# Patient Record
Sex: MALE | Race: WHITE | Employment: UNEMPLOYED | URBAN - NONMETROPOLITAN AREA
[De-identification: names, ages, dates, MRNs, and addresses within clinical notes are randomized per-mention and may not be internally consistent; named-entity substitution may affect disease eponyms.]

---

## 2020-10-16 ENCOUNTER — OFFICE VISIT (OUTPATIENT)
Dept: INTERNAL MEDICINE | Age: 50
End: 2020-10-16
Payer: MEDICAID

## 2020-10-16 VITALS
DIASTOLIC BLOOD PRESSURE: 82 MMHG | SYSTOLIC BLOOD PRESSURE: 122 MMHG | HEART RATE: 81 BPM | HEIGHT: 67 IN | WEIGHT: 193.2 LBS | OXYGEN SATURATION: 98 % | BODY MASS INDEX: 30.32 KG/M2

## 2020-10-16 PROBLEM — E89.0 H/O TOTAL THYROIDECTOMY: Status: ACTIVE | Noted: 2020-10-16

## 2020-10-16 PROBLEM — E34.9 HYPOTESTOSTERONISM: Status: ACTIVE | Noted: 2020-10-16

## 2020-10-16 PROBLEM — Z90.89 H/O TOTAL THYROIDECTOMY: Status: ACTIVE | Noted: 2020-10-16

## 2020-10-16 PROBLEM — I34.1 MITRAL VALVE PROLAPSE: Status: ACTIVE | Noted: 2020-10-16

## 2020-10-16 PROBLEM — M54.40 BACK PAIN OF LUMBAR REGION WITH SCIATICA: Status: ACTIVE | Noted: 2020-10-16

## 2020-10-16 PROBLEM — F41.9 ANXIETY: Status: ACTIVE | Noted: 2020-10-16

## 2020-10-16 PROBLEM — E66.09 OBESITY DUE TO EXCESS CALORIES: Status: ACTIVE | Noted: 2020-10-16

## 2020-10-16 PROBLEM — K21.9 GASTROESOPHAGEAL REFLUX DISEASE WITHOUT ESOPHAGITIS: Status: ACTIVE | Noted: 2020-10-16

## 2020-10-16 PROBLEM — Z98.890 H/O TOTAL THYROIDECTOMY: Status: ACTIVE | Noted: 2020-10-16

## 2020-10-16 PROBLEM — G43.001 MIGRAINE WITHOUT AURA WITH STATUS MIGRAINOSUS: Status: ACTIVE | Noted: 2020-10-16

## 2020-10-16 PROBLEM — E78.1 HYPERTRIGLYCERIDEMIA: Status: ACTIVE | Noted: 2020-10-16

## 2020-10-16 PROCEDURE — 90686 IIV4 VACC NO PRSV 0.5 ML IM: CPT | Performed by: INTERNAL MEDICINE

## 2020-10-16 PROCEDURE — G8431 POS CLIN DEPRES SCRN F/U DOC: HCPCS | Performed by: INTERNAL MEDICINE

## 2020-10-16 PROCEDURE — 4004F PT TOBACCO SCREEN RCVD TLK: CPT | Performed by: INTERNAL MEDICINE

## 2020-10-16 PROCEDURE — 90471 IMMUNIZATION ADMIN: CPT | Performed by: INTERNAL MEDICINE

## 2020-10-16 PROCEDURE — 96160 PT-FOCUSED HLTH RISK ASSMT: CPT | Performed by: INTERNAL MEDICINE

## 2020-10-16 PROCEDURE — G8417 CALC BMI ABV UP PARAM F/U: HCPCS | Performed by: INTERNAL MEDICINE

## 2020-10-16 PROCEDURE — 99203 OFFICE O/P NEW LOW 30 MIN: CPT | Performed by: INTERNAL MEDICINE

## 2020-10-16 PROCEDURE — G8484 FLU IMMUNIZE NO ADMIN: HCPCS | Performed by: INTERNAL MEDICINE

## 2020-10-16 PROCEDURE — G8427 DOCREV CUR MEDS BY ELIG CLIN: HCPCS | Performed by: INTERNAL MEDICINE

## 2020-10-16 RX ORDER — OMEPRAZOLE 20 MG/1
20 CAPSULE, DELAYED RELEASE ORAL 2 TIMES DAILY
COMMUNITY
End: 2020-10-23

## 2020-10-16 RX ORDER — GEMFIBROZIL 600 MG/1
600 TABLET, FILM COATED ORAL DAILY
COMMUNITY
End: 2021-01-18 | Stop reason: ALTCHOICE

## 2020-10-16 RX ORDER — PAROXETINE HYDROCHLORIDE 40 MG/1
40 TABLET, FILM COATED ORAL EVERY MORNING
COMMUNITY
End: 2021-01-18 | Stop reason: SDUPTHER

## 2020-10-16 RX ORDER — METHOCARBAMOL 500 MG/1
500 TABLET, FILM COATED ORAL 3 TIMES DAILY PRN
COMMUNITY
End: 2021-01-18 | Stop reason: ALTCHOICE

## 2020-10-16 RX ORDER — TESTOSTERONE 12.5 MG/1.25G
5 GEL TOPICAL DAILY
COMMUNITY
End: 2020-11-30 | Stop reason: SDUPTHER

## 2020-10-16 RX ORDER — PROPRANOLOL HYDROCHLORIDE 20 MG/1
20 TABLET ORAL NIGHTLY
COMMUNITY
End: 2020-10-23

## 2020-10-16 RX ORDER — ACETAMINOPHEN, ASPIRIN AND CAFFEINE 250; 250; 65 MG/1; MG/1; MG/1
1 TABLET, FILM COATED ORAL EVERY 6 HOURS PRN
COMMUNITY
End: 2021-01-18 | Stop reason: ALTCHOICE

## 2020-10-16 RX ORDER — SILDENAFIL CITRATE 20 MG/1
20 TABLET ORAL PRN
COMMUNITY
End: 2021-05-18 | Stop reason: SDUPTHER

## 2020-10-16 RX ORDER — LEVOTHYROXINE AND LIOTHYRONINE 76; 18 UG/1; UG/1
120 TABLET ORAL DAILY
COMMUNITY
End: 2021-05-10 | Stop reason: SDUPTHER

## 2020-10-16 SDOH — HEALTH STABILITY: MENTAL HEALTH: HOW MANY STANDARD DRINKS CONTAINING ALCOHOL DO YOU HAVE ON A TYPICAL DAY?: 1 OR 2

## 2020-10-16 SDOH — HEALTH STABILITY: MENTAL HEALTH: HOW OFTEN DO YOU HAVE A DRINK CONTAINING ALCOHOL?: MONTHLY OR LESS

## 2020-10-16 ASSESSMENT — ENCOUNTER SYMPTOMS
TROUBLE SWALLOWING: 0
COLOR CHANGE: 0
ABDOMINAL PAIN: 0
WHEEZING: 0
SORE THROAT: 0
DIARRHEA: 0
CONSTIPATION: 0
SHORTNESS OF BREATH: 0
STRIDOR: 0
BLOOD IN STOOL: 0
BACK PAIN: 1
COUGH: 0

## 2020-10-16 ASSESSMENT — PATIENT HEALTH QUESTIONNAIRE - PHQ9
6. FEELING BAD ABOUT YOURSELF - OR THAT YOU ARE A FAILURE OR HAVE LET YOURSELF OR YOUR FAMILY DOWN: 1
7. TROUBLE CONCENTRATING ON THINGS, SUCH AS READING THE NEWSPAPER OR WATCHING TELEVISION: 0
5. POOR APPETITE OR OVEREATING: 1
1. LITTLE INTEREST OR PLEASURE IN DOING THINGS: 3
4. FEELING TIRED OR HAVING LITTLE ENERGY: 3
2. FEELING DOWN, DEPRESSED OR HOPELESS: 1
10. IF YOU CHECKED OFF ANY PROBLEMS, HOW DIFFICULT HAVE THESE PROBLEMS MADE IT FOR YOU TO DO YOUR WORK, TAKE CARE OF THINGS AT HOME, OR GET ALONG WITH OTHER PEOPLE: 1
8. MOVING OR SPEAKING SO SLOWLY THAT OTHER PEOPLE COULD HAVE NOTICED. OR THE OPPOSITE, BEING SO FIGETY OR RESTLESS THAT YOU HAVE BEEN MOVING AROUND A LOT MORE THAN USUAL: 1
SUM OF ALL RESPONSES TO PHQ QUESTIONS 1-9: 14
9. THOUGHTS THAT YOU WOULD BE BETTER OFF DEAD, OR OF HURTING YOURSELF: 1
SUM OF ALL RESPONSES TO PHQ QUESTIONS 1-9: 14
SUM OF ALL RESPONSES TO PHQ9 QUESTIONS 1 & 2: 4
SUM OF ALL RESPONSES TO PHQ QUESTIONS 1-9: 13
3. TROUBLE FALLING OR STAYING ASLEEP: 3

## 2020-10-16 NOTE — PROGRESS NOTES
[facility] INTERNAL MEDICINE  [address] [facility], [place] [address]  Phone: [phone]  Fax: [phone]    Visit Date:  10/16/2020    SUBJECTIVE:     Augie Metcalf is a 52 y.o. 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 and he thinks that is where he developed his back injury he has chronic low back pain occasionally bad and he takes Robaxin as needed for pain otherwise he is not on any pain management occasionally the pain radiates to the leg  Patient complains of a migraine headache every day takes Excedrin Migraine with poor control and is requesting to see neurology  History of colonic polyp and is lost to follow-up  He says he has mitral valve prolapse as a child  Asthma as a child no history of intubations has been smoking a pack a day since age 17 and he is try Chantix which made him extremely nauseous  Occasionally he has GERD and he takes omeprazole  He has chronic anxiety which is well managed with paroxetine  He was on Inderal nightly for his mitral valve prolapse he does not take that regularly    Past Medical History:   Diagnosis Date    Anxiety     Herniated lumbar intervertebral disc     Kidney cysts     Liver cyst     Low testosterone     Migraine     OCD (obsessive compulsive disorder)        Past Surgical History:   Procedure Laterality Date    APPENDECTOMY      HERNIA REPAIR      THYROIDECTOMY         Social History     Socioeconomic History    Marital status:      Spouse name: Not on file    Number of children: Not on file    Years of education: Not on file  Highest education level: Not on file   Occupational History    Not on file   Social Needs    Financial resource strain: Not on file    Food insecurity     Worry: Not on file     Inability: Not on file    Transportation needs     Medical: Not on file     Non-medical: Not on file   Tobacco Use    Smoking status: Current Every Day Smoker     Packs/day: 2.00     Years: 34.00     Pack years: 68.00     Types: Cigarettes     Start date: 14    Smokeless tobacco: Never Used   Substance and Sexual Activity    Alcohol use: Yes     Frequency: Monthly or less     Drinks per session: 1 or 2    Drug use: Yes     Types: Marijuana    Sexual activity: Not on file   Lifestyle    Physical activity     Days per week: Not on file     Minutes per session: Not on file    Stress: Not on file   Relationships    Social connections     Talks on phone: Not on file     Gets together: Not on file     Attends Faith service: Not on file     Active member of club or organization: Not on file     Attends meetings of clubs or organizations: Not on file     Relationship status: Not on file    Intimate partner violence     Fear of current or ex partner: Not on file     Emotionally abused: Not on file     Physically abused: Not on file     Forced sexual activity: Not on file   Other Topics Concern    Not on file   Social History Narrative    Not on file       History reviewed. No pertinent family history. No Known Allergies    Current Outpatient Medications   Medication Sig Dispense Refill    testosterone (ANDROGEL) 25 MG/2.5GM (1%) GEL 1 % gel Apply 5 g topically daily.  Apply 4 clicks  (1ml) daily      gemfibrozil (LOPID) 600 MG tablet Take 600 mg by mouth daily      aspirin-acetaminophen-caffeine (EXCEDRIN MIGRAINE) 250-250-65 MG per tablet Take 1 tablet by mouth every 6 hours as needed for Headaches      omeprazole (PRILOSEC) 20 MG delayed release capsule Take 20 mg by mouth 2 times daily      PARoxetine (PAXIL) 40 MG tablet Take 40 mg by mouth every morning      methocarbamol (ROBAXIN) 500 MG tablet Take 500 mg by mouth 3 times daily as needed      thyroid (ARMOUR THYROID) 120 MG tablet Take 120 mg by mouth daily      sildenafil (REVATIO) 20 MG tablet Take 20 mg by mouth as needed      propranolol (INDERAL) 20 MG tablet Take 20 mg by mouth nightly       No current facility-administered medications for this visit. Patient Active Problem List   Diagnosis    Anxiety    Hypotestosteronism    Hypertriglyceridemia    H/O total thyroidectomy    Gastroesophageal reflux disease without esophagitis    Obesity due to excess calories    Migraine without aura with status migrainosus    Mitral valve prolapse    Back pain of lumbar region with sciatica             Review of Systems:   Review of Systems   Constitutional: Negative for activity change, appetite change, chills, diaphoresis, fatigue and fever. HENT: Negative for congestion, hearing loss, postnasal drip, sore throat, tinnitus and trouble swallowing. Eyes: Negative for visual disturbance. Respiratory: Negative for cough, shortness of breath, wheezing and stridor. Cardiovascular: Negative for chest pain, palpitations and leg swelling. Gastrointestinal: Negative for abdominal pain, blood in stool, constipation and diarrhea. GERD   Endocrine: Negative for cold intolerance. Genitourinary: Negative for frequency. Erectile dysfunction   Musculoskeletal: Positive for back pain. Negative for arthralgias and joint swelling. Skin: Negative for color change and wound. Allergic/Immunologic: Negative for environmental allergies. Neurological: Positive for numbness. Negative for dizziness, light-headedness and headaches. Hematological: Negative for adenopathy. Does not bruise/bleed easily. Psychiatric/Behavioral: Negative for decreased concentration, dysphoric mood and sleep disturbance. The patient is nervous/anxious.          OBJECTIVE: @  BP Readings from Last 3 Encounters:   10/16/20 122/82        @No results found for: LABA1C  No results found for: GLUF, LABMICR, LDLCALC, CREATININE     Physical Exam:    Physical Exam  Vitals signs and nursing note reviewed. Constitutional:       General: He is not in acute distress. Appearance: He is well-developed. HENT:      Head: Normocephalic and atraumatic. Right Ear: External ear normal.      Left Ear: External ear normal.      Nose: Nose normal.      Mouth/Throat:      Mouth: Mucous membranes are moist.   Eyes:      General: No scleral icterus. Conjunctiva/sclera: Conjunctivae normal.      Pupils: Pupils are equal, round, and reactive to light. Neck:      Musculoskeletal: Normal range of motion and neck supple. Thyroid: No thyromegaly. Vascular: No carotid bruit. Cardiovascular:      Rate and Rhythm: Normal rate and regular rhythm. Pulses: Normal pulses. Heart sounds: Normal heart sounds, S1 normal and S2 normal. No murmur. Pulmonary:      Effort: Pulmonary effort is normal.      Breath sounds: Normal breath sounds and air entry. No wheezing or rales. Abdominal:      General: Bowel sounds are normal. There is no distension. Palpations: Abdomen is soft. There is no mass. Tenderness: There is no abdominal tenderness. There is no rebound. Musculoskeletal: Normal range of motion. General: No tenderness. Right lower leg: No edema. Left lower leg: No edema. Lymphadenopathy:      Cervical: No cervical adenopathy. Skin:     General: Skin is warm and dry. Findings: No rash. Neurological:      General: No focal deficit present. Mental Status: He is alert and oriented to person, place, and time. Cranial Nerves: Cranial nerves are intact. No cranial nerve deficit. Sensory: Sensation is intact. Motor: Motor function is intact. Coordination: Coordination is intact. Gait: Gait is intact.       Deep Tendon Reflexes: Reflexes normal.   Psychiatric:         Attention and Perception: Attention normal.         Mood and Affect: Mood normal.         Speech: Speech normal.         Behavior: Behavior normal. Behavior is cooperative. Thought Content: Thought content normal.         Judgment: Judgment normal.       Hospital Outpatient Visit on 03/26/2015   Component Date Value Ref Range Status    Amphetamine Screen, Ur 03/26/2015 Negative  Ktkyon=4783 ng/mL Final    Amphetamine test includes Amphetamine and Methamphetamine.  Barbiturates 03/26/2015 Negative  Bcxwyk=788 ng/mL Final    Benzodiazepines 03/26/2015 Negative  Wtlqcj=359 ng/mL Final    Cannabinoids 03/26/2015 Negative  Cutoff=20 ng/mL Final    Cocaine (Metab.) 03/26/2015 Negative  Pqzulz=247 ng/mL Final    Opiate Scrn, Ur 03/26/2015 Negative  Ytvysi=130 ng/mL Final    Opiate test includes Codeine, Morphine, Hydromorphone, Hydrocodone.  Oxycodone, Urine Confirmation 03/26/2015 Negative  Jzdwyj=369 ng/mL Final    Test includes Oxycodone and Oxymorphone    PHENCYCLIDINE, REFLEX 03/26/2015 Negative  Cutoff=25 ng/mL Final    Methadone Screen, Urine 03/26/2015 Negative  Bdxvlr=714 ng/mL Final    Propoxyphene, Urine 03/26/2015 Negative  Neqvyd=938 ng/mL Final    MEPROBAMATE, REFLEX 03/26/2015 Negative  Ipnezn=367 ng/mL Final    Fentanyl, Ur 03/26/2015 Negative  Asadsf=2357 pg/mL Final    Test includes Fentanyl and Norfentanyl    Ethanol U, Deshaun 03/26/2015 Negative  Cutoff=0.020 % Final    Creatinine, Ur 03/26/2015 166.0  20.0 - 300.0 mg/dL Final    pH, Urine 03/26/2015 6.4  4.5 - 8.9 Final    Comment: Performed at:   [facility]  [address] [place] [address]  [id number]  : Rey Lomeli MD, Phone:  [phone]         ASSESSMENT:      Diagnosis Orders   1. Needs flu shot  INFLUENZA, QUADV, ADJUVANTED, 65 YRS =, IM, PF, PREFILL SYR, 0.5ML (FLUAD)   2.  Anxiety anxiety and depression in good control continue Paxil Comprehensive Metabolic Panel   3. Hypotestosteronism continue current testosterone replacement topical CBC Auto Differential   4. Hypertriglyceridemia  Lipid Panel   5. H/O total thyroidectomy  TSH without Reflex   6. Gastroesophageal reflux disease without esophagitis     7. Class 1 obesity due to excess calories without serious comorbidity with body mass index (BMI) of 30.0 to 30.9 in adult  Hemoglobin A1C   8. Screening for HIV without presence of risk factors  HIV Rapid 1&2   9. Hypovitaminosis D  Vitamin D 25 Hydroxy   10. Screening for colon cancer  [facility], [place]   11. Migraine without aura and with status migrainosus, not intractable  [address], [place]   12. Mitral valve prolapse     13. Back pain of lumbar region with sciatica     14. Tobacco abuse  Full PFT Study With Bronchodilator       PLAN:        Medications Ordered:  No orders of the defined types were placed in this encounter.       Other Orders Placed:  Orders Placed This Encounter   Procedures    INFLUENZA, QUADV, ADJUVANTED, 72 YRS =, IM, PF, PREFILL SYR, 0.5ML (FLUAD)    CBC Auto Differential     Standing Status:   Future     Standing Expiration Date:   10/16/2021    Comprehensive Metabolic Panel     Standing Status:   Future     Standing Expiration Date:   10/16/2021    Hemoglobin A1C     Standing Status:   Future     Standing Expiration Date:   10/16/2021    HIV Rapid 1&2     Standing Status:   Future     Standing Expiration Date:   10/16/2021     Order Specific Question:   Specify Date & Time of Incident     Answer:   screening    Lipid Panel     Standing Status:   Future     Standing Expiration Date:   10/16/2021     Order Specific Question:   Is Patient Fasting?/# of Hours     Answer:   yes    TSH without Reflex     Standing Status:   Future     Standing Expiration Date:   10/16/2021    Vitamin D 25 Hydroxy     Standing Status:   Future     Standing Expiration Date:   10/16/2021   [facility], [place]     Referral Priority:   Routine     Referral Type:   Eval and Treat     Referral Reason:   Specialty Services Required     Requested Specialty:   Gastroenterology     Number of Visits Requested:   [address], [place]     Referral Priority:   Routine     Referral Type:   Eval and Treat     Referral Reason:   Specialty Services Required     Requested Specialty:   Neurology     Number of Visits Requested:   1    Full PFT Study With Bronchodilator     Standing Status:   Future     Standing Expiration Date:   10/16/2021       Return in about 3 months (around 1/16/2021).            Electronically signed by Theron Mackenzie MD on 10/16/2020 at 9:58 AM

## 2020-10-16 NOTE — PATIENT INSTRUCTIONS
Patient Education        A Healthy Lifestyle: Care Instructions  Your Care Instructions     A healthy lifestyle can help you feel good, stay at a healthy weight, and have plenty of energy for both work and play. A healthy lifestyle is something you can share with your whole family. A healthy lifestyle also can lower your risk for serious health problems, such as high blood pressure, heart disease, and diabetes. You can follow a few steps listed below to improve your health and the health of your family. Follow-up care is a key part of your treatment and safety. Be sure to make and go to all appointments, and call your doctor if you are having problems. It's also a good idea to know your test results and keep a list of the medicines you take. How can you care for yourself at home? · Do not eat too much sugar, fat, or fast foods. You can still have dessert and treats now and then. The goal is moderation. · Start small to improve your eating habits. Pay attention to portion sizes, drink less juice and soda pop, and eat more fruits and vegetables. ? Eat a healthy amount of food. A 3-ounce serving of meat, for example, is about the size of a deck of cards. Fill the rest of your plate with vegetables and whole grains. ? Limit the amount of soda and sports drinks you have every day. Drink more water when you are thirsty. ? Eat at least 5 servings of fruits and vegetables every day. It may seem like a lot, but it is not hard to reach this goal. A serving or helping is 1 piece of fruit, 1 cup of vegetables, or 2 cups of leafy, raw vegetables. Have an apple or some carrot sticks as an afternoon snack instead of a candy bar. Try to have fruits and/or vegetables at every meal.  · Make exercise part of your daily routine. You may want to start with simple activities, such as walking, bicycling, or slow swimming. Try to be active 30 to 60 minutes every day. You do not need to do all 30 to 60 minutes all at once.  For example, you can exercise 3 times a day for 10 or 20 minutes. Moderate exercise is safe for most people, but it is always a good idea to talk to your doctor before starting an exercise program.  · Keep moving. [place] Ramp the lawn, work in the garden, or [employer]. Take the stairs instead of the elevator at work. · If you smoke, quit. People who smoke have an increased risk for heart attack, stroke, cancer, and other lung illnesses. Quitting is hard, but there are ways to boost your chance of quitting tobacco for good. ? Use nicotine gum, patches, or lozenges. ? Ask your doctor about stop-smoking programs and medicines. ? Keep trying. In addition to reducing your risk of diseases in the future, you will notice some benefits soon after you stop using tobacco. If you have shortness of breath or asthma symptoms, they will likely get better within a few weeks after you quit. · Limit how much alcohol you drink. Moderate amounts of alcohol (up to 2 drinks a day for men, 1 drink a day for women) are okay. But drinking too much can lead to liver problems, high blood pressure, and other health problems. Family health  If you have a family, there are many things you can do together to improve your health. · Eat meals together as a family as often as possible. · Eat healthy foods. This includes fruits, vegetables, lean meats and dairy, and whole grains. · Include your family in your fitness plan. Most people think of activities such as jogging or tennis as the way to fitness, but there are many ways you and your family can be more active. Anything that makes you breathe hard and gets your heart pumping is exercise. Here are some tips:  ? Walk to do errands or to take your child to school or the bus.  ? Go for a family bike ride after dinner instead of watching TV. Where can you learn more? Go to https://chavelinoeb.health-partners. org and sign in to your [employer] account.  Enter P890 in the [facility] box to learn more about \"A Healthy Lifestyle: Care Instructions. \"     If you do not have an account, please click on the \"Sign Up Now\" link. Current as of: January 31, 2020               Content Version: 12.6  © 2006-2020 [employer]. Care instructions adapted under license by [facility] ([facility]). If you have questions about a medical condition or this instruction, always ask your healthcare professional. [address] any warranty or liability for your use of this information. Patient Education        Heart-Healthy Diet: Care Instructions  Your Care Instructions     A heart-healthy diet has lots of vegetables, fruits, nuts, beans, and whole grains, and is low in salt. It limits foods that are high in saturated fat, such as meats, cheeses, and fried foods. It may be hard to change your diet, but even small changes can lower your risk of heart attack and heart disease. Follow-up care is a key part of your treatment and safety. Be sure to make and go to all appointments, and call your doctor if you are having problems. It's also a good idea to know your test results and keep a list of the medicines you take. How can you care for yourself at home? Watch your portions  · Learn what a serving is. A \"serving\" and a \"portion\" are not always the same thing. Make sure that you are not eating larger portions than are recommended. For example, a serving of pasta is ½ cup. A serving size of meat is 2 to 3 ounces. A 3-ounce serving is about the size of a deck of cards. Measure serving sizes until you are good at [place]" them. Keep in mind that restaurants often serve portions that are 2 or 3 times the size of one serving. · To keep your energy level up and keep you from feeling hungry, eat often but in smaller portions. · Eat only the number of calories you need to stay at a healthy weight.  If you need to lose weight, eat fewer calories than your body burns (through exercise and other physical activity). Eat more fruits and vegetables  · Eat a variety of fruit and vegetables every day. Dark green, deep orange, red, or yellow fruits and vegetables are especially good for you. Examples include spinach, carrots, peaches, and berries. · Keep carrots, celery, and other veggies handy for snacks. Buy fruit that is in season and store it where you can see it so that you will be tempted to eat it. · Cook dishes that have a lot of veggies in them, such as stir-fries and soups. Limit saturated and trans fat  · Read food labels, and try to avoid saturated and trans fats. They increase your risk of heart disease. · Use olive or canola oil when you cook. · Bake, broil, grill, or steam foods instead of frying them. · Choose lean meats instead of high-fat meats such as hot dogs and sausages. Cut off all visible fat when you prepare meat. · Eat fish, skinless poultry, and meat alternatives such as soy products instead of high-fat meats. Soy products, such as tofu, may be especially good for your heart. · Choose low-fat or fat-free milk and dairy products. Eat foods high in fiber  · Eat a variety of grain products every day. Include whole-grain foods that have lots of fiber and nutrients. Examples of whole-grain foods include oats, whole wheat bread, and brown rice. · Buy whole-grain breads and cereals, instead of white bread or pastries. Limit salt and sodium  · Limit how much salt and sodium you eat to help lower your blood pressure. · Taste food before you salt it. Add only a little salt when you think you need it. With time, your taste buds will adjust to less salt. · Eat fewer snack items, fast foods, and other high-salt, processed foods. Check food labels for the amount of sodium in packaged foods. · Choose low-sodium versions of canned goods (such as soups, vegetables, and beans). Limit sugar  · Limit drinks and foods with added sugar.  These include candy, desserts, and soda pop.  Limit alcohol  · Limit alcohol to no more than 2 drinks a day for men and 1 drink a day for women. Too much alcohol can cause health problems. When should you call for help? Watch closely for changes in your health, and be sure to contact your doctor if:    · You would like help planning heart-healthy meals. Where can you learn more? Go to https://chpelainaewprerna.healthPivotLink. org and sign in to your [employer] account. Enter V137 in the [employer] box to learn more about \"Heart-Healthy Diet: Care Instructions. \"     If you do not have an account, please click on the \"Sign Up Now\" link. Current as of: August 22, 2019               Content Version: 12.6  © 8068-8648 [employer]. Care instructions adapted under license by [facility] ([facility]). If you have questions about a medical condition or this instruction, always ask your healthcare professional. [address] any warranty or liability for your use of this information.

## 2020-10-16 NOTE — PROGRESS NOTES
After obtaining consent, and per orders of Dr. Aby Spicer, injection of Flu given in Left deltoid by Magan Burr. Patient instructed to remain in clinic for 20 minutes afterwards, and to report any adverse reaction to me immediately.

## 2020-10-19 ENCOUNTER — TELEPHONE (OUTPATIENT)
Dept: NEUROLOGY | Age: 50
End: 2020-10-19

## 2020-10-19 NOTE — TELEPHONE ENCOUNTER
Called spoke with patient wife about appointment , with Catarina Pascal, wife is aware of the appointment, location and phone number.

## 2020-10-20 DIAGNOSIS — E55.9 HYPOVITAMINOSIS D: ICD-10-CM

## 2020-10-20 DIAGNOSIS — Z11.4 SCREENING FOR HIV WITHOUT PRESENCE OF RISK FACTORS: ICD-10-CM

## 2020-10-20 DIAGNOSIS — E66.09 CLASS 1 OBESITY DUE TO EXCESS CALORIES WITHOUT SERIOUS COMORBIDITY WITH BODY MASS INDEX (BMI) OF 30.0 TO 30.9 IN ADULT: ICD-10-CM

## 2020-10-20 DIAGNOSIS — F41.9 ANXIETY: ICD-10-CM

## 2020-10-20 DIAGNOSIS — E34.9 HYPOTESTOSTERONISM: ICD-10-CM

## 2020-10-20 DIAGNOSIS — E78.1 HYPERTRIGLYCERIDEMIA: ICD-10-CM

## 2020-10-20 DIAGNOSIS — E89.0 H/O TOTAL THYROIDECTOMY: ICD-10-CM

## 2020-10-20 LAB
ALBUMIN SERPL-MCNC: 4.3 G/DL (ref 3.5–5.2)
ALP BLD-CCNC: 76 U/L (ref 40–130)
ALT SERPL-CCNC: 19 U/L (ref 5–41)
ANION GAP SERPL CALCULATED.3IONS-SCNC: 13 MMOL/L (ref 7–19)
AST SERPL-CCNC: 15 U/L (ref 5–40)
BASOPHILS ABSOLUTE: 0 K/UL (ref 0–0.2)
BASOPHILS RELATIVE PERCENT: 0.7 % (ref 0–1)
BILIRUB SERPL-MCNC: <0.2 MG/DL (ref 0.2–1.2)
BUN BLDV-MCNC: 15 MG/DL (ref 6–20)
CALCIUM SERPL-MCNC: 9.1 MG/DL (ref 8.6–10)
CHLORIDE BLD-SCNC: 107 MMOL/L (ref 98–111)
CHOLESTEROL, TOTAL: 203 MG/DL (ref 160–199)
CO2: 23 MMOL/L (ref 22–29)
CREAT SERPL-MCNC: 0.9 MG/DL (ref 0.5–1.2)
EOSINOPHILS ABSOLUTE: 0.2 K/UL (ref 0–0.6)
EOSINOPHILS RELATIVE PERCENT: 3.1 % (ref 0–5)
GFR AFRICAN AMERICAN: >59
GFR NON-AFRICAN AMERICAN: >60
GLUCOSE BLD-MCNC: 113 MG/DL (ref 74–109)
HBA1C MFR BLD: 5.5 % (ref 4–6)
HCT VFR BLD CALC: 39.3 % (ref 42–52)
HDLC SERPL-MCNC: 41 MG/DL (ref 55–121)
HEMOGLOBIN: 13.5 G/DL (ref 14–18)
HIV-1 P24 AG: NORMAL
IMMATURE GRANULOCYTES #: 0 K/UL
LDL CHOLESTEROL CALCULATED: 135 MG/DL
LYMPHOCYTES ABSOLUTE: 1.6 K/UL (ref 1.1–4.5)
LYMPHOCYTES RELATIVE PERCENT: 27.3 % (ref 20–40)
MCH RBC QN AUTO: 31 PG (ref 27–31)
MCHC RBC AUTO-ENTMCNC: 34.4 G/DL (ref 33–37)
MCV RBC AUTO: 90.3 FL (ref 80–94)
MONOCYTES ABSOLUTE: 0.4 K/UL (ref 0–0.9)
MONOCYTES RELATIVE PERCENT: 7.6 % (ref 0–10)
NEUTROPHILS ABSOLUTE: 3.5 K/UL (ref 1.5–7.5)
NEUTROPHILS RELATIVE PERCENT: 61.1 % (ref 50–65)
PDW BLD-RTO: 13.7 % (ref 11.5–14.5)
PLATELET # BLD: 297 K/UL (ref 130–400)
PMV BLD AUTO: 9.4 FL (ref 9.4–12.4)
POTASSIUM SERPL-SCNC: 3.9 MMOL/L (ref 3.5–5)
RAPID HIV 1&2: NORMAL
RBC # BLD: 4.35 M/UL (ref 4.7–6.1)
SODIUM BLD-SCNC: 143 MMOL/L (ref 136–145)
TOTAL PROTEIN: 6.8 G/DL (ref 6.6–8.7)
TRIGL SERPL-MCNC: 133 MG/DL (ref 0–149)
TSH SERPL DL<=0.05 MIU/L-ACNC: 0.26 UIU/ML (ref 0.27–4.2)
VITAMIN D 25-HYDROXY: 34 NG/ML
WBC # BLD: 5.8 K/UL (ref 4.8–10.8)

## 2020-10-23 ENCOUNTER — OFFICE VISIT (OUTPATIENT)
Dept: GASTROENTEROLOGY | Age: 50
End: 2020-10-23
Payer: MEDICAID

## 2020-10-23 VITALS
HEIGHT: 67 IN | OXYGEN SATURATION: 98 % | WEIGHT: 195 LBS | SYSTOLIC BLOOD PRESSURE: 120 MMHG | BODY MASS INDEX: 30.61 KG/M2 | HEART RATE: 82 BPM | DIASTOLIC BLOOD PRESSURE: 80 MMHG

## 2020-10-23 PROCEDURE — 99204 OFFICE O/P NEW MOD 45 MIN: CPT | Performed by: NURSE PRACTITIONER

## 2020-10-23 PROCEDURE — 4004F PT TOBACCO SCREEN RCVD TLK: CPT | Performed by: NURSE PRACTITIONER

## 2020-10-23 PROCEDURE — G8427 DOCREV CUR MEDS BY ELIG CLIN: HCPCS | Performed by: NURSE PRACTITIONER

## 2020-10-23 PROCEDURE — G8484 FLU IMMUNIZE NO ADMIN: HCPCS | Performed by: NURSE PRACTITIONER

## 2020-10-23 PROCEDURE — G8417 CALC BMI ABV UP PARAM F/U: HCPCS | Performed by: NURSE PRACTITIONER

## 2020-10-23 ASSESSMENT — ENCOUNTER SYMPTOMS
ABDOMINAL DISTENTION: 0
VOMITING: 0
NAUSEA: 0
TROUBLE SWALLOWING: 0
DIARRHEA: 1
RECTAL PAIN: 0
SHORTNESS OF BREATH: 0
ABDOMINAL PAIN: 1
CONSTIPATION: 1
CHOKING: 0
ANAL BLEEDING: 1
BLOOD IN STOOL: 0
COUGH: 0

## 2020-10-23 NOTE — PATIENT INSTRUCTIONS
restrictions to diet and bowel prep instructions including laxatives. Please read these instructions one week prior to your scheduled procedure to ensure that you are prepared. If you have any questions regarding these instructions please call our office Mon through Fri from 8:00 am to 4:00 pm.     Follow prep instructions provided for bowel prep. Take all of the bowel prep as directed. If you are having problems with nausea, stop your prep for 30-45 min to allow the nausea to subside before resuming your prep. It is important to drink plenty of fluids throughout the day before taking your laxatives. This will help to protect your kidneys, prevent dehydration and maximize the effect of the bowel prep. Your diet before a colonoscopy bowel preparation is very important to ensure a successful colon exam. It is recommended to consider certain changes to your diet three to four days prior to the procedure. Remember that your bowels need to be completely empty for the exam.    What foods are good to eat? Cut down on heavy solid foods three to four days before the procedure and start introducing lighter meals to your diet. The following food suggestions are a good part of your diet before a colonoscopy bowel preparation.  Light meat that is easily digestible such as chicken (without the skin)    Potatoes without skin    Cheese    Eggs    A light meal of steamed white fish    Light clear soups    Foods and drinks to avoid  Avoid foods that contain too much fiber. Stay clear of dark colored beverages. They can stick to the walls of the digestive tract and make it difficult to differentiate from blood.  Some of these foods are:   Red meat, rice, nuts and vegetables    Milk, other milk based fluids and cream    Most fruit and puddings    Whole grain pasta    Cereals, bran and seeds    Colored beverages, especially those that are red or purple in color    Red colored Jell-O     On the day before the colonoscopy, continue to drink plenty of clear fluids. It is important   to keep yourself hydrated before the exam.     Please follow all instructions as provided for cleansing the bowel. Failure to have an adequately prepped colon may cause you to have incomplete exam with further testing required. [email or web address]    Increasing Your Fiber Intake   What is fiber? Fiber is the portion of plant foods that cannot be digested. There are two kinds of fiber, both of which are keys to a healthy diet and a healthy digestive system:   - Soluble fiber aids in bulking and moving food through the gut. It forms a gel when mixed with liquid. - Insoluble fiber does not mix with liquids and passes through the GI tract mostly intact. It is sometimes called \"roughage. \"     Why do I need to eat it? Fiber has many important roles:   - Helps maintain regular bowel movements. More fiber can improve both diarrhea and constipation.   - Reduces the risk of developing hemorrhoids. - Lowers LDL or \"bad\" cholesterol levels, which lowers risk of heart disease.   - Regulates blood sugar levels in people with diabetes. - Provides a feeling of fullness and may help with weight loss. How much do I need? The Academy of Nutrition and Dietetics recommends:   - For women, 25 grams per day under age 48 and 21 grams per day over age 48. - For men, 38 grams per day under age 48 and 30 grams per day over age 48. What foods are the best sources? Plant foods contain fiber, but some more than others. Best choices are:   - Whole grains and high fiber cereals. - Dried beans and legumes. - Fruits and vegetables, especially raw. What about fiber supplements? If you need to add more fiber than you can get in your diet, consider:   - Type of Fiber: The major brands of fiber supplements (Metamucil®, Konsyl®, Citrucel®, Benefiber®, Fibercon®) all use soluble fiber and work in the same way. - Flavorings and Mixing: Many of the powdered brands have added flavoring and are mixed with just water. Other varieties are \"clear\" and can be added to numerous beverages and food items. Some brands also offer a \"wafer\" form. It's up to you! Tip: Increasing the fiber in your diet gradually may help minimize bloating and discomfort. Be sure to drink plenty of fluids as you increase your fiber intake.         Fruits  Serving size  Total fiber (grams)    Pear  1 medium  5.1    Figs, dried  2 medium  3.7    Blueberries  1 cup  3.5    Apple, with skin  1 medium  4.4    Strawberries  1 cup  3.3    Peaches, dried  3 halves  3.2    Orange  1 medium  3.1    Apricots, dried  10 halves  2.6    Raisins  1.5-ounce box  1.6    Grains, cereal & pasta  Serving size  Total fiber (grams)    Spaghetti, whole-wheat  1 cup  6.3    Bran flakes  3/4 cup  5.1    Oatmeal  1 cup  4.0    Bread, rye  1 slice  1.9    Bread, whole-wheat  1 slice  1.9    Bread, mixed-grain  1 slice  1.7    Bread, cracked-wheat  1 slice  1.4

## 2020-10-23 NOTE — PROGRESS NOTES
Subjective:     Patient ID: Nico Ruelas is a 52 y.o. male  PCP: Mckenna Carroll MD  Referring Provider: Christelle Romero MD    HPI  Patient presents to the office today with the following complaints: New Patient and Colonoscopy      Pt here for colonoscopy. Today, he reports bowels alternate between constipation and diarrhea. He reports BM 2-3 times a day, since 2013. He does c/o tarry stools at times, and bright red blood in toilet water at times. This is associated with abdominal cramping at times. Last Colonoscopy 2017 - polyps per patient  Denies any family history of colon cancer or colon polyps    This was my first time assessing Mr. Jose Koehler    Assessment:     1. Rectal bleeding    2. Change in bowel habits    3. Abdominal cramping            Plan:   - High Fiber Diet  - Schedule colonoscopy  Instruct on bowel prep. Nothing to eat or drink after midnight the day of the exam.  Unable to drive for 24 hours after the procedure. No aspirin or nonsteroidal anti-inflammatories for 5 days before procedure. I have discussed the benefits, alternatives, and risks (including bleeding, perforation and death)  for pursuing Endoscopy (EGD/Colonscopy/EUS/ERCP) with the patient and they are willing to continue. We also discussed the need for anesthesia, IV access, proper dietary changes, medication changes if necessary, and need for bowel prep (if ordered) prior to their Endoscopic procedure. They are aware they must have someone accompany them to their scheduled procedure to drive them home - they agree to the above and are willing to continue. Orders  No orders of the defined types were placed in this encounter. Medications  No orders of the defined types were placed in this encounter.         Patient History:     Past Medical History:   Diagnosis Date    Anxiety     Herniated lumbar intervertebral disc     Kidney cysts     Liver cyst     Low testosterone     Migraine     OCD (obsessive compulsive disorder)        Past Surgical History:   Procedure Laterality Date    APPENDECTOMY      COLONOSCOPY  approx 2017    Dr Joselyn Madrid @ [facility] h[facility] \" polyps\" per pt recall    HERNIA REPAIR      THYROIDECTOMY         Family History   Problem Relation Age of Onset    Colon Cancer Neg Hx     Colon Polyps Neg Hx        Social History     Socioeconomic History    Marital status:      Spouse name: None    Number of children: None    Years of education: None    Highest education level: None   Occupational History    None   Social Needs    Financial resource strain: None    Food insecurity     Worry: None     Inability: None    Transportation needs     Medical: None     Non-medical: None   Tobacco Use    Smoking status: Current Every Day Smoker     Packs/day: 2.00     Years: 34.00     Pack years: 68.00     Types: Cigarettes     Start date: 1986    Smokeless tobacco: Never Used   Substance and Sexual Activity    Alcohol use: Yes     Frequency: Monthly or less     Drinks per session: 1 or 2     Comment: daily    Drug use: Yes     Types: Marijuana    Sexual activity: None   Lifestyle    Physical activity     Days per week: None     Minutes per session: None    Stress: None   Relationships    Social connections     Talks on phone: None     Gets together: None     Attends Church service: None     Active member of club or organization: None     Attends meetings of clubs or organizations: None     Relationship status: None    Intimate partner violence     Fear of current or ex partner: None     Emotionally abused: None     Physically abused: None     Forced sexual activity: None   Other Topics Concern    None   Social History Narrative    None       Current Outpatient Medications   Medication Sig Dispense Refill    testosterone (ANDROGEL) 25 MG/2.5GM (1%) GEL 1 % gel Apply 5 g topically daily.  Apply 4 clicks  (1ml) daily      gemfibrozil (LOPID) 600 MG tablet Take 600 mg by mouth daily      aspirin-acetaminophen-caffeine (EXCEDRIN MIGRAINE) 250-250-65 MG per tablet Take 1 tablet by mouth every 6 hours as needed for Headaches      PARoxetine (PAXIL) 40 MG tablet Take 40 mg by mouth every morning      methocarbamol (ROBAXIN) 500 MG tablet Take 500 mg by mouth 3 times daily as needed      thyroid (ARMOUR THYROID) 120 MG tablet Take 120 mg by mouth daily      sildenafil (REVATIO) 20 MG tablet Take 20 mg by mouth as needed       No current facility-administered medications for this visit. No Known Allergies    Review of Systems   Constitutional: Negative for activity change, appetite change, fatigue, fever and unexpected weight change. HENT: Negative for trouble swallowing. Respiratory: Negative for cough, choking and shortness of breath. Cardiovascular: Negative for chest pain. Gastrointestinal: Positive for abdominal pain (cramping), anal bleeding, constipation and diarrhea. Negative for abdominal distention, blood in stool, nausea, rectal pain and vomiting. Musculoskeletal:        Chronic pain     Allergic/Immunologic: Negative for food allergies. All other systems reviewed and are negative. Objective:     /80   Pulse 82   Ht 5' 7\" (1.702 m)   Wt 195 lb (88.5 kg)   SpO2 98%   BMI 30.54 kg/m²     Physical Exam  Vitals signs reviewed. Constitutional:       General: He is not in acute distress. Appearance: He is well-developed. HENT:      Head: Normocephalic and atraumatic. Right Ear: External ear normal.      Left Ear: External ear normal.      Nose: Nose normal.      Comments: Mask on     Mouth/Throat:      Comments: Mask on  Eyes:      Conjunctiva/sclera: Conjunctivae normal.      Pupils: Pupils are equal, round, and reactive to light. Neck:      Musculoskeletal: Normal range of motion and neck supple. Cardiovascular:      Rate and Rhythm: Normal rate and regular rhythm. Heart sounds: Normal heart sounds. No murmur.

## 2020-11-02 ENCOUNTER — OFFICE VISIT (OUTPATIENT)
Age: 50
End: 2020-11-02

## 2020-11-02 VITALS — HEART RATE: 86 BPM | OXYGEN SATURATION: 96 % | TEMPERATURE: 98.4 F

## 2020-11-02 PROCEDURE — 99999 PR OFFICE/OUTPT VISIT,PROCEDURE ONLY: CPT | Performed by: NURSE PRACTITIONER

## 2020-11-04 LAB — SARS-COV-2, NAA: NOT DETECTED

## 2020-11-05 ENCOUNTER — OFFICE VISIT (OUTPATIENT)
Dept: NEUROLOGY | Age: 50
End: 2020-11-05
Payer: MEDICAID

## 2020-11-05 ENCOUNTER — TELEPHONE (OUTPATIENT)
Dept: NEUROLOGY | Age: 50
End: 2020-11-05

## 2020-11-05 VITALS
SYSTOLIC BLOOD PRESSURE: 138 MMHG | RESPIRATION RATE: 16 BRPM | WEIGHT: 194 LBS | HEIGHT: 67 IN | DIASTOLIC BLOOD PRESSURE: 89 MMHG | HEART RATE: 78 BPM | BODY MASS INDEX: 30.45 KG/M2

## 2020-11-05 PROBLEM — G47.33 OBSTRUCTIVE SLEEP APNEA: Status: ACTIVE | Noted: 2020-11-05

## 2020-11-05 PROBLEM — G44.40 ANALGESIC REBOUND HEADACHE: Status: ACTIVE | Noted: 2020-11-05

## 2020-11-05 PROBLEM — R06.81 WITNESSED APNEIC SPELLS: Status: ACTIVE | Noted: 2020-11-05

## 2020-11-05 PROBLEM — M54.2 NECK PAIN: Status: ACTIVE | Noted: 2020-11-05

## 2020-11-05 PROBLEM — R40.0 SOMNOLENCE, DAYTIME: Status: ACTIVE | Noted: 2020-11-05

## 2020-11-05 PROBLEM — G43.011 INTRACTABLE MIGRAINE WITHOUT AURA AND WITH STATUS MIGRAINOSUS: Status: ACTIVE | Noted: 2020-11-05

## 2020-11-05 PROBLEM — T39.95XA ANALGESIC REBOUND HEADACHE: Status: ACTIVE | Noted: 2020-11-05

## 2020-11-05 PROBLEM — R06.83 SNORING: Status: ACTIVE | Noted: 2020-11-05

## 2020-11-05 PROCEDURE — G8484 FLU IMMUNIZE NO ADMIN: HCPCS | Performed by: PHYSICIAN ASSISTANT

## 2020-11-05 PROCEDURE — 4004F PT TOBACCO SCREEN RCVD TLK: CPT | Performed by: PHYSICIAN ASSISTANT

## 2020-11-05 PROCEDURE — G8417 CALC BMI ABV UP PARAM F/U: HCPCS | Performed by: PHYSICIAN ASSISTANT

## 2020-11-05 PROCEDURE — 99214 OFFICE O/P EST MOD 30 MIN: CPT | Performed by: PHYSICIAN ASSISTANT

## 2020-11-05 PROCEDURE — G8427 DOCREV CUR MEDS BY ELIG CLIN: HCPCS | Performed by: PHYSICIAN ASSISTANT

## 2020-11-05 RX ORDER — AMITRIPTYLINE HYDROCHLORIDE 10 MG/1
10 TABLET, FILM COATED ORAL NIGHTLY
Qty: 30 TABLET | Refills: 3 | Status: SHIPPED | OUTPATIENT
Start: 2020-11-05 | End: 2021-01-18 | Stop reason: ALTCHOICE

## 2020-11-05 RX ORDER — METHYLPREDNISOLONE 4 MG/1
TABLET ORAL
Qty: 1 KIT | Refills: 1 | Status: SHIPPED | OUTPATIENT
Start: 2020-11-05 | End: 2020-11-11

## 2020-11-05 RX ORDER — SUMATRIPTAN 100 MG/1
TABLET, FILM COATED ORAL
Qty: 9 TABLET | Refills: 5 | Status: SHIPPED | OUTPATIENT
Start: 2020-11-05 | End: 2021-04-07 | Stop reason: SDUPTHER

## 2020-11-05 NOTE — PATIENT INSTRUCTIONS
Patient education: Migraine headaches in adults       MIGRAINE HEADACHE OVERVIEW -- Headaches can be quite debilitating, although the vast majority are not due to life-threatening disorders. Approximately 90 percent of headaches are caused by one of three syndromes:   ?Migraine headache  ? Tension-type headaches  ? Cluster headaches  This article discusses migraine headaches in adults. MIGRAINE HEADACHE SYMPTOMS -- Between 12 and 16 percent of people in the [place] experience migraine headaches, making it the second most common type of headache. Pain -- The pain of a migraine headache usually begins gradually, intensifies over minutes to one or more hours, and resolves gradually at the end of the attack. The headache is typically dull, deep, and steady when mild to moderate in severity; it becomes throbbing or pulsatile when severe. Migraine headaches are worsened by light, sneezing, straining, constant motion, moving the head rapidly, or physical activity. Many migraine sufferers try to get relief by lying down in a darkened, quiet room. In 60 to 70 percent of people, the pain occurs on only one side of the head. In adults, a migraine headache usually lasts a few hours, although it can last from four to 72 hours. Other symptoms -- Migraine headaches are often accompanied by nausea and vomiting, as well as sensitivity to light and noise. Between 10 and 20 percent of people with migraines also experience nasal stuffiness and runny nose, or teary eyes. The symptoms of a migraine attack may be severe and alarming, but in most cases there are no lasting health effects when the attack ends. Aura -- About 20 percent of people with migraines experience symptoms before the headache; this is called an aura. The aura may include flashing lights or bright spots, zigzag lines, changes in vision, or numbness or tingling in the fingers of one hand, lips, tongue, or lower face.  You may have one or more of these aura symptoms. Auras may also involve other senses and can occasionally cause temporary muscle weakness or changes in speech; these symptoms can be frightening. Aura symptoms typically last five to 20 minutes and rarely last more than 60 minutes. The headache occurs soon after the aura stops. Muscle-related auras may last longer. MIGRAINE HEADACHE TRIGGERS -- Migraines can be triggered by stress, worry, menstrual periods, birth control pills, physical exertion, fatigue, lack of sleep, hunger, head trauma, and certain foods or drinks that contain chemicals such as nitrites, glutamate, aspartate, tyramine. Certain medications and chemicals can also trigger a migraine, including nitroglycerin (used to treat chest pain), estrogens, hydralazine (used to treat high blood pressure), perfumes, smoke, and organic solvents with a strong odor. Headache diary -- People who have frequent or severe headaches may benefit from keeping a headache diary over the course of one month. This can be used to determine what triggers the migraines and what makes them better. MIGRAINE HEADACHE TREATMENT TYPES -- Migraine headache treatment depends upon the frequency, severity, and symptoms of your headache. ? Acute treatment refers to medicines you can take when you have a headache to relieve the pain immediately. ?Preventive treatment refers to medicines you can take on a regular (usually daily) basis to prevent headaches in the future. Acute treatment -- The pain of migraines can be tough to get rid of. Treatment is most likely to work if you take it at the first sign of an attack (eg, at the first sign of aura if one occurs, or when pain begins). In some people, an aura occurs before the migraine (see 'Aura' above). Therefore, an aura can serve as a reliable warning that a migraine headache is on the way, and should be the signal to take migraine medication. (See \"Acute treatment of migraine in adults\". )  Pain relievers -- Mild migraine attacks may respond to pain relievers, some of which are available without a prescription. These drugs include:  ? Aspirin  ? Acetaminophen  ? Nonsteroidal anti-inflammatory drugs (NSAIDs) such as ibuprofen, indomethacin, or naproxen  ? Indomethacin is a prescription medicine that comes in a rectal suppository, which may be useful for people who have nausea during their headaches. Pain relievers are also available in combination with caffeine, which enhances their antimigraine effect. As an example, some pain relievers contain a combination of acetaminophen, aspirin, and caffeine. Pain relievers are often recommended first for mild to moderate migraine attacks. However, they should not be used too often because overuse can lead to medication-overuse headaches or chronic daily headaches. If you respond to a pain reliever, continue taking it with each attack, as long as you do not take it more than once or twice per week. People with gastritis (inflammation of the stomach), ulcers, kidney disease, and bleeding conditions should not take products containing aspirin or NSAIDs. Anti-nausea medications -- If you have nausea and vomiting with a migraine, you can take an anti-nausea medicine by injection or rectal suppository. In some cases, antimigraine drugs can be taken in combination with drugs that alleviate nausea and vomiting, such as metoclopramide or prochlorperazine. Anti-nausea medications given by mouth are usually used in combination with other medications to treat acute migraine. However, anti-nausea medications can be given alone in the hospital by intravenous and intramuscular administration to treat acute migraine headache. Triptans -- If a pain reliever does not control your migraine pain, most healthcare providers will recommend a treatment that is migraine-specific. This includes a class of medications called triptans.  Examples of triptans are sumatriptan, zolmitriptan, naratriptan, rizatriptan, almotriptan, eletriptan, and frovatriptan. Triptans can be used at home or work/school, and are all available in an oral (pill) form. Sumatriptan and zolmitriptan are available as nasal sprays, and sumatriptan is available as an injection. People with familial hemiplegic migraine, basilar migraine, uncontrolled high blood pressure, vascular disease (including ischemic stroke and coronary artery disease), Prinzmetal's angina, pregnancy, and severe kidney or liver disease should not take triptans in most cases. ?Sumatriptan -- Sumatriptan is available in many different formulas, including a tablet, nasal spray, and injection. Over 70 percent of people get pain relief within one hour of injecting sumatriptan; by two hours, 90 percent of people notice improvement. Your healthcare provider can help to decide which formula (pill, nasal spray, or shot) is best for you. Common side effects of injectable sumatriptan include pain at the injection site, dizziness, a feeling of warmth, and tingling in the arms or legs. Most of these reactions occur soon after the injection and resolve within 30 minutes. These drugs are safe for most patients. Sumatriptan nasal spray begins to work faster than the pill form and has fewer side effects than the injection. The most common side effect of the nasal spray is an unpleasant taste. A tablet that contains a combination of sumatriptan-naproxen appears to be more effective than each medication taken alone. It is not known if taking the two medications separately would be as effective as the combination tablet. Ergots -- Ergotamine is an older, migraine-specific drug. It is often combined with caffeine. Ergots are not usually as effective as triptans and are more likely to cause side effects. Ergots are sometimes recommended for people with migraines of a long duration (greater than 48 hours) or that frequently recur.  People with high blood pressure, coronary artery disease, or kidney or liver disease should not use ergotamines. Dihydroergotamine is related to ergotamine, and can be taken by nasal spray for mild or moderate migraine attacks. It can also be given by injection for severe attacks. Other medications -- Other medications for migraine are not as well studied or are less effective. A small percentage of people with migraine headaches do not respond to routine acute treatments and may require additional treatment for pain. Dexamethasone is a glucocorticoid (steroid) medication that can be given by injection, along with another acute migraine treatment, to reduce the risk of a migraine coming back. Dexamethasone injections may be given in an emergency department or clinic. Ulbrelvy-CGRP inhibitor It is the first in a new class of medications known as oral CGRP (calcitonin gene related peptide). It is non-narcotic, oral treatment specifically designed to help relieve migraine pain plus its most bothersome symptoms (light sensitivity, sound sensitivity, and nausea). Nurtec-CGRP inhibitor      Preventive treatment -- Preventive treatment effectively controls migraine headaches in most people, although the benefits of this treatment may not be evident for three to four weeks. In some cases, both acute treatment and preventive treatment are necessary to adequately control migraines. Beta blockers -- Beta blockers were originally developed to treat high blood pressure. In addition, beta blockers reduce the frequency of migraine attacks in 60 to 80 percent of people. Commonly used beta blockers include propranolol, nadolol, atenolol, and metoprolol. Beta blockers may cause depression in some people or impotence in some men. Antidepressant medications -- Tricyclic antidepressants (TCAs) and certain other antidepressant medications are often recommended for migraine prevention. These include amitriptyline, nortriptyline, and doxepin.  Of these, amitriptyline has proven can sometimes be remedied by taking a higher dose of the drug or switching to a similar drug. Herbal therapies -- Herbal therapies have been evaluated for the treatment of migraine headache, including feverfew and butterbur. Of these, feverfew has been the most widely studied. Some studies have found it to be effective for migraine prevention, although most experts agree that the benefits are still unproven. Neither treatment is recommended. (Magnesium and Riboflavin) There is a non-prescription formula called Migrelief that combines, Magnesium, Riboflavin, and Fever Few available. Botulinum toxin A has been shown to be effective in prevention of chronic migraine. Indication  BOTOX® is a prescription medicine that is injected to prevent headaches in adults with chronic migraine who have 15 or more days each month with headache lasting 4 or more hours each day in people 18 years or older. [place] is a new type of preventative treatment that works by blocking Calcitonin Gene-Related Peptide (CGRP) receptors. It is contraindicated in pregnancy and in allergies to latex or rubber. [place] is a self-injection that is taken as 1 dose, once a month. Injections are administered using the Freshdesk autoinjector. Emgality was specifically developed to bind to CGRP, a substance in the brain that may play a key role in migraine. Emgality is a humanized monoclonal antibody that binds to the CGRP ligand and blocks its binding to the receptor. Ajovy is a monoclonal antibody that selectively targets the CGRP ligand. All of the CGRP monoclonal Ab are contraindicated in pregnancy.     Other agents:  Magnesium 400 mg each day  Riboflavin 300 mg each day  Butterbur  CoQ10 100 mg three times per day       Behaviorally, stress management strategies, such as exercise, relaxation techniques, biofeedback mechanisms, and other therapies designed to limit daily discomfort, may reduce the number and severity of migraine attacks. Avoiding medication overuse -- It is essential to use antimigraine medications according to the prescription and clinician's instructions. Overuse of certain medications for migraine, including over-the-counter drugs such as acetaminophen and nonsteroidal antiinflammatory drugs, or prescription drugs such as triptans, can lead to medication-overuse headaches (also called rebound headaches) and to a pattern of daily headaches that require increasing quantities of drugs for relief. A vicious cycle occurs when frequent headaches cause you to take medications, which then cause rebound headaches as the medications wear off, causing you to take more medication, and so on. Speak with a healthcare provider if your migraine treatment does not relieve your headaches or if you are having unpleasant medication side effects. Switching to another drug or switching from acute treatment to preventive treatment may be helpful. MENSTRUAL MIGRAINES -- Migraines occur about three times more commonly in women than in men. Estrogen has a variable effect on the frequency and severity of a woman's migraines; some women who take birth control pills (which contain estrogen) or hormone replacement therapy experience worsening headaches, while others improve. Similarly, some women have more frequent or severe headaches during pregnancy while others have improvement. Menstrual migraines are migraine headaches that occur around the beginning of a woman's menstrual period (usually two days before to three days after the period begins). Women with menstrual migraine may also have migraines at other times during the month. Most often, there is no migraine aura associated with menstrual migraines, even if the woman usually has aura at other times. Menstrual migraines are thought to be triggered by the normal decrease in estrogen levels that occurs before the menstrual period begins.  Menstrual migraines tend to be longer lasting, more severe, and more resistant to treatment than other types of migraine. Treatment -- Initial treatment of acute menstrual migraine is the same as treatment for migraine occurring at any other time. Preventive therapies for menstrual migraine can be either nonspecific (those that do not address the hormonal trigger) or specific (hormone-based treatments). With nonspecific strategies, success requires accurate anticipation of menses for scheduling interventions; therefore, women with irregular cycles are not good candidates for these options. Coexisting problems, such as dysmenorrhea, menorrhagia, endometriosis, as well as contraception needs may influence choice of preventive therapy. A preventive treatment may be useful for women who have menstrual migraines on a predictable schedule. This treatment strategy is called \"mini-prophylaxis\". ?Nonsteroidal antiinflammatory drugs (NSAIDs) such as ibuprofen or naproxen are one option for mini-prophylaxis of menstrual migraine. ?Triptans such as frovatriptan, sumatriptan, or naratriptan are another option. Typically, long-acting triptans are dosed twice daily beginning two days before anticipated menses and continued for five days. Hormonal treatments may be recommended to prevent menstrual migraines. One approach is to use estrogen-progestin contraceptive pills in an extended cycle; another choice is menstrually-targeted supplemental estrogen. These treatments work by preventing a rapid decline in the level of estrogen in the body before the menstrual period, which is believed to trigger the migraine. However, some experts avoid treatment with estrogen-progestin contraceptives for women who have a menstrual migraine with aura. Others consider the use of such treatment only for healthy women younger than age 28 who do not have focal neurologic signs and who do not smoke.   WHERE TO GET MORE INFORMATION -- Your healthcare provider is the best source of information for questions and concerns related to your medical problem    Organizations  National [facility] organization dedicated to service headache sufferers, their families, and the healthcare practitioners who treat them. Promotes research into headache causes and treatments and educates the public. [address] [place], [place] [address]   [email or web address]   [employer] Ricardo Jaime   Tel: [phone] ([phone])   Fax: [phone]   Migraine [address] [place] [address] Drive  Assists migraine sufferers by providing information and support and by raising money to fund innovative research into its causes and better treatments. [address] [facility] Kaci   [email or web address]   [email or web address] org   Tel: [phone]   Fax: [phone] Headache Society Committee for Headache Education (ACHE)  The American Headache Society Committee on Headache Education (ACHE) is a nonprofit patient-health professional partnership dedicated to advancing the treatment and management of patients with headache. [address] [facility]. EDWINA, [facility] [address]   [email or web address]   [employer]. org   Tel: [phone]       Patient education: Sleep apnea in adults       INTRODUCTION -- Normally during sleep, air moves through the throat and in and out of the lungs at a regular rhythm. In a person with sleep apnea, air movement is periodically diminished or stopped. There are two types of sleep apnea: obstructive sleep apnea and central sleep apnea. In obstructive sleep apnea, breathing is abnormal because of narrowing or closure of the throat. In central sleep apnea, breathing is abnormal because of a change in the breathing control and rhythm. Sleep apnea is a serious condition that can affect a person's ability to safely perform normal daily activities and can affect long term health.  Approximately 25 percent of adults are at risk for sleep apnea of some degree. Men are more commonly affected than women. Other risk factors include middle and older age, being overweight or obese, and having a small mouth and throat. This topic review focuses on the most common type of sleep apnea in adults, obstructive sleep apnea (JANET). HOW SLEEP APNEA OCCURS -- The throat is surrounded by muscles that control the airway for speaking, swallowing, and breathing. During sleep, these muscles are less active, and this causes the throat to narrow. In most people, this narrowing does not affect breathing. In others, it can cause snoring, sometimes with reduced or completely blocked airflow. A completely blocked airway without airflow is called an obstructive apnea. Partial obstruction with diminished airflow is called a hypopnea. A person may have apnea and hypopnea during sleep. Insufficient breathing due to apnea or hypopnea causes oxygen levels to fall and carbon dioxide to rise. Because the airway is blocked, breathing faster or harder does not help to improve oxygen levels until the airway is reopened. Typically, the obstruction requires the person to awaken to activate the upper airway muscles. Once the airway is opened, the person then takes several deep breaths to catch up on breathing. As the person awakens, he or she may move briefly, snort or snore, and take a deep breath. Less frequently, a person may awaken completely with a sensation of gasping, smothering, or choking. If the person falls back to sleep quickly, he or she will not remember the event. Many people with sleep apnea are unaware of their abnormal breathing in sleep, and all patients underestimate how often their sleep is interrupted. Awakening from sleep causes sleep to be unrefreshing and causes fatigue and daytime sleepiness. Anatomic causes of obstructive sleep apnea --  Most patients have JANET because of a small upper airway.  As the bones of the face and skull develop, some people develop a small lower face, a small mouth, and a tongue that seems too large for the mouth. These features are genetically determined, which explains why JANET tends to cluster in families. Obesity is another major factor. Tonsil enlargement can be an important cause, especially in children. SLEEP APNEA SYMPTOMS -- The main symptoms of JANET are loud snoring, fatigue, and daytime sleepiness. However, some people have no symptoms. For example, if the person does not have a bed partner, he or she may not be aware of the snoring. Fatigue and sleepiness have many causes and are often attributed to overwork and increasing age. As a result, a person may be slow to recognize that they have a problem. A bed partner or spouse often prompts the patient to seek medical care. Other symptoms may include one or more of the following:  ?Restless sleep  ? Awakening with choking, gasping, or smothering  ? Morning headaches, dry mouth, or sore throat  ? Waking frequently to urinate  ? Awakening unrested, groggy  ? Low energy, difficulty concentrating, memory impairment    Risk factors -- Certain factors increase the risk of sleep apnea. ?Increasing age - JANET occurs at all ages, but it is more common in middle and older age adults. ?Male sex - JANET is two times more common in men, especially in middle age. ?Obesity - The more obese a person is, the more likely he or she is to have JANET. ? Sedation from medication or alcohol - This interferes with the ability to awaken from sleep and can lengthen periods of apnea (no breathing), with potentially dangerous consequences. ? Abnormality of the airway. SLEEP APNEA CONSEQUENCES -- Complications of sleep apnea can include daytime sleepiness and difficulty concentrating. The consequence of this is an increased risk of accidents and errors in daily activities.  Studies have shown that people with severe JANET are more than twice as likely to be involved in a motor vehicle accident as people without these conditions. People with JANET are encouraged to discuss options for driving, working, and performing other high-risk tasks with a healthcare provider. In addition, people with untreated JANET may have an increased risk of cardiovascular problems such as high blood pressure, heart attack, abnormal heart rhythms, or stroke. This risk may be due to changes in the heart rate and blood pressure that occur during sleep. SLEEP APNEA DIAGNOSIS -- The diagnosis of JANET is best made by a knowledgeable sleep medicine specialist who has an understanding of the individual's health issues. The diagnosis is usually based upon the person's medical history, physical examination, and testing, including:  ? A complaint of snoring and ineffective sleep  ? Neck size (greater than 16 inches in men or 14 inches in women) is associated with an increased risk of sleep apnea  ? A small upper airway: difficulty seeing the throat because of a tongue that is large for the mouth  ? High blood pressure, especially if it is resistant to treatment  ? If a bed partner has observed the patient during episodes of stopped breathing (apnea), choking, or gasping during sleep, there is a strong possibility of sleep apnea. Testing is usually performed in a sleep laboratory. A full sleep study is called a polysomnogram. The polysomnogram measures the breathing effort and airflow, blood oxygen level, heart rate and rhythm, duration of the various stages of sleep, body position, and movement of the arms/legs. Home monitoring devices are available that can perform a sleep study. This is a reasonable alternative to conventional testing in a sleep laboratory if the clinician strongly suspects moderate or severe sleep apnea and the patient does not have other illnesses or sleep disorders that may interfere with the results.     SLEEP APNEA TREATMENT -- Sleep apnea is best treated by a knowledgeable sleep medicine specialist. The goal of treatment is to maintain an open airway during sleep. Effective treatment will eliminate the symptoms of sleep disturbance; long-term health consequences are also reduced. Most treatments require nightly use. The challenge for the clinician and the patient is to select an effective therapy that is appropriate for the patient's problem and that is acceptable for long term use. Auto-titrating CPAP delivers an amount of PAP that varies during the night. The variation is dependent on event detection software algorithms, which will increase the pressure gradually in response to flow changes until adequate patency is detected. After a period of sustained upper airway patency, the delivered level of pressure gradually decreases until the algorithm identifies recurrent upper airway obstruction, at which point the delivered pressure again increases. The result is that the delivered pressure varies throughout the night, in an effort to provide the lowest pressure that is necessary to maintain upper airway patency. Continuous positive airway pressure (CPAP) -- The most effective treatment for sleep apnea uses air pressure from a mechanical device to keep the upper airway open during sleep. A CPAP (continuous positive airway pressure)  device uses an air-tight attachment to the nose, typically a mask, connected to a tube and a blower which generates the pressure. Devices that fit comfortably into the nasal opening, rather than over the nose, are also available. CPAP should be used any time the person sleeps (day or night). The CPAP device is usually used for the first time in the sleep lab, where a technician can adjust the pressure and select the best equipment to keep the airway open.  Alternatively, an auto device with a self-adjusting pressure feature, provided with proper education and training, can get treatment started without another sleep test. While the treatment may seem uncomfortable, noisy, or bulky at first, most people accept the treatment after experiencing better sleep. However, difficulty with mask comfort and nasal congestion prevent up to 50 percent of people from using the treatment on a regular basis. Continued follow up with a healthcare provider helps to ensure that the treatment is effective and comfortable. Information from the CPAP machine is often used by physicians, therapists, and insurers to track the success of treatment. CPAP can be delivered with different features to improve comfort and solve problems that may come up during treatment. Changes in treatment may be needed if symptoms do not improve or if the persons condition changes, such as a gain or loss of weight. Adjust sleep position -- Adjusting sleep position (to stay off the back) may help improve sleep quality in people who have JANET when sleeping on the back. However, this is difficult to maintain throughout the night and is rarely an adequate solution. Weight loss -- Weight loss may be helpful for obese or overweight patients. Weight loss may be accomplished with dietary changes, exercise, and/or surgical treatment. However, it can be difficult to maintain weight loss; the five-year success of non-surgical weight loss is only 5 percent, meaning that 95 percent of people regain lost weight. Avoid alcohol and other sedatives -- Alcohol can worsen sleepiness, potentially increasing the risk of accidents or injury. People with JANET are often counseled to drink little to no alcohol, even during the daytime. Similarly, people who take anti-anxiety medications or sedatives to sleep should speak with their healthcare provider about the safety of these medications. People with JANET must notify all healthcare providers, including surgeons, about their condition and the potential risks of being sedated.  People with JANET who are given anesthesia and/or pain medications require special management and close monitoring to reduce the risk of a blocked whom less drastic measures have failed or are inappropriate. Although it is always successful in eliminating obstructive sleep apnea, tracheostomy requires significant lifestyle changes and carries some serious risks (eg, infection, bleeding, blockage). All surgical treatments require discussions about the goals of treatment, the expected outcomes, and potential complications. Hypoglossal nerve stimulator- \"Inspire\" device    PAP treatment failure:  Possible causes of treatment failure include nonadherence or suboptimal adherence, weight gain, an inappropriate level of prescribed positive pressure, or an additional disorder causing sleepiness (eg, narcolepsy) that may require alterations in the therapeutic regimen. A review of medications should also be undertaken since many drugs may lead to sleepiness. Inadequate sleep time may also negate the expected effects from treatment of JANET. Also, pt's can have persistent hypersomnolence associated with sleep apnea even in the presence of adequate therapy and at those times Provigil or Nuvigil or other stimulants may be indicated. Once the patient's positive airway pressure therapy has been optimized and symptoms resolved, a regimen of long-term follow-up should be established. Annual visits are reasonable, with more frequent visits in between if new issues arise. The purpose of long-term follow-up is to assess usage and monitor for recurrent JANET, new side effects, air leakage, and fluctuations in body weight. WHERE TO GET MORE INFORMATION -- Your healthcare provider is the best source of information for questions and concerns related to your medical problem. Organizations  American Sleep Apnea Association  Provides information about sleep apnea to the public, publishes a newsletter, and serves as an advocate for people with the disorder. Janell, [address]   [email or web address] org   [email or web address] org   Tel: F20(full face mask). It has a \"memory foam\" like cushion. The AirFit F30 is a smaller style full face mask designed to sit low on and cover less of your face for fewer facial marks. AirFit N30i has a top of the head tube with a nasal mask. AirFit P10 reported to be the most comfortable nasal pillow mask. Resmed Mirage FX reported to be the most comfortable nasal mask. Resmed Mirage [place] reported to be the most comfortable hybrid mask. AirTouch N20-memory foam nasal mask. Respironics: You might also like to try a nasal mask called a Dreamwear nasal mask or the Dreamwear nasal pillow. Another suggestion is the [facility], it is a minimal contact full face mask. The [employer] incredible under the nose design makes it the only full face mask that won't cause red marks on the bridge of your nose when compared to other full face masks. The Dreamwear full face mask has a  soft feel, unique in-frame air-flow, and innovative air tube connection at the top of the head for the ultimate in sleep comfort. Comfort Gel Blue. Dreamwear gel pillows. [employer]: Brevida nasal pillow mask and Simplus FFM    The use of a memory foam CPAP pillow supports the head and neck throughout the night.

## 2020-11-05 NOTE — PROGRESS NOTES
REVIEW OF SYSTEMS    Constitutional: []Fever []Sweats []Chills [] Recent Injury   [x] Denies all unless marked  HENT:[x]Headache  [] Head Injury  [] Sore Throat  [] Ear Pain  [] Dizziness [] Hearing Loss   [] Denies all unless marked  Musculoskeletal: [] Arthralgia  [] Myalgias [] Muscle cramps  [] Muscle twitches   [x] Denies all unless marked   Spine:  [x] Neck pain  [] Back pain  [] Sciaticia  [] Denies all unless marked  Neurological:[] Visual Disturbance [] Double Vision [] Slurred Speech [] Trouble swallowing  [] Vertigo [] Tingling [] Numbness [] Weakness [] Loss of Balance   [] Loss of Consciousness [] Memory Loss [] Seizures  [x] Denies all unless marked  Psychiatric/Behavioral:[] Depression [] Anxiety  [x] Denies all unless marked  Sleep: []  Insomnia [] Sleep Disturbance [x] Snoring [] Restless Legs [x] Daytime Sleepiness [] Sleep Apnea  [] Denies all unless marked

## 2020-11-05 NOTE — TELEPHONE ENCOUNTER
Called patient about mri appointment, patient stated this is not a good day, patient stated, he will call and reschedule the appointment.

## 2020-11-05 NOTE — PROGRESS NOTES
[address], [address]  [address] [facility], [place]  Phone [phone]  Fax [phone]           Patient: Sharron Adam  :  1970  Age:  52 y.o. MRN:  [id number]  Today:             20    Provider:  Maryan Pinzon PA-C    Chief Complaint   Patient presents with    New Patient    Migraine       History Source: History obtained from chart review and the patient. PCP: Natalie Hernández MD  Referring Provider: Natalie Hernández MD  [id number] [address]    HISTORY OF PRESENT ILLNESS:   Sharron Adam is a 52y.o. year old male who has  has a past medical history of Anxiety, Herniated lumbar intervertebral disc, Kidney cysts, Liver cyst, Low testosterone, Migraine, and OCD (obsessive compulsive disorder). Sharron Adam was referred for: migraines    He reports that he started having migraines at the age of 17. He was diagnosed with MVP and was treated with Inderal which alleviated the migraines somewhat. He has had intermittent migraines through adulthood. They have become daily over the last several months. He wakes up with a migraine every morning. The headache starts in the occiput and radiates to the top of his head and his temples. The pain is described as throbbing. It \"falls to one side\" if he lays on his side. The duration is constant. He takes daily Excedrin Migraine. There is associated nausea, photophobia, and phonophobia. It can be aggravated by certain BBQ sauces, peppers, and alcohol. He has been prescribed Midrin, Treximet, Topamax, and Imitrex. He has constant neck pain. His neck \"pops\". There is no numbness, tingling, or weakness in the BUE. There is numbness and tingling in the BLE, he has chronic LBP. He snores, witnessed apneas, and excessive daytime somnolence. The snoring can be heard from an adjacent room. He tries to sleep 8 hours. He falls asleep quickly but has frequent awakenings. His sleep is restless.   He has nocturia. He wakes up with a gasp or chokes. He falls asleep when sedentary. Past Medical History:      Diagnosis Date    Anxiety     Herniated lumbar intervertebral disc     Kidney cysts     Liver cyst     Low testosterone     Migraine     OCD (obsessive compulsive disorder)        Past Surgical History:      Procedure Laterality Date    APPENDECTOMY      COLONOSCOPY  approx 2017    Dr Asha Inman @ [facility] hos[facility] \" polyps\" per pt recall    HERNIA REPAIR      THYROIDECTOMY           Current Outpatient Medications   Medication Sig Dispense Refill    SUMAtriptan (IMITREX) 100 MG tablet As directed 9 tablet 5    methylPREDNISolone (MEDROL, VANNESSA,) 4 MG tablet Take by mouth. 1 kit 1    amitriptyline (ELAVIL) 10 MG tablet Take 1 tablet by mouth nightly 30 tablet 3    testosterone (ANDROGEL) 25 MG/2.5GM (1%) GEL 1 % gel Apply 5 g topically daily. Apply 4 clicks  (1ml) daily      gemfibrozil (LOPID) 600 MG tablet Take 600 mg by mouth daily      aspirin-acetaminophen-caffeine (EXCEDRIN MIGRAINE) 250-250-65 MG per tablet Take 1 tablet by mouth every 6 hours as needed for Headaches      PARoxetine (PAXIL) 40 MG tablet Take 40 mg by mouth every morning      methocarbamol (ROBAXIN) 500 MG tablet Take 500 mg by mouth 3 times daily as needed      thyroid (ARMOUR THYROID) 120 MG tablet Take 120 mg by mouth daily      sildenafil (REVATIO) 20 MG tablet Take 20 mg by mouth as needed       No current facility-administered medications for this visit. Allergies:  Patient has no known allergies.       Social History     Socioeconomic History    Marital status:      Spouse name: Not on file    Number of children: Not on file    Years of education: Not on file    Highest education level: Not on file   Occupational History    Not on file   Social Needs    Financial resource strain: Not on file    Food insecurity     Worry: Not on file     Inability: Not on file   [employer] needs Medical: Not on file     Non-medical: Not on file   Tobacco Use    Smoking status: Current Every Day Smoker     Packs/day: 2.00     Years: 34.00     Pack years: 68.00     Types: Cigarettes     Start date: 14    Smokeless tobacco: Never Used   Substance and Sexual Activity    Alcohol use: Yes     Frequency: Monthly or less     Drinks per session: 1 or 2     Comment: daily    Drug use: Yes     Types: Marijuana    Sexual activity: Not on file   Lifestyle    Physical activity     Days per week: Not on file     Minutes per session: Not on file    Stress: Not on file   Relationships    Social connections     Talks on phone: Not on file     Gets together: Not on file     Attends [employer]ism service: Not on file     Active member of club or organization: Not on file     Attends meetings of clubs or organizations: Not on file     Relationship status: Not on file    Intimate partner violence     Fear of current or ex partner: Not on file     Emotionally abused: Not on file     Physically abused: Not on file     Forced sexual activity: Not on file   Other Topics Concern    Not on file   Social History Narrative    Not on file         FAMILY HISTORY:       Problem Relation Age of Onset    Colon Cancer Neg Hx     Colon Polyps Neg Hx        REVIEW OF SYSTEMS     Constitutional: []? Fever []? Sweats []? Chills []? Recent Injury   [x]? Denies all unless marked  HENT:[x]? Headache  []? Head Injury  []? Sore Throat  []? Ear Pain  []? Dizziness []? Hearing Loss   []? Denies all unless marked  Musculoskeletal: []? Arthralgia  []? Myalgias []? Muscle cramps  []? Muscle twitches   [x]? Denies all unless marked   Spine:  [x]? Neck pain  []? Back pain  []? Margretta Dye  []? Denies all unless marked  Neurological:[]? Visual Disturbance []? Double Vision []? Slurred Speech []? Trouble swallowing  []? Vertigo []? Tingling []? Numbness []? Weakness []? Loss of Balance   []? Loss of Consciousness []? Memory Loss []? Seizures  [x]?  Denies all unless marked  Psychiatric/Behavioral:[]? Depression []? Anxiety  [x]? Denies all unless marked  Sleep: [x]? Insomnia [x]? Sleep Disturbance [x]? Snoring []? Restless Legs [x]? Daytime Sleepiness []? Sleep Apnea  []? Denies all unless marked    The MA has completed the ROS with the patient. I have reviewed it in its' entirety with the patient and agree with the documentation. PHYSICAL EXAM  /89   Pulse 78   Resp 16   Ht 5' 7\" (1.702 m)   Wt 194 lb (88 kg)   BMI 30.38 kg/m²    Neck circumference:  15.75 inches  Mallampati:  Type 3  Constitutional -  Alert in NAD, well developed, pleasant and cooperative with exam; body habitus obese as indicated by BMI  HEENT- Conjunctiva normal.  No scars, masses, or lesions over external nose or ears, hearing intact, no neck masses noted, no jugular vein distension, no bruit  Cardiac- Regular rate and rhythm  Pulmonary- Clear to auscultation, good expansion, normal effort without use of accessory muscles  Musculoskeletal - No significant wasting of muscles noted, no bony deformities; Neck FROM; paracervical muscle tenderness  Extremities - No clubbing, cyanosis or edema  Skin - Warm, dry, and intact. No rash, erythema, or pallor  Psychiatric - Mood, affect, and behavior appear normal      NEUROLOGICAL EXAM     Mental status   [X]Awake, alert, oriented   [X]Affect attention and concentration appear appropriate  [X]Recent and remote memory appears unremarkable  [X]Speech normal without dysarthria or aphasia, comprehension and repetition intact.    COMMENTS:    Cranial Nerves [X]No VF deficit to confrontation,  no papilledema on fundoscopic exam.  [X]PERRLA, EOMI, no nystagmus, conjugate eye movements, no ptosis  [X]Face symmetric  [X]Facial sensation intact  [X]Tongue midline no atrophy or fasciculations present  [X]Palate midline, hearing to finger rub normal bilaterally  [X]Shoulder shrug and SCM testing normal bilaterally  COMMENTS:   Motor   [X]5/5 strength x 4 extremities  [X]Normal bulk and tone  [X]No tremor present  [X]No rigidity or bradykinesia noted  COMMENTS:   Sensory  [  ]Sensation intact to light touch, pin prick, vibration, and proprioception BLE  [  ]Sensation intact to light touch, pin prick, vibration, and proprioception BUE  COMMENTS: N/A   Coordination [X]FTN normal bilaterally   [X]HTS normal bilaterally  [X]RYAN normal bilaterally.    COMMENTS:                  Reflexes  [X]Symmetric and non-pathological  [X]Toes down going bilaterally  [X]No clonus present  COMMENTS:   Gait [X]Normal steady gait    [  ]Ataxic    [  ]Spastic     [  ]Magnetic     [  ]Shuffling  [  ]Antalgic  COMMENTS:       I reviewed the following studies:       [x]  :  Clinical laboratory test results     []  :  Radiology reports                    [x]  :  Review and summarization of medical records and/or obtain medical records        []  :  Previous/recent polysomnogram report(s)     [x]  :  Rives Sleepiness Scale: 14    Rives Sleepiness Scale    0 = would never doze  1 = slight chance of dozing  2 = moderate chance of dozing  3 = high chance of dozing    Situation Chance of Dozing (0-3)    Sitting and reading       2    Watching TV        3    Sitting, inactive in a public place (e.g. a theatre or a meeting) 3    As a passenger in a car for an hour without a break   0    Lying down to rest in the afternoon when circumstances permit 3    Sitting and talking to someone     0    Sitting quietly after a lunch without alcohol    3    In a car, while stopped for a few minutes in the traffic  0    Total         14         No results found for: Jorge Stephens  Lab Results   Component Value Date    WBC 5.8 10/20/2020    HGB 13.5 (L) 10/20/2020    HCT 39.3 (L) 10/20/2020    MCV 90.3 10/20/2020     10/20/2020     Lab Results   Component Value Date     10/20/2020    K 3.9 10/20/2020     10/20/2020    CO2 23 10/20/2020    BUN 15 10/20/2020    CREATININE 0.9 10/20/2020 visit after visit summary for your review: JANET/ migraines/analgesic rebound- Discussed with the patient and all questions fully answered. 2.  We had a discussion about the clinical issues and went over the various important aspects to consider. Patient advised of the etiology,  pathophysiology, diagnosis, treatment options, and risks of untreated JANET. Risks may include, but are not limited to  hypertension, coronary artery disease, diabetes, stroke, weight gain, impaired cognition, daytime somnolence,  and motor vehicle accidents. Advised to abstain from driving or operating heavy machinery when drowsy and the use of respiratory suppressants. Discussed use, benefit, and SE of prescribed medication. All questions were answered. Pt voiced understanding and agrees with treatment plan. 3.  Order-HST    4. Order-MRI brain    5. Consider MRI C-spine    6. Stop Excedrin Migraine daily use/analgesic rebound    7. Start Medrol Dose Pack    8. Start Imitrex 100 mg    9. Start amitriptyline 10 mg  10. Follow up per protocol       Note:  A total of >50% (>23 minutes) of 45 minutes was spent discussing the pathophysiology and treatment and/or coordination of care of the above diagnoses.

## 2020-11-06 ENCOUNTER — APPOINTMENT (OUTPATIENT)
Dept: OPERATING ROOM | Age: 50
End: 2020-11-06

## 2020-11-06 ENCOUNTER — ANESTHESIA EVENT (OUTPATIENT)
Dept: OPERATING ROOM | Age: 50
End: 2020-11-06

## 2020-11-06 ENCOUNTER — HOSPITAL ENCOUNTER (OUTPATIENT)
Age: 50
Setting detail: OUTPATIENT SURGERY
Discharge: HOME | End: 2020-11-06
Attending: INTERNAL MEDICINE | Admitting: INTERNAL MEDICINE
Payer: MEDICAID

## 2020-11-06 ENCOUNTER — ANESTHESIA (OUTPATIENT)
Dept: OPERATING ROOM | Age: 50
End: 2020-11-06

## 2020-11-06 VITALS
BODY MASS INDEX: 30.61 KG/M2 | HEART RATE: 82 BPM | DIASTOLIC BLOOD PRESSURE: 62 MMHG | SYSTOLIC BLOOD PRESSURE: 110 MMHG | OXYGEN SATURATION: 98 % | RESPIRATION RATE: 16 BRPM | WEIGHT: 195 LBS | HEIGHT: 67 IN

## 2020-11-06 VITALS — SYSTOLIC BLOOD PRESSURE: 109 MMHG | DIASTOLIC BLOOD PRESSURE: 70 MMHG | OXYGEN SATURATION: 95 %

## 2020-11-06 PROCEDURE — 45378 DIAGNOSTIC COLONOSCOPY: CPT | Performed by: INTERNAL MEDICINE

## 2020-11-06 PROCEDURE — 45378 DIAGNOSTIC COLONOSCOPY: CPT

## 2020-11-06 PROCEDURE — G8918 PT W/O PREOP ORDER IV AB PRO: HCPCS

## 2020-11-06 PROCEDURE — G8907 PT DOC NO EVENTS ON DISCHARG: HCPCS

## 2020-11-06 RX ORDER — SODIUM CHLORIDE 9 MG/ML
INJECTION, SOLUTION INTRAVENOUS CONTINUOUS
Status: DISCONTINUED | OUTPATIENT
Start: 2020-11-06 | End: 2020-11-06 | Stop reason: HOSPADM

## 2020-11-06 RX ORDER — LIDOCAINE HYDROCHLORIDE 10 MG/ML
INJECTION, SOLUTION INFILTRATION; PERINEURAL PRN
Status: DISCONTINUED | OUTPATIENT
Start: 2020-11-06 | End: 2020-11-06 | Stop reason: SDUPTHER

## 2020-11-06 RX ORDER — PROPOFOL 10 MG/ML
INJECTION, EMULSION INTRAVENOUS PRN
Status: DISCONTINUED | OUTPATIENT
Start: 2020-11-06 | End: 2020-11-06 | Stop reason: SDUPTHER

## 2020-11-06 RX ADMIN — PROPOFOL 30 MG: 10 INJECTION, EMULSION INTRAVENOUS at 09:38

## 2020-11-06 RX ADMIN — PROPOFOL 50 MG: 10 INJECTION, EMULSION INTRAVENOUS at 09:32

## 2020-11-06 RX ADMIN — SODIUM CHLORIDE: 9 INJECTION, SOLUTION INTRAVENOUS at 09:34

## 2020-11-06 RX ADMIN — PROPOFOL 50 MG: 10 INJECTION, EMULSION INTRAVENOUS at 09:31

## 2020-11-06 RX ADMIN — LIDOCAINE HYDROCHLORIDE 50 MG: 10 INJECTION, SOLUTION INFILTRATION; PERINEURAL at 09:30

## 2020-11-06 RX ADMIN — PROPOFOL 30 MG: 10 INJECTION, EMULSION INTRAVENOUS at 09:35

## 2020-11-06 RX ADMIN — SODIUM CHLORIDE: 9 INJECTION, SOLUTION INTRAVENOUS at 08:57

## 2020-11-06 RX ADMIN — PROPOFOL 30 MG: 10 INJECTION, EMULSION INTRAVENOUS at 09:41

## 2020-11-06 NOTE — ANESTHESIA PRE PROCEDURE
Department of Anesthesiology  Preprocedure Note       Name:  João Penny   Age:  52 y.o.  :  1970                                          MRN:  [id number]         Date:  2020      Surgeon: Chapin Welch):  Simon Zazueta MD    Procedure: Procedure(s):  COLONOSCOPY DIAGNOSTIC    Medications prior to admission:   Prior to Admission medications    Medication Sig Start Date End Date Taking? Authorizing Provider   SUMAtriptan (IMITREX) 100 MG tablet As directed 20  Yes RADHA London   methylPREDNISolone (MEDROL, VANNESSA,) 4 MG tablet Take by mouth. 20 Yes RADHA London   amitriptyline (ELAVIL) 10 MG tablet Take 1 tablet by mouth nightly 20  Yes RADHA London   testosterone (ANDROGEL) 25 MG/2.5GM (1%) GEL 1 % gel Apply 5 g topically daily.  Apply 4 clicks  (1ml) daily   Yes Historical Provider, MD   gemfibrozil (LOPID) 600 MG tablet Take 600 mg by mouth daily   Yes Historical Provider, MD   aspirin-acetaminophen-caffeine (Salomon Scot) 958-960-88 MG per tablet Take 1 tablet by mouth every 6 hours as needed for Headaches   Yes Historical Provider, MD   PARoxetine (PAXIL) 40 MG tablet Take 40 mg by mouth every morning   Yes Historical Provider, MD   methocarbamol (ROBAXIN) 500 MG tablet Take 500 mg by mouth 3 times daily as needed   Yes Historical Provider, MD   thyroid (ARMOUR THYROID) 120 MG tablet Take 120 mg by mouth daily   Yes Historical Provider, MD   sildenafil (REVATIO) 20 MG tablet Take 20 mg by mouth as needed   Yes Historical Provider, MD       Current medications:    Current Facility-Administered Medications   Medication Dose Route Frequency Provider Last Rate Last Dose    0.9 % sodium chloride infusion   Intravenous Continuous Simon Zazueta MD           Allergies:  No Known Allergies    Problem List:    Patient Active Problem List   Diagnosis Code    Anxiety F41.9    Hypotestosteronism E34.9    Hypertriglyceridemia E78.1    H/O total thyroidectomy E89.0    Gastroesophageal reflux disease without esophagitis K21.9    Obesity due to excess calories E66.09    Migraine without aura with status migrainosus G43.001    Mitral valve prolapse I34.1    Back pain of lumbar region with sciatica M54.40    Intractable migraine without aura and with status migrainosus G43.011    Obstructive sleep apnea G47.33    Snoring R06.83    Witnessed apneic spells R06.81    Somnolence, daytime R40.0    Neck pain M54.2    Analgesic rebound headache G44.40, T39.95XA       Past Medical History:        Diagnosis Date    Anxiety     Herniated lumbar intervertebral disc     Kidney cysts     Liver cyst     Low testosterone     Migraine     OCD (obsessive compulsive disorder)     Thyroid disease        Past Surgical History:        Procedure Laterality Date    APPENDECTOMY      COLONOSCOPY  approx 2017    Dr Brandy Torrez @ [facility] \" polyps\" per pt recall    HERNIA REPAIR      THYROIDECTOMY         Social History:    Social History     Tobacco Use    Smoking status: Current Every Day Smoker     Packs/day: 2.00     Years: 34.00     Pack years: 68.00     Types: Cigarettes     Start date: 1986    Smokeless tobacco: Never Used   Substance Use Topics    Alcohol use: Yes     Frequency: Monthly or less     Drinks per session: 1 or 2     Comment: daily                                Ready to quit: Not Answered  Counseling given: Not Answered      Vital Signs (Current):   Vitals:    11/06/20 0849   BP: 129/78   Pulse: 85   Resp: 16   SpO2: 96%   Weight: 195 lb (88.5 kg)   Height: 5' 7\" (1.702 m)                                              BP Readings from Last 3 Encounters:   11/06/20 129/78   11/05/20 138/89   10/23/20 120/80       NPO Status: Time of last liquid consumption: 2300                        Time of last solid consumption: 2300                        Date of last liquid consumption: 11/05/20                        Date of last solid food consumption: 11/04/20    BMI:   Wt Readings from Last 3 Encounters:   11/06/20 195 lb (88.5 kg)   11/05/20 194 lb (88 kg)   10/23/20 195 lb (88.5 kg)     Body mass index is 30.54 kg/m². CBC:   Lab Results   Component Value Date    WBC 5.8 10/20/2020    RBC 4.35 10/20/2020    HGB 13.5 10/20/2020    HCT 39.3 10/20/2020    MCV 90.3 10/20/2020    RDW 13.7 10/20/2020     10/20/2020       CMP:   Lab Results   Component Value Date     10/20/2020    K 3.9 10/20/2020     10/20/2020    CO2 23 10/20/2020    BUN 15 10/20/2020    CREATININE 0.9 10/20/2020    GFRAA >59 10/20/2020    LABGLOM >60 10/20/2020    GLUCOSE 113 10/20/2020    PROT 6.8 10/20/2020    CALCIUM 9.1 10/20/2020    BILITOT <0.2 10/20/2020    ALKPHOS 76 10/20/2020    AST 15 10/20/2020    ALT 19 10/20/2020       POC Tests: No results for input(s): POCGLU, POCNA, POCK, POCCL, POCBUN, POCHEMO, POCHCT in the last 72 hours.     Coags: No results found for: PROTIME, INR, APTT    HCG (If Applicable): No results found for: PREGTESTUR, PREGSERUM, HCG, HCGQUANT     ABGs: No results found for: PHART, PO2ART, JDM2DFW, MBI4RXI, BEART, P1YXGAVP     Type & Screen (If Applicable):  No results found for: LABABO, LABRH    Drug/Infectious Status (If Applicable):  No results found for: HIV, HEPCAB    COVID-19 Screening (If Applicable):   Lab Results   Component Value Date    COVID19 NOT DETECTED 11/02/2020         Anesthesia Evaluation  Patient summary reviewed  Airway: Mallampati: II  TM distance: >3 FB   Neck ROM: full  Mouth opening: > = 3 FB Dental:          Pulmonary:normal exam    (+) sleep apnea:                             Cardiovascular:                      Neuro/Psych:   (+) neuromuscular disease:, headaches:, psychiatric history:            GI/Hepatic/Renal:   (+) GERD:, liver disease:,           Endo/Other:                     Abdominal:           Vascular:                                        Anesthesia Plan      general and TIVA     ASA 2       Induction: intravenous. Anesthetic plan and risks discussed with patient. Use of blood products discussed with patient whom consented to blood products.                    AR Niño - DONNA   11/6/2020

## 2020-11-06 NOTE — OP NOTE
rectum. Suction was utilized during the procedure to remove as much air as possible from the bowel. The colonoscope was removed from the patient, and the procedure was terminated. Findings are listed below. Findings:     NO large polyps or masses or strictures or colitis. Moderate Diverticulosis in the left colon  Internal hemorrhoids-Grade 1; no bleeding stigmata but likely source of his recent intermittent BRBPR. Where it was clearly visible, the mucosa appeared normal throughout the entire examined colon  Retroflexion in the rectum was otherwise normal and revealed no further abnormalities. Recommendations:  1. Repeat colonoscopy: due to his hx of polyps, for CRCS in 5 years. 2. Await biopsy results-you will receive a letter with your results within 7-10 days    - Resume previous meds and diet  - GI clinic f/u PRN   - Keep scheduled f/u appts with other MDs     - NO ASA/NSAIDs x 2 weeks    Findings and recommendations were discussed w/ the patient. A copy of the images was provided.     Lisa Chambers MD  11/6/2020  9:32 AM

## 2020-11-06 NOTE — ANESTHESIA POSTPROCEDURE EVALUATION
Department of Anesthesiology  Postprocedure Note    Patient: Bri Aldana  MRN: [id number]  YOB: 1970  Date of evaluation: 11/6/2020  Time:  9:50 AM     Procedure Summary     Date:  11/06/20 Room / Location:  [facility] ENDO 02 / [address]    Anesthesia Start:  6698 Anesthesia Stop:  0100    Procedure:  COLONOSCOPY DIAGNOSTIC (N/A Abdomen) Diagnosis:  (RB, CHANGE IN BH, HX POLYPS)    Surgeon:  Malvin Gong MD Responsible Provider:  Lafrances Cockayne, APRN - CRNA    Anesthesia Type:  general, TIVA ASA Status:  2          Anesthesia Type: general, TIVA    Yue Phase I:      Yue Phase II:      Last vitals: Reviewed and per EMR flowsheets.        Anesthesia Post Evaluation    Patient location during evaluation: PACU  Patient participation: complete - patient participated  Level of consciousness: awake  Pain score: 0  Airway patency: patent  Nausea & Vomiting: no nausea and no vomiting  Complications: no  Cardiovascular status: hemodynamically stable  Respiratory status: acceptable and spontaneous ventilation  Hydration status: euvolemic

## 2020-11-06 NOTE — H&P
Patient Name: Tiny Contreras  : 1970  MRN: [id number]  DATE: 20    Allergies: No Known Allergies     ENDOSCOPY  History and Physical    Procedure:    [x] Diagnostic Colonoscopy       [] Screening Colonoscopy  [] EGD      [] ERCP      [] EUS       [] Other    [x] Previous office notes/History and Physical reviewed from the patients chart. Please see EMR for further details of HPI. I have examined the patient's status immediately prior to the procedure and:      Indications/HPI:    1. Rectal bleeding    2. Change in bowel habits    3. Abdominal cramping    4. Hx of polyps    []Abdominal Pain   []Cancer- GI/Lung     []Fhx of colon CA/polyps  []History of Polyps  []Barretts            []Melena  []Abnormal Imaging              []Dysphagia              []Persistent Pneumonia   []Anemia                            []Food Impaction        []History of Polyps  [] GI Bleed             []Pulmonary nodule/Mass   []Change in bowel habits []Heartburn/Reflux  []Rectal Bleed (BRBPR)  []Chest Pain - Non Cardiac []Heme (+) Stool []Ulcers  []Constipation  []Hemoptysis  []Varices  []Diarrhea  []Hypoxemia    []Nausea/Vomiting   []Screening   []Crohns/Colitis  []Other:     Anesthesia:   [x] MAC [] Moderate Sedation   [] General   [] None     ROS: 12 pt Review of Symptoms was negative unless mentioned above    Medications:   Prior to Admission medications    Medication Sig Start Date End Date Taking? Authorizing Provider   SUMAtriptan (IMITREX) 100 MG tablet As directed 20  Yes RADHA London   methylPREDNISolone (MEDROL, VANNESSA,) 4 MG tablet Take by mouth. 20 Yes RADHA London   amitriptyline (ELAVIL) 10 MG tablet Take 1 tablet by mouth nightly 20  Yes RADHA London   testosterone (ANDROGEL) 25 MG/2.5GM (1%) GEL 1 % gel Apply 5 g topically daily.  Apply 4 clicks  (1ml) daily   Yes Historical Provider, MD   gemfibrozil (LOPID) 600 MG tablet Take 600 mg by mouth daily   Yes Historical Provider, MD aspirin-acetaminophen-caffeine (EXCEDRIN MIGRAINE) 250-250-65 MG per tablet Take 1 tablet by mouth every 6 hours as needed for Headaches   Yes Historical Provider, MD   PARoxetine (PAXIL) 40 MG tablet Take 40 mg by mouth every morning   Yes Historical Provider, MD   methocarbamol (ROBAXIN) 500 MG tablet Take 500 mg by mouth 3 times daily as needed   Yes Historical Provider, MD   thyroid (ARMOUR THYROID) 120 MG tablet Take 120 mg by mouth daily   Yes Historical Provider, MD   sildenafil (REVATIO) 20 MG tablet Take 20 mg by mouth as needed   Yes Historical Provider, MD       Past Medical History:  Past Medical History:   Diagnosis Date    Anxiety     Herniated lumbar intervertebral disc     Kidney cysts     Liver cyst     Low testosterone     Migraine     OCD (obsessive compulsive disorder)     Thyroid disease        Past Surgical History:  Past Surgical History:   Procedure Laterality Date    APPENDECTOMY      [facility]    HERNIA REPAIR      THYROIDECTOMY         Social History:  Social History     Tobacco Use    Smoking status: Current Every Day Smoker     Packs/day: 2.00     Years: 34.00     Pack years: 68.00     Types: Cigarettes     Start date: 1986    Smokeless tobacco: Never Used   Substance Use Topics    Alcohol use: Yes     Frequency: Monthly or less     Drinks per session: 1 or 2     Comment: daily    Drug use: Yes     Types: Marijuana       Vital Signs:   Vitals:    11/06/20 0849   BP: 129/78   Pulse: 85   Resp: 16   SpO2: 96%        Physical Exam:  Cardiac:  [x]WNL  []Comments:  Pulmonary:  [x]WNL   []Comments:  Neuro/Mental Status:  [x]WNL  []Comments:  Abdominal:  [x]WNL    []Comments:  Other:   []WNL  []Comments:    Informed Consent:  The risks and benefits of the procedure have been discussed with either the patient or if they cannot consent, their representative.     Assessment:  Patient examined and appropriate for planned sedation and procedure. Plan:  Proceed with planned sedation and procedure as above.          Ernesto Mendez MD

## 2020-11-19 ENCOUNTER — HOSPITAL ENCOUNTER (OUTPATIENT)
Dept: GENERAL RADIOLOGY | Age: 50
Discharge: HOME | End: 2020-11-19
Payer: MEDICAID

## 2020-11-19 ENCOUNTER — HOSPITAL ENCOUNTER (OUTPATIENT)
Dept: MRI IMAGING | Age: 50
Discharge: HOME | End: 2020-11-19
Payer: MEDICAID

## 2020-11-19 PROCEDURE — 72040 X-RAY EXAM NECK SPINE 2-3 VW: CPT

## 2020-11-19 PROCEDURE — 70551 MRI BRAIN STEM W/O DYE: CPT

## 2020-11-23 ENCOUNTER — OFFICE VISIT (OUTPATIENT)
Age: 50
End: 2020-11-23

## 2020-11-23 ENCOUNTER — TELEPHONE (OUTPATIENT)
Dept: NEUROSURGERY | Age: 50
End: 2020-11-23

## 2020-11-23 VITALS — HEART RATE: 90 BPM | OXYGEN SATURATION: 98 % | TEMPERATURE: 97.9 F

## 2020-11-23 NOTE — TELEPHONE ENCOUNTER
Called patient per Anup Faulkner, to give results from recent test. Patient understood also asked about a neck x ray that was completed.

## 2020-11-23 NOTE — TELEPHONE ENCOUNTER
----- Message from [place], [place] sent at 11/23/2020  9:21 AM CST -----  The MRI brain shows minimal chronic paranasal sinus disease for which he can use an OTC Flonase if he is symptomatic. It shows minimal cerebellar tonsillar ectopia. (Denotes an inferior location of the cerebellar tonsils below the margins of the foramen magnum. The hole in the skull through which the spinal cord passes.) He is not symptomatic for this and it really should not cause problems. It is an incidental finding.

## 2020-11-23 NOTE — TELEPHONE ENCOUNTER
----- Message from Dk Loya [place] sent at 11/23/2020  9:17 AM CST -----  The C-spine x-ray suggests a ligamentous strain in her neck.

## 2020-11-23 NOTE — TELEPHONE ENCOUNTER
Also called patient back to give them the results from x ray per Jessica Becker. Patient again understood.

## 2020-11-25 LAB — SARS-COV-2, NAA: NOT DETECTED

## 2020-11-30 ENCOUNTER — OUTSIDE FACILITY SERVICE (OUTPATIENT)
Dept: PULMONOLOGY | Facility: CLINIC | Age: 50
End: 2020-11-30

## 2020-11-30 ENCOUNTER — HOSPITAL ENCOUNTER (OUTPATIENT)
Dept: PULMONOLOGY | Age: 50
Discharge: HOME | End: 2020-11-30
Payer: MEDICAID

## 2020-11-30 PROCEDURE — 94727 GAS DIL/WSHOT DETER LNG VOL: CPT

## 2020-11-30 PROCEDURE — 94729 DIFFUSING CAPACITY: CPT | Performed by: INTERNAL MEDICINE

## 2020-11-30 PROCEDURE — 94010 BREATHING CAPACITY TEST: CPT

## 2020-11-30 PROCEDURE — 94729 DIFFUSING CAPACITY: CPT

## 2020-11-30 PROCEDURE — 94727 GAS DIL/WSHOT DETER LNG VOL: CPT | Performed by: INTERNAL MEDICINE

## 2020-11-30 PROCEDURE — 94010 BREATHING CAPACITY TEST: CPT | Performed by: INTERNAL MEDICINE

## 2020-11-30 RX ORDER — ALBUTEROL SULFATE 90 UG/1
2 AEROSOL, METERED RESPIRATORY (INHALATION)
Status: ACTIVE | OUTPATIENT
Start: 2020-11-30 | End: 2020-11-30

## 2020-11-30 NOTE — TELEPHONE ENCOUNTER
Ernesto Delaney called requesting a refill of the below medication which has been pended for you:     Requested Prescriptions     Pending Prescriptions Disp Refills    testosterone (ANDROGEL) 25 MG/2.5GM (1%) GEL 1 % gel 1 Package      Sig: Apply 5 g topically daily.  Apply 4 clicks  (1ml) daily       Last Appointment Date: 10/16/2020  Next Appointment Date: 01/18/2021    No Known Allergies

## 2020-12-01 NOTE — PROCEDURES
[facility] [employer] [facility] [address] [facility]                               PULMONARY FUNCTION    PATIENT NAME: Volodymyr Cho                     :        1970  MED REC NO:   [id number]                              ROOM:  ACCOUNT NO:   [de-identified]                           ADMIT DATE: 2020  PROVIDER:     Laura Blanchard MD      DATE OF PROCEDURE:  2020    IMPRESSION:  1. Spirometry is normal.  2.  Mid flow is reduced. 3.  Maximum ventilation is normal.  4.  Lung volume measurements show normal total lung capacity and reduced  residual volume, normal functional residual capacity.   5.  Diffusion capacity is normal.        Rachel Billingsley MD    D: 2020 19:52:50      T: 2020 22:22:09     [id number]  Job#: [id number]     Doc#: [id number]    CC:

## 2020-12-02 ENCOUNTER — HOSPITAL ENCOUNTER (OUTPATIENT)
Dept: SLEEP CENTER | Age: 50
Discharge: HOME | End: 2020-12-04
Payer: MEDICAID

## 2020-12-02 PROCEDURE — G0399 HOME SLEEP TEST/TYPE 3 PORTA: HCPCS

## 2020-12-02 PROCEDURE — 95806 SLEEP STUDY UNATT&RESP EFFT: CPT | Performed by: PSYCHIATRY & NEUROLOGY

## 2020-12-03 PROCEDURE — G0399 HOME SLEEP TEST/TYPE 3 PORTA: HCPCS

## 2020-12-03 PROCEDURE — 95806 SLEEP STUDY UNATT&RESP EFFT: CPT | Performed by: PSYCHIATRY & NEUROLOGY

## 2020-12-05 RX ORDER — TESTOSTERONE 12.5 MG/1.25G
5 GEL TOPICAL DAILY
Qty: 1 PACKAGE | Refills: 0 | Status: SHIPPED | OUTPATIENT
Start: 2020-12-05 | End: 2021-01-18 | Stop reason: SDUPTHER

## 2021-01-04 ENCOUNTER — TELEPHONE (OUTPATIENT)
Dept: INTERNAL MEDICINE | Age: 51
End: 2021-01-04

## 2021-01-04 DIAGNOSIS — R73.9 HYPERGLYCEMIA: ICD-10-CM

## 2021-01-04 DIAGNOSIS — E66.01 CLASS 2 SEVERE OBESITY DUE TO EXCESS CALORIES WITH SERIOUS COMORBIDITY IN ADULT, UNSPECIFIED BMI (HCC): ICD-10-CM

## 2021-01-04 DIAGNOSIS — E78.1 HYPERTRIGLYCERIDEMIA: ICD-10-CM

## 2021-01-04 DIAGNOSIS — Z11.59 ENCOUNTER FOR HEPATITIS C SCREENING TEST FOR LOW RISK PATIENT: ICD-10-CM

## 2021-01-04 DIAGNOSIS — E34.9 HYPOTESTOSTERONISM: ICD-10-CM

## 2021-01-04 DIAGNOSIS — K21.9 GASTROESOPHAGEAL REFLUX DISEASE WITHOUT ESOPHAGITIS: Primary | ICD-10-CM

## 2021-01-04 DIAGNOSIS — Z13.29 SCREENING FOR THYROID DISORDER: ICD-10-CM

## 2021-01-05 PROBLEM — R73.9 HYPERGLYCEMIA: Status: ACTIVE | Noted: 2021-01-05

## 2021-01-17 PROBLEM — G43.001 MIGRAINE WITHOUT AURA WITH STATUS MIGRAINOSUS: Status: RESOLVED | Noted: 2020-10-16 | Resolved: 2021-01-17

## 2021-01-18 ENCOUNTER — OFFICE VISIT (OUTPATIENT)
Dept: INTERNAL MEDICINE | Age: 51
End: 2021-01-18
Payer: MEDICAID

## 2021-01-18 VITALS
HEART RATE: 80 BPM | BODY MASS INDEX: 30.86 KG/M2 | WEIGHT: 196.6 LBS | HEIGHT: 67 IN | DIASTOLIC BLOOD PRESSURE: 78 MMHG | OXYGEN SATURATION: 97 % | SYSTOLIC BLOOD PRESSURE: 120 MMHG

## 2021-01-18 DIAGNOSIS — R73.9 HYPERGLYCEMIA: ICD-10-CM

## 2021-01-18 DIAGNOSIS — K21.9 GASTROESOPHAGEAL REFLUX DISEASE WITHOUT ESOPHAGITIS: ICD-10-CM

## 2021-01-18 DIAGNOSIS — I34.1 MITRAL VALVE PROLAPSE: ICD-10-CM

## 2021-01-18 DIAGNOSIS — E34.9 HYPOTESTOSTERONISM: ICD-10-CM

## 2021-01-18 DIAGNOSIS — F41.9 ANXIETY: ICD-10-CM

## 2021-01-18 DIAGNOSIS — G47.33 OBSTRUCTIVE SLEEP APNEA: ICD-10-CM

## 2021-01-18 DIAGNOSIS — E89.0 H/O TOTAL THYROIDECTOMY: ICD-10-CM

## 2021-01-18 DIAGNOSIS — G43.011 INTRACTABLE MIGRAINE WITHOUT AURA AND WITH STATUS MIGRAINOSUS: ICD-10-CM

## 2021-01-18 PROCEDURE — 99214 OFFICE O/P EST MOD 30 MIN: CPT | Performed by: INTERNAL MEDICINE

## 2021-01-18 PROCEDURE — G8484 FLU IMMUNIZE NO ADMIN: HCPCS | Performed by: INTERNAL MEDICINE

## 2021-01-18 PROCEDURE — G8417 CALC BMI ABV UP PARAM F/U: HCPCS | Performed by: INTERNAL MEDICINE

## 2021-01-18 PROCEDURE — 4004F PT TOBACCO SCREEN RCVD TLK: CPT | Performed by: INTERNAL MEDICINE

## 2021-01-18 PROCEDURE — G8427 DOCREV CUR MEDS BY ELIG CLIN: HCPCS | Performed by: INTERNAL MEDICINE

## 2021-01-18 RX ORDER — TESTOSTERONE 12.5 MG/1.25G
5 GEL TOPICAL DAILY
Qty: 1 PACKAGE | Refills: 0 | Status: SHIPPED | OUTPATIENT
Start: 2021-01-18 | End: 2021-03-02 | Stop reason: SDUPTHER

## 2021-01-18 RX ORDER — PAROXETINE HYDROCHLORIDE 40 MG/1
40 TABLET, FILM COATED ORAL EVERY MORNING
Qty: 30 TABLET | Refills: 3 | Status: SHIPPED | OUTPATIENT
Start: 2021-01-18 | End: 2021-05-26 | Stop reason: SDUPTHER

## 2021-01-18 ASSESSMENT — ENCOUNTER SYMPTOMS
TROUBLE SWALLOWING: 0
WHEEZING: 0
DIARRHEA: 0
ABDOMINAL PAIN: 0
COLOR CHANGE: 0
SORE THROAT: 0
SHORTNESS OF BREATH: 0
CONSTIPATION: 0
BLOOD IN STOOL: 0
BACK PAIN: 0
STRIDOR: 0
COUGH: 0

## 2021-01-18 ASSESSMENT — PATIENT HEALTH QUESTIONNAIRE - PHQ9
1. LITTLE INTEREST OR PLEASURE IN DOING THINGS: 0
2. FEELING DOWN, DEPRESSED OR HOPELESS: 0
SUM OF ALL RESPONSES TO PHQ QUESTIONS 1-9: 0

## 2021-01-18 NOTE — ASSESSMENT & PLAN NOTE
Diet controlled, evaluated by GI    Lose weight (if you are overweight)  ? Raise the head of your bed by 6 to 8 inches  You can do this by putting blocks of wood or rubber under 2 legs of the bed or a foam wedge under the mattress. ? Avoid foods that make your symptoms worse  For some people these include coffee, chocolate, alcohol, peppermint, and fatty foods. ?Stop smoking, if you smoke  ? Avoid late meals  Lying down with a full stomach can make reflux worse. Try to plan meals for at least 2 to 3 hours before bedtime. ? Avoid tight clothing  Some people feel better if they wear comfortable clothing that does not squeeze the stomach area.

## 2021-01-18 NOTE — PROGRESS NOTES
Maggie Vogel ( 1970) is a 52 y.o. male, Established [id number]}, here for evaluation of the following chief complaint(s). 3 Month Follow-Up (neck pain, had MRI of head)        ASSESSMENT/PLAN:  Problem List        Circulatory    Mitral valve prolapse     Chronic asymptomatic            Respiratory    Obstructive sleep apnea     Awaiting official sleep study results will try to help and get results for patient            Digestive    Gastroesophageal reflux disease without esophagitis     Diet controlled, evaluated by GI    Lose weight (if you are overweight)  ? Raise the head of your bed by 6 to 8 inches  You can do this by putting blocks of wood or rubber under 2 legs of the bed or a foam wedge under the mattress. ? Avoid foods that make your symptoms worse  For some people these include coffee, chocolate, alcohol, peppermint, and fatty foods. ?Stop smoking, if you smoke  ? Avoid late meals  Lying down with a full stomach can make reflux worse. Try to plan meals for at least 2 to 3 hours before bedtime. ? Avoid tight clothing  Some people feel better if they wear comfortable clothing that does not squeeze the stomach area.                Nervous and Auditory    Intractable migraine without aura and with status migrainosus     Evaluated by Neurology, did not meet criteria for Arnold Chiari malformation, on Butalbital  MRI was noninformative no Arnold-Chiari malformation he was told he has pulled muscle in his neck try to get a new pillow is not taking any muscle relaxants was given Robaxin but had not filled it         Relevant Medications    aspirin-acetaminophen-caffeine (EXCEDRIN MIGRAINE) 250-250-65 MG per tablet    SUMAtriptan (IMITREX) 100 MG tablet    PARoxetine (PAXIL) 40 MG tablet       Other    Anxiety     Paroxetine 40 mg daily         Relevant Medications    PARoxetine (PAXIL) 40 MG tablet    H/O total thyroidectomy     Cont [place] 120 mg daily    Lab Results   Component Value Date    TSH 0.264 (L) 10/20/2020              Hyperglycemia     Pending A1c         Hypotestosteronism    Relevant Medications    testosterone (ANDROGEL) 25 MG/2.5GM (1%) GEL 1 % gel      Tobacco use disorder patient is still smoking cannot tolerate Chantix and states that Wellbutrin makes him hyperactive and will try to use the patches again    Results for orders placed or performed in visit on 11/23/20   COVID-19   Result Value Ref Range    SARS-CoV-2, MEDARDO NOT DETECTED NOT DETECTED       Return in about 4 months (around 5/18/2021). 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 and he thinks that is where he developed his back injury he has chronic low back pain occasionally bad and not taking Robaxinas needed for pain otherwise he is not on any pain management occasionally the pain radiates to the leg  Patient complains of a migraine headache he has no Arnold-Chiari malformation he had a sleep study with pending results  History of colonic polyp and is lost to follow-up  He says he has mitral valve prolapse as a child  Asthma as a child no history of intubations has been smoking a pack a day since age 17 and he is try Chantix which made him extremely nauseous  Occasionally he has GERD and he takes omeprazole  He has chronic anxiety which is well managed with paroxetine  He was on Inderal nightly for his mitral valve prolapse he does not take that regularly    Review of Systems   Constitutional: Negative for activity change, appetite change, chills, diaphoresis, fatigue and fever. HENT: Negative for congestion, hearing loss, postnasal drip, sore throat, tinnitus and trouble swallowing. Eyes: Negative for visual disturbance.    Respiratory: Negative for cough, shortness of breath, wheezing and stridor. Cardiovascular: Negative for chest pain, palpitations and leg swelling. Gastrointestinal: Negative for abdominal pain, blood in stool, constipation and diarrhea. Endocrine: Positive for cold intolerance. Genitourinary: Negative for frequency. Musculoskeletal: Positive for neck pain. Negative for arthralgias, back pain and joint swelling. Skin: Negative for color change and wound. Allergic/Immunologic: Negative for environmental allergies. Neurological: Positive for headaches. Negative for dizziness and light-headedness. Hematological: Negative for adenopathy. Does not bruise/bleed easily. Psychiatric/Behavioral: Negative for decreased concentration, dysphoric mood and sleep disturbance. The patient is nervous/anxious. Physical Exam  Vitals signs and nursing note reviewed. Constitutional:       General: He is not in acute distress. Appearance: He is well-developed. HENT:      Head: Normocephalic and atraumatic. Right Ear: External ear normal.      Left Ear: External ear normal.      Nose: Nose normal.   Eyes:      General: No scleral icterus. Conjunctiva/sclera: Conjunctivae normal.      Pupils: Pupils are equal, round, and reactive to light. Neck:      Musculoskeletal: Normal range of motion and neck supple. Thyroid: No thyromegaly. Cardiovascular:      Rate and Rhythm: Normal rate and regular rhythm. Heart sounds: Normal heart sounds. No murmur. Pulmonary:      Effort: Pulmonary effort is normal.      Breath sounds: Normal breath sounds. No wheezing or rales. Abdominal:      General: Bowel sounds are normal. There is no distension. Palpations: Abdomen is soft. There is no mass. Tenderness: There is no abdominal tenderness. There is no rebound. Musculoskeletal: Normal range of motion. General: No tenderness. Lymphadenopathy:      Cervical: No cervical adenopathy. Skin:     General: Skin is warm and dry.       Findings: No rash.   Neurological:      Mental Status: He is alert and oriented to person, place, and time. Cranial Nerves: No cranial nerve deficit. Deep Tendon Reflexes: Reflexes normal.   Psychiatric:         Behavior: Behavior normal.         Thought Content:  Thought content normal.         Judgment: Judgment normal.           (Time Documentation Optional [id number])    An electronic signaturewaas used to authenticate this note  -Tracie Silvestre MD on 1/18/2021 at 12:11 PM

## 2021-01-18 NOTE — ASSESSMENT & PLAN NOTE
Evaluated by Neurology, did not meet criteria for [facility] Chiari malformation, on Butalbital

## 2021-01-22 DIAGNOSIS — K21.9 GASTROESOPHAGEAL REFLUX DISEASE WITHOUT ESOPHAGITIS: ICD-10-CM

## 2021-01-22 DIAGNOSIS — Z11.59 ENCOUNTER FOR HEPATITIS C SCREENING TEST FOR LOW RISK PATIENT: ICD-10-CM

## 2021-01-22 DIAGNOSIS — R73.9 HYPERGLYCEMIA: ICD-10-CM

## 2021-01-22 DIAGNOSIS — E66.01 CLASS 2 SEVERE OBESITY DUE TO EXCESS CALORIES WITH SERIOUS COMORBIDITY IN ADULT, UNSPECIFIED BMI (HCC): ICD-10-CM

## 2021-01-22 DIAGNOSIS — Z13.29 SCREENING FOR THYROID DISORDER: ICD-10-CM

## 2021-01-22 DIAGNOSIS — E78.1 HYPERTRIGLYCERIDEMIA: ICD-10-CM

## 2021-01-22 DIAGNOSIS — E34.9 HYPOTESTOSTERONISM: ICD-10-CM

## 2021-01-22 LAB
ALBUMIN SERPL-MCNC: 4.1 G/DL (ref 3.5–5.2)
ALP BLD-CCNC: 80 U/L (ref 40–130)
ALT SERPL-CCNC: 12 U/L (ref 5–41)
ANION GAP SERPL CALCULATED.3IONS-SCNC: 8 MMOL/L (ref 7–19)
AST SERPL-CCNC: 10 U/L (ref 5–40)
BILIRUB SERPL-MCNC: 0.3 MG/DL (ref 0.2–1.2)
BUN BLDV-MCNC: 17 MG/DL (ref 6–20)
CALCIUM SERPL-MCNC: 9.3 MG/DL (ref 8.6–10)
CHLORIDE BLD-SCNC: 109 MMOL/L (ref 98–111)
CHOLESTEROL, TOTAL: 207 MG/DL (ref 160–199)
CO2: 26 MMOL/L (ref 22–29)
CREAT SERPL-MCNC: 0.9 MG/DL (ref 0.5–1.2)
GFR AFRICAN AMERICAN: >59
GFR NON-AFRICAN AMERICAN: >60
GLUCOSE BLD-MCNC: 107 MG/DL (ref 74–109)
HBA1C MFR BLD: 5.7 % (ref 4–6)
HCT VFR BLD CALC: 37 % (ref 42–52)
HDLC SERPL-MCNC: 37 MG/DL (ref 55–121)
HEMOGLOBIN: 12.9 G/DL (ref 14–18)
HEPATITIS C ANTIBODY INTERPRETATION: NORMAL
LDL CHOLESTEROL CALCULATED: 140 MG/DL
MCH RBC QN AUTO: 31.1 PG (ref 27–31)
MCHC RBC AUTO-ENTMCNC: 34.9 G/DL (ref 33–37)
MCV RBC AUTO: 89.2 FL (ref 80–94)
PDW BLD-RTO: 13.2 % (ref 11.5–14.5)
PLATELET # BLD: 288 K/UL (ref 130–400)
PMV BLD AUTO: 8.9 FL (ref 9.4–12.4)
POTASSIUM SERPL-SCNC: 4.5 MMOL/L (ref 3.5–5)
RBC # BLD: 4.15 M/UL (ref 4.7–6.1)
SODIUM BLD-SCNC: 143 MMOL/L (ref 136–145)
TOTAL PROTEIN: 6.7 G/DL (ref 6.6–8.7)
TRIGL SERPL-MCNC: 150 MG/DL (ref 0–149)
TSH SERPL DL<=0.05 MIU/L-ACNC: 0.23 UIU/ML (ref 0.27–4.2)
WBC # BLD: 6.4 K/UL (ref 4.8–10.8)

## 2021-02-15 DIAGNOSIS — G47.33 OBSTRUCTIVE SLEEP APNEA: Primary | ICD-10-CM

## 2021-02-16 NOTE — PROGRESS NOTES
[facility]  [address], [address]  Phone [phone] Fax [phone]     Patient Name: Moise Boyd 2021  : 1970  Age: 52 y.o.   Patient Address: [address] [place], [place] [address]rajinder Purvis        Patient Phone: [phone] (home)     REFERRAL  Referred to: DME provider of patient's choice  Moise Boyd is referred for the following:    DME Equipment HPCPS Code Setting   Auto Adjusting CPAP device with flex or comparable pressure relief per comfort  8cm to 20cm   Heated Humidifier  Patient Choice       Replinishible PAP Supplies, 1 year supply  Item HPCPS Code Frequency   Mask of choice  or  1 per 3 months   Nasal Mask cushion/pillows  or  2 per 30 days   Full Face Mask Interface  1 per 30 days   Headgear  1 per 6 months   Tubing, length of choice  or  1 per 3 months   Water Chamber  1 per 6 months   Chinstrap  1 per 6 months   Disposable Filters  2 per 30 days   Reusable Filters  1 per 6 months     Diagnoses:  Obstructive sleep apnea (G47.33)  Length of Need: Lifetime, 99    Ordering Provider: NBA Cardenas: [id number]         Signature:       Date: 2021      Electronically Signed by Nelda Hart M.D.

## 2021-02-26 ENCOUNTER — TELEPHONE (OUTPATIENT)
Dept: NEUROLOGY | Age: 51
End: 2021-02-26

## 2021-03-01 PROBLEM — M54.50 CHRONIC MIDLINE LOW BACK PAIN: Status: ACTIVE | Noted: 2021-03-01

## 2021-03-01 PROBLEM — G89.29 CHRONIC MIDLINE LOW BACK PAIN: Status: ACTIVE | Noted: 2021-03-01

## 2021-03-02 ENCOUNTER — OFFICE VISIT (OUTPATIENT)
Dept: INTERNAL MEDICINE | Age: 51
End: 2021-03-02
Payer: MEDICAID

## 2021-03-02 VITALS
OXYGEN SATURATION: 97 % | SYSTOLIC BLOOD PRESSURE: 136 MMHG | HEART RATE: 87 BPM | WEIGHT: 198.2 LBS | HEIGHT: 67 IN | DIASTOLIC BLOOD PRESSURE: 80 MMHG | BODY MASS INDEX: 31.11 KG/M2

## 2021-03-02 DIAGNOSIS — G89.29 CHRONIC MIDLINE LOW BACK PAIN WITHOUT SCIATICA: Primary | ICD-10-CM

## 2021-03-02 DIAGNOSIS — M54.50 CHRONIC MIDLINE LOW BACK PAIN WITHOUT SCIATICA: Primary | ICD-10-CM

## 2021-03-02 DIAGNOSIS — E34.9 HYPOTESTOSTERONISM: ICD-10-CM

## 2021-03-02 PROCEDURE — G8417 CALC BMI ABV UP PARAM F/U: HCPCS | Performed by: INTERNAL MEDICINE

## 2021-03-02 PROCEDURE — G8427 DOCREV CUR MEDS BY ELIG CLIN: HCPCS | Performed by: INTERNAL MEDICINE

## 2021-03-02 PROCEDURE — G8484 FLU IMMUNIZE NO ADMIN: HCPCS | Performed by: INTERNAL MEDICINE

## 2021-03-02 PROCEDURE — 99213 OFFICE O/P EST LOW 20 MIN: CPT | Performed by: INTERNAL MEDICINE

## 2021-03-02 PROCEDURE — 4004F PT TOBACCO SCREEN RCVD TLK: CPT | Performed by: INTERNAL MEDICINE

## 2021-03-02 RX ORDER — TESTOSTERONE 12.5 MG/1.25G
5 GEL TOPICAL DAILY
Qty: 1 PACKAGE | Refills: 0 | Status: SHIPPED | OUTPATIENT
Start: 2021-03-02 | End: 2021-05-19 | Stop reason: SDUPTHER

## 2021-03-02 RX ORDER — METHYLPREDNISOLONE 4 MG/1
TABLET ORAL
Qty: 1 TABLET | Refills: 0 | Status: SHIPPED | OUTPATIENT
Start: 2021-03-02 | End: 2021-03-08

## 2021-03-02 RX ORDER — HYDROCODONE BITARTRATE AND ACETAMINOPHEN 7.5; 325 MG/1; MG/1
1 TABLET ORAL EVERY 6 HOURS PRN
Qty: 15 TABLET | Refills: 0 | Status: SHIPPED | OUTPATIENT
Start: 2021-03-02 | End: 2021-03-16

## 2021-03-02 ASSESSMENT — ENCOUNTER SYMPTOMS
ABDOMINAL PAIN: 0
BACK PAIN: 1
BOWEL INCONTINENCE: 0

## 2021-03-02 ASSESSMENT — PATIENT HEALTH QUESTIONNAIRE - PHQ9
SUM OF ALL RESPONSES TO PHQ9 QUESTIONS 1 & 2: 0
SUM OF ALL RESPONSES TO PHQ QUESTIONS 1-9: 0
1. LITTLE INTEREST OR PLEASURE IN DOING THINGS: 0

## 2021-03-02 NOTE — PROGRESS NOTES
Sunshine Vogel ( 1970) is a 52 y.o. male,  Established, here for evaluation of the following chief complaint(s).   Back Pain (lower back pain history of several herniated disc in lower back)        ASSESSMENT/PLAN:  Problem List        Other    Chronic midline low back pain - Primary  History of chronic back pain issues although patient has not had any recent imaging was on pain management service before with maintenance Lortabs is not interested with maintenance medications just for acute therapy he does not have pain every day will try to obtain previous imaging for review may refer to pain management if patient's back pain does not resolve    Relevant Medications    HYDROcodone-acetaminophen (LORCET PLUS) 7.5-325 MG per tablet    methylPREDNISolone (MEDROL DOSEPACK) 4 MG tablet    Hypotestosteronism    Relevant Medications    testosterone (ANDROGEL) 25 MG/2.5GM (1%) GEL 1 % gel          Results for orders placed or performed in visit on 21   Hemoglobin A1C   Result Value Ref Range    Hemoglobin A1C 5.7 4.0 - 6.0 %   Hepatitis C Antibody   Result Value Ref Range    Hep C Ab Interp Non-Reactive    TSH without Reflex   Result Value Ref Range    TSH 0.232 (L) 0.270 - 4.200 uIU/mL   Lipid Panel   Result Value Ref Range    Cholesterol, Total 207 (H) 160 - 199 mg/dL    Triglycerides 150 (H) 0 - 149 mg/dL    HDL 37 (L) 55 - 121 mg/dL    LDL Calculated 140 <100 mg/dL   Comprehensive Metabolic Panel   Result Value Ref Range    Sodium 143 136 - 145 mmol/L    Potassium 4.5 3.5 - 5.0 mmol/L    Chloride 109 98 - 111 mmol/L    CO2 26 22 - 29 mmol/L    Anion Gap 8 7 - 19 mmol/L    Glucose 107 74 - 109 mg/dL    BUN 17 6 - 20 mg/dL    CREATININE 0.9 0.5 - 1.2 mg/dL    GFR Non-African American >60 >60    GFR African American >59 >59    Calcium 9.3 8.6 - 10.0 mg/dL    Total Protein 6.7 6.6 - 8.7 g/dL    Albumin 4.1 3.5 - 5.2 g/dL    Total Bilirubin 0.3 0.2 - 1.2 mg/dL    Alkaline Phosphatase 80 40 - 130 U/L    ALT 12 5 - 41 U/L    AST 10 5 - 40 U/L   CBC   Result Value Ref Range    WBC 6.4 4.8 - 10.8 K/uL    RBC 4.15 (L) 4.70 - 6.10 M/uL    Hemoglobin 12.9 (L) 14.0 - 18.0 g/dL    Hematocrit 37.0 (L) 42.0 - 52.0 %    MCV 89.2 80.0 - 94.0 fL    MCH 31.1 (H) 27.0 - 31.0 pg    MCHC 34.9 33.0 - 37.0 g/dL    RDW 13.2 11.5 - 14.5 %    Platelets 870 665 - 032 K/uL    MPV 8.9 (L) 9.4 - 12.4 fL       Return in about 3 months (around 6/2/2021). Back Pain  This is a chronic problem. The current episode started in the past 7 days. The problem occurs constantly. The problem has been waxing and waning since onset. The pain is present in the lumbar spine and sacro-iliac. The quality of the pain is described as shooting and stabbing. The pain does not radiate. The pain is at a severity of 8/10. The pain is severe. The pain is worse during the day. The symptoms are aggravated by bending, lying down, coughing, sitting and standing. Stiffness is present all day. Pertinent negatives include no abdominal pain, bladder incontinence, bowel incontinence, chest pain, dysuria, fever, headaches, leg pain, numbness, paresis, paresthesias, pelvic pain, perianal numbness, tingling, weakness or weight loss. He has tried bed rest, home exercises and heat for the symptoms. The treatment provided no relief. Review of Systems   Constitutional: Negative for fever and weight loss. Cardiovascular: Negative for chest pain. Gastrointestinal: Negative for abdominal pain and bowel incontinence. Genitourinary: Negative for bladder incontinence, dysuria and pelvic pain. Musculoskeletal: Positive for back pain. Neurological: Negative for tingling, weakness, numbness, headaches and paresthesias. Physical Exam  Constitutional:       Appearance: Normal appearance. Neck:      Musculoskeletal: Full passive range of motion without pain. Cardiovascular:      Rate and Rhythm: Normal rate and regular rhythm. Heart sounds: Normal heart sounds. Pulmonary:      Effort: Pulmonary effort is normal.      Breath sounds: Normal breath sounds. Abdominal:      General: Abdomen is flat. Bowel sounds are normal.      Palpations: Abdomen is soft. Musculoskeletal:      Lumbar back: He exhibits decreased range of motion, tenderness, pain and spasm. He exhibits no swelling, no edema and no deformity. Neurological:      General: No focal deficit present. Mental Status: He is alert.    Psychiatric:         Attention and Perception: Attention normal.           (Time Documentation Optional [id number])    An electronic signaturewaas used to authenticate this note  -Pat Jain MD on 3/2/2021 at 10:17 AM

## 2021-03-09 ENCOUNTER — TELEPHONE (OUTPATIENT)
Dept: INTERNAL MEDICINE | Age: 51
End: 2021-03-09

## 2021-03-09 DIAGNOSIS — G89.29 CHRONIC MIDLINE LOW BACK PAIN WITH SCIATICA, SCIATICA LATERALITY UNSPECIFIED: Primary | ICD-10-CM

## 2021-03-09 DIAGNOSIS — R19.01 ABDOMINAL MASS, RUQ (RIGHT UPPER QUADRANT): ICD-10-CM

## 2021-03-09 DIAGNOSIS — R93.89 ABNORMAL FINDING ON IMAGING: ICD-10-CM

## 2021-03-09 DIAGNOSIS — M48.05 SPINAL STENOSIS OF THORACOLUMBAR REGION: ICD-10-CM

## 2021-03-09 DIAGNOSIS — M54.40 CHRONIC MIDLINE LOW BACK PAIN WITH SCIATICA, SCIATICA LATERALITY UNSPECIFIED: Primary | ICD-10-CM

## 2021-03-09 NOTE — TELEPHONE ENCOUNTER
Pt called wanted to know if you will be willing to order the MRI on his back he said he is still having a lot of pain.

## 2021-03-10 NOTE — TELEPHONE ENCOUNTER
Pt is scheduled for the MRI on 3/22/2021 at [facility] [address] at 8:00. Pt has been notified he voice understood that he will also go to pain management.

## 2021-03-22 ENCOUNTER — HOSPITAL ENCOUNTER (OUTPATIENT)
Dept: MRI IMAGING | Age: 51
Discharge: HOME | End: 2021-03-22
Payer: MEDICAID

## 2021-03-22 ENCOUNTER — TELEPHONE (OUTPATIENT)
Dept: NEUROLOGY | Age: 51
End: 2021-03-22

## 2021-03-22 DIAGNOSIS — G89.29 CHRONIC MIDLINE LOW BACK PAIN WITH SCIATICA, SCIATICA LATERALITY UNSPECIFIED: ICD-10-CM

## 2021-03-22 DIAGNOSIS — M54.40 CHRONIC MIDLINE LOW BACK PAIN WITH SCIATICA, SCIATICA LATERALITY UNSPECIFIED: ICD-10-CM

## 2021-03-22 PROCEDURE — 72148 MRI LUMBAR SPINE W/O DYE: CPT

## 2021-03-22 NOTE — TELEPHONE ENCOUNTER
Called patient to let them know we had to reschedule appointment that was on 3/29/21 with Cristhian Souza, she will not be in the office that day. Pt is aware of the new appointment time and date.

## 2021-03-25 ENCOUNTER — HOSPITAL ENCOUNTER (OUTPATIENT)
Dept: CT IMAGING | Age: 51
Discharge: HOME | End: 2021-03-25
Payer: MEDICAID

## 2021-03-25 DIAGNOSIS — R93.89 ABNORMAL FINDING ON IMAGING: ICD-10-CM

## 2021-03-25 PROBLEM — K57.30 DIVERTICULOSIS OF COLON: Status: ACTIVE | Noted: 2021-03-25

## 2021-03-25 PROBLEM — K42.9 UMBILICAL HERNIA WITHOUT OBSTRUCTION AND WITHOUT GANGRENE: Status: ACTIVE | Noted: 2021-03-25

## 2021-03-25 PROCEDURE — 6360000004 HC RX CONTRAST MEDICATION: Performed by: INTERNAL MEDICINE

## 2021-03-25 PROCEDURE — 74178 CT ABD&PLV WO CNTR FLWD CNTR: CPT

## 2021-03-25 RX ADMIN — IOPAMIDOL 75 ML: 755 INJECTION, SOLUTION INTRAVENOUS at 11:33

## 2021-04-05 ENCOUNTER — HOSPITAL ENCOUNTER (OUTPATIENT)
Dept: PAIN MANAGEMENT | Age: 51
Discharge: HOME | End: 2021-04-05
Payer: MEDICAID

## 2021-04-05 VITALS
SYSTOLIC BLOOD PRESSURE: 123 MMHG | HEART RATE: 70 BPM | WEIGHT: 200 LBS | OXYGEN SATURATION: 98 % | TEMPERATURE: 98 F | HEIGHT: 67 IN | DIASTOLIC BLOOD PRESSURE: 75 MMHG | BODY MASS INDEX: 31.39 KG/M2

## 2021-04-05 DIAGNOSIS — M47.816 FACET HYPERTROPHY OF LUMBAR REGION: ICD-10-CM

## 2021-04-05 PROCEDURE — G0463 HOSPITAL OUTPT CLINIC VISIT: HCPCS

## 2021-04-05 PROCEDURE — 99203 OFFICE O/P NEW LOW 30 MIN: CPT | Performed by: NURSE PRACTITIONER

## 2021-04-05 PROCEDURE — 99215 OFFICE O/P EST HI 40 MIN: CPT

## 2021-04-05 ASSESSMENT — PAIN SCALES - GENERAL: PAINLEVEL_OUTOF10: 5

## 2021-04-05 ASSESSMENT — PAIN DESCRIPTION - LOCATION: LOCATION: BACK

## 2021-04-05 NOTE — PROGRESS NOTES
Clinic Documentation      Education Provided:  [x] Review of Jackquline Fenton  [x] Agreement Review  [x] PEG Score Calculated [x] PHQ Score Calculated [x] ORT Score Calculated    [] Compliance Issues Discussed [] Cognitive Behavior Needs [x] Exercise [] Review of Test [] Financial Issues  [x] Tobacco/Alcohol Use Reviewed [x] Teaching [x] New Patient [] Picture Obtained    Physician Plan:  [] Outgoing Referral  [] Pharmacy Consult  [] Test Ordered [] Prescription Ordered/Changed   [] Obtained Test Results / Consult Notes        Complete if patient is withholding blood thinner for procedure     Blood Thinner Patient is currently taking:      [] Plavix (Hold for 7 days)  [] Aspirin (Hold for 5 days)     [] Pletal (Hold for 2 days)  [] Pradaxa (Hold for 3 days)    [] Effient (Hold for 7 days)  [] Xarelto (Hold for 2 days)    [] Eliquis (Hold for 2 days)  [] Brilinta (Hold for 7 days)    [] Coumadin (Hold for 5 days) - (INR needs to be drawn the day prior to procedure- INR < 2.0)    [] Aggrenox (Hold for 7 days)        [] Patient will stop medication on their own.    [] Blood Thinner Form Faxed for approval to hold.    Provider form faxed to:   Assessment Completed by:  Electronically signed by Jessy Daniels on 4/5/2021 at 8:32 AM

## 2021-04-05 NOTE — H&P
[facility] Physical and Pain Medicine    History and Physical    Patient Name: Kylie Lynne    MR #: [id number]    Account #: [de-identified]    : 1970    Age: 52 y.o. Sex: male    Date: 2021    PCP: Kem Calhoun MD         Referring Provider:    Chief Complaint:   Chief Complaint   Patient presents with    Back Pain       History of Present Illness:    Kylie Lynne is a 52 y.o. male who presents to the office with primary complaints of low back pain. He explains that he has had the pain in his back going on 20 years and it has recently gotten worse. 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 He has moved to [place] from [place] and says that when he could not tolerate the pain he would go to his PCP and get a prescription for 5-7 days of pain medication and a steroid pack. When he moved to [place] that cannot be done. He says that he smokes marijuana to relieve the pain and has since he moved to [place]. He has never had steroid injections to his back. He says that in the past it did not hurt long enough to consider injections, but it hurts all of the time now. Says that bending, twisting and side to side movement makes his back hurt worse, while marijuana and steroids help to improve the pain. He does take OTC Excedrin and IBU for the pain and says he actually takes to much. He is open to injections. Employment: Retired []   Disabled  []   Works []     Does Not Work [x]     Previous Injury:  Yes  []   No [x]     Previous Surgery: Yes []   No [x]    Previous Physical Therapy In the last 6 months? Yes  []    No [x]   Did Physical Therapy make thepain better or worse? Better []   Worse []  Unchanged []     MRI in the last two years? Yes [x]  No []  Results reviewed with patient?   Yes []   No Drinks per session: 1 or 2     Comment: daily         Family History  family history is not on file. Review of Systems:  Constitutional: denies fever, chills, fatigue, change in appetite, weight gain or weight loss  Head: Normocephalic  Skin: denies easy bruising, skin redness, skin rash, hives, sensitivity to sun exposure, tightness, nodules or bumps, hair loss, color changes in the hands or feet, or feeling of temperature change such as coldness  Eyes: denies pain, redness, loss of vision, double or blurred vision, eye drainage, or dryness. ENT and Mouth: denies ringing in the ears, loss of hearing, nasal congestion, nasal discharge, no hoarseness, sore throat, or difficulty swallowing   Respiratory: denies chronic dry cough, coughing up blood, coughing up mucus, waking at night coughing or choking, or wheezing  Cardiovascular: denies chest pain, irregular heartbeats, palpitations, shortness of breath, or edema in legs  Gastrointestinal: denies, nausea, vomiting, heartburn, diarrhea, constipation  Genitourinary: denies difficult urination, pain or burning with urination, blood in the urine, or cloudy urine  Musculoskeletal: denies arm, buttock, thigh or calf cramps. Has pain in low back, and tenderness in low back. No muscle weakness. No joint swelling. Neurologic: headache, dizziness, fainting, loss of consciousness, No sensitivity, no memory loss. .    Endocrine: denies intolerance to hot or cold temperature, night sweats, flushing, fingernail changes, increased thirst, or hairloss   Hematologic/ Lymphatic: denies anemia, bleeding tendency or clotting tendency, bruising easily. Allergic/ Immunologic: denies rhinitis, asthma, skin sensitivity, or Latex allergy  Psychiatric: denies depression or thoughts of suicide, or voices in head.        Current Pain Assessment:   Pain Assessment  Pain Assessment: 0-10  Pain Level: 5  Patient's Stated Pain Goal: 3  Pain Type: Chronic pain  Pain Location: Back    Clinical Progression: gradually improving  Effect of Pain on Daily Activities: limits activity  Patient's Stated Pain Goal: No pain  Pain Intervention(s): Medication (see eMar), Repositioning, Rest, Ice    Current PE    ORT Score: 0    PHQ-9 Score: 8    Physical Exam:    Vitals:    21 0912   BP: 123/75   Pulse: 70   Temp: 98 °F (36.7 °C)   TempSrc: Temporal   SpO2: 98%   Weight: 200 lb (90.7 kg)   Height: 5' 7\" (1.702 m)       Body mass index is 31.32 kg/m². General Appearance: No acute distress. Appears to be well dressed  Skin Exam: Warm and dry, no jaundice, rashes or leasions  Head Exam: NCAT, PERRLA, EOMI, scalp normal  Eye Exam: PERRLA, EOMI, conjunctivae clear  Ear Exam: Normal external auricles. No drainage from ear canals  Nose Exam: Normal alignment. Turbinates clear. No drainage  Mouth Exam: Oral mucosa pink and moist. Gums pink. Throat Exam: Posterior pharynx pink in color with no edema  Neck Exam: Supple, trachea midline. No masses palpated. Respiratory Exam: Clear to ausculation in all lobes anterior and posterior. Cardiovascular Exam: Regular rate and rhythm, no gallops, no rubs or murmurs. No edema in extremities  Gastrointestinal Exam: Bowel sounds in all quadrants, soft, non-distended, non-tender with palpation, no guarding   Musculoskeletal Exam: No joint swelling or deformity.    Back Exam: Pain with extension of lumbar spine  Hip Exam: Full rotation bilateral  Shoulder Exam: Full rotation bilateral  Knee Exam: Full flexion and extension bilateral  Extremities: No rash, cyanosis or bruising  Neurologic Exam: Gait and coordination normal, speech normal and clear  Reflexes: Normal brachialis, Negative Camarena's bilateral. Normal Patellar bilateral,   CN EXAM: II-XII intact, face symmetrical, tongue symmetrical, the trapezius and sternocleidomastoid muscle appearance and strength symmetrical, normal achilles bilateral, ankle clonus negative bilateral  Strength: 5/5 RUE Bi's/Tri's, 5/5 LUE Bi's/Tri's, 5/5 RLE knee flex/ext, 5/5 RLE DF/PF, 5/5 LLE knee flex/ext, 5/5 LLE DF/PF  Sensation: Equal and intact to fine touch in all extremities  Mood and affect: Normal limits  Nurses note reviewed along with current vital signs    Active Problem(s)  Active Problems:    Chronic midline low back pain    Facet hypertrophy of lumbar region  Resolved Problems:    * No resolved hospital problems. *                                                                                                                                 PLAN:  1. Patient is to call the office with any questions or concerns that may arise prior to next appointment. 2. Schedule patient for bilateral lumbar facet injections levels L4-L5, L5-S1    Patient does take Excedrin. Knows to hold 5 days prior to procedure      Urine Drug Screen Today:   Yes  []    No  [x]     Discussion:  Discussed exam findings and plan of care with patient. Patient agreed with the current plan of care at this time. All questions from the patient were answered by the provider. Activity:   Discussed exercise as beneficial to pain reduction, encouraged stretching exercise with a focus on torso strengthening. Education Provided:  Review of Lincoln Rolle [x]  Agreement Review  [x]  Reviewed PHQ-9  [x]      Reviewed ORT [x]  Review of Test  [x]  Compliance Issues Discussed [x]   Cognitive Behavior Needs  []  Exercise  [x]  Financial Issues  []   Tobacco/Alcohol Use  []  Teaching  [x]   New Patient Picture Obtained  [x]      [] Benzodiazapine's and Narcotics:  Patient educated on the possible effects of combining Benzodiazapine's and Opioids. Explained \"Black Box Warnings\" such as; possible suppressed breathing, hypoxia, anoxia, depressed cognition, heart arrhythmia, coma and possible death. Patient verbalized understanding concerning possible effects. Controlled Substance Monitoring:   Attestation: The LEELA report for this patient was reviewed today.   Discussed with patient possible medication side effects, risk of tolerance, dependence and alternative treatments. Discussed thegrowing epidemic in the [place] with the overprescribing and at times the abuse of narcotics. Discussed the detrimental effects of long term narcotic use. Patient encouraged to set daily goals of exercising and decreasing dailynarcotic intake. Discussed with the patient about the development of hyperalgesia with long term narcotic intake. EMR dragon/transcription disclaimer: Much of this encounter note is electronic transcription/translation of spoken language to printed tach. Electronic translation of spoken language may be erroneous, or at times, nonsensical words or phrases may be inadvertently transcribed. Although, I have reviewed the note for such errors, some may still exist.    CC:  Terri Chino MD    Thank you for this kind referral and allowing me to participate    in your patients care.     1 [facility], [facility], 4/5/2021 at 9:52 AM

## 2021-04-07 RX ORDER — SUMATRIPTAN 100 MG/1
TABLET, FILM COATED ORAL
Qty: 9 TABLET | Refills: 5 | Status: SHIPPED | OUTPATIENT
Start: 2021-04-07 | End: 2022-01-19 | Stop reason: SDUPTHER

## 2021-04-07 NOTE — TELEPHONE ENCOUNTER
Flor Jorge called requesting a refill of the below medication which has been pended for you:     Requested Prescriptions     Pending Prescriptions Disp Refills    SUMAtriptan (IMITREX) 100 MG tablet 9 tablet 5     Sig: As directed       Last Appointment Date: 11/5/2020  Next Appointment Date: 5/18/2021    No Known Allergies

## 2021-04-20 ENCOUNTER — HOSPITAL ENCOUNTER (OUTPATIENT)
Dept: PAIN MANAGEMENT | Age: 51
Discharge: HOME | End: 2021-04-20
Payer: MEDICAID

## 2021-04-20 ENCOUNTER — OFFICE VISIT (OUTPATIENT)
Dept: NEUROLOGY | Age: 51
End: 2021-04-20
Payer: MEDICAID

## 2021-04-20 VITALS
SYSTOLIC BLOOD PRESSURE: 123 MMHG | DIASTOLIC BLOOD PRESSURE: 66 MMHG | TEMPERATURE: 97.5 F | HEIGHT: 67 IN | OXYGEN SATURATION: 98 % | HEART RATE: 81 BPM | BODY MASS INDEX: 31.39 KG/M2 | WEIGHT: 200 LBS

## 2021-04-20 VITALS
RESPIRATION RATE: 18 BRPM | OXYGEN SATURATION: 98 % | TEMPERATURE: 97.5 F | DIASTOLIC BLOOD PRESSURE: 72 MMHG | SYSTOLIC BLOOD PRESSURE: 118 MMHG | HEART RATE: 78 BPM

## 2021-04-20 DIAGNOSIS — Q04.8 CEREBELLAR TONSILLAR ECTOPIA (HCC): ICD-10-CM

## 2021-04-20 DIAGNOSIS — Z99.89 CPAP (CONTINUOUS POSITIVE AIRWAY PRESSURE) DEPENDENCE: ICD-10-CM

## 2021-04-20 DIAGNOSIS — M54.2 CHRONIC NECK PAIN: ICD-10-CM

## 2021-04-20 DIAGNOSIS — M47.816 LUMBAR FACET JOINT SYNDROME: ICD-10-CM

## 2021-04-20 DIAGNOSIS — G43.011 INTRACTABLE MIGRAINE WITHOUT AURA AND WITH STATUS MIGRAINOSUS: ICD-10-CM

## 2021-04-20 DIAGNOSIS — G89.29 CHRONIC NECK PAIN: ICD-10-CM

## 2021-04-20 DIAGNOSIS — G47.33 OBSTRUCTIVE SLEEP APNEA: Primary | ICD-10-CM

## 2021-04-20 PROCEDURE — 64494 INJ PARAVERT F JNT L/S 2 LEV: CPT

## 2021-04-20 PROCEDURE — 3209999900 FLUORO FOR SURGICAL PROCEDURES

## 2021-04-20 PROCEDURE — 4004F PT TOBACCO SCREEN RCVD TLK: CPT | Performed by: PHYSICIAN ASSISTANT

## 2021-04-20 PROCEDURE — 6360000002 HC RX W HCPCS

## 2021-04-20 PROCEDURE — 99214 OFFICE O/P EST MOD 30 MIN: CPT | Performed by: PHYSICIAN ASSISTANT

## 2021-04-20 PROCEDURE — G8417 CALC BMI ABV UP PARAM F/U: HCPCS | Performed by: PHYSICIAN ASSISTANT

## 2021-04-20 PROCEDURE — 64493 INJ PARAVERT F JNT L/S 1 LEV: CPT

## 2021-04-20 PROCEDURE — 2580000003 HC RX 258

## 2021-04-20 PROCEDURE — 2500000003 HC RX 250 WO HCPCS

## 2021-04-20 PROCEDURE — G8427 DOCREV CUR MEDS BY ELIG CLIN: HCPCS | Performed by: PHYSICIAN ASSISTANT

## 2021-04-20 RX ORDER — BUPIVACAINE HYDROCHLORIDE 5 MG/ML
INJECTION, SOLUTION EPIDURAL; INTRACAUDAL
Status: COMPLETED | OUTPATIENT
Start: 2021-04-20 | End: 2021-04-20

## 2021-04-20 RX ORDER — METHYLPREDNISOLONE ACETATE 80 MG/ML
INJECTION, SUSPENSION INTRA-ARTICULAR; INTRALESIONAL; INTRAMUSCULAR; SOFT TISSUE
Status: COMPLETED | OUTPATIENT
Start: 2021-04-20 | End: 2021-04-20

## 2021-04-20 RX ORDER — LIDOCAINE HYDROCHLORIDE 10 MG/ML
INJECTION, SOLUTION EPIDURAL; INFILTRATION; INTRACAUDAL; PERINEURAL
Status: COMPLETED | OUTPATIENT
Start: 2021-04-20 | End: 2021-04-20

## 2021-04-20 RX ADMIN — METHYLPREDNISOLONE ACETATE 80 MG: 80 INJECTION, SUSPENSION INTRA-ARTICULAR; INTRALESIONAL; INTRAMUSCULAR; SOFT TISSUE at 11:55

## 2021-04-20 RX ADMIN — BUPIVACAINE HYDROCHLORIDE 5 ML: 5 INJECTION, SOLUTION EPIDURAL; INTRACAUDAL at 11:54

## 2021-04-20 RX ADMIN — LIDOCAINE HYDROCHLORIDE 20 ML: 10 INJECTION, SOLUTION EPIDURAL; INFILTRATION; INTRACAUDAL; PERINEURAL at 11:54

## 2021-04-20 ASSESSMENT — PAIN DESCRIPTION - FREQUENCY: FREQUENCY: INTERMITTENT

## 2021-04-20 ASSESSMENT — PAIN - FUNCTIONAL ASSESSMENT: PAIN_FUNCTIONAL_ASSESSMENT: PREVENTS OR INTERFERES SOME ACTIVE ACTIVITIES AND ADLS

## 2021-04-20 ASSESSMENT — PAIN DESCRIPTION - ORIENTATION: ORIENTATION: LOWER

## 2021-04-20 NOTE — PATIENT INSTRUCTIONS
Patient education: Sleep apnea in adults       INTRODUCTION  Normally during sleep, air moves through the throat and in and out of the lungs at a regular rhythm. In a person with sleep apnea, air movement is periodically diminished or stopped. There are two types of sleep apnea: obstructive sleep apnea and central sleep apnea. In obstructive sleep apnea, breathing is abnormal because of narrowing or closure of the throat. In central sleep apnea, breathing is abnormal because of a change in the breathing control and rhythm. Sleep apnea is a serious condition that can affect a person's ability to safely perform normal daily activities and can affect long term health. Approximately 25 percent of adults are at risk for sleep apnea of some degree. Men are more commonly affected than women. Other risk factors include middle and older age, being overweight or obese, and having a small mouth and throat. This topic review focuses on the most common type of sleep apnea in adults, obstructive sleep apnea (JANET). HOW SLEEP APNEA OCCURS  The throat is surrounded by muscles that control the airway for speaking, swallowing, and breathing. During sleep, these muscles are less active, and this causes the throat to narrow. In most people, this narrowing does not affect breathing. In others, it can cause snoring, sometimes with reduced or completely blocked airflow. A completely blocked airway without airflow is called an obstructive apnea. Partial obstruction with diminished airflow is called a hypopnea. A person may have apnea and hypopnea during sleep. Insufficient breathing due to apnea or hypopnea causes oxygen levels to fall and carbon dioxide to rise. Because the airway is blocked, breathing faster or harder does not help to improve oxygen levels until the airway is reopened. Typically, the obstruction requires the person to awaken to activate the upper airway muscles.  Once the airway is opened, the person then takes adults. ?Male sex  JANET is two times more common in men, especially in middle age. ?Obesity  The more obese a person is, the more likely he or she is to have JANET. ? Sedation from medication or alcohol  This interferes with the ability to awaken from sleep and can lengthen periods of apnea (no breathing), with potentially dangerous consequences. ? Abnormality of the airway. SLEEP APNEA CONSEQUENCES  Complications of sleep apnea can include daytime sleepiness and difficulty concentrating. The consequence of this is an increased risk of accidents and errors in daily activities. Studies have shown that people with severe JANET are more than twice as likely to be involved in a motor vehicle accident as people without these conditions. People with JANET are encouraged to discuss options for driving, working, and performing other high-risk tasks with a healthcare provider. In addition, people with untreated JANET may have an increased risk of cardiovascular problems such as high blood pressure, heart attack, abnormal heart rhythms, or stroke. This risk may be due to changes in the heart rate and blood pressure that occur during sleep. SLEEP APNEA DIAGNOSIS  The diagnosis of JANET is best made by a knowledgeable sleep medicine specialist who has an understanding of the individual's health issues. The diagnosis is usually based upon the person's medical history, physical examination, and testing, including:  ? A complaint of snoring and ineffective sleep  ? Neck size (greater than 16 inches in men or 14 inches in women) is associated with an increased risk of sleep apnea  ? A small upper airway: difficulty seeing the throat because of a tongue that is large for the mouth  ? High blood pressure, especially if it is resistant to treatment  ? If a bed partner has observed the patient during episodes of stopped breathing (apnea), choking, or gasping during sleep, there is a strong possibility of sleep apnea.     Testing is pressure)  device uses an air-tight attachment to the nose, typically a mask, connected to a tube and a blower which generates the pressure. Devices that fit comfortably into the nasal opening, rather than over the nose, are also available. CPAP should be used any time the person sleeps (day or night). The CPAP device is usually used for the first time in the sleep lab, where a technician can adjust the pressure and select the best equipment to keep the airway open. Alternatively, an auto device with a self-adjusting pressure feature, provided with proper education and training, can get treatment started without another sleep test. While the treatment may seem uncomfortable, noisy, or bulky at first, most people accept the treatment after experiencing better sleep. However, difficulty with mask comfort and nasal congestion prevent up to 50 percent of people from using the treatment on a regular basis. Continued follow up with a healthcare provider helps to ensure that the treatment is effective and comfortable. Information from the CPAP machine is often used by physicians, therapists, and insurers to track the success of treatment. CPAP can be delivered with different features to improve comfort and solve problems that may come up during treatment. Changes in treatment may be needed if symptoms do not improve or if the persons condition changes, such as a gain or loss of weight. Adjust sleep position  Adjusting sleep position (to stay off the back) may help improve sleep quality in people who have JANET when sleeping on the back. However, this is difficult to maintain throughout the night and is rarely an adequate solution. Weight loss  Weight loss may be helpful for obese or overweight patients. Weight loss may be accomplished with dietary changes, exercise, and/or surgical treatment.  However, it can be difficult to maintain weight loss; the five-year success of non-surgical weight loss is only 5 percent, meaning that 95 percent of people regain lost weight. Avoid alcohol and other sedatives  Alcohol can worsen sleepiness, potentially increasing the risk of accidents or injury. People with JANET are often counseled to drink little to no alcohol, even during the daytime. Similarly, people who take anti-anxiety medications or sedatives to sleep should speak with their healthcare provider about the safety of these medications. People with JANET must notify all healthcare providers, including surgeons, about their condition and the potential risks of being sedated. People with JANET who are given anesthesia and/or pain medications require special management and close monitoring to reduce the risk of a blocked airway. Dental devices  A dental device, called an oral appliance or mandibular advancement device, can reposition the jaw (mandible), bringing the tongue and soft palate forward as well. This may relieve obstruction in some people. This treatment is excellent for reducing snoring, although the effect on JANET is sometimes more limited. As a result, dental devices are best used for mild cases of JANET when relief of snoring is the main goal. Failure to tolerate and accept CPAP is another indication for dental devices. While dental devices are not as effective as CPAP for JANET, some patients prefer a dental device to CPAP. Side effects of dental devices are generally minor but may include changes to the bite with prolonged use. Surgical treatment  Surgery is an alternative therapy for patients who cannot tolerate or do not improve with nonsurgical treatments such as CPAP or oral devices. Surgery can also be used in combination with other nonsurgical treatments. Surgical procedures reshape structures in the upper airways or surgically reposition bone or soft tissue. Uvulopalatopharyngoplasty (UPPP) removes the uvula and excessive tissue in the throat, including the tonsils, if present.  Other procedures, such as maxillomandibular advancement (MMA), address both the upper and lower pharyngeal airway more globally. UPPP alone has limited success rates (less than 50 percent) and people can relapse (when JANET symptoms return after surgery). As a result, this surgery is only recommended in a minority of people and should be considered with caution. MMA may have a higher success rate, particularly in people with abnormal jaw (maxilla and mandible) anatomy, but it is the most complicated procedure. A newer surgical approach, nerve stimulation to protrude the tongue, has promising success rates in very selected people. Tracheostomy creates a permanent opening in the neck. It is reserved for people with severe disease in whom less drastic measures have failed or are inappropriate. Although it is always successful in eliminating obstructive sleep apnea, tracheostomy requires significant lifestyle changes and carries some serious risks (eg, infection, bleeding, blockage). All surgical treatments require discussions about the goals of treatment, the expected outcomes, and potential complications. Hypoglossal nerve stimulator- \"Inspire\" device    PAP treatment failure:  Possible causes of treatment failure include nonadherence or suboptimal adherence, weight gain, an inappropriate level of prescribed positive pressure, or an additional disorder causing sleepiness (eg, narcolepsy) that may require alterations in the therapeutic regimen. A review of medications should also be undertaken since many drugs may lead to sleepiness. Inadequate sleep time may also negate the expected effects from treatment of JANET. Also, pt's can have persistent hypersomnolence associated with sleep apnea even in the presence of adequate therapy and at those times Provigil or Nuvigil or other stimulants may be indicated.     Once the patient's positive airway pressure therapy has been optimized and symptoms resolved, a regimen of long-term follow-up should be established. Annual visits are reasonable, with more frequent visits in between if new issues arise. The purpose of long-term follow-up is to assess usage and monitor for recurrent JANET, new side effects, air leakage, and fluctuations in body weight. WHERE TO GET MORE INFORMATION  Your healthcare provider is the best source of information for questions and concerns related to your medical problem. Organizations  American Sleep Apnea Association  Provides information about sleep apnea to the public, publishes a newsletter, and serves as an advocate for people with the disorder. Janell, [address]   [email or web address]   [email or web address]   Tel: [phone]   Fax: [facility] organization that works to [employer] and safety by promoting public understanding of sleep and sleep disorders. Supports sleep-related education, research, and advocacy; produces and distributes educational materials to the public and healthcare professionals; and offers postdoctoral fellowships and grants for sleep researchers. [address]   [email or web address]   [email or web address]   Tel: [phone]   Fax: [phone]    Important information:  Medicare/private insurance CPAP/BiPAP/APAP requirements:  Medicare/private insurance has specific requirements for PAP compliance that must be met during the first 90 days of use to continue coverage for CPAP/BiPAP/APAP  from day 91 and beyond. The policy requires that patients use a PAP device 4 hours per 24 hour period, at least 70% of the time over a 30 day period. This data must be downloaded as a report direct from the PAP devices. This is called a compliance download. Your PAP supplier will assist you in this matter.      Reminder:  Please bring a copy of the compliance download to your next office visit or have your supplier fax it to our office prior to your office visit. Note:  Where applicable, we will utilize PAP device efficiency reports, additional testing, and face-to-face  clinical evaluation subsequent to any treatment, changes in treatment, and continued treatment. Caution:  Please abstain from driving or engaging in other activities which may be hazardous in the presence of diminished alertness or daytime drowsiness. And avoid the use of sedatives or alcohol, which can worsen sleep apnea and daytime drowsiness. Mask suggestions:  -     Resmed Airfit N20 (Nasal) or F20 (Full face mask). They conform to your face, thus decreasing the potential for mask leakage. You might like the AirTouch F20(full face mask). It has a \"memory foam\" like cushion. The AirFit F30 is a smaller style full face mask designed to sit low on and cover less of your face for fewer facial marks. AirFit N30i has a top of the head tube with a nasal mask. AirFit P10 reported to be the most comfortable nasal pillow mask. Resmed Mirage FX reported to be the most comfortable nasal mask. Resmed Mirage Solo reported to be the most comfortable hybrid mask. AirTouch N20-memory foam nasal mask. Respironics: You might also like to try a nasal mask called a Dreamwear nasal mask or the Dreamwear nasal pillow. Another suggestion is the [facility], it is a minimal contact full face mask. The Orvan Axon incredible under the nose design makes it the only full face mask that won't cause red marks on the bridge of your nose when compared to other full face masks. The Dreamwear full face mask has a  soft feel, unique in-frame air-flow, and innovative air tube connection at the top of the head for the ultimate in sleep comfort. Comfort Gel Blue. Dreamwear gel pillows. [employer]: Brevida nasal pillow mask and Simplus FFM    The use of a memory foam CPAP pillow supports the head and neck throughout the night.

## 2021-04-20 NOTE — PROGRESS NOTES
[facility]  [address] [place] [address] [address], [address]  [place]  Phone [phone]  Fax [phone]       [facility] Follow Up Encounter      Information:   Patient Name: Edmund Schreiber  :   1970  Age:   52 y.o. MRN:   [id number]  Account #:  [de-identified]  Today:                21    Provider:  Petar Billings PA-C    Chief Complaint   Patient presents with    Sleep Apnea     pt  on 2021        Subjective:   Edmund Schreiber is a 52 y.o. male  with a history of severe JANET, migraines, and neck pain who comes in for a sleep clinic follow up. He was referred for an MRI brain and a C-spine x-ray. He was also referred for an HST due to snoring, witnessed apneas, and excessive daytime somnolence. The HST, 2020 revealed an AHI of 38.2. The HST, 12/3/2020 revealed an AHI of 21.6. He is prescribed auto CPAP therapy with a pressure range of 8cm to 20cm. The compliance report indicates that he is averaging close to 8 hours of CPAP use per day. He reports that consistent PAP use has alleviated the previous JANET symptoms. Location or symptom:  JANET  Onset:  HST: 2020   Timing:  q hs  Severity:  Severe  Associated:  Snoring, witnessed apneas, and excessive daytime somnolence  Alleviated:  CPAP     The MRI brain showed minimal chronic paranasal sinus disease. It showed minimal cerebellar tonsillar ectopia. He is not symptomatic for this and it really should not cause problems. It is an incidental finding. The C-spine x-ray suggests a ligamentous strain in her neck. He is followed by Pain Management for chronic neck and back pain. He reports that since starting CPAP he has not had anymore migraines.          Objective:     Past Medical History:   Diagnosis Date    Anxiety     CPAP (continuous positive airway pressure) dependence     Herniated lumbar intervertebral disc     Kidney cysts     Liver cyst     Low testosterone     Migraine     Obstructive sleep apnea     OCD (obsessive compulsive disorder)     Thyroid disease        Past Surgical History:   Procedure Laterality Date    APPENDECTOMY      COLONOSCOPY  approx 2017    Dr Destiny Delgado @ [facility] h[facility] \" polyps\" per pt recall    COLONOSCOPY N/A 11/6/2020    Dr Schneider Form, internal hemorrhoids-Grade 1, 5 yr recall    HERNIA REPAIR      THYROIDECTOMY         Recent Hospitalizations      Significant Injuries      Family History   Problem Relation Age of Onset    Colon Cancer Neg Hx     Colon Polyps Neg Hx        Social History  Social History     Tobacco Use   Smoking Status Current Every Day Smoker    Packs/day: 2.00    Years: 34.00    Pack years: 68.00    Types: Cigarettes    Start date: 1986   Smokeless Tobacco Never Used     Social History     Substance and Sexual Activity   Alcohol Use Yes    Frequency: Monthly or less    Drinks per session: 1 or 2    Comment: daily     Social History     Substance and Sexual Activity   Drug Use Yes    Types: Marijuana         Current Outpatient Medications   Medication Sig Dispense Refill    SUMAtriptan (IMITREX) 100 MG tablet As directed 9 tablet 5    PARoxetine (PAXIL) 40 MG tablet Take 1 tablet by mouth every morning 30 tablet 3    thyroid (ARMOUR THYROID) 120 MG tablet Take 120 mg by mouth daily      sildenafil (REVATIO) 20 MG tablet Take 20 mg by mouth as needed      testosterone (ANDROGEL) 25 MG/2.5GM (1%) GEL 1 % gel Apply 5 g topically daily for 30 days. Apply 4 clicks  (1ml) daily 1 Package 0     No current facility-administered medications for this visit. Allergies:  Patient has no known allergies. REVIEW OF SYSTEMS     Constitutional: []? Fever []? Sweats []? Chills []? Recent Injury   [x]? Denies all unless marked  HENT:[]? Headache  []? Head Injury  []? Sore Throat  []? Ear Pain  []? Dizziness []? Hearing Loss   [x]? Denies all unless marked  Musculoskeletal: []? Arthralgia  []? Myalgias []? Muscle cramps  []? Muscle twitches   [x]? Denies all unless marked   Spine:  []? Neck pain  []? Back pain  []? Sciaticia  [x]? Denies all unless marked  Neurological:[]? Visual Disturbance []? Double Vision []? Slurred Speech []? Trouble swallowing  []? Vertigo []? Tingling []? Numbness []? Weakness []? Loss of Balance   []? Loss of Consciousness []? Memory Loss []? Seizures  [x]? Denies all unless marked  Psychiatric/Behavioral:[]? Depression []? Anxiety  [x]? Denies all unless marked  Sleep: []? Insomnia []? Sleep Disturbance []? Snoring []? Restless Legs []? Daytime Sleepiness [x]? Sleep Apnea  []? Denies all unless marked    The MA has completed the ROS with the patient. I have reviewed it in its' entirety with the patient and agree with the documentation. PHYSICAL EXAM  /66 (Site: Left Upper Arm, Position: Sitting, Cuff Size: Medium Adult)   Pulse 81   Temp 97.5 °F (36.4 °C)   Ht 5' 7\" (1.702 m)   Wt 200 lb (90.7 kg)   SpO2 98%   BMI 31.32 kg/m²      Constitutional   Alert in NAD, well developed, pleasant and cooperative with exam; body habitus obese as indicated by BMI  HEENT- Conjunctiva normal.  No scars, masses, or lesions over external nose or ears, hearing intact, no neck masses noted, no jugular vein distension, no bruit  Cardiac- Regular rate and rhythm  Pulmonary- Clear to auscultation, good expansion, normal effort without use of accessory muscles  Musculoskeletal  No significant wasting of muscles noted, no bony deformities  Extremities - No clubbing, cyanosis or edema  Skin  Warm, dry, and intact. No rash, erythema, or pallor  Psychiatric  Mood, affect, and behavior appear normal      Neurologic:  Extraocular movements are intact without nystagmus. PERRL. Visual fields are full to confrontation. Facial movements are symmetrical and normal.  Speech is precise. Extremity strength is normal in both uppers and lowers.   Deep tendon reflexes are intact and symmetrical.  Rapid alternating movements are unimpaired. Finger-to-nose testing is performed well, without dysmetria. Gait is normal.    I reviewed the following studies:       [x]  :  Clinical laboratory test results     [x]  :  Radiology reports                    [x]  :  Review and summarization of medical records and/or obtain medical records        [x]  :  Previous/recent polysomnogram report(s)     []  :  Sloan Sleepiness Scale        Examination. XR CERVICAL SPINE (2-3 VIEWS) 11/19/2020 8:37 AM   History: Neck pain. The frontal and lateral views of the cervical spine are obtained. There is no previous study for comparison. There is no evidence of an acute fracture, compression or   displacement. There is a mild dextroscoliosis. There is mild reversal of cervical lordosis. The alignment is normal.   The vertebral body heights are normal. The heights of the disc spaces   are maintained. The posterior processes appear intact. Prevertebral   soft tissues are normal.       Impression   No acute bony abnormality. The reversal of cervical lordosis may suggest muscular or ligamentous   strain. Signed by Dr Anel Gooden on 11/19/2020 10:15 AM       MRI BRAIN WO CONTRAST 11/19/2020 7:43 AM   HISTORY: G43.011   Comparison: None.     Technique: Multiplanar imaging of the brain was performed in a routine   fashion without intravenous contrast.   Findings: There is no diffusion signal abnormality to suggest acute infarct. There is no intra-axial or extra-axial hemorrhage. No visualized mass   lesion on this noncontrast exam. Normal size and configuration of the   ventricular system for patient age. Minimal cerebellar tonsillar   ectopia, not meeting criteria for Chiari I malformation. The pituitary   gland and sella are unremarkable. The major intracranial flow voids   are preserved. The orbits are unremarkable. Minimal chronic mucosal thickening in the   paranasal sinuses.  Marrow signal in the calvarium and skull base   appears normal.       Impression   Impression:    1. No acute intracranial process. 2. Minimal cerebellar tonsillar ectopia, not meeting criteria for   Chiari I malformation. No hydrocephalus. 3. Minimal chronic paranasal sinus disease.       Signed by Dr Ami Gil on 11/19/2020 10:04 AM              [x]  :  Compliance download: The auto CPAP is set at a pressure range of 8cm to 20cm. Compliance download shows that he uses device: 100% of the time;  percentage of days with usage >=4 hours: 93%. AHI: 2.1    Assessment:       ICD-10-CM    1. Obstructive sleep apnea  G47.33    2. Intractable migraine without aura and with status migrainosus  G43.011    3. Cerebellar tonsillar ectopia (HCC)  Q04.8    4. Chronic neck pain  M54.2     G89.29    5. CPAP (continuous positive airway pressure) dependence  Z99.89           [x]  :  Stable-Migraines/neck pain     []  :  Improved                       [x]  :  Well controlled-JANET             []  :  Resolving     []  :  Resolved     []  :  Inadequately controlled     []  :  Worsening     []  :  Additional workup planned    Patient is compliant and benefiting from therapy as indicated by compliance evaluation and patient report. Plan:     No orders of the defined types were placed in this encounter. 1.   Patient advised of the etiology,  pathophysiology, diagnosis, treatment options, and risks of untreated JANET. Risks may include, but are not limited to  hypertension, coronary artery disease, diabetes, stroke, weight gain, impaired cognition, daytime somnolence, and motor vehicle accidents. Advised to abstain from driving or operating heavy machinery when drowsy and the use of respiratory suppressants. Discussed diagnostic studies and potential treatment plan. Will evaluate for clinical benefit and and compliance during a 30 day period within the preceding 90 days at follow up.   2.  The following educational material has been included in this visit after visit summary

## 2021-04-27 ENCOUNTER — OFFICE VISIT (OUTPATIENT)
Dept: GASTROENTEROLOGY | Age: 51
End: 2021-04-27
Payer: MEDICAID

## 2021-04-27 VITALS
HEART RATE: 77 BPM | HEIGHT: 67 IN | WEIGHT: 193 LBS | SYSTOLIC BLOOD PRESSURE: 120 MMHG | DIASTOLIC BLOOD PRESSURE: 77 MMHG | OXYGEN SATURATION: 99 % | BODY MASS INDEX: 30.29 KG/M2

## 2021-04-27 DIAGNOSIS — K31.89 GASTRIC WALL THICKENING: ICD-10-CM

## 2021-04-27 DIAGNOSIS — R93.5 ABNORMAL CT OF THE ABDOMEN: ICD-10-CM

## 2021-04-27 DIAGNOSIS — R19.00 RETROPERITONEAL MASS: Primary | ICD-10-CM

## 2021-04-27 PROCEDURE — 4004F PT TOBACCO SCREEN RCVD TLK: CPT | Performed by: NURSE PRACTITIONER

## 2021-04-27 PROCEDURE — G8417 CALC BMI ABV UP PARAM F/U: HCPCS | Performed by: NURSE PRACTITIONER

## 2021-04-27 PROCEDURE — G8427 DOCREV CUR MEDS BY ELIG CLIN: HCPCS | Performed by: NURSE PRACTITIONER

## 2021-04-27 PROCEDURE — 99214 OFFICE O/P EST MOD 30 MIN: CPT | Performed by: NURSE PRACTITIONER

## 2021-04-27 ASSESSMENT — ENCOUNTER SYMPTOMS
ABDOMINAL PAIN: 1
CONSTIPATION: 0
TROUBLE SWALLOWING: 0
BLOOD IN STOOL: 0
SHORTNESS OF BREATH: 0
ABDOMINAL DISTENTION: 0
VOMITING: 0
CHOKING: 0
NAUSEA: 1
ANAL BLEEDING: 0
RECTAL PAIN: 0
DIARRHEA: 0
COUGH: 0

## 2021-04-27 NOTE — PROGRESS NOTES
Subjective:     Patient ID: Gabe Infante is a 52 y.o. male  PCP: Basilio Ramirez MD  Referring Provider: Tegan Lowry MD    HPI  Patient presents to the office today with the following complaints: Follow-up      Pt referred for abnormal CT abdomen. This was found incidentally on MRI of spine. He does report some RUQ pain and nausea in the mornings off and on for the last 3 years. He states abdominal pain will occur with straining with BM. Duodenal and gastric thickening seen on CT as well as solid masses in right upper abdomen between aorta and inferior vena cava. Pt denies any significant weight loss  He mentions strong family history of benign masses \"all over. \"      Last Colonoscopy 2020 - diverticulosis, internal hemorrhoids, 5 year recall    Narrative   Examination. CT ABDOMEN PELVIS W WO CONTRAST 3/25/2021 10:13 AM   History: Abdominal mass. DLP: 1055 mGycm. The CT scan of the abdomen and pelvis is performed before and after   intravenous contrast enhancement. The images are acquired in axial   plane and subsequent reconstruction in coronal and sagittal planes. There is no previous study for comparison. The correlation made with   MR imaging of the lumbar spine dated 3/22/2021. The lung bases included in the study are unremarkable except for mild   atelectatic changes in the posterior costophrenic sulci. The liver is unremarkable except for a tiny nodule located laterally   in the segment 4 of the liver measuring 6 mm in greatest dimension. Spleen is normal.   No radiopaque gallstones. The pancreas is normal.   The adrenal glands bilaterally are normal.   Normal kidneys bilaterally are noted. No calculi. No hydronephrosis. The ureters are suboptimally visualized. The urinary bladder is poorly   distended. No obvious intrinsic abnormality. Prostate is borderline enlarged with intrinsic calcification. Small fat-containing umbilical hernia.    The stomach is decompressed Gastric wall thickening    3. Abnormal CT of the abdomen            Plan:   - Schedule EGD with possible EUS  I will discuss with Dr. Cris Covarrubias regarding possible FNA of retroperitoneal masses  Nothing to eat or drink after midnight. No driving for 24 hours after procedure. Bring a  to procedure. No aspirin, NSAIDs, fish oil 5 days before procedure. I have discussed the benefits, alternatives, and risks (including bleeding, perforation and death)  for pursuing Endoscopy (EGD/Colonscopy/EUS/ERCP) with the patient and they are willing to continue. We also discussed the need for anesthesia, IV access, proper dietary changes, medication changes if necessary, and need for bowel prep (if ordered) prior to their Endoscopic procedure. They are aware they must have someone accompany them to their scheduled procedure to drive them home - they agree to the above and are willing to continue. Orders  No orders of the defined types were placed in this encounter. Medications  No orders of the defined types were placed in this encounter. Patient History:     Past Medical History:   Diagnosis Date    Anxiety     CPAP (continuous positive airway pressure) dependence     Herniated lumbar intervertebral disc     Kidney cysts     Liver cyst     Low testosterone     Migraine     Obstructive sleep apnea     OCD (obsessive compulsive disorder)     Thyroid disease        Past Surgical History:   Procedure Laterality Date    APPENDECTOMY      [facility]    COLONOSCOPY N/A 11/6/2020    Dr Alber Martin, internal hemorrhoids-Grade 1, 5 yr recall    HERNIA REPAIR      THYROIDECTOMY         Family History   Problem Relation Age of Onset    Stomach Cancer Maternal Uncle     Liver Cancer Maternal Uncle     Cancer Maternal Grandfather     Colon Cancer Neg Hx     Colon Polyps Neg Hx        Social History Socioeconomic History    Marital status:      Spouse name: None    Number of children: None    Years of education: None    Highest education level: None   Occupational History    None   Social Needs    Financial resource strain: None    Food insecurity     Worry: None     Inability: None    Transportation needs     Medical: None     Non-medical: None   Tobacco Use    Smoking status: Current Every Day Smoker     Packs/day: 2.00     Years: 34.00     Pack years: 68.00     Types: Cigarettes     Start date: 1986    Smokeless tobacco: Never Used   Substance and Sexual Activity    Alcohol use: Yes     Frequency: Monthly or less     Drinks per session: 1 or 2     Comment: 2x wkly    Drug use: Yes     Types: Marijuana    Sexual activity: None   Lifestyle    Physical activity     Days per week: None     Minutes per session: None    Stress: None   Relationships    Social connections     Talks on phone: None     Gets together: None     Attends Hindu service: None     Active member of club or organization: None     Attends meetings of clubs or organizations: None     Relationship status: None    Intimate partner violence     Fear of current or ex partner: None     Emotionally abused: None     Physically abused: None     Forced sexual activity: None   Other Topics Concern    None   Social History Narrative    None       Current Outpatient Medications   Medication Sig Dispense Refill    SUMAtriptan (IMITREX) 100 MG tablet As directed 9 tablet 5    testosterone (ANDROGEL) 25 MG/2.5GM (1%) GEL 1 % gel Apply 5 g topically daily for 30 days. Apply 4 clicks  (1ml) daily 1 Package 0    PARoxetine (PAXIL) 40 MG tablet Take 1 tablet by mouth every morning 30 tablet 3    thyroid (ARMOUR THYROID) 120 MG tablet Take 120 mg by mouth daily      sildenafil (REVATIO) 20 MG tablet Take 20 mg by mouth as needed       No current facility-administered medications for this visit.         No Known Allergies    Review of Systems   Constitutional: Negative for activity change, appetite change, fatigue, fever and unexpected weight change. HENT: Negative for trouble swallowing. Respiratory: Negative for cough, choking and shortness of breath. Cardiovascular: Negative for chest pain. Gastrointestinal: Positive for abdominal pain and nausea. Negative for abdominal distention, anal bleeding, blood in stool, constipation, diarrhea, rectal pain and vomiting. Allergic/Immunologic: Negative for food allergies. All other systems reviewed and are negative. Objective:     /77   Pulse 77   Ht 5' 7\" (1.702 m)   Wt 193 lb (87.5 kg)   SpO2 99%   BMI 30.23 kg/m²     Physical Exam  Vitals signs reviewed. Constitutional:       General: He is not in acute distress. Appearance: He is well-developed. HENT:      Head: Normocephalic and atraumatic. Right Ear: External ear normal.      Left Ear: External ear normal.      Nose: Nose normal.      Comments: Mask on     Mouth/Throat:      Comments: Mask on  Eyes:      Conjunctiva/sclera: Conjunctivae normal.      Pupils: Pupils are equal, round, and reactive to light. Neck:      Musculoskeletal: Normal range of motion and neck supple. Cardiovascular:      Rate and Rhythm: Normal rate and regular rhythm. Heart sounds: Normal heart sounds. No murmur. No friction rub. No gallop. Pulmonary:      Effort: Pulmonary effort is normal. No respiratory distress. Breath sounds: Normal breath sounds. Abdominal:      General: Bowel sounds are normal. There is no distension. Palpations: Abdomen is soft. There is no mass. Tenderness: There is no abdominal tenderness. There is no guarding or rebound. Musculoskeletal: Normal range of motion. Skin:     General: Skin is warm and dry. Findings: No rash. Nails: There is no clubbing. Neurological:      Mental Status: He is alert and oriented to person, place, and time.       Gait: Gait normal.   Psychiatric:         Behavior: Behavior normal.         Thought Content:  Thought content normal.

## 2021-04-28 ENCOUNTER — TELEPHONE (OUTPATIENT)
Dept: INTERNAL MEDICINE | Age: 51
End: 2021-04-28

## 2021-04-28 ENCOUNTER — TELEPHONE (OUTPATIENT)
Dept: PAIN MANAGEMENT | Age: 51
End: 2021-04-28

## 2021-04-28 DIAGNOSIS — Z00.00 ROUTINE ADULT HEALTH MAINTENANCE: ICD-10-CM

## 2021-04-28 DIAGNOSIS — R73.9 HYPERGLYCEMIA: Primary | ICD-10-CM

## 2021-04-28 DIAGNOSIS — E78.1 HYPERTRIGLYCERIDEMIA: ICD-10-CM

## 2021-04-28 NOTE — TELEPHONE ENCOUNTER
I CALLED THE PATIENT ON 4/28/2021 AT 1322  AS A FOLLOW UP FROM THE LUMBAR FACET INJECTIONS L4/5 & L5/S1 THAT HE HAD ON 4/20/2021. THE PATIENT DID NOT ANSWER AND I DID LEAVE A MESSAGE.

## 2021-04-29 ENCOUNTER — TELEPHONE (OUTPATIENT)
Dept: GASTROENTEROLOGY | Age: 51
End: 2021-04-29

## 2021-04-29 NOTE — TELEPHONE ENCOUNTER
04-29-21 Message per Dr Farhat Cordon moved EGD up to the beginning of next week as that it will be better since he can go to [facility] after that for his retro peritoneal lesion. Routed to Arnulfo LPN/    To move EGD form 5-13-21 to early next week.

## 2021-05-03 ENCOUNTER — OFFICE VISIT (OUTPATIENT)
Age: 51
End: 2021-05-03

## 2021-05-03 VITALS — HEART RATE: 78 BPM | OXYGEN SATURATION: 97 %

## 2021-05-03 DIAGNOSIS — Z11.59 SCREENING FOR VIRAL DISEASE: Primary | ICD-10-CM

## 2021-05-03 DIAGNOSIS — Z00.00 ROUTINE ADULT HEALTH MAINTENANCE: ICD-10-CM

## 2021-05-03 DIAGNOSIS — E78.1 HYPERTRIGLYCERIDEMIA: ICD-10-CM

## 2021-05-03 DIAGNOSIS — R73.9 HYPERGLYCEMIA: ICD-10-CM

## 2021-05-03 PROBLEM — R73.03 PREDIABETES: Status: ACTIVE | Noted: 2021-05-03

## 2021-05-03 LAB
ALBUMIN SERPL-MCNC: 4.3 G/DL (ref 3.5–5.2)
ALP BLD-CCNC: 87 U/L (ref 40–130)
ALT SERPL-CCNC: 12 U/L (ref 5–41)
ANION GAP SERPL CALCULATED.3IONS-SCNC: 8 MMOL/L (ref 7–19)
AST SERPL-CCNC: 12 U/L (ref 5–40)
BILIRUB SERPL-MCNC: 0.3 MG/DL (ref 0.2–1.2)
BUN BLDV-MCNC: 17 MG/DL (ref 6–20)
CALCIUM SERPL-MCNC: 9.3 MG/DL (ref 8.6–10)
CHLORIDE BLD-SCNC: 107 MMOL/L (ref 98–111)
CHOLESTEROL, TOTAL: 198 MG/DL (ref 160–199)
CO2: 28 MMOL/L (ref 22–29)
CREAT SERPL-MCNC: 1 MG/DL (ref 0.5–1.2)
GFR AFRICAN AMERICAN: >59
GFR NON-AFRICAN AMERICAN: >60
GLUCOSE BLD-MCNC: 110 MG/DL (ref 74–109)
HBA1C MFR BLD: 5.8 % (ref 4–6)
HCT VFR BLD CALC: 35.3 % (ref 42–52)
HDLC SERPL-MCNC: 38 MG/DL (ref 55–121)
HEMOGLOBIN: 11.8 G/DL (ref 14–18)
LDL CHOLESTEROL CALCULATED: 141 MG/DL
MCH RBC QN AUTO: 32.8 PG (ref 27–31)
MCHC RBC AUTO-ENTMCNC: 33.4 G/DL (ref 33–37)
MCV RBC AUTO: 98.1 FL (ref 80–94)
PDW BLD-RTO: 14 % (ref 11.5–14.5)
PLATELET # BLD: 295 K/UL (ref 130–400)
PMV BLD AUTO: 9 FL (ref 9.4–12.4)
POTASSIUM SERPL-SCNC: 4.4 MMOL/L (ref 3.5–5)
RBC # BLD: 3.6 M/UL (ref 4.7–6.1)
SARS-COV-2, PCR: NOT DETECTED
SODIUM BLD-SCNC: 143 MMOL/L (ref 136–145)
TOTAL PROTEIN: 6.9 G/DL (ref 6.6–8.7)
TRIGL SERPL-MCNC: 96 MG/DL (ref 0–149)
TSH REFLEX FT4: 1.83 UIU/ML (ref 0.35–5.5)
WBC # BLD: 6.4 K/UL (ref 4.8–10.8)

## 2021-05-03 PROCEDURE — 99999 PR OFFICE/OUTPT VISIT,PROCEDURE ONLY: CPT | Performed by: NURSE PRACTITIONER

## 2021-05-06 ENCOUNTER — ANESTHESIA (OUTPATIENT)
Dept: ENDOSCOPY | Age: 51
End: 2021-05-06
Payer: MEDICAID

## 2021-05-06 ENCOUNTER — HOSPITAL ENCOUNTER (OUTPATIENT)
Age: 51
Setting detail: OUTPATIENT SURGERY
Discharge: HOME | End: 2021-05-06
Attending: INTERNAL MEDICINE | Admitting: INTERNAL MEDICINE
Payer: MEDICAID

## 2021-05-06 ENCOUNTER — ANESTHESIA EVENT (OUTPATIENT)
Dept: ENDOSCOPY | Age: 51
End: 2021-05-06
Payer: MEDICAID

## 2021-05-06 VITALS
BODY MASS INDEX: 30.61 KG/M2 | HEIGHT: 67 IN | HEART RATE: 62 BPM | DIASTOLIC BLOOD PRESSURE: 70 MMHG | TEMPERATURE: 98.4 F | SYSTOLIC BLOOD PRESSURE: 104 MMHG | OXYGEN SATURATION: 95 % | RESPIRATION RATE: 18 BRPM | WEIGHT: 195 LBS

## 2021-05-06 VITALS — DIASTOLIC BLOOD PRESSURE: 61 MMHG | SYSTOLIC BLOOD PRESSURE: 100 MMHG | OXYGEN SATURATION: 97 %

## 2021-05-06 PROCEDURE — 7100000010 HC PHASE II RECOVERY - FIRST 15 MIN: Performed by: INTERNAL MEDICINE

## 2021-05-06 PROCEDURE — 44361 SMALL BOWEL ENDOSCOPY/BIOPSY: CPT | Performed by: INTERNAL MEDICINE

## 2021-05-06 PROCEDURE — 3700000001 HC ADD 15 MINUTES (ANESTHESIA): Performed by: INTERNAL MEDICINE

## 2021-05-06 PROCEDURE — 2580000003 HC RX 258: Performed by: NURSE ANESTHETIST, CERTIFIED REGISTERED

## 2021-05-06 PROCEDURE — 2500000003 HC RX 250 WO HCPCS: Performed by: NURSE ANESTHETIST, CERTIFIED REGISTERED

## 2021-05-06 PROCEDURE — 3700000000 HC ANESTHESIA ATTENDED CARE: Performed by: INTERNAL MEDICINE

## 2021-05-06 PROCEDURE — 7100000011 HC PHASE II RECOVERY - ADDTL 15 MIN: Performed by: INTERNAL MEDICINE

## 2021-05-06 PROCEDURE — 3609012400 HC EGD TRANSORAL BIOPSY SINGLE/MULTIPLE: Performed by: INTERNAL MEDICINE

## 2021-05-06 PROCEDURE — 2580000003 HC RX 258: Performed by: INTERNAL MEDICINE

## 2021-05-06 PROCEDURE — 88342 IMHCHEM/IMCYTCHM 1ST ANTB: CPT

## 2021-05-06 PROCEDURE — 2709999900 HC NON-CHARGEABLE SUPPLY: Performed by: INTERNAL MEDICINE

## 2021-05-06 PROCEDURE — 88305 TISSUE EXAM BY PATHOLOGIST: CPT

## 2021-05-06 PROCEDURE — 6360000002 HC RX W HCPCS: Performed by: NURSE ANESTHETIST, CERTIFIED REGISTERED

## 2021-05-06 RX ORDER — HYDROMORPHONE HYDROCHLORIDE 1 MG/ML
0.5 INJECTION, SOLUTION INTRAMUSCULAR; INTRAVENOUS; SUBCUTANEOUS EVERY 5 MIN PRN
Status: DISCONTINUED | OUTPATIENT
Start: 2021-05-06 | End: 2021-05-06 | Stop reason: HOSPADM

## 2021-05-06 RX ORDER — HYDROMORPHONE HYDROCHLORIDE 1 MG/ML
0.25 INJECTION, SOLUTION INTRAMUSCULAR; INTRAVENOUS; SUBCUTANEOUS EVERY 5 MIN PRN
Status: DISCONTINUED | OUTPATIENT
Start: 2021-05-06 | End: 2021-05-06 | Stop reason: HOSPADM

## 2021-05-06 RX ORDER — ACETAMINOPHEN, ASPIRIN AND CAFFEINE 250; 250; 65 MG/1; MG/1; MG/1
1 TABLET, FILM COATED ORAL EVERY 6 HOURS PRN
COMMUNITY
End: 2021-05-18 | Stop reason: ALTCHOICE

## 2021-05-06 RX ORDER — METOCLOPRAMIDE HYDROCHLORIDE 5 MG/ML
10 INJECTION INTRAMUSCULAR; INTRAVENOUS
Status: DISCONTINUED | OUTPATIENT
Start: 2021-05-06 | End: 2021-05-06 | Stop reason: HOSPADM

## 2021-05-06 RX ORDER — MORPHINE SULFATE 4 MG/ML
4 INJECTION, SOLUTION INTRAMUSCULAR; INTRAVENOUS EVERY 5 MIN PRN
Status: DISCONTINUED | OUTPATIENT
Start: 2021-05-06 | End: 2021-05-06 | Stop reason: HOSPADM

## 2021-05-06 RX ORDER — DIPHENHYDRAMINE HYDROCHLORIDE 50 MG/ML
12.5 INJECTION INTRAMUSCULAR; INTRAVENOUS
Status: DISCONTINUED | OUTPATIENT
Start: 2021-05-06 | End: 2021-05-06 | Stop reason: HOSPADM

## 2021-05-06 RX ORDER — PROPOFOL 10 MG/ML
INJECTION, EMULSION INTRAVENOUS PRN
Status: DISCONTINUED | OUTPATIENT
Start: 2021-05-06 | End: 2021-05-06 | Stop reason: SDUPTHER

## 2021-05-06 RX ORDER — GLYCOPYRROLATE 0.2 MG/ML
INJECTION INTRAMUSCULAR; INTRAVENOUS PRN
Status: DISCONTINUED | OUTPATIENT
Start: 2021-05-06 | End: 2021-05-06 | Stop reason: SDUPTHER

## 2021-05-06 RX ORDER — HYDRALAZINE HYDROCHLORIDE 20 MG/ML
5 INJECTION INTRAMUSCULAR; INTRAVENOUS EVERY 10 MIN PRN
Status: DISCONTINUED | OUTPATIENT
Start: 2021-05-06 | End: 2021-05-06 | Stop reason: HOSPADM

## 2021-05-06 RX ORDER — LABETALOL HYDROCHLORIDE 5 MG/ML
5 INJECTION, SOLUTION INTRAVENOUS EVERY 10 MIN PRN
Status: DISCONTINUED | OUTPATIENT
Start: 2021-05-06 | End: 2021-05-06 | Stop reason: HOSPADM

## 2021-05-06 RX ORDER — SODIUM CHLORIDE, SODIUM LACTATE, POTASSIUM CHLORIDE, CALCIUM CHLORIDE 600; 310; 30; 20 MG/100ML; MG/100ML; MG/100ML; MG/100ML
INJECTION, SOLUTION INTRAVENOUS CONTINUOUS
Status: DISCONTINUED | OUTPATIENT
Start: 2021-05-06 | End: 2021-05-06 | Stop reason: HOSPADM

## 2021-05-06 RX ORDER — MORPHINE SULFATE 4 MG/ML
2 INJECTION, SOLUTION INTRAMUSCULAR; INTRAVENOUS EVERY 5 MIN PRN
Status: DISCONTINUED | OUTPATIENT
Start: 2021-05-06 | End: 2021-05-06 | Stop reason: HOSPADM

## 2021-05-06 RX ORDER — SODIUM CHLORIDE, SODIUM LACTATE, POTASSIUM CHLORIDE, CALCIUM CHLORIDE 600; 310; 30; 20 MG/100ML; MG/100ML; MG/100ML; MG/100ML
INJECTION, SOLUTION INTRAVENOUS CONTINUOUS PRN
Status: DISCONTINUED | OUTPATIENT
Start: 2021-05-06 | End: 2021-05-06 | Stop reason: SDUPTHER

## 2021-05-06 RX ORDER — LIDOCAINE HYDROCHLORIDE 10 MG/ML
INJECTION, SOLUTION EPIDURAL; INFILTRATION; INTRACAUDAL; PERINEURAL PRN
Status: DISCONTINUED | OUTPATIENT
Start: 2021-05-06 | End: 2021-05-06 | Stop reason: SDUPTHER

## 2021-05-06 RX ORDER — MEPERIDINE HYDROCHLORIDE 50 MG/ML
12.5 INJECTION INTRAMUSCULAR; INTRAVENOUS; SUBCUTANEOUS EVERY 5 MIN PRN
Status: DISCONTINUED | OUTPATIENT
Start: 2021-05-06 | End: 2021-05-06 | Stop reason: HOSPADM

## 2021-05-06 RX ORDER — PROMETHAZINE HYDROCHLORIDE 25 MG/ML
6.25 INJECTION, SOLUTION INTRAMUSCULAR; INTRAVENOUS
Status: DISCONTINUED | OUTPATIENT
Start: 2021-05-06 | End: 2021-05-06 | Stop reason: HOSPADM

## 2021-05-06 RX ADMIN — SODIUM CHLORIDE, POTASSIUM CHLORIDE, SODIUM LACTATE AND CALCIUM CHLORIDE: 600; 310; 30; 20 INJECTION, SOLUTION INTRAVENOUS at 08:38

## 2021-05-06 RX ADMIN — PROPOFOL 200 MG: 10 INJECTION, EMULSION INTRAVENOUS at 09:57

## 2021-05-06 RX ADMIN — PROPOFOL 200 MG: 10 INJECTION, EMULSION INTRAVENOUS at 10:03

## 2021-05-06 RX ADMIN — GLYCOPYRROLATE 0.2 MG: 0.2 INJECTION, SOLUTION INTRAMUSCULAR; INTRAVENOUS at 09:56

## 2021-05-06 RX ADMIN — SODIUM CHLORIDE, SODIUM LACTATE, POTASSIUM CHLORIDE, AND CALCIUM CHLORIDE: 600; 310; 30; 20 INJECTION, SOLUTION INTRAVENOUS at 09:52

## 2021-05-06 RX ADMIN — LIDOCAINE HYDROCHLORIDE 10 MG: 10 INJECTION, SOLUTION EPIDURAL; INFILTRATION; INTRACAUDAL; PERINEURAL at 09:56

## 2021-05-06 ASSESSMENT — PAIN SCALES - GENERAL
PAINLEVEL_OUTOF10: 0
PAINLEVEL_OUTOF10: 0

## 2021-05-06 ASSESSMENT — PAIN - FUNCTIONAL ASSESSMENT: PAIN_FUNCTIONAL_ASSESSMENT: 0-10

## 2021-05-06 NOTE — ANESTHESIA PRE PROCEDURE
Intravenous Once PRN Oleg Blazer, APRN        metoclopramide [facility]) injection 10 mg  10 mg Intravenous Once PRN Oleg Blazer, APRN        diphenhydrAMINE (BENADRYL) injection 12.5 mg  12.5 mg Intravenous Once PRN Oleg Blazer, APRN        labetalol (NORMODYNE;TRANDATE) injection 5 mg  5 mg Intravenous Q10 Min PRN Oleg Blazer, APRN        hydrALAZINE (APRESOLINE) injection 5 mg  5 mg Intravenous Q10 Min PRN Oleg Blazer, APRN           Allergies:  No Known Allergies    Problem List:    Patient Active Problem List   Diagnosis Code    Anxiety F41.9    Hypotestosteronism E34.9    Hypertriglyceridemia E78.1    H/O total thyroidectomy E89.0    Gastroesophageal reflux disease without esophagitis K21.9    Obesity due to excess calories E66.09    Mitral valve prolapse I34.1    Back pain of lumbar region with sciatica M54.40    Intractable migraine without aura and with status migrainosus G43.011    Obstructive sleep apnea G47.33    Snoring R06.83    Witnessed apneic spells R06.81    Somnolence, daytime R40.0    Chronic neck pain M54.2, G89.29    Analgesic rebound headache G44.40, T39.95XA    Hyperglycemia R73.9    Chronic midline low back pain M54.5, Z45.55    Umbilical hernia without obstruction and without gangrene K42.9    Diverticulosis of colon K57.30    Facet hypertrophy of lumbar region M47.816    CPAP (continuous positive airway pressure) dependence Z99.89    Cerebellar tonsillar ectopia (HCC) Q04.8    Prediabetes R73.03       Past Medical History:        Diagnosis Date    Anemia     Anxiety     CPAP (continuous positive airway pressure) dependence     Herniated lumbar intervertebral disc     Kidney cysts     Liver cyst     Low testosterone     Migraine     Obstructive sleep apnea     OCD (obsessive compulsive disorder)     Thyroid disease        Past Surgical History:        Procedure Laterality Date    APPENDECTOMY      [facility]  approx 2017    [facility]    BILITOT 0.3 05/03/2021    ALKPHOS 87 05/03/2021    AST 12 05/03/2021    ALT 12 05/03/2021       POC Tests: No results for input(s): POCGLU, POCNA, POCK, POCCL, POCBUN, POCHEMO, POCHCT in the last 72 hours. Coags: No results found for: PROTIME, INR, APTT    HCG (If Applicable): No results found for: PREGTESTUR, PREGSERUM, HCG, HCGQUANT     ABGs: No results found for: PHART, PO2ART, UOI0ZIL, QVT6QST, BEART, I8MLMTHE     Type & Screen (If Applicable):  No results found for: LABABO, LABRH    Drug/Infectious Status (If Applicable):  No results found for: HIV, HEPCAB    COVID-19 Screening (If Applicable):   Lab Results   Component Value Date    COVID19 Not Detected 05/03/2021           Anesthesia Evaluation     Anesthesia Plan      MAC     ASA 2       Induction: intravenous. Anesthetic plan and risks discussed with patient. Use of blood products discussed with patient whom consented to blood products. Plan discussed with CRNA.                   Vincenzo Tran, AR   5/6/2021

## 2021-05-06 NOTE — ANESTHESIA PRE PROCEDURE
Department of Anesthesiology  Preprocedure Note       Name:  Malina Woods   Age:  52 y.o.  :  1970                                          MRN:  [id number]         Date:  2021      Surgeon: Corinthia Goodpasture):  Anderson Deleon MD    Procedure: Procedure(s):  EGD BIOPSY    Medications prior to admission:   Prior to Admission medications    Medication Sig Start Date End Date Taking? Authorizing Provider   aspirin-acetaminophen-caffeine (EXCEDRIN EXTRA STRENGTH) 080-272-44 MG per tablet Take 1 tablet by mouth every 6 hours as needed for Headaches   Yes Historical Provider, MD   testosterone (ANDROGEL) 25 MG/2.5GM (1%) GEL 1 % gel Apply 5 g topically daily for 30 days.  Apply 4 clicks  (1ml) daily 59 Yes Tianna Vázquez MD   PARoxetine (PAXIL) 40 MG tablet Take 1 tablet by mouth every morning 21  Yes Cynthclemente Haver MD Gurpreet   thyroid (ARMOUR THYROID) 120 MG tablet Take 120 mg by mouth daily   Yes Historical Provider, MD   SUMAtriptan (IMITREX) 100 MG tablet As directed 21   Tianna Vázquez MD   sildenafil (REVATIO) 20 MG tablet Take 20 mg by mouth as needed    Historical Provider, MD       Current medications:    Current Facility-Administered Medications   Medication Dose Route Frequency Provider Last Rate Last Admin    lactated ringers infusion   Intravenous Continuous Anderson Deleon  mL/hr at 21 0838 New Bag at 21 0838    meperidine (DEMEROL) injection 12.5 mg  12.5 mg Intravenous Q5 Min PRN Gwinda Ivan, APRN        morphine injection 2 mg  2 mg Intravenous Q5 Min PRN Gwinda [place], APRN        morphine injection 4 mg  4 mg Intravenous Q5 Min PRN Gwinda Ivan, APRN        HYDROmorphone HCl PF (DILAUDID) injection 0.25 mg  0.25 mg Intravenous Q5 Min PRN Gwinda [place], APRN        HYDROmorphone HCl PF (DILAUDID) injection 0.5 mg  0.5 mg Intravenous Q5 Min PRN Vinodindclemente MitchellTwo [place], APRN        promethazine (PHENERGAN) injection 6.25 mg  6.25 mg Intravenous Once PRN Meghna Alu, APRN        metoclopramide [facility]) injection 10 mg  10 mg Intravenous Once PRN Meghna Alu, APRN        diphenhydrAMINE (BENADRYL) injection 12.5 mg  12.5 mg Intravenous Once PRN Meghna Alu, APRN        labetalol (NORMODYNE;TRANDATE) injection 5 mg  5 mg Intravenous Q10 Min PRN Meghna Alu, APRN        hydrALAZINE (APRESOLINE) injection 5 mg  5 mg Intravenous Q10 Min PRN Meghna Alu, APRN           Allergies:  No Known Allergies    Problem List:    Patient Active Problem List   Diagnosis Code    Anxiety F41.9    Hypotestosteronism E34.9    Hypertriglyceridemia E78.1    H/O total thyroidectomy E89.0    Gastroesophageal reflux disease without esophagitis K21.9    Obesity due to excess calories E66.09    Mitral valve prolapse I34.1    Back pain of lumbar region with sciatica M54.40    Intractable migraine without aura and with status migrainosus G43.011    Obstructive sleep apnea G47.33    Snoring R06.83    Witnessed apneic spells R06.81    Somnolence, daytime R40.0    Chronic neck pain M54.2, G89.29    Analgesic rebound headache G44.40, T39.95XA    Hyperglycemia R73.9    Chronic midline low back pain M54.5, G26.67    Umbilical hernia without obstruction and without gangrene K42.9    Diverticulosis of colon K57.30    Facet hypertrophy of lumbar region M47.816    CPAP (continuous positive airway pressure) dependence Z99.89    Cerebellar tonsillar ectopia (HCC) Q04.8    Prediabetes R73.03       Past Medical History:        Diagnosis Date    Anemia     Anxiety     CPAP (continuous positive airway pressure) dependence     Herniated lumbar intervertebral disc     Kidney cysts     Liver cyst     Low testosterone     Migraine     Obstructive sleep apnea     OCD (obsessive compulsive disorder)     Thyroid disease        Past Surgical History:        Procedure Laterality Date    APPENDECTOMY      [facility] \" [facility]    COLONOSCOPY N/A 11/6/2020    Dr Taylor Evangelista, internal hemorrhoids-Grade 1, 5 yr recall    HERNIA REPAIR      THYROIDECTOMY         Social History:    Social History     Tobacco Use    Smoking status: Current Every Day Smoker     Packs/day: 2.00     Years: 34.00     Pack years: 68.00     Types: Cigarettes     Start date: 1986    Smokeless tobacco: Never Used   Substance Use Topics    Alcohol use: Yes     Frequency: Monthly or less     Drinks per session: 1 or 2     Comment: 2x wkly, of x 0ne week                                Ready to quit: Not Answered  Counseling given: Not Answered      Vital Signs (Current):   Vitals:    05/06/21 0820 05/06/21 1008   BP: 124/66    Pulse: 80    Resp: 18    Temp: 99 °F (37.2 °C) 98 °F (36.7 °C)   TempSrc: Temporal    SpO2: 98%    Weight: 195 lb (88.5 kg)    Height: 5' 7\" (1.702 m)                                               BP Readings from Last 3 Encounters:   05/06/21 124/66   05/06/21 100/61   04/27/21 120/77       NPO Status: Time of last liquid consumption: 2209                        Time of last solid consumption: 2130                        Date of last liquid consumption: 05/05/21                        Date of last solid food consumption: 05/05/21    BMI:   Wt Readings from Last 3 Encounters:   05/06/21 195 lb (88.5 kg)   04/27/21 193 lb (87.5 kg)   04/20/21 200 lb (90.7 kg)     Body mass index is 30.54 kg/m².     CBC:   Lab Results   Component Value Date    WBC 6.4 05/03/2021    RBC 3.60 05/03/2021    HGB 11.8 05/03/2021    HCT 35.3 05/03/2021    MCV 98.1 05/03/2021    RDW 14.0 05/03/2021     05/03/2021       CMP:   Lab Results   Component Value Date     05/03/2021    K 4.4 05/03/2021     05/03/2021    CO2 28 05/03/2021    BUN 17 05/03/2021    CREATININE 1.0 05/03/2021    GFRAA >59 05/03/2021    LABGLOM >60 05/03/2021    GLUCOSE 110 05/03/2021    PROT 6.9 05/03/2021    CALCIUM 9.3

## 2021-05-06 NOTE — OP NOTE
Endoscopic Procedure Note    Patient: Shelton Molina : 1970  Med Rec#: [id number] Acc#: [id number]     Primary Care Provider Mckenzie Woods MD    Endoscopist: Maxi Corey MD    Date of Procedure:  2021    Procedure:   1. EGD with push enteroscopy up to the the proximal jejunum with cold biopsies of gastric erosions    Indications:     1. Retroperitoneal mass    2. Gastric wall thickening    3. Abnormal CT of the abdomen      Recent CT abdomen with contrast 3/25/21 (I reviewed the images with the Dr. Lincoln Goodwin and he opined that the retroperitoneal lesion was not amenable to FNA by EUS or even by radiology here in [place], [address])    Impression:  Right upper abdominal retroperitoneal para-aortic   nodes/masses adjacent to the origin of the celiac trunk. These may   represent abnormal enlarged lymph nodes? . Etiology and clinical   significance is not certain. Further follow-up is recommended. Moderate asymmetrical thickening of the wall of the proximal stomach   including the gastric fundus. This may partly be due to incomplete   distention. Possibility of an inflammatory or neoplastic process is   not excluded. Moderate thickening of the wall of the distal/ transverse duodenum   with a narrowing of the lumen. This may be due to incomplete   distention. The inflammatory or neoplastic process is not excluded. Anesthesia:  Sedation was administered by anesthesia who monitored the patient during the procedure. Estimated Blood Loss: minimal    Procedure:   After reviewing the patient's chart and obtaining informed consent, the patient was placed in the left lateral decubitus position. A forward-viewing Olympus endoscope was lubricated and inserted through the mouth into the oropharynx. Under direct visualization, the upper esophagus was intubated. The scope was advanced to the level of the proximal jejunum with a push enteroscopy.  Scope was slowly withdrawn with careful inspection of the mucosal surfaces. The scope was retroflexed for inspection of the gastric fundus and incisura. Findings and maneuvers are listed in impression below. The patient tolerated the procedure well. The scope was removed. There were no immediate complications. Findings/IMPRESSION:  Esophagus: normal and the EG junction at 38 cm appeared essentially normal except for a nonobstructing distal esophageal ring. There is a small 2 to 3 cm in size sliding hiatal hernia present. Stomach:  Abnormal; patchy mucosal changes multiple small 1 to 2 mm erosions with surrounding small areas of erythema noted in the antrum suggestive of medical gastritis noted -  Gastric biopsies were taken from the antrum and body to rule out Helicobacter pylori infection. The proximal stomach including the fundus, cardia, body and angularis appeared completely normal.  No evidence of abnormal folds or unusual thickening noted in the fundus. NO ulcers or masses or gastric outlet obstruction or retained food or fluid. Rugae were normal and lumen distended well with insufflation. Retroflexed views otherwise revealed a normal GE junction, fundus and cardia as well. Duodenum: abnormal: 3 small erosions with surrounding erythema were noted in the posterior duodenal bulb suggestive of mild duodenitis. Otherwise remainder of the examined duodenum including the bulb second third and fourth portions appeared essentially normal as did the first few centimeters of the examined proximal jejunum. No strictures or mass lesions or ulcers were noted. RECOMMENDATIONS:    1. Await path results, the patient will be contacted in 7-10 days with biopsy results. 2.  Avoid NSAIDs    3. Continue antireflux measures and use of PPI for GERD    - Resume previous meds and diet  - GI clinic f/u 6 weeks with Ms. Tameka Evangelista.   - Keep scheduled f/u appts with other MDs; patient will need referral to a tertiary center for further evaluation of his abnormal CT findings with retroperitoneal mass which may require help from surgical oncology and probably biopsies done through diagnostic laparoscopy or through interventional radiology.    - NO ASA/NSAIDs x 2 weeks    The results were discussed with the patient and family. A copy of the images obtained were given to the patient.      Amanda Prince MD  5/6/2021  9:55 AM

## 2021-05-06 NOTE — ANESTHESIA POSTPROCEDURE EVALUATION
Department of Anesthesiology  Postprocedure Note    Patient: Madonna Sweeney  MRN: [id number]  YOB: 1970  Date of evaluation: 5/6/2021  Time:  10:09 AM     Procedure Summary     Date: 05/06/21 Room / Location: [address]    Anesthesia Start: 6651 Anesthesia Stop:     Procedure: EGD BIOPSY (N/A Abdomen) Diagnosis: (ABN CT, PARA-AORTIC MASS)    Surgeons: Kajal Marcial MD Responsible Provider:     Anesthesia Type: Not recorded ASA Status: Not recorded          Anesthesia Type: No value filed. Yue Phase I:      Yue Phase II:      Last vitals: Reviewed and per EMR flowsheets.        Anesthesia Post Evaluation    Patient location during evaluation: PACU  Patient participation: waiting for patient participation  Level of consciousness: lethargic  Airway patency: patent  Nausea & Vomiting: no nausea and no vomiting  Complications: no  Cardiovascular status: blood pressure returned to baseline  Respiratory status: acceptable  Hydration status: euvolemic

## 2021-05-06 NOTE — H&P
Stool []Ulcers  []Constipation  []Hemoptysis  []Varices  []Diarrhea  []Hypoxemia    []Nausea/Vomiting   []Screening   []Crohns/Colitis  []Other:     Anesthesia:   [x] MAC [] Moderate Sedation   [] General   [] None     ROS: 12 pt Review of Symptoms was negative unless mentioned above    Medications:   Prior to Admission medications    Medication Sig Start Date End Date Taking? Authorizing Provider   aspirin-acetaminophen-caffeine (EXCEDRIN EXTRA STRENGTH) [phone] MG per tablet Take 1 tablet by mouth every 6 hours as needed for Headaches   Yes Historical Provider, MD   testosterone (ANDROGEL) 25 MG/2.5GM (1%) GEL 1 % gel Apply 5 g topically daily for 30 days. Apply 4 clicks  (1ml) daily 5/7/60 5/6/21 Yes Tianna Vázquez MD   PARoxetine (PAXIL) 40 MG tablet Take 1 tablet by mouth every morning 1/18/21  Yes Tianna Vázquez MD   thyroid (ARMOUR THYROID) 120 MG tablet Take 120 mg by mouth daily   Yes Historical Provider, MD   SUMAtriptan (IMITREX) 100 MG tablet As directed 4/7/21   Tianna Vázquez MD   sildenafil (REVATIO) 20 MG tablet Take 20 mg by mouth as needed    Historical Provider, MD       Past Medical History:  Past Medical History:   Diagnosis Date    Anemia     Anxiety     CPAP (continuous positive airway pressure) dependence     Herniated lumbar intervertebral disc     Kidney cysts     Liver cyst     Low testosterone     Migraine     Obstructive sleep apnea     OCD (obsessive compulsive disorder)     Thyroid disease        Past Surgical History:  Past Surgical History:   Procedure Laterality Date    APPENDECTOMY      COLONOSCOPY  approx 2017    Dr Lisa Marcial @ [facility] \" polyps\" per pt recall    COLONOSCOPY N/A 11/6/2020    Dr Josselin Santiago, internal hemorrhoids-Grade 1, 5 yr recall    HERNIA REPAIR      THYROIDECTOMY         Social History:  Social History     Tobacco Use    Smoking status: Current Every Day Smoker     Packs/day: 2.00     Years: 34.00     Pack years: 68.00     Types: Cigarettes     Start date: 12    Smokeless tobacco: Never Used   Substance Use Topics    Alcohol use: Yes     Frequency: Monthly or less     Drinks per session: 1 or 2     Comment: 2x wkly, of x 0ne week    Drug use: Yes     Types: Marijuana     Comment: smoked pot last night       Vital Signs:   Vitals:    05/06/21 0820   BP: 124/66   Pulse: 80   Resp: 18   Temp: 99 °F (37.2 °C)   SpO2: 98%        Physical Exam:  Cardiac:  [x]WNL  []Comments:  Pulmonary:  [x]WNL   []Comments:  Neuro/Mental Status:  [x]WNL  []Comments:  Abdominal:  [x]WNL    []Comments:  Other:   []WNL  []Comments:    Informed Consent:  The risks and benefits of the procedure have been discussed with either the patient or if they cannot consent, their representative. Assessment:  Patient examined and appropriate for planned sedation and procedure. Plan:  Proceed with planned sedation and procedure as above.          Kajal Marcial MD

## 2021-05-10 RX ORDER — LEVOTHYROXINE AND LIOTHYRONINE 76; 18 UG/1; UG/1
120 TABLET ORAL DAILY
Qty: 90 TABLET | Refills: 3 | Status: SHIPPED | OUTPATIENT
Start: 2021-05-10 | End: 2021-07-19 | Stop reason: ALTCHOICE

## 2021-05-10 NOTE — TELEPHONE ENCOUNTER
Patient came in stating he is needing a refill on Lester Thyroid medication. He asked for it to be sent to [facility].

## 2021-05-11 ENCOUNTER — TELEPHONE (OUTPATIENT)
Dept: GASTROENTEROLOGY | Age: 51
End: 2021-05-11

## 2021-05-11 DIAGNOSIS — R19.00 RETROPERITONEAL MASS: Primary | ICD-10-CM

## 2021-05-11 NOTE — TELEPHONE ENCOUNTER
05-11-21 Called notified patient of his pathology report per Dr Matilde Morocho. Patient questioned if I had knew of anything about his retroperitoneal masses as of yet? Told patient that I would need to send a message to [facility] to see if she knew anything. Per her office note on  04-27-21 (I will discuss with Dr. Rick Viveros regarding possible FNA of retroperitoneal masses).        Routed to [address]

## 2021-05-11 NOTE — TELEPHONE ENCOUNTER
Per Dr. Sean Parikh op note, it appears he recommends referral to tertiary care center for retroperitoneal masses.   I will place order for referral.

## 2021-05-12 NOTE — TELEPHONE ENCOUNTER
05-12-21 Called spoke with Stacy Kramer of  Surgical Oncology     Ph [phone], Fax [phone]    [address] 10 am  Dr Sandra Squires records       Called and notified patient of apt and that he needed to get and take a copy of his CT abdomen scan.      Verbal agreement and understanding per patient

## 2021-05-17 PROBLEM — R73.9 HYPERGLYCEMIA: Status: RESOLVED | Noted: 2021-01-05 | Resolved: 2021-05-17

## 2021-05-17 NOTE — PROGRESS NOTES
Sohan Vogel ( 1970) is a 52 y.o. male, Established , here for evaluation of the following chief complaint(s).   Other (Wet bed several times, urgency, wants something for ED, hand and feet tingle, Pharmacy filled his thyroid med at 60mg not the 120, wants to see if you will get him a testosterone patch, wants to go over, labs, heart burn, poisen ivy)        ASSESSMENT/PLAN:  Problem List        Respiratory    Obstructive sleep apnea      monitored by Neurology            Digestive    Diverticulosis of colon      Asymptomatic, continue current medications, medication adherence emphasized and lifestyle modifications recommended         Gastroesophageal reflux disease without esophagitis      Well-controlled, continue current medications, medication adherence emphasized and lifestyle modifications recommended   Start PPI, Omeprazole 40 mg daily         Relevant Medications    omeprazole (PRILOSEC) 40 MG delayed release capsule    Other Relevant Orders    Hepatic Function Panel       Nervous and Auditory    Back pain of lumbar region with sciatica      Borderline controlled,          Relevant Medications    PARoxetine (PAXIL) 40 MG tablet       Musculoskeletal and Integument    Poison ivy    Relevant Medications    clobetasol (TEMOVATE) 0.05 % ointment       Other    Erectile dysfunction    Relevant Medications    sildenafil (REVATIO) 20 MG tablet    Hypertriglyceridemia - Primary    Relevant Medications    sildenafil (REVATIO) 20 MG tablet    Other Relevant Orders    Lipid Panel    Hypotestosteronism      Unclear control, changes made today: Revatio 40 mg daily         Relevant Medications    testosterone (ANDROGEL) 25 MG/2.5GM (1%) GEL 1 % gel    Prediabetes      Well-controlled, continue current medications, medication adherence emphasized and lifestyle modifications recommended         Relevant Orders    Basic Metabolic Panel    Lipid Panel    Hemoglobin A1C          Results for orders placed or performed in visit on 05/03/21   Hemoglobin A1C   Result Value Ref Range    Hemoglobin A1C 5.8 4.0 - 6.0 %       Return in about 3 months (around 8/18/2021). HPI    Review of Systems   Constitutional: Negative for fever. Eyes: Negative for visual disturbance. Cardiovascular: Negative for chest pain. Gastrointestinal: Negative for abdominal pain. Heartburn   Genitourinary: Positive for enuresis. Negative for dysuria. Erectile dysfunction   Musculoskeletal: Positive for back pain. Neurological: Positive for numbness. Negative for weakness and headaches. Physical Exam  Vitals and nursing note reviewed. Constitutional:       General: He is not in acute distress. Appearance: He is well-developed. HENT:      Head: Normocephalic and atraumatic. Right Ear: External ear normal.      Left Ear: External ear normal.      Nose: Nose normal.   Eyes:      General: No scleral icterus. Conjunctiva/sclera: Conjunctivae normal.      Pupils: Pupils are equal, round, and reactive to light. Neck:      Thyroid: No thyromegaly. Cardiovascular:      Rate and Rhythm: Normal rate and regular rhythm. Heart sounds: Normal heart sounds. No murmur heard. Pulmonary:      Effort: Pulmonary effort is normal.      Breath sounds: Normal breath sounds. No wheezing or rales. Abdominal:      General: Bowel sounds are normal. There is no distension. Palpations: Abdomen is soft. There is no mass. Tenderness: There is no abdominal tenderness. There is no rebound. Musculoskeletal:         General: No tenderness. Normal range of motion. Cervical back: Normal range of motion and neck supple. Lymphadenopathy:      Cervical: No cervical adenopathy. Skin:     General: Skin is warm and dry. Findings: No rash. Neurological:      Mental Status: He is alert and oriented to person, place, and time. Cranial Nerves: No cranial nerve deficit.       Deep Tendon Reflexes: Reflexes normal.   Psychiatric:         Behavior: Behavior normal.         Thought Content:  Thought content normal.         Judgment: Judgment normal.           (Time Documentation Optional [id number])    An electronic signaturewaas used to authenticate this note  -Roxann MD Saulo on 5/18/2021 at 2:15 PM

## 2021-05-17 NOTE — ASSESSMENT & PLAN NOTE
Well-controlled, continue current medications, medication adherence emphasized and lifestyle modifications recommended   Start PPI, Omeprazole 40 mg daily

## 2021-05-17 NOTE — ASSESSMENT & PLAN NOTE
Asymptomatic, continue current medications, medication adherence emphasized and lifestyle modifications recommended

## 2021-05-18 ENCOUNTER — OFFICE VISIT (OUTPATIENT)
Dept: NEUROLOGY | Age: 51
End: 2021-05-18
Payer: MEDICAID

## 2021-05-18 ENCOUNTER — OFFICE VISIT (OUTPATIENT)
Dept: INTERNAL MEDICINE | Age: 51
End: 2021-05-18
Payer: MEDICAID

## 2021-05-18 VITALS
WEIGHT: 192.6 LBS | DIASTOLIC BLOOD PRESSURE: 70 MMHG | OXYGEN SATURATION: 97 % | HEIGHT: 67 IN | SYSTOLIC BLOOD PRESSURE: 110 MMHG | HEART RATE: 85 BPM | BODY MASS INDEX: 30.23 KG/M2

## 2021-05-18 VITALS
SYSTOLIC BLOOD PRESSURE: 103 MMHG | HEIGHT: 67 IN | BODY MASS INDEX: 31.39 KG/M2 | TEMPERATURE: 97.5 F | WEIGHT: 200 LBS | HEART RATE: 70 BPM | DIASTOLIC BLOOD PRESSURE: 63 MMHG

## 2021-05-18 DIAGNOSIS — G47.33 OBSTRUCTIVE SLEEP APNEA: Primary | ICD-10-CM

## 2021-05-18 DIAGNOSIS — M54.40 BACK PAIN OF LUMBAR REGION WITH SCIATICA: ICD-10-CM

## 2021-05-18 DIAGNOSIS — Z99.89 CPAP (CONTINUOUS POSITIVE AIRWAY PRESSURE) DEPENDENCE: ICD-10-CM

## 2021-05-18 DIAGNOSIS — E34.9 HYPOTESTOSTERONISM: ICD-10-CM

## 2021-05-18 DIAGNOSIS — R35.0 URINARY FREQUENCY: ICD-10-CM

## 2021-05-18 DIAGNOSIS — G47.33 OBSTRUCTIVE SLEEP APNEA: ICD-10-CM

## 2021-05-18 DIAGNOSIS — K57.30 DIVERTICULOSIS OF COLON: ICD-10-CM

## 2021-05-18 DIAGNOSIS — L23.7 POISON IVY: ICD-10-CM

## 2021-05-18 DIAGNOSIS — R73.03 PREDIABETES: ICD-10-CM

## 2021-05-18 DIAGNOSIS — K21.9 GASTROESOPHAGEAL REFLUX DISEASE WITHOUT ESOPHAGITIS: ICD-10-CM

## 2021-05-18 DIAGNOSIS — E78.1 HYPERTRIGLYCERIDEMIA: Primary | ICD-10-CM

## 2021-05-18 DIAGNOSIS — G43.011 INTRACTABLE MIGRAINE WITHOUT AURA AND WITH STATUS MIGRAINOSUS: ICD-10-CM

## 2021-05-18 DIAGNOSIS — N52.9 ERECTILE DYSFUNCTION, UNSPECIFIED ERECTILE DYSFUNCTION TYPE: ICD-10-CM

## 2021-05-18 PROCEDURE — 4004F PT TOBACCO SCREEN RCVD TLK: CPT | Performed by: PHYSICIAN ASSISTANT

## 2021-05-18 PROCEDURE — G8427 DOCREV CUR MEDS BY ELIG CLIN: HCPCS | Performed by: PHYSICIAN ASSISTANT

## 2021-05-18 PROCEDURE — G8417 CALC BMI ABV UP PARAM F/U: HCPCS | Performed by: PHYSICIAN ASSISTANT

## 2021-05-18 PROCEDURE — 4004F PT TOBACCO SCREEN RCVD TLK: CPT | Performed by: INTERNAL MEDICINE

## 2021-05-18 PROCEDURE — G8417 CALC BMI ABV UP PARAM F/U: HCPCS | Performed by: INTERNAL MEDICINE

## 2021-05-18 PROCEDURE — 99213 OFFICE O/P EST LOW 20 MIN: CPT | Performed by: PHYSICIAN ASSISTANT

## 2021-05-18 PROCEDURE — G8427 DOCREV CUR MEDS BY ELIG CLIN: HCPCS | Performed by: INTERNAL MEDICINE

## 2021-05-18 PROCEDURE — 99215 OFFICE O/P EST HI 40 MIN: CPT | Performed by: INTERNAL MEDICINE

## 2021-05-18 RX ORDER — OMEPRAZOLE 40 MG/1
40 CAPSULE, DELAYED RELEASE ORAL
Qty: 90 CAPSULE | Refills: 1 | Status: SHIPPED | OUTPATIENT
Start: 2021-05-18

## 2021-05-18 RX ORDER — CLOBETASOL PROPIONATE 0.5 MG/G
OINTMENT TOPICAL
Qty: 30 G | Refills: 0 | Status: SHIPPED | OUTPATIENT
Start: 2021-05-18

## 2021-05-18 RX ORDER — SILDENAFIL CITRATE 20 MG/1
40 TABLET ORAL PRN
Qty: 60 TABLET | Refills: 3 | Status: SHIPPED | OUTPATIENT
Start: 2021-05-18 | End: 2022-03-14

## 2021-05-18 SDOH — ECONOMIC STABILITY: FOOD INSECURITY: WITHIN THE PAST 12 MONTHS, YOU WORRIED THAT YOUR FOOD WOULD RUN OUT BEFORE YOU GOT MONEY TO BUY MORE.: NEVER TRUE

## 2021-05-18 ASSESSMENT — SOCIAL DETERMINANTS OF HEALTH (SDOH): HOW HARD IS IT FOR YOU TO PAY FOR THE VERY BASICS LIKE FOOD, HOUSING, MEDICAL CARE, AND HEATING?: NOT VERY HARD

## 2021-05-18 ASSESSMENT — ENCOUNTER SYMPTOMS
BACK PAIN: 1
ABDOMINAL PAIN: 0

## 2021-05-18 ASSESSMENT — PATIENT HEALTH QUESTIONNAIRE - PHQ9: SUM OF ALL RESPONSES TO PHQ9 QUESTIONS 1 & 2: 0

## 2021-05-18 NOTE — PROGRESS NOTES
[facility]  [address] [place] [address] [address], [address]  Mavis Otto  Phone [phone]  Fax [phone]       [facility] Sleep Follow Up Encounter      Information:   Patient Name: Zeina Souza  :   1970  Age:   52 y.o. MRN:   [id number]  Account #:  [de-identified]  Today:                21    Provider:  Rosio Chatman PA-C    Chief Complaint   Patient presents with    Sleep Apnea     follow up         Subjective:   Zeina Souza is a 52 y.o. male  with a history of severe JANET, migraines, and neck pain who comes in for a sleep clinic follow up. He was referred for an MRI brain and a C-spine x-ray. He was also referred for an HST due to snoring, witnessed apneas, and excessive daytime somnolence. The HST, 2020 revealed an AHI of 38.2. The HST, 12/3/2020 revealed an AHI of 21.6. He is prescribed auto CPAP therapy with a pressure range of 8cm to 20cm. The compliance report indicates that he is averaging close to 8 hours of CPAP use per day. He reports that consistent PAP use has alleviated the previous JANET symptoms. Location or symptom:  JANET  Onset:  HST: 2020   Timing:  q hs  Severity:  Severe  Associated:  Snoring, witnessed apneas, and excessive daytime somnolence  Alleviated:  CPAP     The MRI brain showed minimal chronic paranasal sinus disease. It showed minimal cerebellar tonsillar ectopia. He is not symptomatic for this and it really should not cause problems. It is an incidental finding. The C-spine x-ray suggests a ligamentous strain in his neck. He is followed by Pain Management for chronic neck and back pain. He reports that since starting CPAP he has not had anymore migraines.          Objective:     Past Medical History:   Diagnosis Date    Anemia     Anxiety     CPAP (continuous positive airway pressure) dependence     Herniated lumbar intervertebral disc     Hyperglycemia 2021    Kidney cysts     Liver cyst     Low testosterone     Migraine  Neck pain     Obstructive sleep apnea     OCD (obsessive compulsive disorder)     Thyroid disease        Past Surgical History:   Procedure Laterality Date    APPENDECTOMY      COLONOSCOPY  approx 2017    Dr Sergio Paulson @ [facility] h[facility] \" polyps\" per pt recall    COLONOSCOPY N/A 11/06/2020    Dr Taylor Evangelista, internal hemorrhoids-Grade 1, 5 yr recall    HERNIA REPAIR      THYROIDECTOMY      UPPER GASTROINTESTINAL ENDOSCOPY N/A 05/06/2021    Dr Bessie Corona gastritis (avoid NSAIDS) (-)H.  pylori, w/push enteroscopy up to the the proximal jejunum-Gastric erosions,  non-obstructing distal esoph ring, sliding hh, gastritis, duodenitis       Recent Hospitalizations  ·     Significant Injuries  ·     Family History   Problem Relation Age of Onset    Stomach Cancer Maternal Uncle     Liver Cancer Maternal Uncle     Cancer Maternal Grandfather     Colon Cancer Neg Hx     Colon Polyps Neg Hx        Social History  Social History     Tobacco Use   Smoking Status Current Every Day Smoker    Packs/day: 2.00    Years: 34.00    Pack years: 68.00    Types: Cigarettes    Start date: 1986   Smokeless Tobacco Never Used     Social History     Substance and Sexual Activity   Alcohol Use Yes    Comment: 2x wkly, of x 0ne week     Social History     Substance and Sexual Activity   Drug Use Yes    Types: Marijuana    Comment: smoked pot last night         Current Outpatient Medications   Medication Sig Dispense Refill    thyroid (ARMOUR THYROID) 120 MG tablet Take 1 tablet by mouth daily 90 tablet 3    aspirin-acetaminophen-caffeine (EXCEDRIN EXTRA STRENGTH) 250-250-65 MG per tablet Take 1 tablet by mouth every 6 hours as needed for Headaches      SUMAtriptan (IMITREX) 100 MG tablet As directed 9 tablet 5    PARoxetine (PAXIL) 40 MG tablet Take 1 tablet by mouth every morning 30 tablet 3    sildenafil (REVATIO) 20 MG tablet Take 20 mg by mouth as needed      testosterone (ANDROGEL) 25 MG/2.5GM (1%) GEL 1 % gel Apply 5 g topically daily for 30 days. Apply 4 clicks  (1ml) daily 1 Package 0     No current facility-administered medications for this visit. Allergies:  Patient has no known allergies. REVIEW OF SYSTEMS     Constitutional: []? Fever []? Sweats []? Chills []? Recent Injury   [x]? Denies all unless marked  HENT:[]? Headache  []? Head Injury  []? Sore Throat  []? Ear Pain  []? Dizziness []? Hearing Loss   [x]? Denies all unless marked  Musculoskeletal: []? Arthralgia  []? Myalgias []? Muscle cramps  []? Muscle twitches   [x]? Denies all unless marked   Spine:  []? Neck pain  []? Back pain  []? Sciaticia  [x]? Denies all unless marked  Neurological:[]? Visual Disturbance []? Double Vision []? Slurred Speech []? Trouble swallowing  []? Vertigo []? Tingling []? Numbness []? Weakness []? Loss of Balance   []? Loss of Consciousness []? Memory Loss []? Seizures  [x]? Denies all unless marked  Psychiatric/Behavioral:[]? Depression []? Anxiety  [x]? Denies all unless marked  Sleep: []? Insomnia []? Sleep Disturbance []? Snoring []? Restless Legs []? Daytime Sleepiness [x]? Sleep Apnea  []? Denies all unless marked    The MA has completed the ROS with the patient. I have reviewed it in its' entirety with the patient and agree with the documentation.      PHYSICAL EXAM  /63 (Site: Left Upper Arm, Position: Sitting, Cuff Size: Medium Adult)   Pulse 70   Temp 97.5 °F (36.4 °C)   Ht 5' 7\" (1.702 m)   Wt 200 lb (90.7 kg)   BMI 31.32 kg/m²      Constitutional   Alert in NAD, well developed, pleasant and cooperative with exam; body habitus obese as indicated by BMI  HEENT- Conjunctiva normal.  No scars, masses, or lesions over external nose or ears, hearing intact, no neck masses noted, no jugular vein distension, no bruit  Cardiac- Regular rate and rhythm  Pulmonary- Clear to auscultation, good expansion, normal effort without use of accessory muscles  Musculoskeletal  No include, but are not limited to  hypertension, coronary artery disease, diabetes, stroke, weight gain, impaired cognition, daytime somnolence, and motor vehicle accidents. Advised to abstain from driving or operating heavy machinery when drowsy and the use of respiratory suppressants. Discussed diagnostic studies and potential treatment plan. Will evaluate for clinical benefit and and compliance during a 30 day period within the preceding 90 days at follow up. 2.  The following educational material has been included in this visit after visit summary for your review: JANET/PAP guidelines-Discussed with the patient and all questions fully answered. 3.  Continue CPAP  4.   Follow up in 6 months

## 2021-05-18 NOTE — PATIENT INSTRUCTIONS
Patient education: Sleep apnea in adults       INTRODUCTION  Normally during sleep, air moves through the throat and in and out of the lungs at a regular rhythm. In a person with sleep apnea, air movement is periodically diminished or stopped. There are two types of sleep apnea: obstructive sleep apnea and central sleep apnea. In obstructive sleep apnea, breathing is abnormal because of narrowing or closure of the throat. In central sleep apnea, breathing is abnormal because of a change in the breathing control and rhythm. Sleep apnea is a serious condition that can affect a person's ability to safely perform normal daily activities and can affect long term health. Approximately 25 percent of adults are at risk for sleep apnea of some degree. Men are more commonly affected than women. Other risk factors include middle and older age, being overweight or obese, and having a small mouth and throat. This topic review focuses on the most common type of sleep apnea in adults, obstructive sleep apnea (JANET). HOW SLEEP APNEA OCCURS  The throat is surrounded by muscles that control the airway for speaking, swallowing, and breathing. During sleep, these muscles are less active, and this causes the throat to narrow. In most people, this narrowing does not affect breathing. In others, it can cause snoring, sometimes with reduced or completely blocked airflow. A completely blocked airway without airflow is called an obstructive apnea. Partial obstruction with diminished airflow is called a hypopnea. A person may have apnea and hypopnea during sleep. Insufficient breathing due to apnea or hypopnea causes oxygen levels to fall and carbon dioxide to rise. Because the airway is blocked, breathing faster or harder does not help to improve oxygen levels until the airway is reopened. Typically, the obstruction requires the person to awaken to activate the upper airway muscles.  Once the airway is opened, the person then takes several deep breaths to catch up on breathing. As the person awakens, he or she may move briefly, snort or snore, and take a deep breath. Less frequently, a person may awaken completely with a sensation of gasping, smothering, or choking. If the person falls back to sleep quickly, he or she will not remember the event. Many people with sleep apnea are unaware of their abnormal breathing in sleep, and all patients underestimate how often their sleep is interrupted. Awakening from sleep causes sleep to be unrefreshing and causes fatigue and daytime sleepiness. Anatomic causes of obstructive sleep apnea   Most patients have JANET because of a small upper airway. As the bones of the face and skull develop, some people develop a small lower face, a small mouth, and a tongue that seems too large for the mouth. These features are genetically determined, which explains why JANET tends to cluster in families. Obesity is another major factor. Tonsil enlargement can be an important cause, especially in children. SLEEP APNEA SYMPTOMS  The main symptoms of JANET are loud snoring, fatigue, and daytime sleepiness. However, some people have no symptoms. For example, if the person does not have a bed partner, he or she may not be aware of the snoring. Fatigue and sleepiness have many causes and are often attributed to overwork and increasing age. As a result, a person may be slow to recognize that they have a problem. A bed partner or spouse often prompts the patient to seek medical care. Other symptoms may include one or more of the following:  ?Restless sleep  ? Awakening with choking, gasping, or smothering  ? Morning headaches, dry mouth, or sore throat  ? Waking frequently to urinate  ? Awakening unrested, groggy  ? Low energy, difficulty concentrating, memory impairment    Risk factors  Certain factors increase the risk of sleep apnea.   ?Increasing age [de-identified] JANET occurs at all ages, but it is more common in middle and older age usually performed in a sleep laboratory. A full sleep study is called a polysomnogram. The polysomnogram measures the breathing effort and airflow, blood oxygen level, heart rate and rhythm, duration of the various stages of sleep, body position, and movement of the arms/legs. Home monitoring devices are available that can perform a sleep study. This is a reasonable alternative to conventional testing in a sleep laboratory if the clinician strongly suspects moderate or severe sleep apnea and the patient does not have other illnesses or sleep disorders that may interfere with the results. SLEEP APNEA TREATMENT  Sleep apnea is best treated by a knowledgeable sleep medicine specialist. The goal of treatment is to maintain an open airway during sleep. Effective treatment will eliminate the symptoms of sleep disturbance; long-term health consequences are also reduced. Most treatments require nightly use. The challenge for the clinician and the patient is to select an effective therapy that is appropriate for the patient's problem and that is acceptable for long term use. Auto-titrating CPAP delivers an amount of PAP that varies during the night. The variation is dependent on event detection software algorithms, which will increase the pressure gradually in response to flow changes until adequate patency is detected. After a period of sustained upper airway patency, the delivered level of pressure gradually decreases until the algorithm identifies recurrent upper airway obstruction, at which point the delivered pressure again increases. The result is that the delivered pressure varies throughout the night, in an effort to provide the lowest pressure that is necessary to maintain upper airway patency. Continuous positive airway pressure (CPAP)  The most effective treatment for sleep apnea uses air pressure from a mechanical device to keep the upper airway open during sleep.  A CPAP (continuous positive airway meaning that 95 percent of people regain lost weight. Avoid alcohol and other sedatives  Alcohol can worsen sleepiness, potentially increasing the risk of accidents or injury. People with JANET are often counseled to drink little to no alcohol, even during the daytime. Similarly, people who take anti-anxiety medications or sedatives to sleep should speak with their healthcare provider about the safety of these medications. People with JANET must notify all healthcare providers, including surgeons, about their condition and the potential risks of being sedated. People with JANET who are given anesthesia and/or pain medications require special management and close monitoring to reduce the risk of a blocked airway. Dental devices  A dental device, called an oral appliance or mandibular advancement device, can reposition the jaw (mandible), bringing the tongue and soft palate forward as well. This may relieve obstruction in some people. This treatment is excellent for reducing snoring, although the effect on JANET is sometimes more limited. As a result, dental devices are best used for mild cases of JANET when relief of snoring is the main goal. Failure to tolerate and accept CPAP is another indication for dental devices. While dental devices are not as effective as CPAP for JANET, some patients prefer a dental device to CPAP. Side effects of dental devices are generally minor but may include changes to the bite with prolonged use. Surgical treatment  Surgery is an alternative therapy for patients who cannot tolerate or do not improve with nonsurgical treatments such as CPAP or oral devices. Surgery can also be used in combination with other nonsurgical treatments. Surgical procedures reshape structures in the upper airways or surgically reposition bone or soft tissue. Uvulopalatopharyngoplasty (UPPP) removes the uvula and excessive tissue in the throat, including the tonsils, if present.  Other procedures, such as established. Annual visits are reasonable, with more frequent visits in between if new issues arise. The purpose of long-term follow-up is to assess usage and monitor for recurrent JANET, new side effects, air leakage, and fluctuations in body weight. WHERE TO GET MORE INFORMATION  Your healthcare provider is the best source of information for questions and concerns related to your medical problem. Organizations  American [employer]nea Association  Provides information about sleep apnea to the public, publishes a newsletter, and serves as an advocate for people with the disorder. Janell, [address]   Keerthidario@[employer]. org   [email or web address] org   Tel: [phone]   Fax: [facility] that works to [employer] and safety by promoting public understanding of sleep and sleep disorders. Supports sleep-related education, research, and advocacy; produces and distributes educational materials to the public and healthcare professionals; and offers postdoctoral fellowships and grants for sleep researchers. [address]   Hu@[employer]. org   [employer]. org   Tel: [phone]   Fax: [phone]    Important information:  Medicare/private insurance CPAP/BiPAP/APAP requirements:  Medicare/private insurance has specific requirements for PAP compliance that must be met during the first 90 days of use to continue coverage for CPAP/BiPAP/APAP  from day 91 and beyond. The policy requires that patients use a PAP device 4 hours per 24 hour period, at least 70% of the time over a 30 day period. This data must be downloaded as a report direct from the PAP devices. This is called a compliance download. Your PAP supplier will assist you in this matter.      Reminder:  Please bring a copy of the compliance download to your next office visit or have your

## 2021-05-19 RX ORDER — TESTOSTERONE 12.5 MG/1.25G
5 GEL TOPICAL DAILY
Qty: 1 PACKAGE | Refills: 0 | Status: SHIPPED | OUTPATIENT
Start: 2021-05-19 | End: 2021-07-26 | Stop reason: SDUPTHER

## 2021-05-20 ENCOUNTER — HOSPITAL ENCOUNTER (OUTPATIENT)
Dept: ULTRASOUND IMAGING | Age: 51
Discharge: HOME | End: 2021-05-20
Payer: MEDICAID

## 2021-05-20 DIAGNOSIS — R35.0 URINARY FREQUENCY: ICD-10-CM

## 2021-05-20 PROCEDURE — 76775 US EXAM ABDO BACK WALL LIM: CPT

## 2021-05-21 ENCOUNTER — TELEPHONE (OUTPATIENT)
Dept: INTERNAL MEDICINE | Age: 51
End: 2021-05-21

## 2021-05-21 DIAGNOSIS — N39.46 MIXED STRESS AND URGE URINARY INCONTINENCE: Primary | ICD-10-CM

## 2021-05-21 NOTE — TELEPHONE ENCOUNTER
----- Message from Emeka Rueda MD sent at 5/21/2021  7:31 AM CDT -----  No urinary retention, he maybe having an overactive bladder , does he want to try medication?

## 2021-05-26 RX ORDER — OXYBUTYNIN CHLORIDE 10 MG/1
10 TABLET, EXTENDED RELEASE ORAL DAILY
Qty: 30 TABLET | Refills: 3 | Status: SHIPPED | OUTPATIENT
Start: 2021-05-26 | End: 2022-02-25 | Stop reason: ALTCHOICE

## 2021-05-26 RX ORDER — PAROXETINE HYDROCHLORIDE 40 MG/1
40 TABLET, FILM COATED ORAL EVERY MORNING
Qty: 30 TABLET | Refills: 3 | Status: SHIPPED | OUTPATIENT
Start: 2021-05-26 | End: 2021-09-27

## 2021-05-26 NOTE — TELEPHONE ENCOUNTER
Charles Andre called requesting a refill of the below medication which has been pended for you:     Requested Prescriptions     Pending Prescriptions Disp Refills    PARoxetine (PAXIL) 40 MG tablet 30 tablet 3     Sig: Take 1 tablet by mouth every morning       Last Appointment Date: 5/18/2021  Next Appointment Date: 8/20/2021    No Known Allergies

## 2021-06-07 ENCOUNTER — TELEPHONE (OUTPATIENT)
Dept: INTERNAL MEDICINE | Age: 51
End: 2021-06-07

## 2021-06-07 NOTE — TELEPHONE ENCOUNTER
Can you please check the chart if we have records from his other providers about his new diagnosis
Notes are in care everywhere
and    transferred into RPMI. The remaining tissue is then placed in formalin and submitted in toto in    cassettes A1-A2. M\"A/M\"A/M\"A     DISCLAIMER:   Lea Platt the senior physician, I attest that I:  (i) examined the relevant preparation(s) for the    specimen(s); and (ii) rendered or confirmed the diagnosis(es). \"     Report Request   [id number]                                              2021 20:36 EDT     ID:   [id number] of [address] [place] [address],[address].  [place] [address] [address] [place] [address]   Patient: Enrike Jiménez     :     /[id number] years     Gender: Sherrie Hilario   MRN:     [id number]                         Account: [de-identified]     Admit:   2021       11:33 EDT           Discharge:2021     23:59 EDT     Admitting:CLARK OSUNA MD-SUR           Attending:CLARK PEÑA MD-SUR                                                Pathology Reports       Accession#:          [id number]   Received:            6/3/2021 15:46 EDT     DISCLAIMER:   ICD10:  C91.10   CPT:  52574, 14649, 12807, 80243H9, 10118   Selected block for future studies:  A2       Report Request   [id number]                                              2021 20:36 EDT     ID:  Specimen Collected: 21  3:15 PM Last Resulted: 21  3:15 PM   [facility]  Result Received: 21  1:27 PM

## 2021-06-13 PROBLEM — C85.10 LOW GRADE B-CELL LYMPHOMA (HCC): Status: ACTIVE | Noted: 2021-06-13

## 2021-06-13 PROBLEM — E78.2 COMBINED HYPERLIPIDEMIA: Status: ACTIVE | Noted: 2021-06-13

## 2021-06-13 PROBLEM — F17.200 TOBACCO USE DISORDER: Status: ACTIVE | Noted: 2021-06-13

## 2021-06-13 NOTE — PROGRESS NOTES
David Vogel ( 1970) is a 52 y.o. male,  Established , here for evaluation of the following chief complaint(s). Results (to go over lab work)        ASSESSMENT/PLAN:  Problem List        Other    Combined hyperlipidemia      Uncontrolled, continue current medications and lifestyle modifications recommended         Relevant Medications    sildenafil (REVATIO) 20 MG tablet    Low grade B-cell lymphoma ([facility] [address]      Referred to Dr. Elyse Barr MD, Hematology/Oncology, [place]    Tobacco use disorder          Results for orders placed or performed in visit on 21   Hemoglobin A1C   Result Value Ref Range    Hemoglobin A1C 5.8 4.0 - 6.0 %       No follow-ups on file. HPI  59-year-old male presents for follow-up after biopsy at [address] [place] [address]  Patient had chronic low back pain on work-up was found to have a retroperitoneal mass was referred to GI for work-up and was inaccessible and therefore referred to [facility] for biopsy  Patient's biopsy for mass showed diffuse B-cell lymphoma and is here to talk about it  Patient was advised that he will be referred to local oncologist who will properly stage him to plan for therapeutic procedure  Patient has been off his testosterone still on paroxetine will taking thyroid medication he is coping well with his wife      Review of Systems   Constitutional: Negative for fever. Eyes: Negative for visual disturbance. Cardiovascular: Negative for chest pain. Gastrointestinal: Negative for abdominal pain. Heartburn   Genitourinary: Positive for enuresis. Negative for dysuria. Erectile dysfunction   Musculoskeletal: Positive for back pain. Neurological: Positive for numbness. Negative for weakness and headaches. Physical Exam  Vitals and nursing note reviewed. Constitutional:       General: He is not in acute distress. Appearance: He is well-developed.    HENT:      Head: Normocephalic and atraumatic. Right Ear: External ear normal.      Left Ear: External ear normal.      Nose: Nose normal.   Eyes:      General: No scleral icterus. Conjunctiva/sclera: Conjunctivae normal.      Pupils: Pupils are equal, round, and reactive to light. Neck:      Thyroid: No thyromegaly. Cardiovascular:      Rate and Rhythm: Normal rate and regular rhythm. Heart sounds: Normal heart sounds. No murmur heard. Pulmonary:      Effort: Pulmonary effort is normal.      Breath sounds: Normal breath sounds. No wheezing or rales. Abdominal:      General: Bowel sounds are normal. There is no distension. Palpations: Abdomen is soft. There is no mass. Tenderness: There is no abdominal tenderness. There is no rebound. Musculoskeletal:         General: No tenderness. Normal range of motion. Cervical back: Normal range of motion and neck supple. Lymphadenopathy:      Cervical: No cervical adenopathy. Skin:     General: Skin is warm and dry. Findings: No rash. Neurological:      Mental Status: He is alert and oriented to person, place, and time. Cranial Nerves: No cranial nerve deficit. Deep Tendon Reflexes: Reflexes normal.   Psychiatric:         Behavior: Behavior normal.         Thought Content:  Thought content normal.         Judgment: Judgment normal.           (Time Documentation Optional [id number])    An electronic signaturewaas used to authenticate this note  -Neal Kelly MD on 6/15/2021 at 1:00 PM

## 2021-06-15 ENCOUNTER — OFFICE VISIT (OUTPATIENT)
Dept: INTERNAL MEDICINE | Age: 51
End: 2021-06-15
Payer: MEDICAID

## 2021-06-15 VITALS
DIASTOLIC BLOOD PRESSURE: 60 MMHG | OXYGEN SATURATION: 97 % | HEIGHT: 67 IN | WEIGHT: 189 LBS | HEART RATE: 66 BPM | BODY MASS INDEX: 29.66 KG/M2 | SYSTOLIC BLOOD PRESSURE: 98 MMHG

## 2021-06-15 DIAGNOSIS — C85.10 LOW GRADE B-CELL LYMPHOMA (HCC): ICD-10-CM

## 2021-06-15 DIAGNOSIS — E78.2 COMBINED HYPERLIPIDEMIA: ICD-10-CM

## 2021-06-15 DIAGNOSIS — F17.200 TOBACCO USE DISORDER: ICD-10-CM

## 2021-06-15 PROCEDURE — G8417 CALC BMI ABV UP PARAM F/U: HCPCS | Performed by: INTERNAL MEDICINE

## 2021-06-15 PROCEDURE — G8427 DOCREV CUR MEDS BY ELIG CLIN: HCPCS | Performed by: INTERNAL MEDICINE

## 2021-06-15 PROCEDURE — 99213 OFFICE O/P EST LOW 20 MIN: CPT | Performed by: INTERNAL MEDICINE

## 2021-06-15 PROCEDURE — 4004F PT TOBACCO SCREEN RCVD TLK: CPT | Performed by: INTERNAL MEDICINE

## 2021-06-15 ASSESSMENT — ENCOUNTER SYMPTOMS
ABDOMINAL PAIN: 0
BACK PAIN: 1

## 2021-06-17 ENCOUNTER — OFFICE VISIT (OUTPATIENT)
Dept: GASTROENTEROLOGY | Age: 51
End: 2021-06-17
Payer: MEDICAID

## 2021-06-17 VITALS
HEIGHT: 67 IN | WEIGHT: 189.4 LBS | OXYGEN SATURATION: 98 % | DIASTOLIC BLOOD PRESSURE: 60 MMHG | HEART RATE: 66 BPM | SYSTOLIC BLOOD PRESSURE: 118 MMHG | BODY MASS INDEX: 29.73 KG/M2

## 2021-06-17 DIAGNOSIS — K29.60 CHEMICAL GASTRITIS: Primary | ICD-10-CM

## 2021-06-17 DIAGNOSIS — K29.80 DUODENITIS: ICD-10-CM

## 2021-06-17 PROCEDURE — 99213 OFFICE O/P EST LOW 20 MIN: CPT | Performed by: NURSE PRACTITIONER

## 2021-06-17 PROCEDURE — G8417 CALC BMI ABV UP PARAM F/U: HCPCS | Performed by: NURSE PRACTITIONER

## 2021-06-17 PROCEDURE — 4004F PT TOBACCO SCREEN RCVD TLK: CPT | Performed by: NURSE PRACTITIONER

## 2021-06-17 PROCEDURE — G8427 DOCREV CUR MEDS BY ELIG CLIN: HCPCS | Performed by: NURSE PRACTITIONER

## 2021-06-17 RX ORDER — NICOTINE 21 MG/24HR
1 PATCH, TRANSDERMAL 24 HOURS TRANSDERMAL EVERY 24 HOURS
COMMUNITY

## 2021-06-17 RX ORDER — ONDANSETRON 4 MG/1
4 TABLET, FILM COATED ORAL EVERY 6 HOURS PRN
Qty: 20 TABLET | Refills: 0 | Status: SHIPPED | OUTPATIENT
Start: 2021-06-17 | End: 2021-07-28 | Stop reason: SDUPTHER

## 2021-06-17 ASSESSMENT — ENCOUNTER SYMPTOMS
CONSTIPATION: 0
ABDOMINAL PAIN: 1
SHORTNESS OF BREATH: 0
DIARRHEA: 0
RECTAL PAIN: 0
ANAL BLEEDING: 0
VOMITING: 0
ABDOMINAL DISTENTION: 0
NAUSEA: 1
CHOKING: 0
TROUBLE SWALLOWING: 0
BLOOD IN STOOL: 0
COUGH: 0

## 2021-06-17 NOTE — PROGRESS NOTES
Subjective:     Patient ID: Yolette Grissom is a 52 y.o. male  PCP: Petr Carmichael MD  Referring Provider: No ref. provider found    HPI  Patient presents to the office today with the following complaints: Follow Up After Procedure      Yolette Grissom is here for follow up after EGD on 5/6/2021. During his last visit, he had c/o abnormal CT, RUQ pain, and nausea. Today, he continues to report issues with nausea and pain. Since last visit, he was referred for evaluation of retroperitoneal mass. Pt reports he was diagnosed with Lymphoma. He has upcoming appt with Oncology. He is requesting something for nausea. He admits to use of THC for pain. He is taking Omeprazole 40 mg po daily. EGD Findings/IMPRESSION:  Esophagus: normal and the EG junction at 38 cm appeared essentially normal except for a nonobstructing distal esophageal ring. There is a small 2 to 3 cm in size sliding hiatal hernia present. Stomach:  Abnormal; patchy mucosal changes multiple small 1 to 2 mm erosions with surrounding small areas of erythema noted in the antrum suggestive of medical gastritis noted -  Gastric biopsies were taken from the antrum and body to rule out Helicobacter pylori infection. The proximal stomach including the fundus, cardia, body and angularis appeared completely normal.  No evidence of abnormal folds or unusual thickening noted in the fundus. NO ulcers or masses or gastric outlet obstruction or retained food or fluid. Rugae were normal and lumen distended well with insufflation. Retroflexed views otherwise revealed a normal GE junction, fundus and cardia as well. Duodenum: abnormal: 3 small erosions with surrounding erythema were noted in the posterior duodenal bulb suggestive of mild duodenitis. Otherwise remainder of the examined duodenum including the bulb second third and fourth portions appeared essentially normal as did the first few centimeters of the examined proximal jejunum.   No strictures or mass lesions or ulcers were noted. RECOMMENDATIONS:    1. Await path results, the patient will be contacted in 7-10 days with biopsy results. 2.  Avoid NSAIDs  3. Continue antireflux measures and use of PPI for GERD  - Resume previous meds and diet  - GI clinic f/u 6 weeks with Ms. Sari Stevens. - Keep scheduled f/u appts with other MDs; patient will need referral to a tertiary center for further evaluation of his abnormal CT findings with retroperitoneal mass which may require help from surgical oncology and probably biopsies done through diagnostic laparoscopy or through interventional radiology.  - NO ASA/NSAIDs x 2 weeks    FINAL DIAGNOSIS:   Stomach, antrum, biopsy:   Antral-type gastric mucosa with acanthosis and tortuous glands,   consistent with reactive gastropathy (chemical gastritis). Negative for active inflammation. Negative for Helicobacter pylori organisms by immunohistochemical   staining. Negative for intestinal metaplasia. Negative for dysplasia. All scope and pathology reports were reviewed and discussed with patient. All questions answered, pt verbalized understanding. Assessment:     1. Chemical gastritis    2. Duodenitis            Plan:   - Zofran prn for nausea  - Keep scheduled follow up appts  - Continue PPI  - Follow up yearly or sooner if needed  - Call with any questions or concerns      Orders  No orders of the defined types were placed in this encounter.     Medications  Orders Placed This Encounter   Medications    ondansetron (ZOFRAN) 4 MG tablet     Sig: Take 1 tablet by mouth every 6 hours as needed for Nausea     Dispense:  20 tablet     Refill:  0         Patient History:     Past Medical History:   Diagnosis Date    Anemia     Anxiety     CPAP (continuous positive airway pressure) dependence     Herniated lumbar intervertebral disc     Hyperglycemia 01/05/2021    Kidney cysts     Liver cyst     Low testosterone     Lymphoma ([facility] [address] 06/07/2021    Dr Tj Carbajal     Migraine     Neck pain     Obstructive sleep apnea     OCD (obsessive compulsive disorder)     Thyroid disease        Past Surgical History:   Procedure Laterality Date    APPENDECTOMY      COLONOSCOPY  approx 2017    Dr Digna Cho @ [facility] hospt[facility] \" polyps\" per pt recall    COLONOSCOPY N/A 11/06/2020    Dr Zain Palmer, internal hemorrhoids-Grade 1, 5 yr recall    HERNIA REPAIR      THYROIDECTOMY      UPPER GASTROINTESTINAL ENDOSCOPY N/A 05/06/2021    Dr Elliot Sanders gastritis (avoid NSAIDS) (-)H.  pylori, w/push enteroscopy up to the the proximal jejunum-Gastric erosions,  non-obstructing distal esoph ring, sliding hh, gastritis, duodenitis       Family History   Problem Relation Age of Onset    Stomach Cancer Maternal Uncle     Liver Cancer Maternal Uncle     Cancer Maternal Grandfather     Stomach Cancer Maternal Aunt     Colon Cancer Neg Hx     Colon Polyps Neg Hx     Esophageal Cancer Neg Hx     Rectal Cancer Neg Hx        Social History     Socioeconomic History    Marital status:      Spouse name: None    Number of children: None    Years of education: None    Highest education level: None   Occupational History    None   Tobacco Use    Smoking status: Current Every Day Smoker     Packs/day: 2.00     Years: 34.00     Pack years: 68.00     Types: Cigarettes     Start date: 1986    Smokeless tobacco: Never Used    Tobacco comment: Nicotine Gum severals times daily & Nioctine patches    Vaping Use    Vaping Use: Never used   Substance and Sexual Activity    Alcohol use: Yes     Comment: occasional     Drug use: Yes     Types: Marijuana     Comment: smokes marijuana daily, along with RSO (marijuana) oil 0.1ml under tongue       Sexual activity: None   Other Topics Concern    None   Social History Narrative    None     Social Determinants of Health     Financial Resource Strain: Low Risk     Difficulty of Paying Living Expenses: Not very hard   Food Insecurity: No Food Insecurity    Worried About Running Out of Food in the Last Year: Never true    Ran Out of Food in the Last Year: Never true   Transportation Needs:     Lack of Transportation (Medical):  Lack of Transportation (Non-Medical):    Physical Activity:     Days of Exercise per Week:     Minutes of Exercise per Session:    Stress:     Feeling of Stress :    Social Connections:     Frequency of Communication with Friends and Family:     Frequency of Social Gatherings with Friends and Family:     Attends Mosque Services:     Active Member of Clubs or Organizations:     Attends Club or Organization Meetings:     Marital Status:    Intimate Partner Violence:     Fear of Current or Ex-Partner:     Emotionally Abused:     Physically Abused:     Sexually Abused:        Current Outpatient Medications   Medication Sig Dispense Refill    nicotine (Ying Apo) 14 MG/24HR Place 1 patch onto the skin every 24 hours      NONFORMULARY 4 mg as needed Equate Nicotine Gum      ondansetron (ZOFRAN) 4 MG tablet Take 1 tablet by mouth every 6 hours as needed for Nausea 20 tablet 0    oxybutynin (DITROPAN XL) 10 MG extended release tablet Take 1 tablet by mouth daily 30 tablet 3    PARoxetine (PAXIL) 40 MG tablet Take 1 tablet by mouth every morning 30 tablet 3    sildenafil (REVATIO) 20 MG tablet Take 2 tablets by mouth as needed (as needed) 60 tablet 3    omeprazole (PRILOSEC) 40 MG delayed release capsule Take 1 capsule by mouth every morning (before breakfast) 90 capsule 1    clobetasol (TEMOVATE) 0.05 % ointment Apply topically 2 times daily.  (Patient taking differently: 2 times daily as needed Apply topically 2 times daily.) 30 g 0    thyroid (ARMOUR THYROID) 120 MG tablet Take 1 tablet by mouth daily 90 tablet 3    SUMAtriptan (IMITREX) 100 MG tablet As directed 9 tablet 5    testosterone (ANDROGEL) 25 MG/2.5GM (1%) GEL 1 % gel Apply 5 g topically daily for 30 days. Apply 4 clicks  (1ml) daily (Patient not taking: Reported on 6/17/2021) 1 Package 0     No current facility-administered medications for this visit. No Known Allergies    Review of Systems   Constitutional: Negative for activity change, appetite change, fatigue, fever and unexpected weight change. HENT: Negative for trouble swallowing. Respiratory: Negative for cough, choking and shortness of breath. Cardiovascular: Negative for chest pain. Gastrointestinal: Positive for abdominal pain and nausea. Negative for abdominal distention, anal bleeding, blood in stool, constipation, diarrhea, rectal pain and vomiting. Allergic/Immunologic: Negative for food allergies. All other systems reviewed and are negative. Objective:     /60   Pulse 66   Ht 5' 7\" (1.702 m)   Wt 189 lb 6.4 oz (85.9 kg)   SpO2 98%   BMI 29.66 kg/m²     Physical Exam  Vitals reviewed. Constitutional:       General: He is not in acute distress. Appearance: He is well-developed. HENT:      Head: Normocephalic and atraumatic. Right Ear: External ear normal.      Left Ear: External ear normal.      Nose: Nose normal.      Comments: Mask on     Mouth/Throat:      Comments: Mask on  Eyes:      Conjunctiva/sclera: Conjunctivae normal.      Pupils: Pupils are equal, round, and reactive to light. Cardiovascular:      Rate and Rhythm: Normal rate and regular rhythm. Heart sounds: Normal heart sounds. No murmur heard. No friction rub. No gallop. Pulmonary:      Effort: Pulmonary effort is normal. No respiratory distress. Breath sounds: Normal breath sounds. Abdominal:      General: Bowel sounds are normal. There is no distension. Palpations: Abdomen is soft. There is no mass. Tenderness: There is no abdominal tenderness. There is no guarding or rebound. Musculoskeletal:         General: Normal range of motion.       Cervical back: Normal range of motion and neck supple. Skin:     General: Skin is warm and dry. Findings: No rash. Nails: There is no clubbing. Neurological:      Mental Status: He is alert and oriented to person, place, and time. Gait: Gait normal.   Psychiatric:         Mood and Affect: Affect is tearful. Behavior: Behavior normal.         Thought Content:  Thought content normal.

## 2021-06-17 NOTE — PATIENT INSTRUCTIONS

## 2021-06-22 NOTE — TELEPHONE ENCOUNTER
06-22-21 Apt complete     Apt 05-21-21 Dr Kojo Lopez MD   [facility]       Notes are in Care everywhere Cv

## 2021-06-23 ENCOUNTER — HOSPITAL ENCOUNTER (OUTPATIENT)
Dept: PAIN MANAGEMENT | Age: 51
Discharge: HOME | End: 2021-06-23
Payer: MEDICAID

## 2021-06-23 VITALS
HEART RATE: 66 BPM | WEIGHT: 192 LBS | OXYGEN SATURATION: 94 % | SYSTOLIC BLOOD PRESSURE: 93 MMHG | TEMPERATURE: 97 F | DIASTOLIC BLOOD PRESSURE: 52 MMHG | HEIGHT: 61 IN | BODY MASS INDEX: 36.25 KG/M2

## 2021-06-23 PROCEDURE — 99213 OFFICE O/P EST LOW 20 MIN: CPT

## 2021-06-23 PROCEDURE — 99213 OFFICE O/P EST LOW 20 MIN: CPT | Performed by: NURSE PRACTITIONER

## 2021-06-23 ASSESSMENT — PAIN SCALES - GENERAL: PAINLEVEL_OUTOF10: 3

## 2021-06-23 ASSESSMENT — PAIN DESCRIPTION - LOCATION: LOCATION: BACK

## 2021-06-23 ASSESSMENT — PAIN DESCRIPTION - PAIN TYPE: TYPE: CHRONIC PAIN

## 2021-06-23 NOTE — PROGRESS NOTES
Clinic Documentation      Education Provided:  [x] Review of Kylah Romero  [] Agreement Review  [x] PEG Score Calculated [] PHQ Score Calculated [] ORT Score Calculated    [] Compliance Issues Discussed [] Cognitive Behavior Needs [x] Exercise [] Review of Test [] Financial Issues  [x] Tobacco/Alcohol Use Reviewed [x] Teaching [] New Patient [] Picture Obtained    Physician Plan:  [] Outgoing Referral  [] Pharmacy Consult  [] Test Ordered [] Prescription Ordered/Changed   [] Obtained Test Results / Consult Notes        Complete if patient is withholding blood thinner for procedure     Blood Thinner Patient is currently taking:      [] Plavix (Hold for 7 days)  [] Aspirin (Hold for 5 days)     [] Pletal (Hold for 2 days)  [] Pradaxa (Hold for 3 days)    [] Effient (Hold for 7 days)  [] Xarelto (Hold for 2 days)    [] Eliquis (Hold for 2 days)  [] Brilinta (Hold for 7 days)    [] Coumadin (Hold for 5 days) - (INR needs to be drawn the day prior to procedure- INR < 2.0)    [] Aggrenox (Hold for 7 days)        [] Patient will stop medication on their own.    [] Blood Thinner Form Faxed for approval to hold.    Provider form faxed to:  Assessment Completed by:  Electronically signed by Yajaira Doyle on 6/23/2021 at 8:03 AM

## 2021-06-23 NOTE — PROGRESS NOTES
[facility]    Office Progress Note    Patient Name: Gregg Nixon    MR #: [id number]    Account #: [de-identified]    : 1970    Age: 52 y.o. Sex: male    Date: 2021    PCP: Geraldine Reza MD         Referring Provider:    Chief Complaint:   Chief Complaint   Patient presents with    Back Pain       History of Present Illness:    Gregg Nixon is a 52 y.o. male who presents to the office for follow-up of bilateral lumbar facet injections Levels L4-L5, L5-S1. He says that he obtained 80% relief for 6 weeks and is wanting them repeated. He recently has found that he has Lymphoma. Has an appointment with Dr. Frankey Fontan. He ways that he is going to come back positive for marijuana on his drug screen. He went to [place] and bought some to smoke and pills to take. It has helped with the pain. .     Last pain management HPI note read    Employment: Retired []   Disabled  []   Works []    Does Not Work [x]     Previous Injury:  Yes  []   No [x]     Previous Surgery: Yes []   No [x]     Previous Physical Therapy In the last 6 months? Yes  []     No []   Did Physical Therapy make thepain better or worse? Better []   Worse []  Unchanged []    MRI in the last two years? Yes [x]  No []   Results reviewed with patient? Yes []   No []    CT Scan in the last two years? Yes  []   No [x]   Results reviewed with patient? Yes []   No []    X-ray in the last two years? Yes [x]   No  []  Results reviewed with patient? Yes  []  No []    Injections in the past?  Yes [x]   No []   Did the injections help relieve the pain? Yes [x]   No []     Do you have Depression? Yes  []    No [x]   Thinking of harming yourself or others?   Yes  []   No [x]     Past Medical Histoy  Past Medical History:   Diagnosis Date    Anemia     Anxiety     CPAP (continuous positive airway pressure) dependence     Herniated lumbar intervertebral disc     Hyperglycemia 2021  Kidney cysts     Liver cyst     Low testosterone     Lymphoma ([facility] [address] 06/07/2021    Dr Jolie Escobedo     Migraine     Neck pain     Obstructive sleep apnea     OCD (obsessive compulsive disorder)     Thyroid disease        Surgery History  Past Surgical History:   Procedure Laterality Date    APPENDECTOMY      COLONOSCOPY  approx 2017    Dr Renetta Pham @ [facility] hos[facility]. \" polyps\" per pt recall    COLONOSCOPY N/A 11/06/2020    Dr Sasha Celis, internal hemorrhoids-Grade 1, 5 yr recall    HERNIA REPAIR      THYROIDECTOMY      UPPER GASTROINTESTINAL ENDOSCOPY N/A 05/06/2021    Dr Severiano Lorenz gastritis (avoid NSAIDS) (-)H. pylori, w/push enteroscopy up to the the proximal jejunum-Gastric erosions,  non-obstructing distal esoph ring, sliding hh, gastritis, duodenitis        Allergies  Patient has no known allergies. Current Medications  Current Outpatient Medications   Medication Sig Dispense Refill    nicotine (NICODERM CQ) 14 MG/24HR Place 1 patch onto the skin every 24 hours      NONFORMULARY 4 mg as needed Equate Nicotine Gum      ondansetron (ZOFRAN) 4 MG tablet Take 1 tablet by mouth every 6 hours as needed for Nausea 20 tablet 0    oxybutynin (DITROPAN XL) 10 MG extended release tablet Take 1 tablet by mouth daily 30 tablet 3    PARoxetine (PAXIL) 40 MG tablet Take 1 tablet by mouth every morning 30 tablet 3    testosterone (ANDROGEL) 25 MG/2.5GM (1%) GEL 1 % gel Apply 5 g topically daily for 30 days. Apply 4 clicks  (1ml) daily 1 Package 0    sildenafil (REVATIO) 20 MG tablet Take 2 tablets by mouth as needed (as needed) 60 tablet 3    omeprazole (PRILOSEC) 40 MG delayed release capsule Take 1 capsule by mouth every morning (before breakfast) 90 capsule 1    clobetasol (TEMOVATE) 0.05 % ointment Apply topically 2 times daily.  (Patient taking differently: 2 times daily as needed Apply topically 2 times daily.) 30 g 0    thyroid (ARMOUR THYROID) 120 MG tablet Take 1 tablet by mouth daily 90 tablet 3    SUMAtriptan (IMITREX) 100 MG tablet As directed 9 tablet 5     No current facility-administered medications for this encounter. Social History    Social History     Tobacco Use    Smoking status: Current Every Day Smoker     Packs/day: 2.00     Years: 34.00     Pack years: 68.00     Types: Cigarettes     Start date: 1986    Smokeless tobacco: Never Used    Tobacco comment: Nicotine Gum severals times daily & Nioctine patches    Substance Use Topics    Alcohol use: Yes     Comment: occasional          Family History  family history includes Cancer in his maternal grandfather; Dionisio Lame in his maternal uncle; Stomach Cancer in his maternal aunt and maternal uncle. Review of Systems:  Constitutional: denies fever, chills, fatigue, change in appetite, weight gain or weight loss  Head: Normocephalic  Skin: denies easy bruising, skin redness, skin rash, hives, sensitivity to sun exposure, tightness, nodules or bumps, hair loss, color changes in the hands or feet with cold. Eyes: denies pain, redness, loss of vision, double or blurred vision, eye drainage, or dryness. ENT and Mouth: denies ringing in the ears, loss of hearing, nasal congestion, nasal discharge, no hoarseness, sore throat, or difficulty swallowing   Respiratory: denies chronic dry cough, coughing up blood, coughing up mucus, waking at night coughing or choking, or wheezing. Cardiovascular: denies chest pain, irregular heartbeats, palpitations, shortness of breath, or edema in legs  Gastrointestinal: denies, nausea, vomiting, heartburn, diarrhea, or constipation  Genitourinary: denies difficult urination, pain or burning with urination, blood in the urine, or cloudy urine  Musculoskeletal: denies arm, buttock, thigh or calf cramps. Has pain in low back, and tenderness in low back. No muscle weakness. No joint swelling.    Neurologic: headache, dizziness, fainting, loss of consciousness, no memory loss. no sensitivity. Endocrine: denies intolerance to hot or cold temperature, night sweats, flushing, fingernail changes, increased thirst, or hairloss   Hematologic/ Lymphatic: denies anemia, bleeding tendency or clotting tendency, bruising easily. Allergic/ Immunologic: denies rhinitis, asthma, skin sensitivity, or allergy to Latex   Psychiatric: denies depression or thoughts of suicide, or voices in head. Current Pain Assessment:   Pain Assessment  Pain Assessment: 0-10  Pain Level: 3  Pain Type: Chronic pain  Pain Location: Back    Clinical Progression: gradually improving  Effect of Pain on Daily Activities: limits activity  Patient's Stated Pain Goal: No pain  Pain Intervention(s): Medication (see eMar), Repositioning, Rest, Ice    Current PE    ORT Score: 0    PHQ-9 Score: 8    Physical Exam:    Vitals:    21 0832   BP: (!) 93/52   Pulse: 66   Temp: 97 °F (36.1 °C)   TempSrc: Temporal   SpO2: 94%   Weight: 192 lb (87.1 kg)   Height: 5' 1\" (1.549 m)       Body mass index is 36.28 kg/m². General Appearance: no acute distress. Appears to be well dressed  Skin Exam: Warm and dry, no jaundice, rashes or lesions  Head Exam: NCAT, PERRLA, EOMI, moist mucus membranes, scalp normal  Neck Exam: Supple, no masses palpated, trachea midline. *  Lung: Clear to ausculation in all lobes anterior and posterior. Heart: Regular rate and rhythm, no gallops, rubs or murmurs, no edema  Abdomen:  Bowel sounds in all quadrants, soft, non-distended, non-tender with palpation, no guarding  Extremities: No rash, cyanosis or bruising  Musculoskeletal: No joint swelling or deformity   Back Exam: Pain with extension in lumbar spine  Hip Exam: Full rotation bilateral   Knee Exam: Full flexion and extension bilateral, no crepitus  Shoulder Exam: Full ROM bilateral  Neurologic Exam: Gait and coordination normal, speech normal  Reflexes: Normal brachialis, Negative Camarena's bilateral. Normal Patellar bilateral,   CN EXAM: II-XII intact, face symmetrical, tongue symmetrical, the trapezius and sternocleidomastoid muscle appearance and strength symmetrical, normal achilles bilateral, ankle clonus negative bilateral  Strength: 5/5 RUE Bi's/Tri's, 5/5 LUE Bi's/Tri's, 5/5 RLE knee flex/ext, 5/5 RLE DF/PF, 5/5 LLE knee flex/ext, 5/5 LLE DF/PF  Sensation: Equal and intact to fine touch in all extremities  Mood and affect: Normal   Nurses note reviewed along with current vital signs    Active Problem(s)  Active Problems:    Chronic midline low back pain    Facet hypertrophy of lumbar region  Resolved Problems:    * No resolved hospital problems. *                                                                                                                            PLAN:  1. Patient is to call the office with any questions or concerns that may arise prior to next appointment. 2. Schedule patient for bilateral lumbar facet injections levels L4-L5, L5-S1    Patient not on any blood thinners or ASA products    Urine Drug Screen Current/Today:  Yes  [x]  No []     Discussion:  Discussed exam findings and plan of care with patient. Patient agreed with the current plan of care at this time. All questions from the patient were answered by the provider. Activity:   Discussed exercise as beneficial to pain reduction, encouraged stretching exercise with a focus on torso strengthening.     Education Provided:  Review of Tiki Engel [x] Agreement Review [x]  Reviewed PHQ-9  [x]    Review of Test [] Compliance Issues Discussed []   Cognitive Behavior Needs [] Exercise [x]  Financial Issues []   Tobacco/Alcohol Use [] Teaching  [x]     Goal:  Pain Management Goals of Therapy:   []        Resolution in pain  [x]        Decrease in pain level  [x]        Improvement in ADL's  [x]        Increase in activities with less pain  [x]        Decrease in medication      [] Benzodiazapine's and Narcotics:  Patient educated on the

## 2021-06-29 DIAGNOSIS — C85.10 LOW GRADE B-CELL LYMPHOMA (HCC): Primary | ICD-10-CM

## 2021-06-29 NOTE — PROGRESS NOTES
Patient:  Andrew Glez  YOB: 1970  Date of Service: 6/30/2021  MRN: [id number]    Primary Care Physician: Jocelin Baig MD    Chief Complaint   Patient presents with    New Patient      B Cell Lymphoma       Patient Seen, Chart, Consults notes, Labs, Radiology studies reviewed. Subjective:  Mr Nila Mcadams is a 52year old gentleman referred by Dr. Jocelin Baig for newly diagnosed low grade B-cell lymphoma. TARGET SLL/CLL SITES:  · 4 x 2.1 x 4.9 cm and 3.6 x 1.9 x 4.1 cm enhancing oval lobulated solid masses in the right upper abdomen, retroperitoneal para-aortic region located between the aorta and inferior vena cava    TUMOR HISTORY:  Mr Nila Mcadams was seen in initial consultation on 6/30/2021, referred by Dr Jocelin Baig for newly diagnosed low grade B-cell lymphoma. In March of 2021, Ajay Arzate  decided he wanted to jump on a pogo stick but unfortunately was unsuccessful, fell and hurt his back. Ajay Arzate presented for work-up with low back pain    MRI LUMBAR SPINE WO CONTRAST  On 3/22/2021 at [facility] documented:  · Multilevel prominent disc osteophyte complexes and facetal arthropathy and resultant neural foramina spinal canal stenosis  · Incidentally noted is an ill-defined soft tissue mass located posterior to the distal left renal vein and inferior vena cava with anterior displacement of both. It measures approximately 4 cm x 1.5 x 5.3 cm. This may represent an enlarged retroperitoneal lymph node? Carolyn Drummond A similar mass is seen at a lower level measuring 2.8 x 1.8 x 3.6 cm. CT ABDOMEN PELVIS W WO CONTRAST on 3/25/2021 at [facility] documented:  · Evidence of moderately enhancing oval lobulated solid masses in the right upper abdomen in the retroperitoneal para-aortic region located between the aorta and inferior vena cava. There is marked asymmetrical thickening of the right diaphragmatic luz. The 2 masses measure 4 cm x 2.1 x 4.9 cm and 3.6 x 1.9 x 4.1 cm.  Potential moderate Dr Matilde Morocho reviewed the images with the Dr. Jazmin Dudley and he opined that the retroperitoneal lesion was not amenable to FNA by EUS or even by radiology here in [place], [facility] [address] Dr Dada Licona referred Bernardinohedy Solano to Dr Anila Donaldson with the surgical oncology department at [facility]     Dr Anila Donaldson saw Ricki Solano on 5/21/2021. A CT-guided biopsy retroperitoneal mass was performed on 6/2/2021 at [facility]  Microscopic description: The cytospin shows a mixture of small and large lymphoid cells    CYTOLOGIC DIAGNOSIS:  Low-grade B-cell lymphoma most consistent with small lymphocytic lymphoma (SLL/CLL)  CD5 (+) lambda surface restricted B-cell population (48% of cells) by flow cytometry  IHC report the neoplastic cells are positive for PAX5 and LEF1. CD3 is positive and T cells. Cells are negative for cyclin D1 and SOX11. The Ki-67 proliferative index is variable and ranges from 5% to focally up to 20%    Flow cytometry immunophenotypic B cells  POSITIVE FOR CD5, lambda, CD45, CD20, CD22, CD19, CD23, and CD38  NEGATIVE FOR, CD10, CD79B and CD14    COMMENT:   Findings support involvement by a B cell lymphoma. Correlation with the forthcoming morphology (75-FV-) is recommended. Physical examination today, 6/30/2021 does not reveal evidence of palpable pathological peripheral lymphadenopathy in the cervical, supraclavicular, infraclavicular, axillary or inguinal lymph node areas. Lungs are clear heart is regular normal S1-S2, no S3  Abdominal exam documents an enlarged liver, with fullness in the RUQ, somewhat tender. I am unable to palpate a spleen. Extremities no cyanosis clubbing or edema  Neurological exam is nonfocal intact with an intact mental status and normal cranial nerves II through XII.     ASSESSMENT RECOMMENDATION AND PLAN:  SLL/CLL presenting with a 4 x 2.1 x 4.9 cm and 3.6 x 1.9 x 4.1 cm enhancing oval lobulated solid masses in the right upper abdomen, retroperitoneal para-aortic region located between the aorta and inferior vena cava  Complete staging and work-up with the following:  · CT scan of the neck chest abdomen and pelvis with contrast  · PET scan  · Bone marrow biopsy and aspirate  · Lymphoma and anemia serology, see below  · Follow-up 7/15/2021      Allergies:  Patient has no known allergies. Medicines:  Current Outpatient Medications   Medication Sig Dispense Refill    medical marijuana Take by mouth as needed.  nicotine (NICODERM CQ) 14 MG/24HR Place 1 patch onto the skin every 24 hours      NONFORMULARY 4 mg as needed Equate Nicotine Gum      ondansetron (ZOFRAN) 4 MG tablet Take 1 tablet by mouth every 6 hours as needed for Nausea 20 tablet 0    oxybutynin (DITROPAN XL) 10 MG extended release tablet Take 1 tablet by mouth daily 30 tablet 3    PARoxetine (PAXIL) 40 MG tablet Take 1 tablet by mouth every morning 30 tablet 3    omeprazole (PRILOSEC) 40 MG delayed release capsule Take 1 capsule by mouth every morning (before breakfast) 90 capsule 1    clobetasol (TEMOVATE) 0.05 % ointment Apply topically 2 times daily. (Patient taking differently: 2 times daily as needed Apply topically 2 times daily.) 30 g 0    thyroid (ARMOUR THYROID) 120 MG tablet Take 1 tablet by mouth daily 90 tablet 3    SUMAtriptan (IMITREX) 100 MG tablet As directed 9 tablet 5    allopurinol (ZYLOPRIM) 300 MG tablet Take 1 tablet by mouth daily 30 tablet 5    testosterone (ANDROGEL) 25 MG/2.5GM (1%) GEL 1 % gel Apply 5 g topically daily for 30 days. Apply 4 clicks  (1ml) daily 1 Package 0    sildenafil (REVATIO) 20 MG tablet Take 2 tablets by mouth as needed (as needed) 60 tablet 3     No current facility-administered medications for this visit.        Past Medical History:      Diagnosis Date    Anemia     Anxiety     CPAP (continuous positive airway pressure) dependence     Herniated lumbar intervertebral disc     Hyperglycemia 01/05/2021    Kidney cysts     Liver cyst     Low wheezing. Cardiovascular: Negative for chest pain, palpitations or leg swelling. Gastrointestinal: Negative for abdominal pain, blood in stool, constipation, diarrhea, nausea or vomiting. Genitourinary: Negative for dysuria, flank pain, frequency, hematuria or urgency. Musculoskeletal: Negative for back pain, joint swelling, myalgias or neck pain. Skin: Negative for rash or petechiae. Neurological: Negative for tremors, seizures, syncope, weakness or headaches. Hematological: No active bruising or bleeding. Psychiatric/Behavioral: Negative for hallucinations. Wt Readings from Last 3 Encounters:   06/30/21 193 lb 4.8 oz (87.7 kg)   06/23/21 192 lb (87.1 kg)   06/17/21 189 lb 6.4 oz (85.9 kg)        Objective:  Vital Signs: Blood pressure (!) 142/68, pulse 72, height 5' 7\" (1.702 m), weight 193 lb 4.8 oz (87.7 kg), SpO2 97 %. Physical Exam   Constitutional: Oriented to person, place, and time. No acute distress. Head: Normocephalic and atraumatic. Nose: Nose normal.   Mouth/Throat: Oropharynx is clear and moist. No oropharyngeal exudate. Eyes: Pupils are equal and round. Conjunctivae and EOM are normal. No scleral icterus. Neck: Normal range of motion. Neck supple. No JVD. No appreciable thyromegaly. Cardiovascular: Normal rate, regular rhythm, normal heart sounds and intact distal pulses. Exam reveals no gallop, murmurs or friction rub. Pulmonary/Chest: Effort normal and breath sounds normal. No respiratory distress. No wheezes. Abdominal: Soft. Bowel sounds are normal. No organomegally or masses. No tenderness. There is no rebound and no guarding. Musculoskeletal: Normal range of motion. No edema or tenderness. Lymphadenopathy: No cervical, axillary or inguinal lymphadenopathy. Neurological: Alert and oriented to person, place, and time. Cranial nerves are intact. Neurological exam is nonfocal  Skin: Skin is warm and dry. No rash noted. No erythema. No pallor. moderately enhancing oval lobulated solid masses in the right upper abdomen in the retroperitoneal para-aortic region located between the aorta and inferior vena cava. There is marked asymmetrical thickening of the right diaphragmatic luz. The 2 masses measure 4 cm x 2.1 x 4.9 cm and 3.6 x 1.9 x 4.1 cm. Potential moderate contrast enhancement. Inflammatory or neoplastic process is not excluded. Further evaluation is recommended. · No other enlarged lymph nodes are noted. Cristi Lane was referred to GI for initial evaluation hoping for access to the abdominal mass via EGD/EUS. EGD BIOPSY on 5/6/2021 at [facility] per  Dinh Valles MD  IMPRESSION:  Esophagus: normal and the EG junction at 38 cm appeared essentially normal except for a nonobstructing distal esophageal ring. There is a small 2 to 3 cm in size sliding hiatal hernia present. Stomach:  Abnormal; patchy mucosal changes multiple small 1 to 2 mm erosions with surrounding small areas of erythema noted in the antrum suggestive of medical gastritis noted -  Gastric biopsies were taken from the antrum and body to rule out Helicobacter pylori infection. The proximal stomach including the fundus, cardia, body and angularis appeared completely normal.  No evidence of abnormal folds or unusual thickening noted in the fundus. NO ulcers or masses or gastric outlet obstruction or retained food or fluid. Rugae were normal and lumen distended well with insufflation. Retroflexed views otherwise revealed a normal GE junction, fundus and cardia as well. Duodenum: abnormal: 3 small erosions with surrounding erythema were noted in the posterior duodenal bulb suggestive of mild duodenitis. Otherwise remainder of the examined duodenum including the bulb second third and fourth portions appeared essentially normal as did the first few centimeters of the examined proximal jejunum. No strictures or mass lesions or ulcers were noted.   FINAL DIAGNOSIS: Stomach, antrum, biopsy:   Antral-type gastric mucosa with acanthosis and tortuous glands, consistent with reactive gastropathy (chemical gastritis). Negative for active inflammation. Negative for Helicobacter pylori organisms by immunohistochemical staining. Negative for intestinal metaplasia. Negative for dysplasia. Dr Yu Guerrero reviewed the images with the Dr. Shayy Villeda and he opined that the retroperitoneal lesion was not amenable to FNA by EUS or even by radiology here in [place] [address] [address] Violet Valentin referred Dionicioio Primrose to Dr Lidia Burrell with the surgical oncology department at [facility]     Dr Lidia Burrell saw Crecencio Primrose on 5/21/2021. A CT-guided biopsy retroperitoneal mass was performed on 6/2/2021 at [facility]  Microscopic description: The cytospin shows a mixture of small and large lymphoid cells    CYTOLOGIC DIAGNOSIS:  Low-grade B-cell lymphoma most consistent with small lymphocytic lymphoma (SLL/CLL)  CD5 (+) lambda surface restricted B-cell population (48% of cells) by flow cytometry  IHC report the neoplastic cells are positive for PAX5 and LEF1. CD3 is positive and T cells. Cells are negative for cyclin D1 and SOX11. The Ki-67 proliferative index is variable and ranges from 5% to focally up to 20%    Flow cytometry immunophenotypic B cells  POSITIVE FOR CD5, lambda, CD45, CD20, CD22, CD19, CD23, and CD38  NEGATIVE FOR, CD10, CD79B and CD14    COMMENT:   Findings support involvement by a B cell lymphoma. Correlation with the forthcoming morphology ([id number]-) is recommended. Physical examination today, 6/30/2021 does not reveal evidence of palpable pathological peripheral lymphadenopathy in the cervical, supraclavicular, infraclavicular, axillary or inguinal lymph node areas. Lungs are clear heart is regular normal S1-S2, no S3  Abdominal exam documents an enlarged liver, with fullness in the RUQ, somewhat tender. I am unable to palpate a spleen.   Extremities no cyanosis clubbing or edema  Neurological exam is nonfocal intact with an intact mental status and normal cranial nerves II through XII. ASSESSMENT RECOMMENDATION AND PLAN:  SLL/CLL presenting with a 4 x 2.1 x 4.9 cm and 3.6 x 1.9 x 4.1 cm enhancing oval lobulated solid masses in the right upper abdomen, retroperitoneal para-aortic region located between the aorta and inferior vena cava  Complete staging and work-up with the following:  · CT scan of the neck chest abdomen and pelvis with contrast  · PET scan  · Bone marrow biopsy and aspirate  · Lymphoma and anemia serology, see below  · Follow-up 7/15/2021          Naila LOUIS am scribing for Mannie Limon MD. Electronically signed by Fern Travis RN on 6/30/2021 at 2:26 PM        Junior Rajan was seen today for new patient. Diagnoses and all orders for this visit:    Low grade B-cell lymphoma ([facility] [address]  -     Miscellaneous Sendout 1; Future  -     CT SOFT TISSUE NECK W CONTRAST; Future  -     CT CHEST W CONTRAST; Future  -     CT ABDOMEN PELVIS W IV CONTRAST Additional Contrast? Oral; Future  -     Comprehensive Metabolic Panel; Future  -     Eddi Mir MD, General Surgery, [place]  -     Hepatitis Panel, Acute; Future    Health care maintenance    Care plan discussed with patient    Anemia, unspecified type  -     Beta 2 Microglobulin, Serum; Future  -     Electrophoresis Protein, Serum with Reflex to Immunofixation; Future  -     Miscellaneous Sendout 1; Future  -     Immunoglobulins, Quantitative; Future  -     Bright/Lambda Free Lt Chains, Serum Quant; Future  -     Protein, Total; Future  -     Uric Acid; Future  -     Iron and TIBC; Future  -     Folate; Future  -     Lactate Dehydrogenase; Future  -     TSH without Reflex; Future  -     Ferritin; Future  -     Methylmalonic Acid, Serum; Future  -     Reticulocytes; Future  -     Haptoglobin; Future  -     Hepatitis Panel, Acute;  Future    Preop examination  -     ECHO Complete 2D W Doppler W Color; Future    Retroperitoneal mass  -     PET CT SKULL BASE TO MID THIGH; Future        Orders Placed This Encounter   Procedures    CT SOFT TISSUE NECK W CONTRAST     Standing Status:   Future     Standing Expiration Date:   6/30/2022     Order Specific Question:   Reason for exam:     Answer:   retroperitoneal mass; work up for lymphoma    CT CHEST W CONTRAST     Standing Status:   Future     Standing Expiration Date:   6/30/2022     Order Specific Question:   Reason for exam:     Answer:   retroperitoneal mass; work up for lymphoma    CT ABDOMEN PELVIS W IV CONTRAST Additional Contrast? Oral     Standing Status:   Future     Standing Expiration Date:   6/30/2022     Order Specific Question:   Additional Contrast?     Answer:   Oral     Order Specific Question:   Reason for exam:     Answer:   retroperitoneal mass; work up for lymphoma    PET CT SKULL BASE TO MID THIGH     Standing Status:   Future     Standing Expiration Date:   6/30/2022     Order Specific Question:   Reason for exam:     Answer:   retroperitoneal mass; metastatic workup    Miscellaneous Sendout 1     Standing Status:   Future     Standing Expiration Date:   6/30/2022     Order Specific Question:   Specify Req. Test (1 Test/Order)     Answer:   BMAB with Gerardine Fuelling Comprehensive Metabolic Panel     Standing Status:   Future     Number of Occurrences:   1     Standing Expiration Date:   6/30/2022    Beta 2 Microglobulin, Serum     Standing Status:   Future     Standing Expiration Date:   6/30/2022    Electrophoresis Protein, Serum with Reflex to Immunofixation     Standing Status:   Future     Standing Expiration Date:   6/30/2022    Miscellaneous Sendout 1     Standing Status:   Future     Standing Expiration Date:   6/30/2022     Order Specific Question:   Specify Req.  Test (1 Test/Order)     Answer:   Immunofixation electrophoresis, serum    Immunoglobulins, Quantitative     Standing Status:   Future     Standing Expiration Date: 6/30/2022    [place]/Lambda Free Lt Chains, Serum Quant     Standing Status:   Future     Standing Expiration Date:   6/30/2022    Protein, Total     Standing Status:   Future     Standing Expiration Date:   6/30/2022    Uric Acid     Standing Status:   Future     Number of Occurrences:   1     Standing Expiration Date:   6/30/2022    Iron and TIBC     Standing Status:   Future     Number of Occurrences:   1     Standing Expiration Date:   6/30/2022     Order Specific Question:   Is Patient Fasting? Answer:   no     Order Specific Question:   No of Hours?      Answer:   na    Folate     Standing Status:   Future     Number of Occurrences:   1     Standing Expiration Date:   6/30/2022    Lactate Dehydrogenase     Standing Status:   Future     Number of Occurrences:   1     Standing Expiration Date:   6/30/2022    TSH without Reflex     Standing Status:   Future     Number of Occurrences:   1     Standing Expiration Date:   6/30/2022    Ferritin     Standing Status:   Future     Number of Occurrences:   1     Standing Expiration Date:   6/30/2022    Methylmalonic Acid, Serum     Standing Status:   Future     Standing Expiration Date:   6/30/2022    Reticulocytes     Standing Status:   Future     Standing Expiration Date:   6/30/2022    Haptoglobin     Standing Status:   Future     Standing Expiration Date:   6/30/2022    Hepatitis Panel, Acute     Standing Status:   Future     Standing Expiration Date:   6/30/2022   Mariaelena Durán MD, General Surgery, [place]     Referral Priority:   Routine     Referral Type:   Eval and Treat     Referral Reason:   Specialty Services Required     Referred to Provider:   Raymundo Thao MD     Requested Specialty:   General Surgery     Number of Visits Requested:   1    ECHO Complete 2D W Doppler W Color     Standing Status:   Future     Standing Expiration Date:   6/30/2022     Order Specific Question:   Reason for exam:     Answer:   baseline prior to cardiotoxic chemotherapy       No orders of the defined types were placed in this encounter. Return in about 2 weeks (around 7/14/2021) for treatment and see Dr. Quirino Reyes. I, Dr. Corrinne Mcgee, personally performed the services described in this documentation as scribed by Naila Jackson in my presence, and it is both accurate and complete.

## 2021-06-30 ENCOUNTER — OFFICE VISIT (OUTPATIENT)
Dept: HEMATOLOGY | Age: 51
End: 2021-06-30
Payer: MEDICAID

## 2021-06-30 ENCOUNTER — HOSPITAL ENCOUNTER (OUTPATIENT)
Dept: INFUSION THERAPY | Age: 51
Discharge: HOME | End: 2021-06-30
Payer: MEDICAID

## 2021-06-30 VITALS
DIASTOLIC BLOOD PRESSURE: 68 MMHG | SYSTOLIC BLOOD PRESSURE: 142 MMHG | OXYGEN SATURATION: 97 % | HEART RATE: 72 BPM | WEIGHT: 193.3 LBS | HEIGHT: 67 IN | BODY MASS INDEX: 30.34 KG/M2

## 2021-06-30 DIAGNOSIS — C85.10 LOW GRADE B-CELL LYMPHOMA (HCC): ICD-10-CM

## 2021-06-30 DIAGNOSIS — Z71.89 CARE PLAN DISCUSSED WITH PATIENT: ICD-10-CM

## 2021-06-30 DIAGNOSIS — Z00.00 HEALTH CARE MAINTENANCE: ICD-10-CM

## 2021-06-30 DIAGNOSIS — D64.9 ANEMIA, UNSPECIFIED TYPE: ICD-10-CM

## 2021-06-30 DIAGNOSIS — C85.10 LOW GRADE B-CELL LYMPHOMA (HCC): Primary | ICD-10-CM

## 2021-06-30 DIAGNOSIS — Z01.818 PREOP EXAMINATION: ICD-10-CM

## 2021-06-30 DIAGNOSIS — R19.00 RETROPERITONEAL MASS: ICD-10-CM

## 2021-06-30 LAB
ALBUMIN SERPL-MCNC: 4.2 G/DL (ref 3.5–5.2)
ALP BLD-CCNC: 65 U/L (ref 40–130)
ALT SERPL-CCNC: 13 U/L (ref 21–72)
ANION GAP SERPL CALCULATED.3IONS-SCNC: 8 MMOL/L (ref 7–19)
AST SERPL-CCNC: 15 U/L (ref 17–59)
BASOPHILS ABSOLUTE: 0.02 K/UL (ref 0.01–0.08)
BASOPHILS RELATIVE PERCENT: 0.5 % (ref 0.1–1.2)
BILIRUB SERPL-MCNC: 0.5 MG/DL (ref 0.2–1.3)
BUN BLDV-MCNC: 19 MG/DL (ref 9–20)
CALCIUM SERPL-MCNC: 9.2 MG/DL (ref 8.4–10.2)
CHLORIDE BLD-SCNC: 106 MMOL/L (ref 98–111)
CO2: 26 MMOL/L (ref 22–29)
CREAT SERPL-MCNC: 0.9 MG/DL (ref 0.6–1.2)
EOSINOPHILS ABSOLUTE: 0.07 K/UL (ref 0.04–0.54)
EOSINOPHILS RELATIVE PERCENT: 1.6 % (ref 0.7–7)
FERRITIN: 128 NG/ML (ref 17.9–464)
FOLATE: 4.2 NG/ML (ref 2.7–20)
GFR NON-AFRICAN AMERICAN: >60
GLOBULIN: 2.1 G/DL
GLUCOSE BLD-MCNC: 106 MG/DL (ref 74–106)
HCT VFR BLD CALC: 26.8 % (ref 40.1–51)
HEMOGLOBIN: 9.1 G/DL (ref 13.7–17.5)
IRON SATURATION: 32 % (ref 14–50)
IRON: 149 UG/DL (ref 49–181)
LACTATE DEHYDROGENASE: 441 U/L (ref 313–618)
LYMPHOCYTES ABSOLUTE: 1.15 K/UL (ref 1.18–3.74)
LYMPHOCYTES RELATIVE PERCENT: 26.1 % (ref 19.3–53.1)
MCH RBC QN AUTO: 33.7 PG (ref 25.7–32.2)
MCHC RBC AUTO-ENTMCNC: 34 G/DL (ref 32.3–36.5)
MCV RBC AUTO: 99.3 FL (ref 79–92.2)
MONOCYTES ABSOLUTE: 0.35 K/UL (ref 0.24–0.82)
MONOCYTES RELATIVE PERCENT: 7.9 % (ref 4.7–12.5)
NEUTROPHILS ABSOLUTE: 2.82 K/UL (ref 1.56–6.13)
NEUTROPHILS RELATIVE PERCENT: 63.9 % (ref 34–71.1)
PDW BLD-RTO: 13.6 % (ref 11.6–14.4)
PLATELET # BLD: 264 K/UL (ref 163–337)
PMV BLD AUTO: 9.1 FL (ref 7.4–10.4)
POTASSIUM SERPL-SCNC: 4.4 MMOL/L (ref 3.5–5.1)
RBC # BLD: 2.7 M/UL (ref 4.63–6.08)
SODIUM BLD-SCNC: 140 MMOL/L (ref 137–145)
TOTAL IRON BINDING CAPACITY: 460 UG/DL (ref 261–462)
TOTAL PROTEIN: 6.3 G/DL (ref 6.3–8.2)
TSH SERPL DL<=0.05 MIU/L-ACNC: 0.27 UIU/ML (ref 0.47–4.68)
URIC ACID, SERUM: 4.5 MG/DL (ref 3.5–8.5)
WBC # BLD: 4.41 K/UL (ref 4.23–9.07)

## 2021-06-30 PROCEDURE — 85025 COMPLETE CBC W/AUTO DIFF WBC: CPT

## 2021-06-30 PROCEDURE — 83550 IRON BINDING TEST: CPT

## 2021-06-30 PROCEDURE — 99213 OFFICE O/P EST LOW 20 MIN: CPT

## 2021-06-30 PROCEDURE — 82728 ASSAY OF FERRITIN: CPT

## 2021-06-30 PROCEDURE — 82746 ASSAY OF FOLIC ACID SERUM: CPT

## 2021-06-30 PROCEDURE — 4004F PT TOBACCO SCREEN RCVD TLK: CPT | Performed by: INTERNAL MEDICINE

## 2021-06-30 PROCEDURE — 83540 ASSAY OF IRON: CPT

## 2021-06-30 PROCEDURE — 84550 ASSAY OF BLOOD/URIC ACID: CPT

## 2021-06-30 PROCEDURE — 80053 COMPREHEN METABOLIC PANEL: CPT

## 2021-06-30 PROCEDURE — 99205 OFFICE O/P NEW HI 60 MIN: CPT | Performed by: INTERNAL MEDICINE

## 2021-06-30 PROCEDURE — G8417 CALC BMI ABV UP PARAM F/U: HCPCS | Performed by: INTERNAL MEDICINE

## 2021-06-30 PROCEDURE — 83615 LACTATE (LD) (LDH) ENZYME: CPT

## 2021-06-30 PROCEDURE — 84443 ASSAY THYROID STIM HORMONE: CPT

## 2021-06-30 PROCEDURE — G8427 DOCREV CUR MEDS BY ELIG CLIN: HCPCS | Performed by: INTERNAL MEDICINE

## 2021-06-30 RX ORDER — ALLOPURINOL 300 MG/1
300 TABLET ORAL DAILY
Qty: 30 TABLET | Refills: 5 | Status: SHIPPED | OUTPATIENT
Start: 2021-06-30 | End: 2021-07-15 | Stop reason: ALTCHOICE

## 2021-07-02 NOTE — PROGRESS NOTES
Patient name: Johnnette Lundborg  Patient : 1970      Procedure Note: Bone Marrow Aspirate and Biopsy    Indication:    1. Low grade B-cell lymphoma (HCC)      Lab Results   Component Value Date    WBC 4.27 2021    HGB 8.7 (L) 2021    HCT 24.2 (LL) 2021    MCV 96.0 (H) 2021     2021         Informed consent was signed by Johnnette Lundborg.  he  was placed in the left lateral decubitus position. The patient was prepped and draped in sterile fashion. Lidocaine 1% was used for local anesthetic of the skin and periosteum. A bone marrow aspirate and biopsy was obtained from the [facility] and sent for surgical pathology review,  flow cytometry, and chromosome analysis. The total blood loss was less than 3 mL. The skin was cleaned and a sterile bandage was placed on the entrance site. Benito Vogel tolerated the procedure without complication.      Vidhya Styles PA-C    21  2:58 PM

## 2021-07-06 ENCOUNTER — OFFICE VISIT (OUTPATIENT)
Dept: SURGERY | Age: 51
End: 2021-07-06
Payer: MEDICAID

## 2021-07-06 VITALS
DIASTOLIC BLOOD PRESSURE: 68 MMHG | BODY MASS INDEX: 30.1 KG/M2 | HEIGHT: 67 IN | SYSTOLIC BLOOD PRESSURE: 120 MMHG | TEMPERATURE: 99 F | WEIGHT: 191.8 LBS

## 2021-07-06 DIAGNOSIS — C85.10 LOW GRADE B-CELL LYMPHOMA (HCC): Primary | ICD-10-CM

## 2021-07-06 PROCEDURE — G8427 DOCREV CUR MEDS BY ELIG CLIN: HCPCS | Performed by: SURGERY

## 2021-07-06 PROCEDURE — 4004F PT TOBACCO SCREEN RCVD TLK: CPT | Performed by: SURGERY

## 2021-07-06 PROCEDURE — G8417 CALC BMI ABV UP PARAM F/U: HCPCS | Performed by: SURGERY

## 2021-07-06 PROCEDURE — 99202 OFFICE O/P NEW SF 15 MIN: CPT | Performed by: SURGERY

## 2021-07-06 ASSESSMENT — ENCOUNTER SYMPTOMS
NAUSEA: 1
CONSTIPATION: 1
EYE REDNESS: 0
SORE THROAT: 0
DIARRHEA: 1
EYE PAIN: 0
BACK PAIN: 1
APNEA: 1
ABDOMINAL PAIN: 1
WHEEZING: 1
SHORTNESS OF BREATH: 1

## 2021-07-06 NOTE — PROGRESS NOTES
Mr. Emma Angulo is a 52year old male who presents with a complaint of a lymphoma. He reports that this happened to be found after he injured himself trying to jump on a pogo stick. He had imaging done of his back, which showed some retroperitoneal masses. These were biopsied by [facility] and came back lymphoma. He is a patient of Dr. Pepe Prater. He is still undergoing some staging including bone marrow biopsy. Past Medical History:   Diagnosis Date    Anemia     Anxiety     CPAP (continuous positive airway pressure) dependence     Depression     Herniated lumbar intervertebral disc     Hyperglycemia 01/05/2021    Kidney cysts     Liver cyst     Low testosterone     Lymphoma ([facility] [address] 06/07/2021    Dr Collin Colvin     Migraine     Neck pain     Obstructive sleep apnea     OCD (obsessive compulsive disorder)     Thyroid disease      Past Surgical History:   Procedure Laterality Date    APPENDECTOMY      COLONOSCOPY  approx 2017    Dr Estela Ravi @ [facility] hospt. \" polyps\" per pt recall    COLONOSCOPY N/A 11/06/2020    Dr Kenney Hamm, internal hemorrhoids-Grade 1, 5 yr recall    HERNIA REPAIR      bih    THYROIDECTOMY      TOTAL    UPPER GASTROINTESTINAL ENDOSCOPY N/A 05/06/2021    Dr Florinda Hatchet gastritis (avoid NSAIDS) (-)H. pylori, w/push enteroscopy up to the the proximal jejunum-Gastric erosions,  non-obstructing distal esoph ring, sliding hh, gastritis, duodenitis     Current Outpatient Medications   Medication Sig Dispense Refill    medical marijuana Take by mouth as needed.       allopurinol (ZYLOPRIM) 300 MG tablet Take 1 tablet by mouth daily 30 tablet 5    nicotine (NICODERM CQ) 14 MG/24HR Place 1 patch onto the skin every 24 hours      NONFORMULARY 4 mg as needed Equate Nicotine Gum      ondansetron (ZOFRAN) 4 MG tablet Take 1 tablet by mouth every 6 hours as needed for Nausea 20 tablet 0    oxybutynin (DITROPAN XL) 10 MG extended release tablet Take 1 tablet by mouth daily 30 tablet 3    PARoxetine (PAXIL) 40 MG tablet Take 1 tablet by mouth every morning 30 tablet 3    testosterone (ANDROGEL) 25 MG/2.5GM (1%) GEL 1 % gel Apply 5 g topically daily for 30 days. Apply 4 clicks  (1ml) daily 1 Package 0    sildenafil (REVATIO) 20 MG tablet Take 2 tablets by mouth as needed (as needed) 60 tablet 3    omeprazole (PRILOSEC) 40 MG delayed release capsule Take 1 capsule by mouth every morning (before breakfast) 90 capsule 1    clobetasol (TEMOVATE) 0.05 % ointment Apply topically 2 times daily. (Patient taking differently: 2 times daily as needed Apply topically 2 times daily.) 30 g 0    thyroid (ARMOUR THYROID) 120 MG tablet Take 1 tablet by mouth daily 90 tablet 3    SUMAtriptan (IMITREX) 100 MG tablet As directed 9 tablet 5     No current facility-administered medications for this visit. Allergies: Patient has no known allergies. Family History   Problem Relation Age of Onset    Stomach Cancer Maternal Uncle     Liver Cancer Maternal Uncle     Cancer Maternal Grandfather     Stomach Cancer Maternal Aunt     Cancer Other         LEUKEMIA    Colon Cancer Neg Hx     Colon Polyps Neg Hx     Esophageal Cancer Neg Hx     Rectal Cancer Neg Hx        Social History     Tobacco Use    Smoking status: Current Every Day Smoker     Packs/day: 2.00     Years: 34.00     Pack years: 68.00     Types: Cigarettes     Start date: 1986    Smokeless tobacco: Never Used    Tobacco comment: Nicotine Gum severals times daily & Nioctine patches    Substance Use Topics    Alcohol use: Yes     Comment: occasional        Review of Systems   Constitutional: Positive for activity change, appetite change, diaphoresis and fatigue. HENT: Positive for congestion. Negative for sore throat. Eyes: Negative for pain and redness. Respiratory: Positive for apnea, shortness of breath and wheezing. Cardiovascular: Positive for palpitations. Negative for chest pain. Gastrointestinal: Positive for abdominal pain, constipation, diarrhea and nausea. Endocrine: Positive for heat intolerance, polydipsia and polyuria. Genitourinary: Positive for urgency. Negative for dysuria. Musculoskeletal: Positive for arthralgias, back pain, gait problem, joint swelling, myalgias and neck pain. Neurological: Positive for numbness. Negative for dizziness. Psychiatric/Behavioral: Positive for dysphoric mood. The patient is nervous/anxious. Physical Exam  Vitals reviewed. Constitutional:       General: He is not in acute distress. HENT:      Head: Normocephalic and atraumatic. Eyes:      General: No scleral icterus. Pupils: Pupils are equal, round, and reactive to light. Cardiovascular:      Rate and Rhythm: Normal rate and regular rhythm. Pulmonary:      Effort: Pulmonary effort is normal. No respiratory distress. Abdominal:      General: There is no distension. Palpations: Abdomen is soft. Musculoskeletal:         General: No swelling or deformity. Cervical back: Neck supple. No rigidity. Skin:     General: Skin is warm and dry. Neurological:      General: No focal deficit present. Mental Status: He is alert. Mental status is at baseline. Psychiatric:         Mood and Affect: Mood normal.         Behavior: Behavior normal.               Assessment and plan:  52year old male with lymphoma  Discussed the case with Dr. Teresa Lincoln, and it turns out that on his final pathology, this is a low grade lymphoma, which likely will not need IV chemo. The patient is scheduled for bone marrow biopsy tomorrow, and follow up with Dr. Teresa Lincoln next week. If they diagnosis changes and he does end up needing a port, then will have the patient call the office to get scheduled. Otherwise, will plan to hold off on port placement for now.  On the chance that port is eventually required, the risks and benefits of port placement were discussed with the patient, including but not limited to, bleeding, infection, and pneumothorax.     Tera Duff MD  7/6/2021  11:28 AM

## 2021-07-08 ENCOUNTER — HOSPITAL ENCOUNTER (OUTPATIENT)
Dept: INFUSION THERAPY | Age: 51
Discharge: HOME | End: 2021-07-08
Payer: MEDICAID

## 2021-07-08 ENCOUNTER — OFFICE VISIT (OUTPATIENT)
Dept: HEMATOLOGY | Age: 51
End: 2021-07-08
Payer: MEDICAID

## 2021-07-08 VITALS
SYSTOLIC BLOOD PRESSURE: 138 MMHG | DIASTOLIC BLOOD PRESSURE: 62 MMHG | OXYGEN SATURATION: 96 % | WEIGHT: 193.2 LBS | HEIGHT: 67 IN | HEART RATE: 86 BPM | BODY MASS INDEX: 30.32 KG/M2

## 2021-07-08 DIAGNOSIS — C85.10 LOW GRADE B-CELL LYMPHOMA (HCC): ICD-10-CM

## 2021-07-08 DIAGNOSIS — C85.10 LOW GRADE B-CELL LYMPHOMA (HCC): Primary | ICD-10-CM

## 2021-07-08 LAB
BASOPHILS ABSOLUTE: 0.02 K/UL (ref 0.01–0.08)
BASOPHILS RELATIVE PERCENT: 0.5 % (ref 0.1–1.2)
EOSINOPHILS ABSOLUTE: 0.04 K/UL (ref 0.04–0.54)
EOSINOPHILS RELATIVE PERCENT: 0.9 % (ref 0.7–7)
HCT VFR BLD CALC: 24.2 % (ref 40.1–51)
HEMOGLOBIN: 8.7 G/DL (ref 13.7–17.5)
LYMPHOCYTES ABSOLUTE: 1.13 K/UL (ref 1.18–3.74)
LYMPHOCYTES RELATIVE PERCENT: 26.5 % (ref 19.3–53.1)
MCH RBC QN AUTO: 34.5 PG (ref 25.7–32.2)
MCHC RBC AUTO-ENTMCNC: 36 G/DL (ref 32.3–36.5)
MCV RBC AUTO: 96 FL (ref 79–92.2)
MONOCYTES ABSOLUTE: 0.31 K/UL (ref 0.24–0.82)
MONOCYTES RELATIVE PERCENT: 7.3 % (ref 4.7–12.5)
NEUTROPHILS ABSOLUTE: 2.77 K/UL (ref 1.56–6.13)
NEUTROPHILS RELATIVE PERCENT: 64.8 % (ref 34–71.1)
PDW BLD-RTO: 13.5 % (ref 11.6–14.4)
PLATELET # BLD: 300 K/UL (ref 163–337)
PMV BLD AUTO: 8.4 FL (ref 7.4–10.4)
RBC # BLD: 2.52 M/UL (ref 4.63–6.08)
WBC # BLD: 4.27 K/UL (ref 4.23–9.07)

## 2021-07-08 PROCEDURE — 85025 COMPLETE CBC W/AUTO DIFF WBC: CPT

## 2021-07-08 PROCEDURE — 99999 PR OFFICE/OUTPT VISIT,PROCEDURE ONLY: CPT | Performed by: PHYSICIAN ASSISTANT

## 2021-07-08 PROCEDURE — 38222 DX BONE MARROW BX & ASPIR: CPT | Performed by: PHYSICIAN ASSISTANT

## 2021-07-09 ENCOUNTER — HOSPITAL ENCOUNTER (OUTPATIENT)
Dept: NUCLEAR MEDICINE | Age: 51
Discharge: HOME | End: 2021-07-11
Payer: MEDICAID

## 2021-07-09 ENCOUNTER — HOSPITAL ENCOUNTER (OUTPATIENT)
Dept: NON INVASIVE DIAGNOSTICS | Age: 51
Discharge: HOME | End: 2021-07-09
Payer: MEDICAID

## 2021-07-09 ENCOUNTER — HOSPITAL ENCOUNTER (OUTPATIENT)
Dept: CT IMAGING | Age: 51
Discharge: HOME | End: 2021-07-09
Payer: MEDICAID

## 2021-07-09 DIAGNOSIS — Z01.818 PREOP EXAMINATION: ICD-10-CM

## 2021-07-09 DIAGNOSIS — C85.10 LOW GRADE B-CELL LYMPHOMA (HCC): ICD-10-CM

## 2021-07-09 DIAGNOSIS — R19.00 RETROPERITONEAL MASS: ICD-10-CM

## 2021-07-09 LAB
GLUCOSE BLD-MCNC: 117 MG/DL (ref 70–99)
LV EF: 58 %
LVEF MODALITY: NORMAL
PERFORMED ON: ABNORMAL

## 2021-07-09 PROCEDURE — 93306 TTE W/DOPPLER COMPLETE: CPT

## 2021-07-09 PROCEDURE — 74177 CT ABD & PELVIS W/CONTRAST: CPT

## 2021-07-09 PROCEDURE — 70491 CT SOFT TISSUE NECK W/DYE: CPT

## 2021-07-09 PROCEDURE — A9552 F18 FDG: HCPCS | Performed by: INTERNAL MEDICINE

## 2021-07-09 PROCEDURE — 3430000000 HC RX DIAGNOSTIC RADIOPHARMACEUTICAL: Performed by: INTERNAL MEDICINE

## 2021-07-09 PROCEDURE — 82947 ASSAY GLUCOSE BLOOD QUANT: CPT

## 2021-07-09 PROCEDURE — 78815 PET IMAGE W/CT SKULL-THIGH: CPT

## 2021-07-09 PROCEDURE — 71260 CT THORAX DX C+: CPT

## 2021-07-09 PROCEDURE — 6360000004 HC RX CONTRAST MEDICATION: Performed by: INTERNAL MEDICINE

## 2021-07-09 RX ORDER — FLUDEOXYGLUCOSE F 18 200 MCI/ML
10 INJECTION, SOLUTION INTRAVENOUS
Status: COMPLETED | OUTPATIENT
Start: 2021-07-09 | End: 2021-07-09

## 2021-07-09 RX ADMIN — IOPAMIDOL 90 ML: 755 INJECTION, SOLUTION INTRAVENOUS at 11:44

## 2021-07-09 RX ADMIN — FLUDEOXYGLUCOSE F 18 10 MILLICURIE: 200 INJECTION, SOLUTION INTRAVENOUS at 13:49

## 2021-07-15 ENCOUNTER — OFFICE VISIT (OUTPATIENT)
Dept: HEMATOLOGY | Age: 51
End: 2021-07-15
Payer: MEDICAID

## 2021-07-15 ENCOUNTER — HOSPITAL ENCOUNTER (OUTPATIENT)
Dept: INFUSION THERAPY | Age: 51
Discharge: HOME | End: 2021-07-15
Payer: MEDICAID

## 2021-07-15 ENCOUNTER — DOCUMENTATION (OUTPATIENT)
Dept: RADIATION ONCOLOGY | Facility: HOSPITAL | Age: 51
End: 2021-07-15

## 2021-07-15 VITALS
HEIGHT: 67 IN | DIASTOLIC BLOOD PRESSURE: 64 MMHG | HEART RATE: 83 BPM | SYSTOLIC BLOOD PRESSURE: 138 MMHG | BODY MASS INDEX: 30.01 KG/M2 | OXYGEN SATURATION: 97 % | WEIGHT: 191.2 LBS

## 2021-07-15 DIAGNOSIS — Z71.89 CARE PLAN DISCUSSED WITH PATIENT: ICD-10-CM

## 2021-07-15 DIAGNOSIS — C85.10 LOW GRADE B-CELL LYMPHOMA (HCC): Primary | ICD-10-CM

## 2021-07-15 DIAGNOSIS — C85.10 LOW GRADE B-CELL LYMPHOMA (HCC): ICD-10-CM

## 2021-07-15 DIAGNOSIS — Z00.00 HEALTH CARE MAINTENANCE: ICD-10-CM

## 2021-07-15 DIAGNOSIS — D64.9 ANEMIA, UNSPECIFIED TYPE: ICD-10-CM

## 2021-07-15 LAB
ALBUMIN SERPL-MCNC: 4.2 G/DL (ref 3.5–5.2)
ALP BLD-CCNC: 66 U/L (ref 40–130)
ALT SERPL-CCNC: 10 U/L (ref 21–72)
ANION GAP SERPL CALCULATED.3IONS-SCNC: 5 MMOL/L (ref 7–19)
AST SERPL-CCNC: 14 U/L (ref 17–59)
BASOPHILS ABSOLUTE: 0.03 K/UL (ref 0.01–0.08)
BASOPHILS RELATIVE PERCENT: 0.7 % (ref 0.1–1.2)
BILIRUB SERPL-MCNC: 0.8 MG/DL (ref 0.2–1.3)
BUN BLDV-MCNC: 16 MG/DL (ref 9–20)
CALCIUM SERPL-MCNC: 9.2 MG/DL (ref 8.4–10.2)
CHLORIDE BLD-SCNC: 106 MMOL/L (ref 98–111)
CO2: 27 MMOL/L (ref 22–29)
CREAT SERPL-MCNC: 1 MG/DL (ref 0.6–1.2)
EOSINOPHILS ABSOLUTE: 0.04 K/UL (ref 0.04–0.54)
EOSINOPHILS RELATIVE PERCENT: 0.9 % (ref 0.7–7)
GFR NON-AFRICAN AMERICAN: >60
GLOBULIN: 2.3 G/DL
GLUCOSE BLD-MCNC: 104 MG/DL (ref 74–106)
HCT VFR BLD CALC: 25 % (ref 40.1–51)
HEMOGLOBIN: 8.4 G/DL (ref 13.7–17.5)
LACTATE DEHYDROGENASE: 389 U/L (ref 313–618)
LYMPHOCYTES ABSOLUTE: 1 K/UL (ref 1.18–3.74)
LYMPHOCYTES RELATIVE PERCENT: 22.8 % (ref 19.3–53.1)
MCH RBC QN AUTO: 34.9 PG (ref 25.7–32.2)
MCHC RBC AUTO-ENTMCNC: 33.6 G/DL (ref 32.3–36.5)
MCV RBC AUTO: 103.7 FL (ref 79–92.2)
MONOCYTES ABSOLUTE: 0.32 K/UL (ref 0.24–0.82)
MONOCYTES RELATIVE PERCENT: 7.3 % (ref 4.7–12.5)
NEUTROPHILS ABSOLUTE: 2.99 K/UL (ref 1.56–6.13)
NEUTROPHILS RELATIVE PERCENT: 68.3 % (ref 34–71.1)
PDW BLD-RTO: 14 % (ref 11.6–14.4)
PLATELET # BLD: 287 K/UL (ref 163–337)
PMV BLD AUTO: 8.9 FL (ref 7.4–10.4)
POTASSIUM SERPL-SCNC: 4.3 MMOL/L (ref 3.5–5.1)
RBC # BLD: 2.41 M/UL (ref 4.63–6.08)
SODIUM BLD-SCNC: 138 MMOL/L (ref 137–145)
TOTAL PROTEIN: 6.4 G/DL (ref 6.3–8.2)
WBC # BLD: 4.38 K/UL (ref 4.23–9.07)

## 2021-07-15 PROCEDURE — 99213 OFFICE O/P EST LOW 20 MIN: CPT

## 2021-07-15 PROCEDURE — G8427 DOCREV CUR MEDS BY ELIG CLIN: HCPCS | Performed by: INTERNAL MEDICINE

## 2021-07-15 PROCEDURE — 83615 LACTATE (LD) (LDH) ENZYME: CPT

## 2021-07-15 PROCEDURE — 80053 COMPREHEN METABOLIC PANEL: CPT

## 2021-07-15 PROCEDURE — 4004F PT TOBACCO SCREEN RCVD TLK: CPT | Performed by: INTERNAL MEDICINE

## 2021-07-15 PROCEDURE — 99215 OFFICE O/P EST HI 40 MIN: CPT | Performed by: INTERNAL MEDICINE

## 2021-07-15 PROCEDURE — G8417 CALC BMI ABV UP PARAM F/U: HCPCS | Performed by: INTERNAL MEDICINE

## 2021-07-15 PROCEDURE — 85025 COMPLETE CBC W/AUTO DIFF WBC: CPT

## 2021-07-15 NOTE — PROGRESS NOTES
I spoke with Dr. Caceres today regarding this patient.  He has an incidental finding of 2 lesions in the right diaphragmatic osiris which are positive for small lymphocytic lymphoma with no evidence of her systemic disease.    We discussed potential treatment options such as observation, systemic therapy, or local treatment with external beam radiation.    In the absence of systemic disease I believe that local therapy with radiation would give adequate local control without the systemic side effects or toxicity.  We will see the patient in consultation and discuss potential definitive radiotherapy to these lesions and observation for future systemic disease.

## 2021-07-15 NOTE — PROGRESS NOTES
His back pain is secondary to a fall off a pogo stick and not related to his malignancy.  However did result in an evaluation that discovered the malignancy.

## 2021-07-16 ENCOUNTER — TELEPHONE (OUTPATIENT)
Dept: ADMINISTRATIVE | Age: 51
End: 2021-07-16

## 2021-07-16 ENCOUNTER — TELEPHONE (OUTPATIENT)
Dept: RADIATION ONCOLOGY | Facility: HOSPITAL | Age: 51
End: 2021-07-16

## 2021-07-16 DIAGNOSIS — E03.9 HYPOTHYROIDISM (ACQUIRED): Primary | ICD-10-CM

## 2021-07-16 NOTE — TELEPHONE ENCOUNTER
Pt called to let dr. Hough Dawley his TSH is low, he is currently on armour, and he would like to switch to levolthroxine and increase the dosage. Please advise patient if this is possible.

## 2021-07-19 ENCOUNTER — TELEPHONE (OUTPATIENT)
Dept: INTERNAL MEDICINE | Age: 51
End: 2021-07-19

## 2021-07-19 RX ORDER — LEVOTHYROXINE SODIUM 112 UG/1
112 TABLET ORAL DAILY
Qty: 90 TABLET | Refills: 0 | Status: SHIPPED | OUTPATIENT
Start: 2021-07-19 | End: 2021-10-13 | Stop reason: ALTCHOICE

## 2021-07-19 NOTE — TELEPHONE ENCOUNTER
PT called and wanted to see if you thought he has Nonmegaloblastic macrocytic anemia and if that may be what is causing his anemia.

## 2021-07-22 ENCOUNTER — HOSPITAL ENCOUNTER (OUTPATIENT)
Dept: INFUSION THERAPY | Age: 51
Discharge: HOME | End: 2021-07-22
Payer: MEDICAID

## 2021-07-22 DIAGNOSIS — D64.9 ANEMIA, UNSPECIFIED TYPE: ICD-10-CM

## 2021-07-22 LAB
BASOPHILS ABSOLUTE: 0.03 K/UL (ref 0.01–0.08)
BASOPHILS RELATIVE PERCENT: 0.6 % (ref 0.1–1.2)
EOSINOPHILS ABSOLUTE: 0.05 K/UL (ref 0.04–0.54)
EOSINOPHILS RELATIVE PERCENT: 1.1 % (ref 0.7–7)
HCT VFR BLD CALC: 22.7 % (ref 40.1–51)
HEMOGLOBIN: 8 G/DL (ref 13.7–17.5)
LYMPHOCYTES ABSOLUTE: 1.19 K/UL (ref 1.18–3.74)
LYMPHOCYTES RELATIVE PERCENT: 25.3 % (ref 19.3–53.1)
MCH RBC QN AUTO: 34 PG (ref 25.7–32.2)
MCHC RBC AUTO-ENTMCNC: 35.2 G/DL (ref 32.3–36.5)
MCV RBC AUTO: 96.6 FL (ref 79–92.2)
MONOCYTES ABSOLUTE: 0.37 K/UL (ref 0.24–0.82)
MONOCYTES RELATIVE PERCENT: 7.9 % (ref 4.7–12.5)
NEUTROPHILS ABSOLUTE: 3.06 K/UL (ref 1.56–6.13)
NEUTROPHILS RELATIVE PERCENT: 65.1 % (ref 34–71.1)
PDW BLD-RTO: 13.9 % (ref 11.6–14.4)
PLATELET # BLD: 273 K/UL (ref 163–337)
PMV BLD AUTO: 8.3 FL (ref 7.4–10.4)
RBC # BLD: 2.35 M/UL (ref 4.63–6.08)
WBC # BLD: 4.7 K/UL (ref 4.23–9.07)

## 2021-07-22 PROCEDURE — 85025 COMPLETE CBC W/AUTO DIFF WBC: CPT

## 2021-07-23 PROBLEM — C85.10 LOW GRADE B-CELL LYMPHOMA (HCC): Status: ACTIVE | Noted: 2021-06-13

## 2021-07-25 NOTE — PROGRESS NOTES
[facility]SL[facility] Dee Dee Rivers BSN, PA-C  ________________________________________  [facility]  Department of Radiation Oncology  [address] [place] [id number]  Office:  [phone]  Fax: [phone]    DATE:  07/26/2021  PATIENT:  Bill Salazar 1970                 MEDICAL RECORD #:  [id number]                                                       REASON FOR VISIT  Chief Complaint   Patient presents with   • Lymphoma     Bill Salazar is a very pleasant male that has been referred to our office for radiotherapy considerations. Reports decrease in appetite, diaphoresis/night sweats, fatigue, SOB, palpitations, nausea, hot flashes, difficulty urinating, back pain, and numbness to back/hands/feet.Denies unexpected weight change, vomiting, nocturia, pelvis pain, light-headedness, weakness, and headaches. He follows .     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 Pathology confirmed Low-grade B-cell lymphoma (small lymphocytic lymphoma, SLL/CLL).  Bone marrow biopsy negative. PAX-5 and LEF1 positive. CD3 is positive and T cells. Cyclin D1, SOX11 CD10, CD79B and CD14 negative. Ki-67 5% - 20%. Follows Dr. Caceres, referred to radiation oncology.     In March of 2021, Bill decided he wanted to jump on a pogo stick but unfortunately was unsuccessful, fell and hurt his back. Bill presented to PCP for work-up with low back pain - Scans ordered for further evaluation.     03/22/2021 - MRI Lumbar Spine without contrast:  • The multilevel prominent disc osteophyte complexes and facetal arthropathy and resultant neural foramina spinal canal stenosis as detailed above.   • The enlarged right retroperitoneal para-aortic mass/lymph nodes?. This may further  evaluated with CT scan of the abdomen.     03/25/2021 - CT Abdomen/Pelvis with and without contrast:  • The lung bases included in the study are unremarkable except for mild atelectatic changes in the posterior costophrenic sulci.   • The liver is unremarkable except for a tiny nodule located laterally in the segment 4 of the liver measuring 6 mm in greatest dimension.   • There is moderate thickening of the wall of the metatarsals duodenum with narrowing of the lumen. The small bowel is nondistended.  • Appendix is surgically absent.   • Moderate gas and stool are seen in the colon.   • There is diverticulosis of the distal colon. No evidence for diverticulitis.   • There is evidence of moderately enhancing oval lobulated solid masses in the right upper abdomen in the retroperitoneal para-aortic region located between the aorta and inferior vena cava. There is marked asymmetrical thickening of the right diaphragmatic osiris. The 2 masses measure 4 cm x 2.1 x 4.9 cm and 3.6 x 1.9 x 4.1 cm. Potential moderate contrast enhancement.   • No other enlarged lymph nodes are noted.   • There is evidence of lower anterior abdominal wall/pelvic surgery.  Impression:  • Right upper abdominal retroperitoneal para-aortic nodes/masses adjacent to the origin of the celiac trunk. These may represent abnormal enlarged lymph nodes?. Etiology and clinical significance is not certain. Further follow-up is recommended.   • Moderate asymmetrical thickening of the wall of the proximal stomach including the gastric fundus. This may partly be due to incomplete distention. Possibility of an inflammatory or neoplastic process is not excluded.   • Moderate thickening of the wall of the distal/ transverse duodenum with a narrowing of the lumen. This may be due to incomplete distention. The inflammatory or neoplastic process is not excluded. Further evaluation is recommended.   • The diverticulosis of the colon. No evidence of diverticulitis.      Bill was referred to GI for initial evaluation hoping for access to the abdominal mass via EGD/EUS.     05/06/2021 - Endoscopy with biopsy per Fabienne Chambers MD:  Findings:  • Esophagus:   o Normal and the EG junction at 38 cm appeared essentially normal except for a nonobstructing distal esophageal ring.  o There is a small 2 to 3 cm in size sliding hiatal hernia present.   • Stomach:   o Abnormal; patchy mucosal changes multiple small 1 to 2 mm erosions with surrounding small areas of erythema noted in the antrum suggestive of medical gastritis noted - Gastric biopsies were taken from the antrum and body to rule out Helicobacter pylori infection.  o The proximal stomach including the fundus, cardia, body and angularis appeared completely normal. No evidence of abnormal folds or unusual thickening noted in the fundus.  o NO ulcers or masses or gastric outlet obstruction or retained food or fluid. Rugae were normal and lumen distended well with insufflation. Retroflexed views otherwise revealed a normal GE junction, fundus and cardia as well.  • Duodenum:   o Abnormal: 3 small erosions with surrounding erythema were noted in the posterior duodenal bulb suggestive of mild duodenitis. Otherwise remainder of the examined duodenum including the bulb second third and fourth portions appeared essentially normal as did the first few centimeters of the examined proximal jejunum. No strictures or mass lesions or ulcers were noted.  Pathology:  Stomach, antrum, biopsy:   • Antral-type gastric mucosa with acanthosis and tortuous glands, consistent with reactive gastropathy (chemical gastritis).   • Negative for active inflammation.   • Negative for Helicobacter pylori organisms by immunohistochemical staining.   • Negative for intestinal metaplasia.   • Negative for dysplasia.     Dr Chambers reviewed the images with the Dr. Gallegos and he opined that the retroperitoneal lesion was not amenable to FNA by EUS  or even by radiology here in [place], [place]. Dr Mayer referred Bill to Dr Jesus Galeana with the surgical oncology department at [facility]     05/21/2021 - Consult with Dr Jesus Galeana at [facility]:  • Mr. Perez a 50-year-old gentleman with newly identified retroperitoneal masses. I suspect these are enlarged lymph nodes. Differential includes lymphoma, though a soft tissue neoplastic process is certainly in the differential. He does not have many systemic symptoms. Further work-up for this is needed. We will get a biopsy. These should be amenable to biopsy. I will follow up with him with the biopsy results.  • Follow up in about 2 weeks (around 6/4/2021) for Telehealth, Imaging.    06/02/2021 - CT-guided biopsy retroperitoneal mass at [facility]:  Microscopic description:   • The cytospin shows a mixture of small and large lymphoid cells  CYTOLOGIC DIAGNOSIS:  • Low-grade B-cell lymphoma most consistent with small lymphocytic lymphoma (SLL/CLL)  • CD5 (+) lambda surface restricted B-cell population (48% of cells) by flow cytometry  • IHC report the neoplastic cells are positive for PAX-5 and LEF1. CD3 is positive and T cells.  • Cells are negative for cyclin D1 and SOX11.  • The Ki-67 proliferative index is variable and ranges from 5% to focally up to 20%  Flow cytometry immunophenotypic B cells  • POSITIVE FOR CD5, lambda, CD45, CD20, CD22, CD19, CD23, and CD38  • NEGATIVE FOR, CD10, CD79B and CD14    06/30/2021 - Consult with :  ASSESSMENT RECOMMENDATION AND PLAN:  • SLL/CLL presenting with a 4 x 2.1 x 4.9 cm and 3.6 x 1.9 x 4.1 cm enhancing oval lobulated solid masses in the right upper abdomen, retroperitoneal para-aortic region located between the aorta and inferior vena cava  • Complete staging and work-up with the following:  o CT scan of the neck chest abdomen and pelvis with contrast  o PET scan  o Bone marrow biopsy and aspirate  o Lymphoma and anemia serology, see below  o Follow-up  7/15/2021    07/08/2021 - Bone marrow biopsy and aspirate:  • Morphologically the marrow is hypercellular (90%) with increased erythroid shift. There is no obvious evidence of panmyelosis  • Flow cytometry shows no increase in number of blasts and no immunophenotypic evidence of involvement by non-Hodgkin's lymphoproliferative disorder.  • FISH study for myelodysplasia profile is completely negative  • Cytogenetics documents a normal male karyotype  • Pending is JAK2 with reflex     07/09/2021 - CT Soft tissue neck with contrast:  • No cervical lymphadenopathy.     07/09/2021 - CT Abdomen/Pelvis with contrast:  • There is a 5-6 mm probable cyst in the right hepatic lobe of the liver on image 19 of series 6, stable. The liver is otherwise unremarkable  • There is underdistention of portions of the colon. There is diverticulosis of the colon.   • Surgical anchors are noted along the anterior pelvic wall.   • There is minimal diastases of the rectus abdominis muscles.   • A retroperitoneal mass in the upper abdomen is again noted.   • The more superior aspect of the mass measures 4.6 x 2 cm today and previously measured 4 x 2.1 cm.   • The more posterior and inferior portion of the mass measures 3.4 x 2.2 cm and previously measured 3.1 x 1.9 cm.   • There are no other enlarged lymph nodes in the abdomen or the pelvis.   • There is no free fluid.   • There are degenerative changes of the spine.   • No acute bony abnormality is seen.  Impression:  • The retroperitoneal mass in the upper abdomen appears to be larger in size on the current study. The more posterior/inferior portion of the mass might be assessable to CT-guided biopsy. This is likely an enlarged lymph node. Other retroperitoneal mass lesions are not ruled out. Sarcoma and other tumors would also be in the differential.   • Stable 5-6 mm probable liver cyst.   • Gastric wall thickening likely an artifact of under distention. Mild diverticulosis of the colon.    • Other nonacute findings, as discussed above    07/09/2021 - CT Chest with contrast:  • No intrathoracic lymphadenopathy.   • Emphysema.    07/09/2021 - PET Scan:  • Focal increased metabolic activity in the right posterior hypopharynx, with maximal activity of 6.2 is without soft tissue abnormality and is favored to reflect an area of focally retained secretion.   • No FDG-avid cervical lymph nodes.   • No FDG-avid pulmonary nodules or airspace opacities.    • No FDG-avid mediastinal, hilar or axillary lymph nodes.     • Personally stable right para-aortic mass shows maximum SUV of 3.2.   • No abnormal FDG activity within the pelvis.   • No FDG-avid pelvic or inguinal lymph nodes.   • No FDG-avid skeletal lesions.   Impression:  • Slightly increased metabolic activity in the retroperitoneal mass seen on recent examination, with maximum SUV of 3.2. Neoplastic process remains in the differential. Tissue diagnosis is recommended. For reference purposes, maximum SUV in the mediastinum is 2.3 and maximum SUV in the liver is 1.9.     07/15/2021 - Appointment with :  • The diagnosis and extent of disease was discussed at length with Kristian, his wife and mother.  • Treatment alternatives include systemic therapy such as ibrutinib or alternatively limited treatment with XRT which would be reasonable given his young age and the limited and early stage of presentation.  • A call was placed to Dr. Harish Stacy who agreed to see Mr. Awad in consultation to discuss and consider XRT.  • I explained to Mr. Salazar and family that despite potentially being able to include all of the known SLL in a radiation portal, over time, expectation will be that it is more likely than not that the SLL/CLL would recur but we do not have a timeframe and it could be in his best interest to hold off on systemic therapy. He and his family agree completely.  • Referral is made to Dr. Harish Stacy for evaluation.  • Return in about 2  months (around 9/15/2021) for follow-up with .    07/15/2021 - Documentation per :  • I spoke with Dr. Caceres today regarding this patient.  He has an incidental finding of 2 lesions in the right diaphragmatic osiris which are positive for small lymphocytic lymphoma with no evidence of her systemic disease.  • We discussed potential treatment options such as observation, systemic therapy, or local treatment with external beam radiation.  • In the absence of systemic disease I believe that local therapy with radiation would give adequate local control without the systemic side effects or toxicity.  We will see the patient in consultation and discuss potential definitive radiotherapy to these lesions and observation for future systemic disease.    07/15/2021 - Documentation per :  • His back pain is secondary to a fall off a pogo stick and not related to his malignancy.  However did result in an evaluation that discovered the malignancy.    History obtained from  PATIENT, FAMILY and CHART    PAST MEDICAL HISTORY  Past Medical History:   Diagnosis Date   • Chronic back pain    • Lymphoma (CMS/HCC)    • Migraines    • Spinal stenosis         PAST SURGICAL HISTORY  History reviewed. No pertinent surgical history.     FAMILY HISTORY  family history includes Cancer in his maternal grandfather and maternal uncle; Colon cancer in his maternal aunt; Leukemia in his maternal great-grandfather.    SOCIAL HISTORY  Social History     Tobacco Use   • Smoking status: Current Every Day Smoker     Packs/day: 2.00     Types: Cigarettes   • Smokeless tobacco: Never Used   Substance Use Topics   • Alcohol use: Yes     Comment: occasional   • Drug use: Yes     Types: Marijuana     ALLERGIES  Patient has no known allergies.     MEDICATIONS    Current Outpatient Medications:   •  cyclobenzaprine (FLEXERIL) 10 MG tablet, Take 1 tablet by mouth Every 8 (Eight) Hours As Needed., Disp: , Rfl:   •  levothyroxine  (SYNTHROID, LEVOTHROID) 112 MCG tablet, Take 112 mcg by mouth Daily., Disp: , Rfl:   •  omeprazole (priLOSEC) 40 MG capsule, Take 40 mg by mouth Daily., Disp: , Rfl:   •  oxybutynin XL (DITROPAN-XL) 10 MG 24 hr tablet, Take 10 mg by mouth Daily., Disp: , Rfl:   •  PARoxetine (PAXIL) 40 MG tablet, Take 40 mg by mouth Daily., Disp: , Rfl:   •  sildenafil (REVATIO) 20 MG tablet, Take 40 mg by mouth As Needed., Disp: , Rfl:   •  SUMAtriptan (IMITREX) 100 MG tablet, Take 1 tablet by mouth Daily., Disp: , Rfl:   •  testosterone (ANDROGEL) 25 MG/2.5GM (1%) gel gel, Apply 5 g topically to the appropriate area as directed Daily., Disp: , Rfl:      Current outpatient and discharge medications have been reconciled for the patient.  Reviewed by: David Barraza MD    The following portions of the patient's history were reviewed and updated as appropriate: allergies, current medications, past family history, past medical history, past social history, past surgical history and problem list.    REVIEW OF SYSTEMS  Review of Systems   Constitutional: Positive for appetite change (decrease), diaphoresis and fatigue. Negative for unexpected weight change.   HENT:  Negative.    Eyes: Negative.    Respiratory: Positive for shortness of breath (r/t anemia).    Cardiovascular: Positive for palpitations (r/t anemia).   Gastrointestinal: Positive for nausea. Negative for vomiting.   Endocrine: Positive for hot flashes (heat intolerance).   Genitourinary: Positive for difficulty urinating (occasional push at start of stream) and frequency. Negative for nocturia and pelvic pain.         Recently started bed-wetting   Musculoskeletal: Positive for back pain (chronic back pain, spinal stenosis T12-L1).   Skin: Negative.    Neurological: Positive for numbness (bilateral hands/feet r/t back problems).        Migraines   Hematological: Negative for adenopathy.   Psychiatric/Behavioral: Negative.      I have reviewed and confirmed the accuracy  "of the ROS as documented by the MA/LPN/RN David Barraza MD     PHYSICAL EXAM  Vital Signs:   Vitals:    07/26/21 1322   BP: 115/52   Weight: 86.6 kg (191 lb)   Height: 170.2 cm (67\")   PainSc:   3   PainLoc: Back  Comment: low, chronic - spinal stenosis T12-L1      Physical Exam    General:  Alert and oriented, in no acute distress, well-developed, vitals reviewed.  Head:  Normocephalic, without obvious abnormality    Nose/Sinuses:  Nares normal externally, no sinus tenderness.  Mouth/Throat:  Mucosa moist, without erythema  Neck:  supple, No evidence of adenopathy in the cervical or supraclavicular areas.  Lymphatics: No palpable peripheral lymphadenopathy, particularly no facial, preauricular, postauricular, submandibular, cervical, supraclavicular, axillary, epitrochlear, inguinal, or popliteal lymphadenopathy palpated.  Eyes: No gross abnormalities   Ears: Ears intact with no external abnormalities noted  Chest:  Respiratory efforts are normal and unlabored, chest is clear to auscultation.  Cardiovascular: Regular rate and rhythm without murmurs, rubs, or gallops.   Abdomen:  Soft, non-tender, no palpable masses, normal bowel sounds;   Extremities:  ACEVES well, warm to touch, no cyanosis or edema.  Skin: No suspicious lesions or rashes of concern  Neurologic: non focal exam, strength and sensation grossly normal  Psych: Mood and affect are appropriate    Performance Status: ECOG (0) Fully active, able to carry on all predisease performance without restriction    Clinical Quality Measures  -Pain Documented by Standardized Tool, FPS  Bill Salazar reports a pain score of 3. Given his pain assessment as noted, treatment options were discussed and the following options were decided upon as a follow-up plan to address the patient's pain: continuation of current treatment plan for pain.      Pain Medications             cyclobenzaprine (FLEXERIL) 10 MG tablet Take 1 tablet by mouth Every 8 (Eight) Hours As Needed. "    PARoxetine (PAXIL) 40 MG tablet Take 40 mg by mouth Daily.        -Advanced Care Planning Advance Care Planning   ACP discussion was held with the patient during this visit. Patient does not have an advance directive, information provided.     -Body Mass Index Screening and Follow-Up Plan Patient's Body mass index is 29.91 kg/m². indicating that he is overweight (BMI 25-29.9). Obesity-related health conditions include the following: Cancer consult - no plan given.     -Tobacco Use: Screening and Cessation Intervention Social History    Tobacco Use      Smoking status: Current Every Day Smoker        Packs/day: 2.00        Types: Cigarettes      Smokeless tobacco: Never Used   Smoking cessation information given in after visit summary.    ASSESSMENT AND PLAN  1. Low grade B-cell lymphoma (CMS/HCC)    2. Current every day smoker      RECOMMENDATIONS: Bill Salazar has been referred to our office today to discuss radiotherapy recommendations. 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 Pathology confirmed Low-grade B-cell lymphoma (small lymphocytic lymphoma, SLL/CLL).  Bone marrow biopsy negative. PAX-5 and LEF1 positive. CD3 is positive and T cells. Cyclin D1, SOX11 CD10, CD79B and CD14 negative. Ki-67 5% - 20%. Follows Dr. Caceres, referred to radiation oncology.     We have discussed the indications and rationale of definitive intent single modality radiation therapy according to the NCCN Guidelines for low-grade lymphoma. I have extensively reviewed the risks, benefits and alternatives of therapy and progression of disease in spite of therapy with either local or systemic failure; specifically discussed possible side effects to include nausea, vomiting, diarrhea, upset stomach, radiation-induced injury to solid and hollow abdominal organs. I  have seen, examined and reviewed his medication list, appropriate labs and imaging studies as well as other physician notes. We discussed the goals/plans of care and answered all questions.     After consideration of the diagnostic data and evaluation of the patient, I have recommended treatment to involved site (ISRT) with definitive radiation therapy, I anticipate a dose of 3000 cGy over 15 daily fractions, final course pending completion of planning.    The patient and his family verbalize understanding of this discussion, voice no further questions and wish to proceed with recommendations. We will simulate treatment fields today to begin the treatment planning.     Continue ongoing management per primary care physician and other specialists. Thank you for allowing me to assist in this patients care.    Bill Salazar  reports that he has been smoking cigarettes. He has been smoking about 2.00 packs per day. He has never used smokeless tobacco.. I have educated him on the risk of diseases from using tobacco products such as cancer, COPD and heart disease. I advised him to quit and he is not willing to quit.I spent >10 minutes counseling the patient.  I saw this patient in consultation while covering for Dr. Harish Stacy, radiation oncologist.    Patient Instructions       Time Spent: I spent 56 minutes caring for Bill on this date of service. This time includes time spent by me in the following activities: preparing for the visit, reviewing tests, obtaining and/or reviewing a separately obtained history, performing a medically appropriate examination and/or evaluation, counseling and educating the patient/family/caregiver, ordering medications, tests, or procedures, referring and communicating with other health care professionals, documenting information in the medical record and independently interpreting results and communicating that information with the patient/family/caregiver.   David Barraza MD    07/26/2021

## 2021-07-26 ENCOUNTER — TELEPHONE (OUTPATIENT)
Dept: INTERNAL MEDICINE | Age: 51
End: 2021-07-26

## 2021-07-26 ENCOUNTER — CONSULT (OUTPATIENT)
Dept: RADIATION ONCOLOGY | Facility: HOSPITAL | Age: 51
End: 2021-07-26

## 2021-07-26 ENCOUNTER — HOSPITAL ENCOUNTER (OUTPATIENT)
Dept: RADIATION ONCOLOGY | Facility: HOSPITAL | Age: 51
Setting detail: RADIATION/ONCOLOGY SERIES
End: 2021-07-26

## 2021-07-26 ENCOUNTER — APPOINTMENT (OUTPATIENT)
Dept: RADIATION ONCOLOGY | Facility: HOSPITAL | Age: 51
End: 2021-07-26

## 2021-07-26 VITALS
WEIGHT: 191 LBS | SYSTOLIC BLOOD PRESSURE: 115 MMHG | HEIGHT: 67 IN | BODY MASS INDEX: 29.98 KG/M2 | DIASTOLIC BLOOD PRESSURE: 52 MMHG

## 2021-07-26 DIAGNOSIS — C85.10 LOW GRADE B-CELL LYMPHOMA (HCC): Primary | ICD-10-CM

## 2021-07-26 DIAGNOSIS — F17.200 CURRENT EVERY DAY SMOKER: ICD-10-CM

## 2021-07-26 DIAGNOSIS — E34.9 HYPOTESTOSTERONISM: ICD-10-CM

## 2021-07-26 PROCEDURE — 77334 RADIATION TREATMENT AID(S): CPT | Performed by: RADIOLOGY

## 2021-07-26 PROCEDURE — 77290 THER RAD SIMULAJ FIELD CPLX: CPT | Performed by: RADIOLOGY

## 2021-07-26 RX ORDER — TESTOSTERONE 12.5 MG/1.25G
5 GEL TOPICAL DAILY
COMMUNITY
Start: 2021-07-26 | End: 2021-08-25

## 2021-07-26 RX ORDER — LEVOTHYROXINE SODIUM 112 UG/1
112 TABLET ORAL DAILY
COMMUNITY
Start: 2021-07-19 | End: 2021-10-17

## 2021-07-26 RX ORDER — SUMATRIPTAN 100 MG/1
1 TABLET, FILM COATED ORAL DAILY
COMMUNITY
Start: 2021-04-07

## 2021-07-26 RX ORDER — PAROXETINE HYDROCHLORIDE 40 MG/1
40 TABLET, FILM COATED ORAL DAILY
COMMUNITY
Start: 2021-05-26

## 2021-07-26 RX ORDER — OXYBUTYNIN CHLORIDE 10 MG/1
10 TABLET, EXTENDED RELEASE ORAL DAILY
COMMUNITY
Start: 2021-05-26

## 2021-07-26 RX ORDER — SILDENAFIL CITRATE 20 MG/1
40 TABLET ORAL AS NEEDED
COMMUNITY
Start: 2021-05-18

## 2021-07-26 RX ORDER — OMEPRAZOLE 40 MG/1
40 CAPSULE, DELAYED RELEASE ORAL DAILY
COMMUNITY
Start: 2021-05-18

## 2021-07-26 RX ORDER — CYCLOBENZAPRINE HCL 10 MG
1 TABLET ORAL EVERY 8 HOURS PRN
COMMUNITY

## 2021-07-26 RX ORDER — TESTOSTERONE 12.5 MG/1.25G
5 GEL TOPICAL DAILY
Qty: 1 PACKAGE | Refills: 0 | Status: SHIPPED | OUTPATIENT
Start: 2021-07-26 | End: 2022-05-31

## 2021-07-26 NOTE — TELEPHONE ENCOUNTER
Received a call from [employer] that the patients testosterone is not covered they want to compound this for the patient. They said that they can compound it for much cheaper than the 400.00. Ok'd to compound.

## 2021-07-27 ENCOUNTER — DOCUMENTATION (OUTPATIENT)
Dept: RADIATION ONCOLOGY | Facility: HOSPITAL | Age: 51
End: 2021-07-27

## 2021-07-27 NOTE — PROGRESS NOTES
JAYDEN met with Mr. Salazar on 7-26-21. He is here for a radiation consultation for low grade B-cell lymphoma. JAYDEN introduced self and explained role and source of support. He is a 50 year old male and lives with his wife. His support system includes his wife and stepmother. He is a member at [employer]. He is independent with his personal ADLs. He does not have any transportation issues or any current financial concerns. Mr. Salazar states he does smoke marijuana. He does not see a therapist or counselor. He denies needing assistance at this time but will contact this writer as needed.

## 2021-07-28 ENCOUNTER — OFFICE VISIT (OUTPATIENT)
Dept: GASTROENTEROLOGY | Age: 51
End: 2021-07-28
Payer: MEDICAID

## 2021-07-28 VITALS
OXYGEN SATURATION: 98 % | HEIGHT: 67 IN | WEIGHT: 192.2 LBS | HEART RATE: 82 BPM | SYSTOLIC BLOOD PRESSURE: 112 MMHG | BODY MASS INDEX: 30.17 KG/M2 | DIASTOLIC BLOOD PRESSURE: 60 MMHG

## 2021-07-28 DIAGNOSIS — D64.9 ANEMIA, UNSPECIFIED TYPE: Primary | ICD-10-CM

## 2021-07-28 DIAGNOSIS — R10.10 PAIN OF UPPER ABDOMEN: ICD-10-CM

## 2021-07-28 DIAGNOSIS — R11.0 NAUSEA: ICD-10-CM

## 2021-07-28 PROCEDURE — G8417 CALC BMI ABV UP PARAM F/U: HCPCS | Performed by: NURSE PRACTITIONER

## 2021-07-28 PROCEDURE — 4004F PT TOBACCO SCREEN RCVD TLK: CPT | Performed by: NURSE PRACTITIONER

## 2021-07-28 PROCEDURE — G8427 DOCREV CUR MEDS BY ELIG CLIN: HCPCS | Performed by: NURSE PRACTITIONER

## 2021-07-28 PROCEDURE — 99213 OFFICE O/P EST LOW 20 MIN: CPT | Performed by: NURSE PRACTITIONER

## 2021-07-28 PROCEDURE — 77338 DESIGN MLC DEVICE FOR IMRT: CPT | Performed by: RADIOLOGY

## 2021-07-28 PROCEDURE — 77300 RADIATION THERAPY DOSE PLAN: CPT | Performed by: RADIOLOGY

## 2021-07-28 PROCEDURE — 77301 RADIOTHERAPY DOSE PLAN IMRT: CPT | Performed by: RADIOLOGY

## 2021-07-28 RX ORDER — ONDANSETRON 4 MG/1
4 TABLET, FILM COATED ORAL EVERY 6 HOURS PRN
Qty: 30 TABLET | Refills: 0 | Status: SHIPPED | OUTPATIENT
Start: 2021-07-28 | End: 2021-08-27

## 2021-07-28 ASSESSMENT — ENCOUNTER SYMPTOMS
BLOOD IN STOOL: 0
BACK PAIN: 1
CONSTIPATION: 0
NAUSEA: 1
ABDOMINAL PAIN: 1
VOMITING: 0
RECTAL PAIN: 0
ANAL BLEEDING: 0
COUGH: 0
TROUBLE SWALLOWING: 0
SHORTNESS OF BREATH: 0
DIARRHEA: 0
ABDOMINAL DISTENTION: 0
CHOKING: 0

## 2021-07-28 NOTE — PROGRESS NOTES
Subjective:     Patient ID: Pradip Villalba is a 52 y.o. male  PCP: Sanjuana Elam MD  Referring Provider: No ref. provider found    HPI  Patient presents to the office today with the following complaints: Anemia      Pt referred from Dr. Ivette Ross for anemia and possible M2A. Hx lymphoma, following Oncology. He denies any blood in stool, melena, or hematemesis. He reports some upper abdominal pressure at times with straining during BM. He reports chronic nausea, requests Zofran refill. EGD 5/2021 - Chemical gastritis (avoid NSAIDS) (-)H. pylori, w/push enteroscopy up to the the proximal jejunum-Gastric erosions,  non-obstructing distal esoph ring, sliding hh, gastritis, duodenitis  Last Colonoscopy 2020 - diverticulosis, internal hemorrhoids    WBC (K/uL)   Date Value   07/22/2021 4.70     Hemoglobin (g/dL)   Date Value   07/22/2021 8.0 (L)     Hematocrit (%)   Date Value   07/22/2021 22.7 (LL)     Platelets (K/uL)   Date Value   07/22/2021 273         Assessment:     1. Anemia, unspecified type    2. Pain of upper abdomen    3.  Nausea            Plan:   - Schedule SBFT  - If negative for obstructions, plan for M2A capsule endoscopy to r/o small bowel lesions  - Follow up prn or sooner if needed  - Call with any questions or concerns  - Zofran prn     Orders  Orders Placed This Encounter   Procedures    FL SMALL BOWEL FOLLOW THROUGH ONLY     Standing Status:   Future     Standing Expiration Date:   7/28/2022     Order Specific Question:   Reason for exam:     Answer:   prior to M2A capsule endoscopy     Medications  Orders Placed This Encounter   Medications    ondansetron (ZOFRAN) 4 MG tablet     Sig: Take 1 tablet by mouth every 6 hours as needed for Nausea     Dispense:  30 tablet     Refill:  0         Patient History:     Past Medical History:   Diagnosis Date    Anemia     Anxiety     CPAP (continuous positive airway pressure) dependence     Depression     Herniated lumbar intervertebral disc     Hyperglycemia 01/05/2021    Kidney cysts     Liver cyst     Low testosterone     Lymphoma ([address] 06/07/2021    Dr Irish Zhang     Migraine     Neck pain     Obstructive sleep apnea     OCD (obsessive compulsive disorder)     Thyroid disease        Past Surgical History:   Procedure Laterality Date    APPENDECTOMY      COLONOSCOPY  approx 2017    Dr Luis Manuel Laura @ [facility] hos[facility] \" polyps\" per pt recall    COLONOSCOPY N/A 11/06/2020    Dr Samuel Hunt, internal hemorrhoids-Grade 1, 5 yr recall    HERNIA REPAIR      bih    THYROIDECTOMY      TOTAL    UPPER GASTROINTESTINAL ENDOSCOPY N/A 05/06/2021    Dr Brittany Greco gastritis (avoid NSAIDS) (-)H. pylori, w/push enteroscopy up to the the proximal jejunum-Gastric erosions,  non-obstructing distal esoph ring, sliding hh, gastritis, duodenitis       Family History   Problem Relation Age of Onset    Stomach Cancer Maternal Uncle     Liver Cancer Maternal Uncle     Cancer Maternal Grandfather     Stomach Cancer Maternal Aunt     Cancer Other         LEUKEMIA    Colon Cancer Neg Hx     Colon Polyps Neg Hx     Esophageal Cancer Neg Hx     Rectal Cancer Neg Hx        Social History     Socioeconomic History    Marital status:      Spouse name: None    Number of children: None    Years of education: None    Highest education level: None   Occupational History    None   Tobacco Use    Smoking status: Current Every Day Smoker     Packs/day: 2.00     Years: 34.00     Pack years: 68.00     Types: Cigarettes     Start date: 1986    Smokeless tobacco: Never Used    Tobacco comment: Nicotine Gum severals times daily & Nioctine patches    Vaping Use    Vaping Use: Never used   Substance and Sexual Activity    Alcohol use:  Yes     Alcohol/week: 1.0 standard drinks     Types: 1 Cans of beer per week     Comment: occasional     Drug use: Yes     Types: Marijuana     Comment: smokes marijuana daily, along with RSO (marijuana) oil 0.1ml under tongue       Sexual activity: None   Other Topics Concern    None   Social History Narrative    None     Social Determinants of Health     Financial Resource Strain: Low Risk     Difficulty of Paying Living Expenses: Not very hard   Food Insecurity: No Food Insecurity    Worried About Running Out of Food in the Last Year: Never true    Jerrod of Food in the Last Year: Never true   Transportation Needs:     Lack of Transportation (Medical):  Lack of Transportation (Non-Medical):    Physical Activity:     Days of Exercise per Week:     Minutes of Exercise per Session:    Stress:     Feeling of Stress :    Social Connections:     Frequency of Communication with Friends and Family:     Frequency of Social Gatherings with Friends and Family:     Attends Orthodox Services:     Active Member of Clubs or Organizations:     Attends Club or Organization Meetings:     Marital Status:    Intimate Partner Violence:     Fear of Current or Ex-Partner:     Emotionally Abused:     Physically Abused:     Sexually Abused:        Current Outpatient Medications   Medication Sig Dispense Refill    ondansetron (ZOFRAN) 4 MG tablet Take 1 tablet by mouth every 6 hours as needed for Nausea 30 tablet 0    testosterone (ANDROGEL) 25 MG/2.5GM (1%) GEL 1 % gel Apply 5 g topically daily for 30 days. Apply 4 clicks  (1ml) daily 1 Package 0    levothyroxine (SYNTHROID) 112 MCG tablet Take 1 tablet by mouth Daily 90 tablet 0    medical marijuana Take by mouth as needed.       nicotine (NICODERM CQ) 14 MG/24HR Place 1 patch onto the skin every 24 hours      NONFORMULARY 4 mg as needed Equate Nicotine Gum      oxybutynin (DITROPAN XL) 10 MG extended release tablet Take 1 tablet by mouth daily 30 tablet 3    PARoxetine (PAXIL) 40 MG tablet Take 1 tablet by mouth every morning 30 tablet 3    sildenafil (REVATIO) 20 MG tablet Take 2 tablets by mouth as needed Normal range of motion. Skin:     General: Skin is warm and dry. Neurological:      Mental Status: He is alert and oriented to person, place, and time.    Psychiatric:         Behavior: Behavior normal.

## 2021-07-28 NOTE — PATIENT INSTRUCTIONS
Increasing Your Fiber Intake   What is fiber? Fiber is the portion of plant foods that cannot be digested. There are two kinds of fiber, both of which are keys to a healthy diet and a healthy digestive system:   - Soluble fiber aids in bulking and moving food through the gut. It forms a gel when mixed with liquid. - Insoluble fiber does not mix with liquids and passes through the GI tract mostly intact. It is sometimes called \"roughage. \"     Why do I need to eat it? Fiber has many important roles:   - Helps maintain regular bowel movements. More fiber can improve both diarrhea and constipation.   - Reduces the risk of developing hemorrhoids. - Lowers LDL or \"bad\" cholesterol levels, which lowers risk of heart disease.   - Regulates blood sugar levels in people with diabetes. - Provides a feeling of fullness and may help with weight loss. How much do I need? The Academy of Nutrition and Dietetics recommends:   - For women, 25 grams per day under age 48 and 21 grams per day over age 48. - For men, 38 grams per day under age 48 and 30 grams per day over age 48. What foods are the best sources? Plant foods contain fiber, but some more than others. Best choices are:   - Whole grains and high fiber cereals. - Dried beans and legumes. - Fruits and vegetables, especially raw. What about fiber supplements? If you need to add more fiber than you can get in your diet, consider:   - Type of Fiber: The major brands of fiber supplements (Metamucil®, Konsyl®, Citrucel®, Benefiber®, Fibercon®) all use soluble fiber and work in the same way. - Flavorings and Mixing: Many of the powdered brands have added flavoring and are mixed with just water. Other varieties are \"clear\" and can be added to numerous beverages and food items. Some brands also offer a \"wafer\" form. It's up to you! Tip: Increasing the fiber in your diet gradually may help minimize bloating and discomfort.  Be sure to drink plenty

## 2021-07-29 ENCOUNTER — HOSPITAL ENCOUNTER (OUTPATIENT)
Dept: INFUSION THERAPY | Age: 51
Discharge: HOME | End: 2021-07-29
Payer: MEDICAID

## 2021-07-29 DIAGNOSIS — D64.9 ANEMIA, UNSPECIFIED TYPE: ICD-10-CM

## 2021-07-29 LAB
BASOPHILS ABSOLUTE: 0.02 K/UL (ref 0.01–0.08)
BASOPHILS RELATIVE PERCENT: 0.4 % (ref 0.1–1.2)
EOSINOPHILS ABSOLUTE: 0.07 K/UL (ref 0.04–0.54)
EOSINOPHILS RELATIVE PERCENT: 1.4 % (ref 0.7–7)
HCT VFR BLD CALC: 22.5 % (ref 40.1–51)
HEMOGLOBIN: 7.5 G/DL (ref 13.7–17.5)
LYMPHOCYTES ABSOLUTE: 1.33 K/UL (ref 1.18–3.74)
LYMPHOCYTES RELATIVE PERCENT: 26.6 % (ref 19.3–53.1)
MCH RBC QN AUTO: 34.2 PG (ref 25.7–32.2)
MCHC RBC AUTO-ENTMCNC: 33.3 G/DL (ref 32.3–36.5)
MCV RBC AUTO: 102.7 FL (ref 79–92.2)
MONOCYTES ABSOLUTE: 0.31 K/UL (ref 0.24–0.82)
MONOCYTES RELATIVE PERCENT: 6.2 % (ref 4.7–12.5)
NEUTROPHILS ABSOLUTE: 3.27 K/UL (ref 1.56–6.13)
NEUTROPHILS RELATIVE PERCENT: 65.4 % (ref 34–71.1)
PDW BLD-RTO: 14.5 % (ref 11.6–14.4)
PLATELET # BLD: 251 K/UL (ref 163–337)
PMV BLD AUTO: 9.2 FL (ref 7.4–10.4)
RBC # BLD: 2.19 M/UL (ref 4.63–6.08)
WBC # BLD: 5 K/UL (ref 4.23–9.07)

## 2021-07-29 PROCEDURE — 85025 COMPLETE CBC W/AUTO DIFF WBC: CPT

## 2021-08-02 ENCOUNTER — HOSPITAL ENCOUNTER (OUTPATIENT)
Dept: GENERAL RADIOLOGY | Age: 51
Discharge: HOME | End: 2021-08-02
Payer: MEDICAID

## 2021-08-02 ENCOUNTER — HOSPITAL ENCOUNTER (OUTPATIENT)
Dept: RADIATION ONCOLOGY | Facility: HOSPITAL | Age: 51
Setting detail: RADIATION/ONCOLOGY SERIES
End: 2021-08-02

## 2021-08-02 DIAGNOSIS — D64.9 ANEMIA, UNSPECIFIED TYPE: ICD-10-CM

## 2021-08-02 PROCEDURE — 74250 X-RAY XM SM INT 1CNTRST STD: CPT

## 2021-08-09 ENCOUNTER — HOSPITAL ENCOUNTER (OUTPATIENT)
Dept: INFUSION THERAPY | Age: 51
Discharge: HOME | End: 2021-08-09
Payer: MEDICAID

## 2021-08-09 ENCOUNTER — HOSPITAL ENCOUNTER (OUTPATIENT)
Dept: INFUSION THERAPY | Age: 51
Setting detail: INFUSION SERIES
Discharge: HOME | End: 2021-08-09
Payer: MEDICAID

## 2021-08-09 VITALS
HEART RATE: 65 BPM | TEMPERATURE: 97.4 F | RESPIRATION RATE: 18 BRPM | SYSTOLIC BLOOD PRESSURE: 107 MMHG | DIASTOLIC BLOOD PRESSURE: 58 MMHG

## 2021-08-09 DIAGNOSIS — C85.10 LOW GRADE B-CELL LYMPHOMA (HCC): Primary | ICD-10-CM

## 2021-08-09 DIAGNOSIS — D64.9 SYMPTOMATIC ANEMIA: ICD-10-CM

## 2021-08-09 DIAGNOSIS — D64.9 SYMPTOMATIC ANEMIA: Primary | ICD-10-CM

## 2021-08-09 DIAGNOSIS — C85.10 LOW GRADE B-CELL LYMPHOMA (HCC): ICD-10-CM

## 2021-08-09 DIAGNOSIS — D64.9 ANEMIA, UNSPECIFIED TYPE: ICD-10-CM

## 2021-08-09 LAB
ABO/RH: NORMAL
ANTIBODY SCREEN: NORMAL
BASOPHILS ABSOLUTE: 0.02 K/UL (ref 0.01–0.08)
BASOPHILS RELATIVE PERCENT: 0.4 % (ref 0.1–1.2)
BLOOD BANK DISPENSE STATUS: NORMAL
BLOOD BANK PRODUCT CODE: NORMAL
BPU ID: NORMAL
DESCRIPTION BLOOD BANK: NORMAL
EOSINOPHILS ABSOLUTE: 0.05 K/UL (ref 0.04–0.54)
EOSINOPHILS RELATIVE PERCENT: 1.1 % (ref 0.7–7)
HCT VFR BLD CALC: 20.4 % (ref 40.1–51)
HEMOGLOBIN: 6.9 G/DL (ref 13.7–17.5)
LYMPHOCYTES ABSOLUTE: 1.06 K/UL (ref 1.18–3.74)
LYMPHOCYTES RELATIVE PERCENT: 22.8 % (ref 19.3–53.1)
MCH RBC QN AUTO: 34.3 PG (ref 25.7–32.2)
MCHC RBC AUTO-ENTMCNC: 33.8 G/DL (ref 32.3–36.5)
MCV RBC AUTO: 101.5 FL (ref 79–92.2)
MONOCYTES ABSOLUTE: 0.3 K/UL (ref 0.24–0.82)
MONOCYTES RELATIVE PERCENT: 6.5 % (ref 4.7–12.5)
NEUTROPHILS ABSOLUTE: 3.21 K/UL (ref 1.56–6.13)
NEUTROPHILS RELATIVE PERCENT: 69.2 % (ref 34–71.1)
PDW BLD-RTO: 15.4 % (ref 11.6–14.4)
PLATELET # BLD: 228 K/UL (ref 163–337)
PMV BLD AUTO: 8.5 FL (ref 7.4–10.4)
RBC # BLD: 2.01 M/UL (ref 4.63–6.08)
WBC # BLD: 4.64 K/UL (ref 4.23–9.07)

## 2021-08-09 PROCEDURE — 86900 BLOOD TYPING SEROLOGIC ABO: CPT

## 2021-08-09 PROCEDURE — 86923 COMPATIBILITY TEST ELECTRIC: CPT

## 2021-08-09 PROCEDURE — 86850 RBC ANTIBODY SCREEN: CPT

## 2021-08-09 PROCEDURE — 6370000000 HC RX 637 (ALT 250 FOR IP): Performed by: INTERNAL MEDICINE

## 2021-08-09 PROCEDURE — P9016 RBC LEUKOCYTES REDUCED: HCPCS

## 2021-08-09 PROCEDURE — 36430 TRANSFUSION BLD/BLD COMPNT: CPT

## 2021-08-09 PROCEDURE — 2580000003 HC RX 258: Performed by: INTERNAL MEDICINE

## 2021-08-09 PROCEDURE — 6360000002 HC RX W HCPCS: Performed by: INTERNAL MEDICINE

## 2021-08-09 PROCEDURE — 86901 BLOOD TYPING SEROLOGIC RH(D): CPT

## 2021-08-09 PROCEDURE — 85025 COMPLETE CBC W/AUTO DIFF WBC: CPT

## 2021-08-09 RX ORDER — SODIUM CHLORIDE 9 MG/ML
20 INJECTION, SOLUTION INTRAVENOUS CONTINUOUS
Status: DISCONTINUED | OUTPATIENT
Start: 2021-08-09 | End: 2021-08-11 | Stop reason: HOSPADM

## 2021-08-09 RX ORDER — DIPHENHYDRAMINE HCL 25 MG
25 TABLET ORAL ONCE
Status: COMPLETED | OUTPATIENT
Start: 2021-08-09 | End: 2021-08-09

## 2021-08-09 RX ORDER — ACETAMINOPHEN 325 MG/1
650 TABLET ORAL ONCE
Status: COMPLETED | OUTPATIENT
Start: 2021-08-09 | End: 2021-08-09

## 2021-08-09 RX ORDER — SODIUM CHLORIDE 9 MG/ML
20 INJECTION, SOLUTION INTRAVENOUS CONTINUOUS
Status: CANCELLED | OUTPATIENT
Start: 2021-08-09

## 2021-08-09 RX ORDER — SODIUM CHLORIDE 9 MG/ML
25 INJECTION, SOLUTION INTRAVENOUS PRN
Status: DISCONTINUED | OUTPATIENT
Start: 2021-08-09 | End: 2021-08-10 | Stop reason: HOSPADM

## 2021-08-09 RX ORDER — FUROSEMIDE 10 MG/ML
20 INJECTION INTRAMUSCULAR; INTRAVENOUS ONCE
Status: DISCONTINUED | OUTPATIENT
Start: 2021-08-09 | End: 2021-08-11 | Stop reason: HOSPADM

## 2021-08-09 RX ORDER — SODIUM CHLORIDE 0.9 % (FLUSH) 0.9 %
5-40 SYRINGE (ML) INJECTION PRN
Status: DISCONTINUED | OUTPATIENT
Start: 2021-08-09 | End: 2021-08-10 | Stop reason: HOSPADM

## 2021-08-09 RX ORDER — SODIUM CHLORIDE 9 MG/ML
25 INJECTION, SOLUTION INTRAVENOUS PRN
Status: CANCELLED | OUTPATIENT
Start: 2021-08-09

## 2021-08-09 RX ORDER — ACETAMINOPHEN 325 MG/1
650 TABLET ORAL ONCE
Status: CANCELLED | OUTPATIENT
Start: 2021-08-09 | End: 2021-08-09

## 2021-08-09 RX ORDER — SODIUM CHLORIDE 0.9 % (FLUSH) 0.9 %
5-40 SYRINGE (ML) INJECTION PRN
Status: CANCELLED | OUTPATIENT
Start: 2021-08-09

## 2021-08-09 RX ORDER — FUROSEMIDE 10 MG/ML
20 INJECTION INTRAMUSCULAR; INTRAVENOUS ONCE
Status: CANCELLED | OUTPATIENT
Start: 2021-08-09 | End: 2021-08-09

## 2021-08-09 RX ORDER — DIPHENHYDRAMINE HCL 25 MG
25 TABLET ORAL ONCE
Status: CANCELLED | OUTPATIENT
Start: 2021-08-09 | End: 2021-08-09

## 2021-08-09 RX ADMIN — HYDROCORTISONE SODIUM SUCCINATE 25 MG: 100 INJECTION, POWDER, FOR SOLUTION INTRAMUSCULAR; INTRAVENOUS at 11:55

## 2021-08-09 RX ADMIN — SODIUM CHLORIDE 20 ML/HR: 9 INJECTION, SOLUTION INTRAVENOUS at 11:54

## 2021-08-09 RX ADMIN — DIPHENHYDRAMINE HCL 25 MG: 25 TABLET ORAL at 11:55

## 2021-08-09 RX ADMIN — ACETAMINOPHEN 650 MG: 325 TABLET ORAL at 11:55

## 2021-08-09 ASSESSMENT — PAIN SCALES - GENERAL: PAINLEVEL_OUTOF10: 0

## 2021-08-12 ENCOUNTER — HOSPITAL ENCOUNTER (OUTPATIENT)
Dept: RADIATION ONCOLOGY | Facility: HOSPITAL | Age: 51
Setting detail: RADIATION/ONCOLOGY SERIES
Discharge: HOME | End: 2021-08-12

## 2021-08-12 ENCOUNTER — APPOINTMENT (OUTPATIENT)
Dept: INFUSION THERAPY | Age: 51
End: 2021-08-12
Payer: MEDICAID

## 2021-08-12 PROCEDURE — 77386: CPT | Performed by: RADIOLOGY

## 2021-08-13 ENCOUNTER — TELEPHONE (OUTPATIENT)
Dept: INTERNAL MEDICINE | Age: 51
End: 2021-08-13

## 2021-08-13 ENCOUNTER — HOSPITAL ENCOUNTER (OUTPATIENT)
Dept: RADIATION ONCOLOGY | Facility: HOSPITAL | Age: 51
Setting detail: RADIATION/ONCOLOGY SERIES
Discharge: HOME | End: 2021-08-13

## 2021-08-13 PROCEDURE — 77386: CPT | Performed by: RADIOLOGY

## 2021-08-16 ENCOUNTER — HOSPITAL ENCOUNTER (OUTPATIENT)
Dept: RADIATION ONCOLOGY | Facility: HOSPITAL | Age: 51
Setting detail: RADIATION/ONCOLOGY SERIES
Discharge: HOME | End: 2021-08-16

## 2021-08-16 ENCOUNTER — HOSPITAL ENCOUNTER (OUTPATIENT)
Dept: INFUSION THERAPY | Age: 51
Discharge: HOME | End: 2021-08-16
Payer: MEDICAID

## 2021-08-16 DIAGNOSIS — D64.9 ANEMIA, UNSPECIFIED TYPE: ICD-10-CM

## 2021-08-16 LAB
BASOPHILS ABSOLUTE: 0.01 K/UL (ref 0.01–0.08)
BASOPHILS RELATIVE PERCENT: 0.2 % (ref 0.1–1.2)
EOSINOPHILS ABSOLUTE: 0.03 K/UL (ref 0.04–0.54)
EOSINOPHILS RELATIVE PERCENT: 0.7 % (ref 0.7–7)
HCT VFR BLD CALC: 23.8 % (ref 40.1–51)
HEMOGLOBIN: 7.6 G/DL (ref 13.7–17.5)
LYMPHOCYTES ABSOLUTE: 0.71 K/UL (ref 1.18–3.74)
LYMPHOCYTES RELATIVE PERCENT: 17.1 % (ref 19.3–53.1)
MCH RBC QN AUTO: 33.6 PG (ref 25.7–32.2)
MCHC RBC AUTO-ENTMCNC: 31.9 G/DL (ref 32.3–36.5)
MCV RBC AUTO: 105.3 FL (ref 79–92.2)
MONOCYTES ABSOLUTE: 0.23 K/UL (ref 0.24–0.82)
MONOCYTES RELATIVE PERCENT: 5.5 % (ref 4.7–12.5)
NEUTROPHILS ABSOLUTE: 3.18 K/UL (ref 1.56–6.13)
NEUTROPHILS RELATIVE PERCENT: 76.5 % (ref 34–71.1)
PDW BLD-RTO: 16.5 % (ref 11.6–14.4)
PLATELET # BLD: 216 K/UL (ref 163–337)
PMV BLD AUTO: 9.1 FL (ref 7.4–10.4)
RBC # BLD: 2.26 M/UL (ref 4.63–6.08)
WBC # BLD: 4.16 K/UL (ref 4.23–9.07)

## 2021-08-16 PROCEDURE — 85025 COMPLETE CBC W/AUTO DIFF WBC: CPT

## 2021-08-16 PROCEDURE — 77386: CPT | Performed by: RADIOLOGY

## 2021-08-17 ENCOUNTER — HOSPITAL ENCOUNTER (OUTPATIENT)
Dept: RADIATION ONCOLOGY | Facility: HOSPITAL | Age: 51
Setting detail: RADIATION/ONCOLOGY SERIES
Discharge: HOME | End: 2021-08-17

## 2021-08-17 ENCOUNTER — TELEPHONE (OUTPATIENT)
Dept: GASTROENTEROLOGY | Age: 51
End: 2021-08-17

## 2021-08-17 PROCEDURE — 77386: CPT | Performed by: RADIOLOGY

## 2021-08-17 RX ORDER — ONDANSETRON 4 MG/1
4 TABLET, FILM COATED ORAL EVERY 8 HOURS PRN
Qty: 30 TABLET | Refills: 0 | Status: SHIPPED | OUTPATIENT
Start: 2021-08-17

## 2021-08-18 ENCOUNTER — HOSPITAL ENCOUNTER (OUTPATIENT)
Dept: RADIATION ONCOLOGY | Facility: HOSPITAL | Age: 51
Setting detail: RADIATION/ONCOLOGY SERIES
Discharge: HOME | End: 2021-08-18

## 2021-08-18 PROCEDURE — 77386: CPT | Performed by: RADIOLOGY

## 2021-08-19 ENCOUNTER — HOSPITAL ENCOUNTER (OUTPATIENT)
Dept: RADIATION ONCOLOGY | Facility: HOSPITAL | Age: 51
Setting detail: RADIATION/ONCOLOGY SERIES
Discharge: HOME | End: 2021-08-19

## 2021-08-19 PROBLEM — R40.0 SOMNOLENCE, DAYTIME: Status: RESOLVED | Noted: 2020-11-05 | Resolved: 2021-08-19

## 2021-08-19 PROBLEM — E78.1 HYPERTRIGLYCERIDEMIA: Status: RESOLVED | Noted: 2020-10-16 | Resolved: 2021-08-19

## 2021-08-19 PROBLEM — R06.83 SNORING: Status: RESOLVED | Noted: 2020-11-05 | Resolved: 2021-08-19

## 2021-08-19 PROBLEM — R06.81 WITNESSED APNEIC SPELLS: Status: RESOLVED | Noted: 2020-11-05 | Resolved: 2021-08-19

## 2021-08-19 PROBLEM — L23.7 POISON IVY: Status: RESOLVED | Noted: 2021-05-18 | Resolved: 2021-08-19

## 2021-08-19 PROCEDURE — 77386: CPT | Performed by: RADIOLOGY

## 2021-08-19 PROCEDURE — 77336 RADIATION PHYSICS CONSULT: CPT | Performed by: RADIOLOGY

## 2021-08-19 NOTE — PROGRESS NOTES
Jefferson Vogel ( 1970) is a 54 y.o. male,  Established  here for evaluation of the following chief complaint(s).   3 Month Follow-Up        ASSESSMENT/PLAN:  Problem List        Respiratory    Obstructive sleep apnea      Well-controlled, continue current medications, medication adherence emphasized and lifestyle modifications recommended            Digestive    Diverticulosis of colon      Asymptomatic, lifestyle modifications recommended         Gastroesophageal reflux disease without esophagitis      Well-controlled, continue current medications, medication adherence emphasized and lifestyle modifications recommended         Relevant Medications    omeprazole (PRILOSEC) 40 MG delayed release capsule    ondansetron (ZOFRAN) 4 MG tablet       Endocrine    Acquired hypothyroidism      Well-controlled, continue current medications, medication adherence emphasized and lifestyle modifications recommended         Relevant Medications    levothyroxine (SYNTHROID) 112 MCG tablet       Nervous and Auditory    Back pain of lumbar region with sciatica      Well-controlled,          Relevant Medications    PARoxetine (PAXIL) 40 MG tablet    nicotine (NICODERM CQ) 14 MG/24HR    Intractable migraine without aura and with status migrainosus      Asymptomatic, continue current medications         Relevant Medications    SUMAtriptan (IMITREX) 100 MG tablet    PARoxetine (PAXIL) 40 MG tablet       Other    Anemia associated with chemotherapy      Monitored by specialist- no acute findings meriting change in the plan         Combined hyperlipidemia      Well-controlled, continue current medications, medication adherence emphasized and lifestyle modifications recommended         Low grade B-cell lymphoma (HCC)      Monitored by specialist- no acute findings meriting change in the plan         Prediabetes      Well-controlled, continue current medications, medication adherence emphasized and lifestyle modifications recommended         Tobacco use disorder      Unclear control,                Results for orders placed or performed during the hospital encounter of 08/16/21   CBC Auto Differential   Result Value Ref Range    WBC 4.16 (L) 4.23 - 9.07 K/uL    RBC 2.26 (L) 4.63 - 6.08 M/uL    Hemoglobin 7.6 (L) 13.7 - 17.5 g/dL    Hematocrit 23.8 (LL) 40.1 - 51.0 %    .3 (H) 79.0 - 92.2 fL    MCH 33.6 (H) 25.7 - 32.2 pg    MCHC 31.9 (L) 32.3 - 36.5 g/dL    RDW 16.5 (H) 11.6 - 14.4 %    Platelets 379 982 - 837 K/uL    MPV 9.1 7.4 - 10.4 fL    Neutrophils % 76.5 (H) 34.0 - 71.1 %    Lymphocytes % 17.1 (L) 19.3 - 53.1 %    Monocytes % 5.5 4.7 - 12.5 %    Eosinophils % 0.7 0.7 - 7.0 %    Basophils % 0.2 0.1 - 1.2 %    Neutrophils Absolute 3.18 1.56 - 6.13 K/uL    Lymphocytes Absolute 0.71 (L) 1.18 - 3.74 K/uL    Monocytes Absolute 0.23 (L) 0.24 - 0.82 K/uL    Eosinophils Absolute 0.03 (L) 0.04 - 0.54 K/uL    Basophils Absolute 0.01 0.01 - 0.08 K/uL       Return in about 6 months (around 2/20/2022). HPI  37-year-old male who presents for follow-up  Patient is currently under treatment with oncology for lymphoma  Patient has chronic neck and low back pain  History of migraine headaches  Obstructive sleep sleep apnea with daytime somnolence somnolence and apneic spells symptoms on CPAP  Patient has GERD  Post thyroidectomy hypothyroidism      Review of Systems   Constitutional: Positive for activity change, appetite change and fatigue. Negative for chills, diaphoresis and fever. HENT: Negative for congestion, hearing loss, postnasal drip, sore throat, tinnitus and trouble swallowing. Eyes: Negative for visual disturbance. Respiratory: Negative for cough, shortness of breath, wheezing and stridor. Cardiovascular: Negative for chest pain, palpitations and leg swelling. Gastrointestinal: Positive for nausea (and vomitting). Negative for abdominal pain, blood in stool, constipation and diarrhea.         Heartburn Endocrine: Negative for cold intolerance. Genitourinary: Negative for dysuria, enuresis and frequency. Erectile dysfunction   Musculoskeletal: Positive for back pain. Negative for arthralgias and joint swelling. Skin: Negative for color change and wound. Allergic/Immunologic: Negative for environmental allergies. Neurological: Positive for numbness. Negative for dizziness, weakness, light-headedness and headaches. Hematological: Negative for adenopathy. Does not bruise/bleed easily. Psychiatric/Behavioral: Positive for sleep disturbance. Negative for decreased concentration and dysphoric mood. The patient is nervous/anxious. Physical Exam  Vitals and nursing note reviewed. Constitutional:       General: He is not in acute distress. Appearance: He is well-developed. HENT:      Head: Normocephalic and atraumatic. Right Ear: External ear normal.      Left Ear: External ear normal.      Nose: Nose normal.   Eyes:      General: No scleral icterus. Conjunctiva/sclera: Conjunctivae normal.      Pupils: Pupils are equal, round, and reactive to light. Neck:      Thyroid: No thyromegaly. Cardiovascular:      Rate and Rhythm: Normal rate and regular rhythm. Heart sounds: Normal heart sounds. No murmur heard. Pulmonary:      Effort: Pulmonary effort is normal.      Breath sounds: Normal breath sounds. No wheezing or rales. Abdominal:      General: Bowel sounds are normal. There is no distension. Palpations: Abdomen is soft. There is no mass. Tenderness: There is no abdominal tenderness. There is no rebound. Musculoskeletal:         General: No tenderness. Normal range of motion. Cervical back: Normal range of motion and neck supple. Lymphadenopathy:      Cervical: No cervical adenopathy. Skin:     General: Skin is warm and dry. Findings: No rash. Neurological:      Mental Status: He is alert and oriented to person, place, and time.

## 2021-08-20 ENCOUNTER — HOSPITAL ENCOUNTER (OUTPATIENT)
Dept: RADIATION ONCOLOGY | Facility: HOSPITAL | Age: 51
Setting detail: RADIATION/ONCOLOGY SERIES
Discharge: HOME | End: 2021-08-20

## 2021-08-20 ENCOUNTER — OFFICE VISIT (OUTPATIENT)
Dept: INTERNAL MEDICINE | Age: 51
End: 2021-08-20
Payer: MEDICAID

## 2021-08-20 VITALS
OXYGEN SATURATION: 98 % | SYSTOLIC BLOOD PRESSURE: 110 MMHG | HEIGHT: 67 IN | BODY MASS INDEX: 29.85 KG/M2 | HEART RATE: 77 BPM | DIASTOLIC BLOOD PRESSURE: 60 MMHG | WEIGHT: 190.2 LBS

## 2021-08-20 DIAGNOSIS — G47.33 OBSTRUCTIVE SLEEP APNEA: ICD-10-CM

## 2021-08-20 DIAGNOSIS — C85.10 LOW GRADE B-CELL LYMPHOMA (HCC): ICD-10-CM

## 2021-08-20 DIAGNOSIS — E03.9 ACQUIRED HYPOTHYROIDISM: ICD-10-CM

## 2021-08-20 DIAGNOSIS — K57.30 DIVERTICULOSIS OF COLON: ICD-10-CM

## 2021-08-20 DIAGNOSIS — R73.03 PREDIABETES: ICD-10-CM

## 2021-08-20 DIAGNOSIS — D64.81 ANEMIA ASSOCIATED WITH CHEMOTHERAPY: ICD-10-CM

## 2021-08-20 DIAGNOSIS — K21.9 GASTROESOPHAGEAL REFLUX DISEASE WITHOUT ESOPHAGITIS: ICD-10-CM

## 2021-08-20 DIAGNOSIS — G43.011 INTRACTABLE MIGRAINE WITHOUT AURA AND WITH STATUS MIGRAINOSUS: ICD-10-CM

## 2021-08-20 DIAGNOSIS — T45.1X5A ANEMIA ASSOCIATED WITH CHEMOTHERAPY: ICD-10-CM

## 2021-08-20 DIAGNOSIS — E78.2 COMBINED HYPERLIPIDEMIA: ICD-10-CM

## 2021-08-20 DIAGNOSIS — F17.200 TOBACCO USE DISORDER: ICD-10-CM

## 2021-08-20 DIAGNOSIS — M54.40 BACK PAIN OF LUMBAR REGION WITH SCIATICA: ICD-10-CM

## 2021-08-20 PROCEDURE — G8427 DOCREV CUR MEDS BY ELIG CLIN: HCPCS | Performed by: INTERNAL MEDICINE

## 2021-08-20 PROCEDURE — 77386: CPT | Performed by: RADIOLOGY

## 2021-08-20 PROCEDURE — 99213 OFFICE O/P EST LOW 20 MIN: CPT | Performed by: INTERNAL MEDICINE

## 2021-08-20 PROCEDURE — G8417 CALC BMI ABV UP PARAM F/U: HCPCS | Performed by: INTERNAL MEDICINE

## 2021-08-20 PROCEDURE — 4004F PT TOBACCO SCREEN RCVD TLK: CPT | Performed by: INTERNAL MEDICINE

## 2021-08-20 ASSESSMENT — PATIENT HEALTH QUESTIONNAIRE - PHQ9
SUM OF ALL RESPONSES TO PHQ QUESTIONS 1-9: 0
1. LITTLE INTEREST OR PLEASURE IN DOING THINGS: 0
SUM OF ALL RESPONSES TO PHQ9 QUESTIONS 1 & 2: 0
SUM OF ALL RESPONSES TO PHQ QUESTIONS 1-9: 0
2. FEELING DOWN, DEPRESSED OR HOPELESS: 0
SUM OF ALL RESPONSES TO PHQ QUESTIONS 1-9: 0

## 2021-08-22 PROBLEM — E03.9 ACQUIRED HYPOTHYROIDISM: Status: ACTIVE | Noted: 2021-08-22

## 2021-08-22 PROBLEM — T45.1X5A ANEMIA ASSOCIATED WITH CHEMOTHERAPY: Status: ACTIVE | Noted: 2021-08-22

## 2021-08-22 PROBLEM — D64.81 ANEMIA ASSOCIATED WITH CHEMOTHERAPY: Status: ACTIVE | Noted: 2021-08-22

## 2021-08-22 ASSESSMENT — ENCOUNTER SYMPTOMS
WHEEZING: 0
BACK PAIN: 1
ABDOMINAL PAIN: 0
BLOOD IN STOOL: 0
COUGH: 0
NAUSEA: 1
COLOR CHANGE: 0
STRIDOR: 0
DIARRHEA: 0
CONSTIPATION: 0
TROUBLE SWALLOWING: 0
SHORTNESS OF BREATH: 0
SORE THROAT: 0

## 2021-08-23 ENCOUNTER — HOSPITAL ENCOUNTER (OUTPATIENT)
Dept: INFUSION THERAPY | Age: 51
Discharge: HOME | End: 2021-08-23
Payer: MEDICAID

## 2021-08-23 ENCOUNTER — HOSPITAL ENCOUNTER (OUTPATIENT)
Dept: RADIATION ONCOLOGY | Facility: HOSPITAL | Age: 51
Setting detail: RADIATION/ONCOLOGY SERIES
Discharge: HOME | End: 2021-08-23

## 2021-08-23 DIAGNOSIS — D64.9 ANEMIA, UNSPECIFIED TYPE: ICD-10-CM

## 2021-08-23 LAB
BASOPHILS ABSOLUTE: 0.01 K/UL (ref 0.01–0.08)
BASOPHILS RELATIVE PERCENT: 0.2 % (ref 0.1–1.2)
EOSINOPHILS ABSOLUTE: 0.05 K/UL (ref 0.04–0.54)
EOSINOPHILS RELATIVE PERCENT: 0.8 % (ref 0.7–7)
HCT VFR BLD CALC: 23.5 % (ref 40.1–51)
HEMOGLOBIN: 7.7 G/DL (ref 13.7–17.5)
LYMPHOCYTES ABSOLUTE: 0.36 K/UL (ref 1.18–3.74)
LYMPHOCYTES RELATIVE PERCENT: 6 % (ref 19.3–53.1)
MCH RBC QN AUTO: 34.1 PG (ref 25.7–32.2)
MCHC RBC AUTO-ENTMCNC: 32.8 G/DL (ref 32.3–36.5)
MCV RBC AUTO: 104 FL (ref 79–92.2)
MONOCYTES ABSOLUTE: 0.35 K/UL (ref 0.24–0.82)
MONOCYTES RELATIVE PERCENT: 5.8 % (ref 4.7–12.5)
NEUTROPHILS ABSOLUTE: 5.25 K/UL (ref 1.56–6.13)
NEUTROPHILS RELATIVE PERCENT: 87.2 % (ref 34–71.1)
PDW BLD-RTO: 16.6 % (ref 11.6–14.4)
PLATELET # BLD: 217 K/UL (ref 163–337)
PMV BLD AUTO: 9.5 FL (ref 7.4–10.4)
RBC # BLD: 2.26 M/UL (ref 4.63–6.08)
WBC # BLD: 6.02 K/UL (ref 4.23–9.07)

## 2021-08-23 PROCEDURE — 77386: CPT | Performed by: RADIOLOGY

## 2021-08-23 PROCEDURE — 85025 COMPLETE CBC W/AUTO DIFF WBC: CPT

## 2021-08-24 ENCOUNTER — HOSPITAL ENCOUNTER (OUTPATIENT)
Dept: RADIATION ONCOLOGY | Facility: HOSPITAL | Age: 51
Setting detail: RADIATION/ONCOLOGY SERIES
Discharge: HOME | End: 2021-08-24

## 2021-08-24 PROCEDURE — 77386: CPT | Performed by: RADIOLOGY

## 2021-08-25 ENCOUNTER — HOSPITAL ENCOUNTER (OUTPATIENT)
Dept: RADIATION ONCOLOGY | Facility: HOSPITAL | Age: 51
Setting detail: RADIATION/ONCOLOGY SERIES
Discharge: HOME | End: 2021-08-25

## 2021-08-25 PROCEDURE — 77386: CPT | Performed by: RADIOLOGY

## 2021-08-26 ENCOUNTER — HOSPITAL ENCOUNTER (OUTPATIENT)
Dept: RADIATION ONCOLOGY | Facility: HOSPITAL | Age: 51
Setting detail: RADIATION/ONCOLOGY SERIES
Discharge: HOME | End: 2021-08-26

## 2021-08-26 PROCEDURE — 77336 RADIATION PHYSICS CONSULT: CPT | Performed by: RADIOLOGY

## 2021-08-26 PROCEDURE — 77386: CPT | Performed by: RADIOLOGY

## 2021-08-27 ENCOUNTER — HOSPITAL ENCOUNTER (OUTPATIENT)
Dept: RADIATION ONCOLOGY | Facility: HOSPITAL | Age: 51
Setting detail: RADIATION/ONCOLOGY SERIES
Discharge: HOME | End: 2021-08-27

## 2021-08-27 PROCEDURE — 77386: CPT | Performed by: RADIOLOGY

## 2021-08-27 RX ORDER — ONDANSETRON 4 MG/1
TABLET, FILM COATED ORAL
Qty: 30 TABLET | Refills: 1 | Status: SHIPPED | OUTPATIENT
Start: 2021-08-27 | End: 2021-10-04 | Stop reason: SDUPTHER

## 2021-08-30 ENCOUNTER — HOSPITAL ENCOUNTER (OUTPATIENT)
Dept: INFUSION THERAPY | Age: 51
Discharge: HOME | End: 2021-08-30
Payer: MEDICAID

## 2021-08-30 ENCOUNTER — HOSPITAL ENCOUNTER (OUTPATIENT)
Dept: RADIATION ONCOLOGY | Facility: HOSPITAL | Age: 51
Setting detail: RADIATION/ONCOLOGY SERIES
Discharge: HOME | End: 2021-08-30

## 2021-08-30 DIAGNOSIS — D64.9 ANEMIA, UNSPECIFIED TYPE: ICD-10-CM

## 2021-08-30 LAB
BASOPHILS ABSOLUTE: 0.01 K/UL (ref 0.01–0.08)
BASOPHILS RELATIVE PERCENT: 0.3 % (ref 0.1–1.2)
EOSINOPHILS ABSOLUTE: 0.04 K/UL (ref 0.04–0.54)
EOSINOPHILS RELATIVE PERCENT: 1 % (ref 0.7–7)
HCT VFR BLD CALC: 24.4 % (ref 40.1–51)
HEMOGLOBIN: 7.8 G/DL (ref 13.7–17.5)
LYMPHOCYTES ABSOLUTE: 0.27 K/UL (ref 1.18–3.74)
LYMPHOCYTES RELATIVE PERCENT: 7 % (ref 19.3–53.1)
MCH RBC QN AUTO: 35.5 PG (ref 25.7–32.2)
MCHC RBC AUTO-ENTMCNC: 32 G/DL (ref 32.3–36.5)
MCV RBC AUTO: 110.9 FL (ref 79–92.2)
MONOCYTES ABSOLUTE: 0.23 K/UL (ref 0.24–0.82)
MONOCYTES RELATIVE PERCENT: 5.9 % (ref 4.7–12.5)
NEUTROPHILS ABSOLUTE: 3.33 K/UL (ref 1.56–6.13)
NEUTROPHILS RELATIVE PERCENT: 85.8 % (ref 34–71.1)
PDW BLD-RTO: 18.2 % (ref 11.6–14.4)
PLATELET # BLD: 180 K/UL (ref 163–337)
PMV BLD AUTO: 9.6 FL (ref 7.4–10.4)
RBC # BLD: 2.2 M/UL (ref 4.63–6.08)
WBC # BLD: 3.88 K/UL (ref 4.23–9.07)

## 2021-08-30 PROCEDURE — 85025 COMPLETE CBC W/AUTO DIFF WBC: CPT

## 2021-08-30 PROCEDURE — 77386: CPT | Performed by: RADIOLOGY

## 2021-08-30 NOTE — PROGRESS NOTES
Patient:  Dottie Brand  YOB: 1970  Date of Service: 9/17/2021  MRN: [id number]    Primary Care Physician: Lehman Olszewski, MD    Chief Complaint   Patient presents with    Follow-up     Low grade B-cell lymphoma [facility]       Patient Seen, Chart, Consults notes, Labs, Radiology studies reviewed. Subjective:  Mr Mira Lange is a 52year old gentleman managed with primary and secondary diagnoses as outlined:  · Stage I low-grade B-cell lymphoma (SLL) (small lymphocytic lymphoma) manifesting with 4 x 2.1 x 4.9 cm and 3.6 x 1.9 x 4.1 cm retroperitoneal para-aortic region lymph node conglomerates, 6/2/2021     Radha Thao has completed XRT and is here accompanied by his wife in follow-up evaluation and further recommendations. TARGET SLL/CLL SITES:  · 4 x 2.1 x 4.9 cm and 3.6 x 1.9 x 4.1 cm enhancing oval lobulated solid masses in the right upper abdomen, retroperitoneal para-aortic region located between the aorta and inferior vena cava    TUMOR HISTORY: Stage I low-grade B-cell lymphoma (SLL) 6/2/2021  Mr Mira Lange was seen in initial consultation on 6/30/2021, referred by Dr Lehman Olszewski for newly diagnosed low grade B-cell lymphoma. In March of 2021, Radha Thao  decided he wanted to jump on a pogo stick but unfortunately was unsuccessful, fell and hurt his back. Radha Thao presented for work-up with low back pain    MRI LUMBAR SPINE WO CONTRAST  On 3/22/2021 at [facility] documented:  · Multilevel prominent disc osteophyte complexes and facetal arthropathy and resultant neural foramina spinal canal stenosis  · Incidentally noted is an ill-defined soft tissue mass located posterior to the distal left renal vein and inferior vena cava with anterior displacement of both. It measures approximately 4 cm x 1.5 x 5.3 cm. This may represent an enlarged retroperitoneal lymph node? Crystal Jaylin A similar mass is seen at a lower level measuring 2.8 x 1.8 x 3.6 cm.       CT ABDOMEN PELVIS W WO CONTRAST on 3/25/2021 at [facility] documented:  · Evidence of moderately enhancing oval lobulated solid masses in the right upper abdomen in the retroperitoneal para-aortic region located between the aorta and inferior vena cava. There is marked asymmetrical thickening of the right diaphragmatic luz. The 2 masses measure 4 cm x 2.1 x 4.9 cm and 3.6 x 1.9 x 4.1 cm. Potential moderate contrast enhancement. Inflammatory or neoplastic process is not excluded. Further evaluation is recommended. · No other enlarged lymph nodes are noted. Isac Fung was referred to GI for initial evaluation hoping for access to the abdominal mass via EGD/EUS. EGD BIOPSY on 5/6/2021 at [facility] per  Oren Gonzalez MD  IMPRESSION:  Esophagus: normal and the EG junction at 38 cm appeared essentially normal except for a nonobstructing distal esophageal ring. There is a small 2 to 3 cm in size sliding hiatal hernia present. Stomach:  Abnormal; patchy mucosal changes multiple small 1 to 2 mm erosions with surrounding small areas of erythema noted in the antrum suggestive of medical gastritis noted -  Gastric biopsies were taken from the antrum and body to rule out Helicobacter pylori infection. The proximal stomach including the fundus, cardia, body and angularis appeared completely normal.  No evidence of abnormal folds or unusual thickening noted in the fundus. NO ulcers or masses or gastric outlet obstruction or retained food or fluid. Rugae were normal and lumen distended well with insufflation. Retroflexed views otherwise revealed a normal GE junction, fundus and cardia as well. Duodenum: abnormal: 3 small erosions with surrounding erythema were noted in the posterior duodenal bulb suggestive of mild duodenitis. Otherwise remainder of the examined duodenum including the bulb second third and fourth portions appeared essentially normal as did the first few centimeters of the examined proximal jejunum.   No strictures or mass lesions or ulcers were noted.  FINAL DIAGNOSIS:   Stomach, antrum, biopsy:   Antral-type gastric mucosa with acanthosis and tortuous glands, consistent with reactive gastropathy (chemical gastritis). Negative for active inflammation. Negative for Helicobacter pylori organisms by immunohistochemical staining. Negative for intestinal metaplasia. Negative for dysplasia. Dr Miguelina Montes reviewed the images with the Dr. Homa Guerrero and he opined that the retroperitoneal lesion was not amenable to FNA by EUS or even by radiology here in [place], [place]. Dr Nolan Simons referred Siri Jackson to Dr Laron Hearn with the surgical oncology department at [facility]     Dr Laron Hearn saw Siri Jackson on 5/21/2021. A CT-guided biopsy retroperitoneal mass was performed on 6/2/2021 at [facility]  Microscopic description: The cytospin shows a mixture of small and large lymphoid cells    CYTOLOGIC DIAGNOSIS:  Low-grade B-cell lymphoma most consistent with small lymphocytic lymphoma (SLL/CLL)  CD5 (+) lambda surface restricted B-cell population (48% of cells) by flow cytometry  IHC report the neoplastic cells are positive for PAX5 and LEF1. CD3 is positive and T cells. Cells are negative for cyclin D1 and SOX11. The Ki-67 proliferative index is variable and ranges from 5% to focally up to 20%    Flow cytometry immunophenotypic B cells  POSITIVE FOR CD5, lambda, CD45, CD20, CD22, CD19, CD23, and CD38  NEGATIVE FOR, CD10, CD79B and CD14    COMMENT:   Findings support involvement by a B cell lymphoma. Correlation with the forthcoming morphology ([id number] is recommended. Physical examination at presentation did not reveal evidence of palpable pathological peripheral lymphadenopathy in the cervical, supraclavicular, infraclavicular, axillary or inguinal lymph node areas. Lungs are clear heart is regular normal S1-S2, no S3  Abdominal exam documents an enlarged liver, with fullness in the RUQ, somewhat tender. I am unable to palpate a spleen.   Extremities measuring up to 7 mm. · No cervical lymphadenopathy. CT CHEST W CONTRAST on  7/9/2021at [facility] documented:  · No enlarged axillary, hilar, or mediastinal lymph nodes. · No intrathoracic lymphadenopathy. · Emphysema. .    CT ABDOMEN PELVIS W IV CONTRAST on  7/9/2021 compared to 3/25/21 at [facility] documented:  · Aretroperitoneal mass in the upper abdomen is again noted. The more superior aspect of the mass measures 4.6 x 2 cm today and previously measured 4 x 2.1 cm. The more posterior and inferior portion of the mass measures 3.4 x 2.2 cm and previously measured 3.1 x 1.9 cm. There are no other enlarged lymph nodes in the abdomen or the pelvis. This is likely an enlarged lymph node. Other retroperitoneal mass lesions are not ruled out. Sarcoma and other tumors would also be in the differential.  · Stable 5-6 mm probable liver cyst.  · Gastric wall thickening likely an artifact of under distention. Mild diverticulosis of the colon. PET CT SKULL BASE TO MID THIGH on 7/9/2021at [facility], compared to CTs performed same day, documented:  · Slightly increased metabolic activity in the retroperitoneal mass seen on recent examination, with maximum SUV of 3.2. Neoplastic process remains in the differential. Tissue diagnosis is recommended. · For reference purposes, maximum SUV in the mediastinum is 2.3 and maximum SUV in the liver is 1.9. Transthoracic Echocardiography Report (TTE) on 7/9/21 at [facility] documented:  Summary:  LV is borderline dilated in size with normal systolic function. LV ejection fraction estimated at 55 to 60%. Diastolic function appears preserved. RV appears normal in size with normal systolic function. Normal left atrial size. Normal right atrial size. Aortic valve is trileaflet with normal leaflet mobility. No stenosis or significant regurgitation. Mitral valve appears structurally normal with normal leaflet mobility. Trace mitral regurgitation. No stenosis.   No significant tricuspid tablet Take 1 tablet by mouth daily 30 tablet 3    PARoxetine (PAXIL) 40 MG tablet Take 1 tablet by mouth every morning 30 tablet 3    omeprazole (PRILOSEC) 40 MG delayed release capsule Take 1 capsule by mouth every morning (before breakfast) 90 capsule 1    clobetasol (TEMOVATE) 0.05 % ointment Apply topically 2 times daily. (Patient taking differently: 2 times daily as needed Apply topically 2 times daily.) 30 g 0    SUMAtriptan (IMITREX) 100 MG tablet As directed 9 tablet 5    testosterone (ANDROGEL) 25 MG/2.5GM (1%) GEL 1 % gel Apply 5 g topically daily for 30 days. Apply 4 clicks  (1ml) daily 1 Package 0    sildenafil (REVATIO) 20 MG tablet Take 2 tablets by mouth as needed (as needed) 60 tablet 3     No current facility-administered medications for this visit. Past Medical History:      Diagnosis Date    Anemia     Anxiety     CPAP (continuous positive airway pressure) dependence     Depression     Herniated lumbar intervertebral disc     Hyperglycemia 01/05/2021    Hypertriglyceridemia 10/16/2020    Kidney cysts     Liver cyst     Low testosterone     Lymphoma ([facility] [address] 06/07/2021    Dr Raina Restrepo     Migraine     Neck pain     Obstructive sleep apnea     OCD (obsessive compulsive disorder)     Poison ivy 5/18/2021    Somnolence, daytime 11/5/2020    Thyroid disease     Witnessed apneic spells 11/5/2020        Past Surgical History:      Procedure Laterality Date    APPENDECTOMY      COLONOSCOPY  approx 2017    Dr Lashay Villavicencio @ [facility] \" polyps\" per pt recall    COLONOSCOPY N/A 11/06/2020    Dr Nathalie Quiroz, internal hemorrhoids-Grade 1, 5 yr recall    HERNIA REPAIR      bih    THYROIDECTOMY      TOTAL    UPPER GASTROINTESTINAL ENDOSCOPY N/A 05/06/2021    Dr Lesley Mello gastritis (avoid NSAIDS) (-)H.  pylori, w/push enteroscopy up to the the proximal jejunum-Gastric erosions,  non-obstructing distal esoph ring, sliding hh, gastritis, duodenitis        Family History:      Problem Relation Age of Onset    Stomach Cancer Maternal Uncle     Liver Cancer Maternal Uncle     Cancer Maternal Grandfather     Stomach Cancer Maternal Aunt     Cancer Other         LEUKEMIA    Colon Cancer Neg Hx     Colon Polyps Neg Hx     Esophageal Cancer Neg Hx     Rectal Cancer Neg Hx         Social History  Social History     Tobacco Use    Smoking status: Current Every Day Smoker     Packs/day: 2.00     Years: 34.00     Pack years: 68.00     Types: Cigarettes     Start date: 1986    Smokeless tobacco: Never Used    Tobacco comment: Nicotine Gum severals times daily & Nioctine patches    Vaping Use    Vaping Use: Never used   Substance Use Topics    Alcohol use: Yes     Alcohol/week: 1.0 standard drinks     Types: 1 Cans of beer per week     Comment: occasional     Drug use: Yes     Types: Marijuana     Comment: smokes marijuana daily, along with RSO (marijuana) oil 0.1ml under tongue             Review of Systems:  Constitutional: Negative for chills, fatigue, fever or significant weight loss. HENT: Negative for congestion, hearing loss, nosebleeds or sore throat. Eyes: Negative for photophobia, pain, discharge, redness and visual disturbance. Respiratory: Negative for cough, shortness of breath, or wheezing. Cardiovascular: Negative for chest pain, palpitations or leg swelling. Gastrointestinal: Negative for abdominal pain, blood in stool, constipation, diarrhea, nausea or vomiting. Genitourinary: Negative for dysuria, flank pain, frequency, hematuria or urgency. Musculoskeletal: Negative for back pain, joint swelling, myalgias or neck pain. Skin: Negative for rash or petechiae. Neurological: Negative for tremors, seizures, syncope, weakness or headaches. Hematological: No active bruising or bleeding. Psychiatric/Behavioral: Negative for hallucinations.        Wt Readings from Last 3 Encounters:   08/20/21 190 lb 3.2 oz (86.3 kg)   07/28/21 192 lb 3.2 oz (87.2 kg)   07/15/21 191 lb 3.2 oz (86.7 kg)        Objective:  Vital Signs: Blood pressure 120/75, pulse 75, height 5' 7\" (1.702 m), SpO2 96 %. Physical Exam   Constitutional: Oriented to person, place, and time. No acute distress. Head: Normocephalic and atraumatic. Nose: Nose normal.   Mouth/Throat: Oropharynx is clear and moist. No oropharyngeal exudate. Eyes: Pupils are equal and round. Conjunctivae and EOM are normal. No scleral icterus. Neck: Normal range of motion. Neck supple. No JVD. No appreciable thyromegaly. Cardiovascular: Normal rate, regular rhythm, normal heart sounds and intact distal pulses. Exam reveals no gallop, murmurs or friction rub. Pulmonary/Chest: Effort normal and breath sounds normal. No respiratory distress. No wheezes. Abdominal: Soft. Bowel sounds are normal. No organomegally or masses. No tenderness. There is no rebound and no guarding. Musculoskeletal: Normal range of motion. No edema or tenderness. Lymphadenopathy: No cervical, axillary or inguinal lymphadenopathy. Neurological: Alert and oriented to person, place, and time. Cranial nerves are intact. Neurological exam is nonfocal  Skin: Skin is warm and dry. No rash noted. No erythema. No pallor. Psychiatric: Judgment normal.          Labs:  BMP:   No results for input(s): NA, K, CL, CO2, PHOS, BUN, CREATININE, CALCIUM in the last 72 hours. CBC:   No results for input(s): WBC, HGB, HCT, MCV, PLT in the last 72 hours. PT/INR: No results for input(s): PROTIME, INR in the last 72 hours. APTT: No results for input(s): APTT in the last 72 hours. Magnesium:No results for input(s): MG in the last 72 hours. Phosphorus:No results for input(s): PHOS in the last 72 hours. Hepatic:   No results for input(s): ALKPHOS, ALT, AST, PROT, BILITOT, BILIDIR, LABALBU in the last 72 hours. Cultures:   No results for input(s): CULTURE in the last 72 hours.     Radiology reports done prior to his return in 2 months, this is ordered. #2  Macrocytic anemia    Anemia was documented at his initial visit on 6/30/2021. Serology on 6/30/21 revealed:  CMP - unremarkable  Iron - 149  TIBC - 460  Saturation - 32%  Ferritin -128  LDH - 441  TSH - 0.267  Uric Acid - 4.5  Folate - 4.2  Retic - 1.0  Haptoglobin-180  MMA - 109    B2M-1.9  Kappa light chains- 18.9  Lambda light chains-24.4  K/L ratio-0.77  IgG-590 ,IgA-176 , IgM-54  Total Protein - 6.2  M-spike-not observed    Hepatitis Panel - negative    CBC on 7/15/2021 revealed a WBC of 4.38. Hgb is 8.4  with an MCV of 103.7 and platelet count of 484,518. CBC today, 09/13/2021 reveals a WBC of 3.58 . Hgb is 9.4 with an MCV of 112.8 and platelet count of 156,749 . Further serology is being sent off and will be reviewed when available. A CT scan of the abdomen and pelvis will be done prior to his return in 2 months    #3  Tumor screening and health maintenance    GI cancer screening -   Cscope 11/6/2020 per Dr Tyesha Tracy 5 yr recall  EGD -  5/6/2021 per Dr Tyesha Tracy      Prostate cancer screening    #4  Immunizations: Manfred Desouzais Vaccications received in March/April of 2021      Anival Faust am scribing for Roxanne Hinson MD. Electronically signed by Tex Barreto RN on 9/17/2021 at 3:51 PM        Gio Parrish was seen today for follow-up. Diagnoses and all orders for this visit:    Low grade B-cell lymphoma ([facility] [address]  -     CT ABDOMEN PELVIS W IV CONTRAST Additional Contrast? Oral; Future  -     Lactate Dehydrogenase; Future  -     Reticulocytes; Future  -     Comprehensive Metabolic Panel; Future    Health care maintenance    Anemia, unspecified type  -     Iron and TIBC; Future  -     Ferritin;  Future    Retroperitoneal mass  -     Haptoglobin; Future        Orders Placed This Encounter   Procedures    CT ABDOMEN PELVIS W IV CONTRAST Additional Contrast? Oral     Standing Status:   Future     Standing Expiration Date:   9/13/2022     Order Specific Question:   Additional Contrast?     Answer:   Oral     Order Specific Question:   Reason for exam:     Answer:   Post radiation    Lactate Dehydrogenase     Standing Status:   Future     Standing Expiration Date:   9/13/2022    Reticulocytes     Standing Status:   Future     Standing Expiration Date:   9/13/2022    Comprehensive Metabolic Panel     Standing Status:   Future     Standing Expiration Date:   9/13/2022    Haptoglobin     Standing Status:   Future     Standing Expiration Date:   9/13/2022    Iron and TIBC     Standing Status:   Future     Standing Expiration Date:   9/13/2022     Order Specific Question:   Is Patient Fasting? Answer:   na     Order Specific Question:   No of Hours? Answer:   0    Ferritin     Standing Status:   Future     Standing Expiration Date:   9/13/2022       No orders of the defined types were placed in this encounter. Return in about 2 months (around 11/13/2021) for Follow Up with Alex Lopez. I, Dr. Maria E Souza, personally performed the services described in this documentation as scribed by Gloria Eldridge in my presence, and it is both accurate and complete.

## 2021-08-31 ENCOUNTER — HOSPITAL ENCOUNTER (OUTPATIENT)
Dept: RADIATION ONCOLOGY | Facility: HOSPITAL | Age: 51
Setting detail: RADIATION/ONCOLOGY SERIES
Discharge: HOME | End: 2021-08-31

## 2021-08-31 PROCEDURE — 77386: CPT | Performed by: RADIOLOGY

## 2021-09-01 ENCOUNTER — HOSPITAL ENCOUNTER (OUTPATIENT)
Dept: RADIATION ONCOLOGY | Facility: HOSPITAL | Age: 51
Setting detail: RADIATION/ONCOLOGY SERIES
End: 2021-09-01

## 2021-09-01 ENCOUNTER — HOSPITAL ENCOUNTER (OUTPATIENT)
Dept: RADIATION ONCOLOGY | Facility: HOSPITAL | Age: 51
Setting detail: RADIATION/ONCOLOGY SERIES
Discharge: HOME | End: 2021-09-01

## 2021-09-01 PROCEDURE — 77386: CPT | Performed by: RADIOLOGY

## 2021-09-01 PROCEDURE — 77336 RADIATION PHYSICS CONSULT: CPT | Performed by: RADIOLOGY

## 2021-09-07 ENCOUNTER — HOSPITAL ENCOUNTER (OUTPATIENT)
Dept: INFUSION THERAPY | Age: 51
Discharge: HOME | End: 2021-09-07
Payer: MEDICAID

## 2021-09-07 DIAGNOSIS — D64.9 ANEMIA, UNSPECIFIED TYPE: ICD-10-CM

## 2021-09-07 LAB
BASOPHILS ABSOLUTE: 0.01 K/UL (ref 0.01–0.08)
BASOPHILS RELATIVE PERCENT: 0.3 % (ref 0.1–1.2)
EOSINOPHILS ABSOLUTE: 0.05 K/UL (ref 0.04–0.54)
EOSINOPHILS RELATIVE PERCENT: 1.3 % (ref 0.7–7)
HCT VFR BLD CALC: 27.1 % (ref 40.1–51)
HEMOGLOBIN: 8.5 G/DL (ref 13.7–17.5)
LYMPHOCYTES ABSOLUTE: 0.24 K/UL (ref 1.18–3.74)
LYMPHOCYTES RELATIVE PERCENT: 6.2 % (ref 19.3–53.1)
MCH RBC QN AUTO: 35.9 PG (ref 25.7–32.2)
MCHC RBC AUTO-ENTMCNC: 31.4 G/DL (ref 32.3–36.5)
MCV RBC AUTO: 114.3 FL (ref 79–92.2)
MONOCYTES ABSOLUTE: 0.3 K/UL (ref 0.24–0.82)
MONOCYTES RELATIVE PERCENT: 7.7 % (ref 4.7–12.5)
NEUTROPHILS ABSOLUTE: 3.3 K/UL (ref 1.56–6.13)
NEUTROPHILS RELATIVE PERCENT: 84.5 % (ref 34–71.1)
PDW BLD-RTO: 17.9 % (ref 11.6–14.4)
PLATELET # BLD: 155 K/UL (ref 163–337)
PMV BLD AUTO: 9.5 FL (ref 7.4–10.4)
RBC # BLD: 2.37 M/UL (ref 4.63–6.08)
WBC # BLD: 3.9 K/UL (ref 4.23–9.07)

## 2021-09-07 PROCEDURE — 85025 COMPLETE CBC W/AUTO DIFF WBC: CPT

## 2021-09-13 ENCOUNTER — HOSPITAL ENCOUNTER (OUTPATIENT)
Dept: INFUSION THERAPY | Age: 51
Discharge: HOME | End: 2021-09-13
Payer: MEDICAID

## 2021-09-13 ENCOUNTER — OFFICE VISIT (OUTPATIENT)
Dept: HEMATOLOGY | Age: 51
End: 2021-09-13
Payer: MEDICAID

## 2021-09-13 VITALS
SYSTOLIC BLOOD PRESSURE: 120 MMHG | HEART RATE: 75 BPM | BODY MASS INDEX: 29.79 KG/M2 | OXYGEN SATURATION: 96 % | HEIGHT: 67 IN | DIASTOLIC BLOOD PRESSURE: 75 MMHG

## 2021-09-13 DIAGNOSIS — D64.9 ANEMIA, UNSPECIFIED TYPE: ICD-10-CM

## 2021-09-13 DIAGNOSIS — R19.00 RETROPERITONEAL MASS: ICD-10-CM

## 2021-09-13 DIAGNOSIS — C85.10 LOW GRADE B-CELL LYMPHOMA (HCC): Primary | ICD-10-CM

## 2021-09-13 DIAGNOSIS — Z00.00 HEALTH CARE MAINTENANCE: ICD-10-CM

## 2021-09-13 LAB
BASOPHILS ABSOLUTE: 0.02 K/UL (ref 0.01–0.08)
BASOPHILS RELATIVE PERCENT: 0.6 % (ref 0.1–1.2)
EOSINOPHILS ABSOLUTE: 0.02 K/UL (ref 0.04–0.54)
EOSINOPHILS RELATIVE PERCENT: 0.6 % (ref 0.7–7)
HCT VFR BLD CALC: 29.1 % (ref 40.1–51)
HEMOGLOBIN: 9.4 G/DL (ref 13.7–17.5)
LYMPHOCYTES ABSOLUTE: 0.3 K/UL (ref 1.18–3.74)
LYMPHOCYTES RELATIVE PERCENT: 8.4 % (ref 19.3–53.1)
MCH RBC QN AUTO: 36.4 PG (ref 25.7–32.2)
MCHC RBC AUTO-ENTMCNC: 32.3 G/DL (ref 32.3–36.5)
MCV RBC AUTO: 112.8 FL (ref 79–92.2)
MONOCYTES ABSOLUTE: 0.29 K/UL (ref 0.24–0.82)
MONOCYTES RELATIVE PERCENT: 8.1 % (ref 4.7–12.5)
NEUTROPHILS ABSOLUTE: 2.95 K/UL (ref 1.56–6.13)
NEUTROPHILS RELATIVE PERCENT: 82.3 % (ref 34–71.1)
PDW BLD-RTO: 16.7 % (ref 11.6–14.4)
PLATELET # BLD: 144 K/UL (ref 163–337)
PMV BLD AUTO: 9.4 FL (ref 7.4–10.4)
RBC # BLD: 2.58 M/UL (ref 4.63–6.08)
WBC # BLD: 3.58 K/UL (ref 4.23–9.07)

## 2021-09-13 PROCEDURE — G8427 DOCREV CUR MEDS BY ELIG CLIN: HCPCS | Performed by: INTERNAL MEDICINE

## 2021-09-13 PROCEDURE — 85025 COMPLETE CBC W/AUTO DIFF WBC: CPT

## 2021-09-13 PROCEDURE — 99213 OFFICE O/P EST LOW 20 MIN: CPT

## 2021-09-13 PROCEDURE — 4004F PT TOBACCO SCREEN RCVD TLK: CPT | Performed by: INTERNAL MEDICINE

## 2021-09-13 PROCEDURE — 99214 OFFICE O/P EST MOD 30 MIN: CPT | Performed by: INTERNAL MEDICINE

## 2021-09-13 PROCEDURE — G8417 CALC BMI ABV UP PARAM F/U: HCPCS | Performed by: INTERNAL MEDICINE

## 2021-09-20 ENCOUNTER — HOSPITAL ENCOUNTER (OUTPATIENT)
Dept: CT IMAGING | Age: 51
Discharge: HOME | End: 2021-09-20
Payer: MEDICAID

## 2021-09-20 DIAGNOSIS — C85.10 LOW GRADE B-CELL LYMPHOMA (HCC): ICD-10-CM

## 2021-09-20 PROCEDURE — 74177 CT ABD & PELVIS W/CONTRAST: CPT

## 2021-09-20 PROCEDURE — 6360000004 HC RX CONTRAST MEDICATION: Performed by: INTERNAL MEDICINE

## 2021-09-20 RX ADMIN — IOPAMIDOL 75 ML: 755 INJECTION, SOLUTION INTRAVENOUS at 09:05

## 2021-09-27 ENCOUNTER — HOSPITAL ENCOUNTER (OUTPATIENT)
Dept: INFUSION THERAPY | Age: 51
Discharge: HOME | End: 2021-09-27
Payer: MEDICAID

## 2021-09-27 DIAGNOSIS — C85.10 LOW GRADE B-CELL LYMPHOMA (HCC): ICD-10-CM

## 2021-09-27 DIAGNOSIS — E34.9 HYPOTESTOSTERONISM: Primary | ICD-10-CM

## 2021-09-27 LAB
BASOPHILS ABSOLUTE: 0.02 K/UL (ref 0.01–0.08)
BASOPHILS RELATIVE PERCENT: 0.6 % (ref 0.1–1.2)
EOSINOPHILS ABSOLUTE: 0.03 K/UL (ref 0.04–0.54)
EOSINOPHILS RELATIVE PERCENT: 0.9 % (ref 0.7–7)
HCT VFR BLD CALC: 30.9 % (ref 40.1–51)
HEMOGLOBIN: 10.7 G/DL (ref 13.7–17.5)
LYMPHOCYTES ABSOLUTE: 0.32 K/UL (ref 1.18–3.74)
LYMPHOCYTES RELATIVE PERCENT: 9.2 % (ref 19.3–53.1)
MCH RBC QN AUTO: 36.9 PG (ref 25.7–32.2)
MCHC RBC AUTO-ENTMCNC: 34.6 G/DL (ref 32.3–36.5)
MCV RBC AUTO: 106.6 FL (ref 79–92.2)
MONOCYTES ABSOLUTE: 0.23 K/UL (ref 0.24–0.82)
MONOCYTES RELATIVE PERCENT: 6.6 % (ref 4.7–12.5)
NEUTROPHILS ABSOLUTE: 2.86 K/UL (ref 1.56–6.13)
NEUTROPHILS RELATIVE PERCENT: 82.7 % (ref 34–71.1)
PDW BLD-RTO: 12.9 % (ref 11.6–14.4)
PLATELET # BLD: 229 K/UL (ref 163–337)
PMV BLD AUTO: 8.8 FL (ref 7.4–10.4)
RBC # BLD: 2.9 M/UL (ref 4.63–6.08)
WBC # BLD: 3.46 K/UL (ref 4.23–9.07)

## 2021-09-27 PROCEDURE — 85025 COMPLETE CBC W/AUTO DIFF WBC: CPT

## 2021-09-27 RX ORDER — TESTOSTERONE MICRONIZED 100 %
POWDER (GRAM) MISCELLANEOUS
Qty: 1 EACH | Refills: 5 | Status: SHIPPED | OUTPATIENT
Start: 2021-09-27 | End: 2021-10-04 | Stop reason: ALTCHOICE

## 2021-09-27 RX ORDER — PAROXETINE HYDROCHLORIDE 40 MG/1
TABLET, FILM COATED ORAL
Qty: 30 TABLET | Refills: 0 | Status: SHIPPED | OUTPATIENT
Start: 2021-09-27 | End: 2021-10-25

## 2021-09-27 NOTE — TELEPHONE ENCOUNTER
Reid Vogel called to request a refill on his medication. Last office visit : 8/20/2021   Next office visit : 10/4/2021     Last UDS:   Opiate Scrn, Ur   Date Value Ref Range Status   03/26/2015 Negative Wkbfpk=047 ng/mL Final     Comment:     Opiate test includes Codeine, Morphine, Hydromorphone, Hydrocodone. Last Leodis Mealy: 07/23/2021  Medication Contract: 03/02/2021   Last Fill: 07/23/2021    Requested Prescriptions     Pending Prescriptions Disp Refills    Testosterone POWD, Versabase GEL, Ethyl Alcohol 95 % SOLN [Pharmacy Med Name: TESTOSTERONE 5% GEL]  0     Sig: APPLY 4 CLICKS TO CHEST OR SHOULDER ONCE DAILY.  PARoxetine (PAXIL) 40 MG tablet [Pharmacy Med Name: PAROXETINE HCL 40 MG TABLET] 30 tablet 0     Sig: TAKE ONE TABLET IN THE MORNING         Please approve or refuse this medication.    Jonny Burgos MA

## 2021-10-03 ASSESSMENT — ENCOUNTER SYMPTOMS
BACK PAIN: 1
ABDOMINAL PAIN: 0

## 2021-10-04 ENCOUNTER — OFFICE VISIT (OUTPATIENT)
Dept: INTERNAL MEDICINE | Age: 51
End: 2021-10-04
Payer: MEDICAID

## 2021-10-04 ENCOUNTER — TELEPHONE (OUTPATIENT)
Dept: INTERNAL MEDICINE | Age: 51
End: 2021-10-04

## 2021-10-04 VITALS
SYSTOLIC BLOOD PRESSURE: 104 MMHG | HEART RATE: 79 BPM | OXYGEN SATURATION: 99 % | BODY MASS INDEX: 29.73 KG/M2 | HEIGHT: 67 IN | WEIGHT: 189.4 LBS | DIASTOLIC BLOOD PRESSURE: 60 MMHG

## 2021-10-04 DIAGNOSIS — T45.1X5A ANEMIA ASSOCIATED WITH CHEMOTHERAPY: ICD-10-CM

## 2021-10-04 DIAGNOSIS — E03.9 ACQUIRED HYPOTHYROIDISM: ICD-10-CM

## 2021-10-04 DIAGNOSIS — D64.81 ANEMIA ASSOCIATED WITH CHEMOTHERAPY: ICD-10-CM

## 2021-10-04 DIAGNOSIS — C85.10 LOW GRADE B-CELL LYMPHOMA (HCC): ICD-10-CM

## 2021-10-04 DIAGNOSIS — H10.021 OTHER MUCOPURULENT CONJUNCTIVITIS OF RIGHT EYE: Primary | ICD-10-CM

## 2021-10-04 DIAGNOSIS — J31.0 PERENNIAL NON-ALLERGIC RHINITIS: ICD-10-CM

## 2021-10-04 DIAGNOSIS — E78.2 COMBINED HYPERLIPIDEMIA: ICD-10-CM

## 2021-10-04 PROBLEM — H10.9 CONJUNCTIVITIS: Status: ACTIVE | Noted: 2021-10-04

## 2021-10-04 PROCEDURE — G8417 CALC BMI ABV UP PARAM F/U: HCPCS | Performed by: INTERNAL MEDICINE

## 2021-10-04 PROCEDURE — 4004F PT TOBACCO SCREEN RCVD TLK: CPT | Performed by: INTERNAL MEDICINE

## 2021-10-04 PROCEDURE — G8427 DOCREV CUR MEDS BY ELIG CLIN: HCPCS | Performed by: INTERNAL MEDICINE

## 2021-10-04 PROCEDURE — 99213 OFFICE O/P EST LOW 20 MIN: CPT | Performed by: INTERNAL MEDICINE

## 2021-10-04 PROCEDURE — G8484 FLU IMMUNIZE NO ADMIN: HCPCS | Performed by: INTERNAL MEDICINE

## 2021-10-04 RX ORDER — SULFACETAMIDE SODIUM 100 MG/G
0.5 OINTMENT OPHTHALMIC 3 TIMES DAILY
Qty: 3.5 G | Refills: 0 | Status: SHIPPED | OUTPATIENT
Start: 2021-10-04 | End: 2021-10-14

## 2021-10-04 RX ORDER — ONDANSETRON 4 MG/1
TABLET, FILM COATED ORAL
Qty: 30 TABLET | Refills: 1 | Status: SHIPPED | OUTPATIENT
Start: 2021-10-04 | End: 2022-02-25 | Stop reason: SDUPTHER

## 2021-10-04 RX ORDER — DESLORATADINE AND PSEUDOEPHEDRINE SULFATE 2.5; 12 MG/1; MG/1
1 TABLET, EXTENDED RELEASE ORAL 2 TIMES DAILY
Qty: 60 TABLET | Refills: 1 | Status: SHIPPED | OUTPATIENT
Start: 2021-10-04 | End: 2021-11-03

## 2021-10-04 ASSESSMENT — ENCOUNTER SYMPTOMS
SINUS PAIN: 1
EYE ITCHING: 1
EYE REDNESS: 1
SINUS PRESSURE: 1
EYE DISCHARGE: 1

## 2021-10-04 ASSESSMENT — PATIENT HEALTH QUESTIONNAIRE - PHQ9
2. FEELING DOWN, DEPRESSED OR HOPELESS: 0
SUM OF ALL RESPONSES TO PHQ QUESTIONS 1-9: 0
SUM OF ALL RESPONSES TO PHQ9 QUESTIONS 1 & 2: 0
1. LITTLE INTEREST OR PLEASURE IN DOING THINGS: 0
SUM OF ALL RESPONSES TO PHQ QUESTIONS 1-9: 0
SUM OF ALL RESPONSES TO PHQ QUESTIONS 1-9: 0

## 2021-10-04 NOTE — TELEPHONE ENCOUNTER
Received a call from [facility] they can not get the sulfacetamide or anything like it.  What can we change it to?

## 2021-10-04 NOTE — ASSESSMENT & PLAN NOTE
Patient has contacts which he might have left for very prolonged period of time has redness and tearing of one eye but also has a lot of cats in his house

## 2021-10-04 NOTE — PROGRESS NOTES
Shawn Vogel ( 1970) is a 54 y.o. male,  Established , here for evaluation of the following chief complaint(s).   Eye Pain (right eye)        ASSESSMENT/PLAN:  Problem List        Respiratory    Perennial non-allergic rhinitis    Relevant Medications    desloratadine-pseudoephedrine (CLARINEX-D 12 HOUR) 2.5-120 MG per extended release tablet       Endocrine    Acquired hypothyroidism    Relevant Medications    levothyroxine (SYNTHROID) 112 MCG tablet    Other Relevant Orders    TSH WITH REFLEX TO FT4       Other    Low grade B-cell lymphoma (HCC)    Relevant Medications    ondansetron (ZOFRAN) 4 MG tablet    Conjunctivitis - Primary     Patient has contacts which he might have left for very prolonged period of time has redness and tearing of one eye but also has a lot of cats in his house          Relevant Medications    sulfacetamide (BLEPH-10) 10 % ophthalmic ointment    Combined hyperlipidemia      Well-controlled, continue current medications, medication adherence emphasized and lifestyle modifications recommended         Relevant Medications    sildenafil (REVATIO) 20 MG tablet    Other Relevant Orders    Lipid Panel    Anemia associated with chemotherapy      Monitored by specialist- no acute findings meriting change in the plan               Results for orders placed or performed during the hospital encounter of 21   CBC Auto Differential   Result Value Ref Range    WBC 3.46 (L) 4.23 - 9.07 K/uL    RBC 2.90 (L) 4.63 - 6.08 M/uL    Hemoglobin 10.7 (L) 13.7 - 17.5 g/dL    Hematocrit 30.9 (L) 40.1 - 51.0 %    .6 (H) 79.0 - 92.2 fL    MCH 36.9 (H) 25.7 - 32.2 pg    MCHC 34.6 32.3 - 36.5 g/dL    RDW 12.9 11.6 - 14.4 %    Platelets 128 540 - 499 K/uL    MPV 8.8 7.4 - 10.4 fL    Neutrophils % 82.7 (H) 34.0 - 71.1 %    Lymphocytes % 9.2 (L) 19.3 - 53.1 %    Monocytes % 6.6 4.7 - 12.5 %    Eosinophils % 0.9 0.7 - 7.0 %    Basophils % 0.6 0.1 - 1.2 %    Neutrophils Absolute 2.86 1.56 - 6.13 K/uL Lymphocytes Absolute 0.32 (L) 1.18 - 3.74 K/uL    Monocytes Absolute 0.23 (L) 0.24 - 0.82 K/uL    Eosinophils Absolute 0.03 (L) 0.04 - 0.54 K/uL    Basophils Absolute 0.02 0.01 - 0.08 K/uL       Return in about 4 months (around 2/4/2022). HPI    57-year-old male FUV  Known Low grade B cell Lymphoma, ff by Oncology  Phu Grullon well controlled  Hypothyroidism, taking synthroid  Tony  HL  Chronic pain  Prediabetes   New R eye pink and excessive tearing, has many cats in their house  [facility]      Review of Systems   Constitutional: Negative for fever. HENT: Positive for postnasal drip, sinus pressure and sinus pain. Eyes: Positive for discharge, redness and itching. Negative for visual disturbance. Cardiovascular: Negative for chest pain. Gastrointestinal: Negative for abdominal pain. Heartburn   Genitourinary: Positive for enuresis. Negative for dysuria. Erectile dysfunction   Musculoskeletal: Positive for back pain. Neurological: Positive for numbness. Negative for weakness and headaches. Physical Exam  Vitals and nursing note reviewed. Constitutional:       General: He is not in acute distress. Appearance: He is well-developed. HENT:      Head: Normocephalic and atraumatic. Right Ear: External ear normal.      Left Ear: External ear normal.      Nose: Nose normal.   Eyes:      General: No scleral icterus. Conjunctiva/sclera:      Right eye: Right conjunctiva is injected. Pupils: Pupils are equal, round, and reactive to light. Neck:      Thyroid: No thyromegaly. Cardiovascular:      Rate and Rhythm: Normal rate and regular rhythm. Heart sounds: Normal heart sounds. No murmur heard. Pulmonary:      Effort: Pulmonary effort is normal.      Breath sounds: Normal breath sounds. No wheezing or rales. Abdominal:      General: Bowel sounds are normal. There is no distension. Palpations: Abdomen is soft.  There is no mass.      Tenderness: There is no abdominal tenderness. There is no rebound. Musculoskeletal:         General: No tenderness. Normal range of motion. Cervical back: Normal range of motion and neck supple. Lymphadenopathy:      Cervical: No cervical adenopathy. Skin:     General: Skin is warm and dry. Findings: No rash. Neurological:      Mental Status: He is alert and oriented to person, place, and time. Cranial Nerves: No cranial nerve deficit. Deep Tendon Reflexes: Reflexes normal.   Psychiatric:         Behavior: Behavior normal.         Thought Content:  Thought content normal.         Judgment: Judgment normal.           (Time Documentation Optional [id number])    An electronic signaturewaas used to authenticate this note  -Jimmie Cooper MD on 10/4/2021 at 12:20 PM

## 2021-10-04 NOTE — TELEPHONE ENCOUNTER
Called [facility] and let them know Dr France Ramirez message they VU and will get him which one they can.

## 2021-10-06 ENCOUNTER — TELEPHONE (OUTPATIENT)
Dept: INTERNAL MEDICINE | Age: 51
End: 2021-10-06

## 2021-10-06 NOTE — TELEPHONE ENCOUNTER
Received a denial on pts Clarinex d he has to try cetirzine, levocetirizine , loratadine or loratadine pseudoephedrine.

## 2021-10-11 ENCOUNTER — HOSPITAL ENCOUNTER (OUTPATIENT)
Dept: INFUSION THERAPY | Age: 51
Discharge: HOME | End: 2021-10-11
Payer: MEDICAID

## 2021-10-11 DIAGNOSIS — C85.10 LOW GRADE B-CELL LYMPHOMA (HCC): ICD-10-CM

## 2021-10-11 LAB
BASOPHILS ABSOLUTE: 0.01 K/UL (ref 0.01–0.08)
BASOPHILS RELATIVE PERCENT: 0.2 % (ref 0.1–1.2)
EOSINOPHILS ABSOLUTE: 0.05 K/UL (ref 0.04–0.54)
EOSINOPHILS RELATIVE PERCENT: 0.9 % (ref 0.7–7)
HCT VFR BLD CALC: 34.4 % (ref 40.1–51)
HEMOGLOBIN: 12 G/DL (ref 13.7–17.5)
LYMPHOCYTES ABSOLUTE: 0.46 K/UL (ref 1.18–3.74)
LYMPHOCYTES RELATIVE PERCENT: 8 % (ref 19.3–53.1)
MCH RBC QN AUTO: 36.5 PG (ref 25.7–32.2)
MCHC RBC AUTO-ENTMCNC: 34.9 G/DL (ref 32.3–36.5)
MCV RBC AUTO: 104.6 FL (ref 79–92.2)
MONOCYTES ABSOLUTE: 0.35 K/UL (ref 0.24–0.82)
MONOCYTES RELATIVE PERCENT: 6.1 % (ref 4.7–12.5)
NEUTROPHILS ABSOLUTE: 4.85 K/UL (ref 1.56–6.13)
NEUTROPHILS RELATIVE PERCENT: 84.8 % (ref 34–71.1)
PDW BLD-RTO: 11.3 % (ref 11.6–14.4)
PLATELET # BLD: 204 K/UL (ref 163–337)
PMV BLD AUTO: 9 FL (ref 7.4–10.4)
RBC # BLD: 3.29 M/UL (ref 4.63–6.08)
WBC # BLD: 5.72 K/UL (ref 4.23–9.07)

## 2021-10-11 PROCEDURE — 85025 COMPLETE CBC W/AUTO DIFF WBC: CPT

## 2021-10-13 ENCOUNTER — TELEPHONE (OUTPATIENT)
Dept: INTERNAL MEDICINE | Age: 51
End: 2021-10-13

## 2021-10-13 DIAGNOSIS — E03.9 ACQUIRED HYPOTHYROIDISM: Primary | ICD-10-CM

## 2021-10-13 RX ORDER — LEVOTHYROXINE SODIUM 0.12 MG/1
125 TABLET ORAL DAILY
Qty: 90 TABLET | Refills: 1 | Status: SHIPPED | OUTPATIENT
Start: 2021-10-13 | End: 2022-04-12

## 2021-10-13 NOTE — TELEPHONE ENCOUNTER
----- Message from Clarissa Mejias MD sent at 10/13/2021  2:04 PM CDT -----  Will increase Levothyroxine to 125, recheck TSH alone in 6-8 weeks

## 2021-10-16 ENCOUNTER — IMMUNIZATION (OUTPATIENT)
Dept: INTERNAL MEDICINE | Age: 51
End: 2021-10-16
Payer: MEDICAID

## 2021-10-16 DIAGNOSIS — Z23 NEED FOR INFLUENZA VACCINATION: Primary | ICD-10-CM

## 2021-10-16 PROCEDURE — 90674 CCIIV4 VAC NO PRSV 0.5 ML IM: CPT | Performed by: INTERNAL MEDICINE

## 2021-10-16 PROCEDURE — 99999 PR OFFICE/OUTPT VISIT,PROCEDURE ONLY: CPT | Performed by: INTERNAL MEDICINE

## 2021-10-16 PROCEDURE — 90471 IMMUNIZATION ADMIN: CPT | Performed by: INTERNAL MEDICINE

## 2021-10-16 NOTE — PROGRESS NOTES
After obtaining consent, and per orders of Dr. Naila Wright MD, injection of Flucelvax given in Left deltoid by [place] Fig[place]. Patient instructed to remain in clinic for 20 minutes afterwards, and to report any adverse reaction to me immediately.

## 2021-10-20 ENCOUNTER — OFFICE VISIT (OUTPATIENT)
Dept: NEUROLOGY | Age: 51
End: 2021-10-20
Payer: MEDICAID

## 2021-10-20 VITALS
WEIGHT: 189 LBS | BODY MASS INDEX: 29.66 KG/M2 | HEART RATE: 70 BPM | DIASTOLIC BLOOD PRESSURE: 82 MMHG | SYSTOLIC BLOOD PRESSURE: 124 MMHG | OXYGEN SATURATION: 99 % | HEIGHT: 67 IN

## 2021-10-20 DIAGNOSIS — G89.29 CHRONIC NECK PAIN: ICD-10-CM

## 2021-10-20 DIAGNOSIS — G43.011 INTRACTABLE MIGRAINE WITHOUT AURA AND WITH STATUS MIGRAINOSUS: ICD-10-CM

## 2021-10-20 DIAGNOSIS — Z99.89 CPAP (CONTINUOUS POSITIVE AIRWAY PRESSURE) DEPENDENCE: ICD-10-CM

## 2021-10-20 DIAGNOSIS — G47.33 OBSTRUCTIVE SLEEP APNEA: Primary | ICD-10-CM

## 2021-10-20 DIAGNOSIS — M54.2 CHRONIC NECK PAIN: ICD-10-CM

## 2021-10-20 DIAGNOSIS — Q04.8 CEREBELLAR TONSILLAR ECTOPIA (HCC): ICD-10-CM

## 2021-10-20 PROCEDURE — 4004F PT TOBACCO SCREEN RCVD TLK: CPT | Performed by: PHYSICIAN ASSISTANT

## 2021-10-20 PROCEDURE — G8482 FLU IMMUNIZE ORDER/ADMIN: HCPCS | Performed by: PHYSICIAN ASSISTANT

## 2021-10-20 PROCEDURE — 99214 OFFICE O/P EST MOD 30 MIN: CPT | Performed by: PHYSICIAN ASSISTANT

## 2021-10-20 PROCEDURE — G8417 CALC BMI ABV UP PARAM F/U: HCPCS | Performed by: PHYSICIAN ASSISTANT

## 2021-10-20 PROCEDURE — G8427 DOCREV CUR MEDS BY ELIG CLIN: HCPCS | Performed by: PHYSICIAN ASSISTANT

## 2021-10-20 NOTE — PROGRESS NOTES
[facility]  [address] [place] [address] Drive, [address]  [place], Mavis Mike  Phone [phone]  Fax [phone]       [facility] Sleep Follow Up Encounter      Information:   Patient Name: Washington Ocampo  :   1970  Age:   54 y.o. MRN:   [id number]  Account #:  [de-identified]  Today:                10/20/21    Provider:  Jordan Eaton PA-C    Chief Complaint   Patient presents with    Sleep Apnea     follow up        Subjective:   Washington Ocampo is a 54 y.o. male  with a history of severe JANET, migraines, and neck pain who comes in for a sleep clinic follow up. The HST, 2020 revealed an AHI of 38.2. The HST, 12/3/2020 revealed an AHI of 21.6. He is prescribed auto CPAP therapy with a pressure range of 8cm to 20cm. The compliance report indicates that he is averaging 8 hours of CPAP use per day. He reports that consistent PAP use has alleviated the previous JANET symptoms.      Location or symptom:  JANET  Onset:  HST: 2020   Timing:  q hs  Severity:  Severe  Associated:  Snoring, witnessed apneas, and excessive daytime somnolence  Alleviated:  CPAP     The migraines and neck pain are stable. He sometimes has occipital pain, especially if he extends his neck. The MRI brain, 2020 showed minimal chronic paranasal sinus disease. It showed minimal cerebellar tonsillar ectopia. He was diagnosed with lymphoma in July.        Objective:     Past Medical History:   Diagnosis Date    Anemia     Anxiety     CPAP (continuous positive airway pressure) dependence     Depression     Herniated lumbar intervertebral disc     Hyperglycemia 2021    Hypertriglyceridemia 10/16/2020    Kidney cysts     Liver cyst     Low testosterone     [facility]    Dr Adri Hampton     Migraine     Neck pain     Obstructive sleep apnea     OCD (obsessive compulsive disorder)     Poison ivy 2021    Somnolence, daytime 2020    Thyroid disease     Witnessed apneic spells 11/5/2020       Past Surgical History:   Procedure Laterality Date    APPENDECTOMY      COLONOSCOPY  approx 2017    Dr Stacy Desai @ [facility] hospt. \" polyps\" per pt recall    COLONOSCOPY N/A 11/06/2020    Dr Sharmaine Lind, internal hemorrhoids-Grade 1, 5 yr recall    HERNIA REPAIR      bih    THYROIDECTOMY      TOTAL    UPPER GASTROINTESTINAL ENDOSCOPY N/A 05/06/2021    Dr Macy Infante gastritis (avoid NSAIDS) (-)H.  pylori, w/push enteroscopy up to the the proximal jejunum-Gastric erosions,  non-obstructing distal esoph ring, sliding hh, gastritis, duodenitis       Recent Hospitalizations  ·     Significant Injuries  ·     Family History   Problem Relation Age of Onset    Stomach Cancer Maternal Uncle     Liver Cancer Maternal Uncle     Cancer Maternal Grandfather     Stomach Cancer Maternal Aunt     Cancer Other         LEUKEMIA    Colon Cancer Neg Hx     Colon Polyps Neg Hx     Esophageal Cancer Neg Hx     Rectal Cancer Neg Hx        Social History  Social History     Tobacco Use   Smoking Status Current Every Day Smoker    Packs/day: 2.00    Years: 34.00    Pack years: 68.00    Types: Cigarettes    Start date: 1986   Smokeless Tobacco Never Used   Tobacco Comment    Nicotine Gum severals times daily & Nioctine patches      Social History     Substance and Sexual Activity   Alcohol Use Yes    Alcohol/week: 1.0 standard drinks    Types: 1 Cans of beer per week    Comment: occasional      Social History     Substance and Sexual Activity   Drug Use Yes    Types: Marijuana    Comment: smokes marijuana daily, along with RSO (marijuana) oil 0.1ml under tongue            Current Outpatient Medications   Medication Sig Dispense Refill    levothyroxine (SYNTHROID) 125 MCG tablet Take 1 tablet by mouth daily 90 tablet 1    ondansetron (ZOFRAN) 4 MG tablet TAKE ONE TABLET BY MOUTH EVERY 6 HOURS AS NEEDED FOR NAUSEA 30 tablet 1    desloratadine-pseudoephedrine (CLARINEX-D 12 HOUR) 2.5-120 MG per extended release tablet Take 1 tablet by mouth 2 times daily 60 tablet 1    PARoxetine (PAXIL) 40 MG tablet TAKE ONE TABLET IN THE MORNING 30 tablet 0    medical marijuana Take by mouth as needed.  nicotine (NICODERM CQ) 14 MG/24HR Place 1 patch onto the skin every 24 hours      oxybutynin (DITROPAN XL) 10 MG extended release tablet Take 1 tablet by mouth daily 30 tablet 3    omeprazole (PRILOSEC) 40 MG delayed release capsule Take 1 capsule by mouth every morning (before breakfast) 90 capsule 1    clobetasol (TEMOVATE) 0.05 % ointment Apply topically 2 times daily. (Patient taking differently: 2 times daily as needed Apply topically 2 times daily.) 30 g 0    SUMAtriptan (IMITREX) 100 MG tablet As directed 9 tablet 5    testosterone (ANDROGEL) 25 MG/2.5GM (1%) GEL 1 % gel Apply 5 g topically daily for 30 days. Apply 4 clicks  (1ml) daily 1 Package 0    sildenafil (REVATIO) 20 MG tablet Take 2 tablets by mouth as needed (as needed) 60 tablet 3     No current facility-administered medications for this visit. Allergies:  Patient has no known allergies. REVIEW OF SYSTEMS     Constitutional: []? Fever []? Sweats []? Chills []? Recent Injury   [x]? Denies all unless marked  HENT:[]? Headache  []? Head Injury  []? Sore Throat  []? Ear Pain  []? Dizziness []? Hearing Loss   [x]? Denies all unless marked  Musculoskeletal: []? Arthralgia  []? Myalgias []? Muscle cramps  []? Muscle twitches   [x]? Denies all unless marked   Spine:  []? Neck pain  []? Back pain  []? Sciaticia  [x]? Denies all unless marked  Neurological:[]? Visual Disturbance []? Double Vision []? Slurred Speech []? Trouble swallowing  []? Vertigo []? Tingling []? Numbness []? Weakness []? Loss of Balance   []? Loss of Consciousness []? Memory Loss []? Seizures  [x]? Denies all unless marked  Psychiatric/Behavioral:[]? Depression []? Anxiety  [x]? Denies all unless marked  Sleep: []? Insomnia []? Sleep Disturbance []? Snoring []? Restless Legs []? Daytime Sleepiness [x]? Sleep Apnea  []? Denies all unless marked    The MA has completed the ROS with the patient. I have reviewed it in its' entirety with the patient and agree with the documentation. PHYSICAL EXAM  /82 (Site: Left Upper Arm, Position: Sitting, Cuff Size: Medium Adult)   Pulse 70   Ht 5' 7\" (1.702 m)   Wt 189 lb (85.7 kg)   SpO2 99%   BMI 29.60 kg/m²      Constitutional   Alert in NAD, well developed, pleasant and cooperative with exam; body habitus overweight as indicated by BMI  HEENT- Conjunctiva normal.  No scars, masses, or lesions over external nose or ears, hearing intact, no neck masses noted, no jugular vein distension, no bruit  Cardiac- Regular rate and rhythm  Pulmonary- Clear to auscultation, good expansion, normal effort without use of accessory muscles  Musculoskeletal  No significant wasting of muscles noted, no bony deformities  Extremities - No clubbing, cyanosis or edema  Skin  Warm, dry, and intact. No rash, erythema, or pallor  Psychiatric  Mood, affect, and behavior appear normal      Neurologic:  Extraocular movements are intact without nystagmus. PERRL. Visual fields are full to confrontation. Facial movements are symmetrical and normal.  Speech is precise. Extremity strength is normal in both uppers and lowers. Deep tendon reflexes are intact and symmetrical.  Rapid alternating movements are unimpaired. Finger-to-nose testing is performed well, without dysmetria. Gait is normal.    I reviewed the following studies:       []  :  Clinical laboratory test results     []  :  Radiology reports                    [x]  :  Review and summarization of medical records and/or obtain medical records        []  :  Previous/recent polysomnogram report(s)     []  :  Taylor Sleepiness Scale       [x]  :  Compliance download: The auto CPAP is set at a pressure range of 8cm to 20cm.  Compliance download shows that he uses device: 100% of the time;  percentage of days with usage >=4 hours: 94%. AHI: 3.3 (Large leaks)-discussed    Assessment:       ICD-10-CM    1. Obstructive sleep apnea  G47.33    2. Intractable migraine without aura and with status migrainosus  G43.011    3. Chronic neck pain  M54.2     G89.29    4. Cerebellar tonsillar ectopia (HCC)  Q04.8    5. CPAP (continuous positive airway pressure) dependence  Z99.89           [x]  :  Stable     []  :  Improved                       []  :  Well controlled              []  :  Resolving     []  :  Resolved     []  :  Inadequately controlled     []  :  Worsening     []  :  Additional workup planned    Patient is compliant and benefiting from therapy as indicated by compliance evaluation and patient report. Plan:     No orders of the defined types were placed in this encounter. 1.   Previously or presently advised of the etiology,  pathophysiology, diagnosis, treatment options, and risks of untreated JANET. Risks may include, but are not limited to  hypertension, coronary artery disease, atrial fibrillation, CHF, diabetes, stroke, weight gain, impaired cognition, daytime somnolence, and motor vehicle accidents. Advised to abstain from driving or operating heavy machinery when drowsy and the use of respiratory suppressants. Discussed diagnostic studies and potential treatment plan. 2.  Will evaluate for PAP clinical benefit and and compliance during a 30 day period within the preceding 90 days PRN. 3.  The following educational material has been included in this visit after visit summary for your review: JANET/PAP guidelines-Discussed with the patient and all questions fully answered. 4.  Continue CPAP  therapy. The patient voices understanding and recognizes the need for adherence to the prescribed therapy   5. Order-supplies-[employer]  6.   Follow up in 6 months

## 2021-10-20 NOTE — LETTER
[facility] Neurology [facility] Sleep Medicine  [address] [place] [address] Drive, [address] [place] [address], [facility]  Phone [phone]  Fax [phone]             Re:  Daina Hinojosamartina    10/20/21  :  1970  Address: [address] [place] [id number]       Replinishible PAP Supplies, 1 year supply  Item HPCPS Code Frequency   Mask of choice  or  1 per 3 months   Nasal Mask cushion/pillows  or  2 per 30 days   Full Face Mask Interface  1 per 30 days   Headgear  1 per 6 months   Tubing, length of choice  or  1 per 3 months   Water Chamber  1 per 6 months   Chinstrap  1 per 6 months   Disposable Filters  2 per 30 days   Reusable Filters  1 per 6 months     Diagnoses:  Obstructive sleep apnea (G47.33)  Length of Need: Lifetime, 99    Ordering Provider: Campbell Dejesus PA-C  NPI:  [id number]        Signature: [unfilled]        Date: 10/20/2021      Electronically Signed by Campbell Dejesus PA-C  on 10/20/2021 at 11:08 AM

## 2021-10-20 NOTE — PATIENT INSTRUCTIONS
Patient education: Sleep apnea in adults       INTRODUCTION  Normally during sleep, air moves through the throat and in and out of the lungs at a regular rhythm. In a person with sleep apnea, air movement is periodically diminished or stopped. There are two types of sleep apnea: obstructive sleep apnea and central sleep apnea. In obstructive sleep apnea, breathing is abnormal because of narrowing or closure of the throat. In central sleep apnea, breathing is abnormal because of a change in the breathing control and rhythm. Sleep apnea is a serious condition that can affect a person's ability to safely perform normal daily activities and can affect long term health. Approximately 25 percent of adults are at risk for sleep apnea of some degree. Men are more commonly affected than women. Other risk factors include middle and older age, being overweight or obese, and having a small mouth and throat. This topic review focuses on the most common type of sleep apnea in adults, obstructive sleep apnea (JANET). HOW SLEEP APNEA OCCURS  The throat is surrounded by muscles that control the airway for speaking, swallowing, and breathing. During sleep, these muscles are less active, and this causes the throat to narrow. In most people, this narrowing does not affect breathing. In others, it can cause snoring, sometimes with reduced or completely blocked airflow. A completely blocked airway without airflow is called an obstructive apnea. Partial obstruction with diminished airflow is called a hypopnea. A person may have apnea and hypopnea during sleep. Insufficient breathing due to apnea or hypopnea causes oxygen levels to fall and carbon dioxide to rise. Because the airway is blocked, breathing faster or harder does not help to improve oxygen levels until the airway is reopened. Typically, the obstruction requires the person to awaken to activate the upper airway muscles.  Once the airway is opened, the person then takes adults. ?Male sex  JANET is two times more common in men, especially in middle age. ?Obesity  The more obese a person is, the more likely he or she is to have JANET. ? Sedation from medication or alcohol  This interferes with the ability to awaken from sleep and can lengthen periods of apnea (no breathing), with potentially dangerous consequences. ? Abnormality of the airway. SLEEP APNEA CONSEQUENCES  Complications of sleep apnea can include daytime sleepiness and difficulty concentrating. The consequence of this is an increased risk of accidents and errors in daily activities. Studies have shown that people with severe JANET are more than twice as likely to be involved in a motor vehicle accident as people without these conditions. People with JANET are encouraged to discuss options for driving, working, and performing other high-risk tasks with a healthcare provider. In addition, people with untreated JANET may have an increased risk of cardiovascular problems such as high blood pressure, heart attack, abnormal heart rhythms, or stroke. This risk may be due to changes in the heart rate and blood pressure that occur during sleep. SLEEP APNEA DIAGNOSIS  The diagnosis of JANET is best made by a knowledgeable sleep medicine specialist who has an understanding of the individual's health issues. The diagnosis is usually based upon the person's medical history, physical examination, and testing, including:  ? A complaint of snoring and ineffective sleep  ? Neck size (greater than 16 inches in men or 14 inches in women) is associated with an increased risk of sleep apnea  ? A small upper airway: difficulty seeing the throat because of a tongue that is large for the mouth  ? High blood pressure, especially if it is resistant to treatment  ? If a bed partner has observed the patient during episodes of stopped breathing (apnea), choking, or gasping during sleep, there is a strong possibility of sleep apnea.     Testing is usually performed in a sleep laboratory. A full sleep study is called a polysomnogram. The polysomnogram measures the breathing effort and airflow, blood oxygen level, heart rate and rhythm, duration of the various stages of sleep, body position, and movement of the arms/legs. Home monitoring devices are available that can perform a sleep study. This is a reasonable alternative to conventional testing in a sleep laboratory if the clinician strongly suspects moderate or severe sleep apnea and the patient does not have other illnesses or sleep disorders that may interfere with the results. SLEEP APNEA TREATMENT  Sleep apnea is best treated by a knowledgeable sleep medicine specialist. The goal of treatment is to maintain an open airway during sleep. Effective treatment will eliminate the symptoms of sleep disturbance; long-term health consequences are also reduced. Most treatments require nightly use. The challenge for the clinician and the patient is to select an effective therapy that is appropriate for the patient's problem and that is acceptable for long term use. Auto-titrating CPAP delivers an amount of PAP that varies during the night. The variation is dependent on event detection software algorithms, which will increase the pressure gradually in response to flow changes until adequate patency is detected. After a period of sustained upper airway patency, the delivered level of pressure gradually decreases until the algorithm identifies recurrent upper airway obstruction, at which point the delivered pressure again increases. The result is that the delivered pressure varies throughout the night, in an effort to provide the lowest pressure that is necessary to maintain upper airway patency. Continuous positive airway pressure (CPAP)  The most effective treatment for sleep apnea uses air pressure from a mechanical device to keep the upper airway open during sleep.  A CPAP (continuous positive airway pressure)  device uses an air-tight attachment to the nose, typically a mask, connected to a tube and a blower which generates the pressure. Devices that fit comfortably into the nasal opening, rather than over the nose, are also available. CPAP should be used any time the person sleeps (day or night). The CPAP device is usually used for the first time in the sleep lab, where a technician can adjust the pressure and select the best equipment to keep the airway open. Alternatively, an auto device with a self-adjusting pressure feature, provided with proper education and training, can get treatment started without another sleep test. While the treatment may seem uncomfortable, noisy, or bulky at first, most people accept the treatment after experiencing better sleep. However, difficulty with mask comfort and nasal congestion prevent up to 50 percent of people from using the treatment on a regular basis. Continued follow up with a healthcare provider helps to ensure that the treatment is effective and comfortable. Information from the CPAP machine is often used by physicians, therapists, and insurers to track the success of treatment. CPAP can be delivered with different features to improve comfort and solve problems that may come up during treatment. Changes in treatment may be needed if symptoms do not improve or if the persons condition changes, such as a gain or loss of weight. Adjust sleep position  Adjusting sleep position (to stay off the back) may help improve sleep quality in people who have JANET when sleeping on the back. However, this is difficult to maintain throughout the night and is rarely an adequate solution. Weight loss  Weight loss may be helpful for obese or overweight patients. Weight loss may be accomplished with dietary changes, exercise, and/or surgical treatment.  However, it can be difficult to maintain weight loss; the five-year success of non-surgical weight loss is only 5 percent, meaning that 95 percent of people regain lost weight. Avoid alcohol and other sedatives  Alcohol can worsen sleepiness, potentially increasing the risk of accidents or injury. People with JANET are often counseled to drink little to no alcohol, even during the daytime. Similarly, people who take anti-anxiety medications or sedatives to sleep should speak with their healthcare provider about the safety of these medications. People with JANET must notify all healthcare providers, including surgeons, about their condition and the potential risks of being sedated. People with JANET who are given anesthesia and/or pain medications require special management and close monitoring to reduce the risk of a blocked airway. Dental devices  A dental device, called an oral appliance or mandibular advancement device, can reposition the jaw (mandible), bringing the tongue and soft palate forward as well. This may relieve obstruction in some people. This treatment is excellent for reducing snoring, although the effect on JANET is sometimes more limited. As a result, dental devices are best used for mild cases of JANET when relief of snoring is the main goal. Failure to tolerate and accept CPAP is another indication for dental devices. While dental devices are not as effective as CPAP for JANET, some patients prefer a dental device to CPAP. Side effects of dental devices are generally minor but may include changes to the bite with prolonged use. Surgical treatment  Surgery is an alternative therapy for patients who cannot tolerate or do not improve with nonsurgical treatments such as CPAP or oral devices. Surgery can also be used in combination with other nonsurgical treatments. Surgical procedures reshape structures in the upper airways or surgically reposition bone or soft tissue. Uvulopalatopharyngoplasty (UPPP) removes the uvula and excessive tissue in the throat, including the tonsils, if present.  Other procedures, such as maxillomandibular advancement (MMA), address both the upper and lower pharyngeal airway more globally. UPPP alone has limited success rates (less than 50 percent) and people can relapse (when JANET symptoms return after surgery). As a result, this surgery is only recommended in a minority of people and should be considered with caution. MMA may have a higher success rate, particularly in people with abnormal jaw (maxilla and mandible) anatomy, but it is the most complicated procedure. A newer surgical approach, nerve stimulation to protrude the tongue, has promising success rates in very selected people. Tracheostomy creates a permanent opening in the neck. It is reserved for people with severe disease in whom less drastic measures have failed or are inappropriate. Although it is always successful in eliminating obstructive sleep apnea, tracheostomy requires significant lifestyle changes and carries some serious risks (eg, infection, bleeding, blockage). All surgical treatments require discussions about the goals of treatment, the expected outcomes, and potential complications. Hypoglossal nerve stimulator- \"Inspire\" device    PAP treatment failure:  Possible causes of treatment failure include nonadherence or suboptimal adherence, weight gain, an inappropriate level of prescribed positive pressure, or an additional disorder causing sleepiness (eg, narcolepsy) that may require alterations in the therapeutic regimen. A review of medications should also be undertaken since many drugs may lead to sleepiness. Inadequate sleep time may also negate the expected effects from treatment of JANET. Also, pt's can have persistent hypersomnolence associated with sleep apnea even in the presence of adequate therapy and at those times Provigil or Nuvigil or other stimulants may be indicated.     Once the patient's positive airway pressure therapy has been optimized and symptoms resolved, a regimen of long-term follow-up should be established. Annual visits are reasonable, with more frequent visits in between if new issues arise. The purpose of long-term follow-up is to assess usage and monitor for recurrent JANET, new side effects, air leakage, and fluctuations in body weight. WHERE TO GET MORE INFORMATION  Your healthcare provider is the best source of information for questions and concerns related to your medical problem. Organizations  [employer] Association  Provides information about sleep apnea to the public, publishes a newsletter, and serves as an advocate for people with the disorder. Janell, [address]   Monica@[employer]. org   [email or web address]   Tel: [phone]   Fax: [facility] organization that works to [employer] and safety by promoting public understanding of sleep and sleep disorders. Supports sleep-related education, research, and advocacy; produces and distributes educational materials to the public and healthcare professionals; and offers postdoctoral fellowships and grants for sleep researchers. [address]   Hope@[email or web address]. org   [email or web address] org   Tel: [phone]   Fax: [phone]    Important information:  Medicare/private insurance CPAP/BiPAP/APAP requirements:  Medicare/private insurance has specific requirements for PAP compliance that must be met during the first 90 days of use to continue coverage for CPAP/BiPAP/APAP  from day 91 and beyond. The policy requires that patients use a PAP device 4 hours per 24 hour period, at least 70% of the time over a 30 day period. This data must be downloaded as a report direct from the PAP devices. This is called a compliance download. Your PAP supplier will assist you in this matter.      Note:  Where applicable, we will utilize PAP device efficiency reports, additional testing, and face-to-face  clinical evaluation subsequent to any treatment, changes in treatment, and continued treatment. Caution:  Please abstain from driving or engaging in other activities which may be hazardous in the presence of diminished alertness or daytime drowsiness. And avoid the use of sedatives or alcohol, which can worsen sleep apnea and daytime drowsiness. Mask suggestions:  -     Resmed Airfit N20 (Nasal) or F20 (Full face mask). They conform to your face, thus decreasing the potential for mask leakage. You might like the AirTouch F20(full face mask). It has a \"memory foam\" like cushion. The AirFit F30 is a smaller style full face mask designed to sit low on and cover less of your face for fewer facial marks. AirFit N30i has a top of the head tube with a nasal mask. AirFit P10 reported to be the most comfortable nasal pillow mask. Resmed Mirage FX reported to be the most comfortable nasal mask. Resmed Mirage [place] reported to be the most comfortable hybrid mask. AirTouch N20-memory foam nasal mask. Respironics: You might also like to try a nasal mask called a Dreamwear nasal mask or the Dreamwear nasal pillow. Another suggestion is the [facility], it is a minimal contact full face mask. The Francia Moe incredible under the nose design makes it the only full face mask that won't cause red marks on the bridge of your nose when compared to other full face masks. The Dreamwear full face mask has a  soft feel, unique in-frame air-flow, and innovative air tube connection at the top of the head for the ultimate in sleep comfort. Comfort Gel Blue. Dreamwear gel pillows. [employer]: Brevida nasal pillow mask and Simplus FFM    The use of a memory foam CPAP pillow supports the head and neck throughout the night.

## 2021-10-22 DIAGNOSIS — C85.10 LOW GRADE B-CELL LYMPHOMA (HCC): Primary | ICD-10-CM

## 2021-10-25 ENCOUNTER — HOSPITAL ENCOUNTER (OUTPATIENT)
Dept: INFUSION THERAPY | Age: 51
Discharge: HOME | End: 2021-10-25
Payer: MEDICAID

## 2021-10-25 DIAGNOSIS — C85.10 LOW GRADE B-CELL LYMPHOMA (HCC): ICD-10-CM

## 2021-10-25 DIAGNOSIS — F41.9 ANXIETY: Primary | ICD-10-CM

## 2021-10-25 LAB
HCT VFR BLD CALC: 37.7 % (ref 40.1–51)
HEMOGLOBIN: 13.4 G/DL (ref 13.7–17.5)
MCH RBC QN AUTO: 35.3 PG (ref 25.7–32.2)
MCHC RBC AUTO-ENTMCNC: 35.5 G/DL (ref 32.3–36.5)
MCV RBC AUTO: 99.2 FL (ref 79–92.2)
PDW BLD-RTO: 11.1 % (ref 11.6–14.4)
PLATELET # BLD: 197 K/UL (ref 163–337)
PMV BLD AUTO: 8.4 FL (ref 7.4–10.4)
RBC # BLD: 3.8 M/UL (ref 4.63–6.08)
WBC # BLD: 4.1 K/UL (ref 4.23–9.07)

## 2021-10-25 PROCEDURE — 85027 COMPLETE CBC AUTOMATED: CPT

## 2021-10-25 RX ORDER — PAROXETINE HYDROCHLORIDE 40 MG/1
TABLET, FILM COATED ORAL
Qty: 30 TABLET | Refills: 5 | Status: SHIPPED | OUTPATIENT
Start: 2021-10-25 | End: 2022-04-25

## 2021-10-25 NOTE — TELEPHONE ENCOUNTER
Kevin Vogel called to request a refill on his medication.       Last office visit : 10/4/2021   Next office visit : 2/25/2022     Requested Prescriptions     Pending Prescriptions Disp Refills    PARoxetine (PAXIL) 40 MG tablet [Pharmacy Med Name: PAROXETINE HCL 40 MG TABLET] 30 tablet 5     Sig: TAKE ONE TABLET IN THE MORNING            Alex Chase MA

## 2021-11-08 ENCOUNTER — HOSPITAL ENCOUNTER (OUTPATIENT)
Dept: INFUSION THERAPY | Age: 51
Discharge: HOME | End: 2021-11-08
Payer: MEDICAID

## 2021-11-08 DIAGNOSIS — C85.10 LOW GRADE B-CELL LYMPHOMA (HCC): ICD-10-CM

## 2021-11-08 LAB
BASOPHILS ABSOLUTE: 0.02 K/UL (ref 0.01–0.08)
BASOPHILS RELATIVE PERCENT: 0.5 % (ref 0.1–1.2)
EOSINOPHILS ABSOLUTE: 0.1 K/UL (ref 0.04–0.54)
EOSINOPHILS RELATIVE PERCENT: 2.3 % (ref 0.7–7)
HCT VFR BLD CALC: 37.6 % (ref 40.1–51)
HEMOGLOBIN: 13.4 G/DL (ref 13.7–17.5)
LYMPHOCYTES ABSOLUTE: 0.41 K/UL (ref 1.18–3.74)
LYMPHOCYTES RELATIVE PERCENT: 9.4 % (ref 19.3–53.1)
MCH RBC QN AUTO: 35 PG (ref 25.7–32.2)
MCHC RBC AUTO-ENTMCNC: 35.6 G/DL (ref 32.3–36.5)
MCV RBC AUTO: 98.2 FL (ref 79–92.2)
MONOCYTES ABSOLUTE: 0.29 K/UL (ref 0.24–0.82)
MONOCYTES RELATIVE PERCENT: 6.7 % (ref 4.7–12.5)
NEUTROPHILS ABSOLUTE: 3.52 K/UL (ref 1.56–6.13)
NEUTROPHILS RELATIVE PERCENT: 81.1 % (ref 34–71.1)
PDW BLD-RTO: 10.9 % (ref 11.6–14.4)
PLATELET # BLD: 194 K/UL (ref 163–337)
PMV BLD AUTO: 9.2 FL (ref 7.4–10.4)
RBC # BLD: 3.83 M/UL (ref 4.63–6.08)
WBC # BLD: 4.34 K/UL (ref 4.23–9.07)

## 2021-11-08 PROCEDURE — 85025 COMPLETE CBC W/AUTO DIFF WBC: CPT

## 2021-11-09 NOTE — PROGRESS NOTES
Patient:  Lory Ochoa  YOB: 1970  Date of Service: 11/15/2021  MRN: [id number]    Primary Care Physician: Brandy Calle MD    Chief Complaint   Patient presents with    Follow-up     Low grade B-cell lymphoma [facility])       Patient Seen, Chart, Consults notes, Labs, Radiology studies reviewed. Subjective:  Mr Tahir Geronimo is a 52year old gentleman managed with primary and secondary diagnoses as outlined:  · Stage I low-grade B-cell lymphoma (SLL) (small lymphocytic lymphoma) manifesting with 4 x 2.1 x 4.9 cm and 3.6 x 1.9 x 4.1 cm retroperitoneal para-aortic region lymph node conglomerates, 6/2/2021     Alton Arteaga has completed XRT and received 3000 cGy that was completed on 9/01/2021  He is asymptomatic, without B symptoms. He and his wife are remodeling his mothers house that she left to him. He tour of the vinyl, then the subfloor and now is working on the area beneath the house spraying it for mold insects etc.  He appears healthy. TARGET SLL/CLL SITES:  · 4 x 2.1 x 4.9 cm and 3.6 x 1.9 x 4.1 cm enhancing oval lobulated solid masses in the right upper abdomen, retroperitoneal para-aortic region located between the aorta and inferior vena cava    TUMOR HISTORY: Stage I low-grade B-cell lymphoma (SLL) 6/2/2021  Mr Tahir Geronimo was seen in initial consultation on 6/30/2021, referred by Dr Brandy Calle for newly diagnosed low grade B-cell lymphoma. In March 2021, Alton Arteaga decided he wanted to jump on a pogo stick but unfortunately was unsuccessful, fell and hurt his back. Alton Arteaga presented for work-up with low back pain. MRI LUMBAR SPINE WO CONTRAST  On 3/22/2021 at [facility] documented:  · Multilevel prominent disc osteophyte complexes and facetal arthropathy and resultant neural foramina spinal canal stenosis  · Incidentally noted is an ill-defined soft tissue mass located posterior to the distal left renal vein and inferior vena cava with anterior displacement of both.  It measures approximately 4 cm x 1.5 x 5.3 cm. This may represent an enlarged retroperitoneal lymph node? Mark Elroy A similar mass is seen at a lower level measuring 2.8 x 1.8 x 3.6 cm. CT ABDOMEN PELVIS W WO CONTRAST on 3/25/2021 at [facility] documented:  · Evidence of moderately enhancing oval lobulated solid masses in the right upper abdomen in the retroperitoneal para-aortic region located between the aorta and inferior vena cava. There is marked asymmetrical thickening of the right diaphragmatic luz. The 2 masses measure 4 cm x 2.1 x 4.9 cm and 3.6 x 1.9 x 4.1 cm. Potential moderate contrast enhancement. Inflammatory or neoplastic process is not excluded. Further evaluation is recommended. · No other enlarged lymph nodes are noted. Saulo Ribeiro was referred to GI for initial evaluation hoping for access to the abdominal mass via EGD/EUS. EGD BIOPSY on 5/6/2021 at [facility] per  Graciela Forman MD  IMPRESSION:  Esophagus: normal and the EG junction at 38 cm appeared essentially normal except for a nonobstructing distal esophageal ring. There is a small 2 to 3 cm in size sliding hiatal hernia present. Stomach:  Abnormal; patchy mucosal changes multiple small 1 to 2 mm erosions with surrounding small areas of erythema noted in the antrum suggestive of medical gastritis noted -  Gastric biopsies were taken from the antrum and body to rule out Helicobacter pylori infection. The proximal stomach including the fundus, cardia, body and angularis appeared completely normal.  No evidence of abnormal folds or unusual thickening noted in the fundus. NO ulcers or masses or gastric outlet obstruction or retained food or fluid. Rugae were normal and lumen distended well with insufflation. Retroflexed views otherwise revealed a normal GE junction, fundus and cardia as well. Duodenum: abnormal: 3 small erosions with surrounding erythema were noted in the posterior duodenal bulb suggestive of mild duodenitis.   Otherwise remainder of the examined duodenum including the bulb second third and fourth portions appeared essentially normal as did the first few centimeters of the examined proximal jejunum. No strictures or mass lesions or ulcers were noted. FINAL DIAGNOSIS:   Stomach, antrum, biopsy:   Antral-type gastric mucosa with acanthosis and tortuous glands, consistent with reactive gastropathy (chemical gastritis). Negative for active inflammation. Negative for Helicobacter pylori organisms by immunohistochemical staining. Negative for intestinal metaplasia. Negative for dysplasia. Dr Jesika Lou reviewed the images with the Dr. Nora Randall and he opined that the retroperitoneal lesion was not amenable to FNA by EUS or even by radiology here in [place] [id number] [address]. Dr Javy Mckeon referred Arnaldo Alvarez to Dr Wendy Collier with the surgical oncology department at [facility]     Dr Wendy Collier saw Arnaldo Alvarez on 5/21/2021. A CT-guided biopsy retroperitoneal mass was performed on 6/2/2021 at [facility]  Microscopic description: The cytospin shows a mixture of small and large lymphoid cells    CYTOLOGIC DIAGNOSIS:  Low-grade B-cell lymphoma most consistent with small lymphocytic lymphoma (SLL/CLL)  CD5 (+) lambda surface restricted B-cell population (48% of cells) by flow cytometry  IHC report the neoplastic cells are positive for PAX5 and LEF1. CD3 is positive and T cells. Cells are negative for cyclin D1 and SOX11. The Ki-67 proliferative index is variable and ranges from 5% to focally up to 20%    Flow cytometry immunophenotypic B cells  POSITIVE FOR CD5, lambda, CD45, CD20, CD22, CD19, CD23, and CD38  NEGATIVE FOR, CD10, CD79B and CD14    COMMENT:   Findings support involvement by a B cell lymphoma. Correlation with the forthcoming morphology (92-TC-) is recommended.     Physical examination at presentation did not reveal evidence of palpable pathological peripheral lymphadenopathy in the cervical, supraclavicular, infraclavicular, axillary or inguinal lymph node areas. Lungs are clear heart is regular normal S1-S2, no S3  Abdominal exam documents an enlarged liver, with fullness in the RUQ, somewhat tender. I am unable to palpate a spleen. Extremities no cyanosis clubbing or edema  Neurological exam is nonfocal intact with an intact mental status and normal cranial nerves II through XII. Serology on 6/30/21 revealed:  CMP - unremarkable  Iron - 149  TIBC - 460  Saturation - 32%  Ferritin -128  LDH - 441  TSH - 0.267  Uric Acid - 4.5  Folate - 4.2  Retic - 1.0  Haptoglobin-180  MMA - 109    B2M-1.9  Kappa light chains- 18.9  Lambda light chains-24.4  K/L ratio-0.77  IgG-590 ,IgA-176 , IgM-54  Total Protein - 6.2  M-spike-not observed    Hepatitis Panel - negative    Bone marrow biopsy and aspirate on 7/8/2021 is preliminary but has been discussed with Dr. Elio Bee indicating the following:  Morphologically the marrow is hypercellular (90%) with increased erythroid shift. There is no obvious evidence of panmyelosis  Flow cytometry shows no increase in number of blasts and no immunophenotypic evidence of involvement by non-Hodgkin's lymphoproliferative disorder. [address] study for myelodysplasia profile is completely negative  Cytogenetics documents a normal male karyotype  JAK2 V617F, JAK2 EXON 12, CALR MPL and KIT MUTATIONS ALL NEGATIVE    COMMENT:  The marrow is hypercellular (90% cellular) with increased erythroid precursors and a shift to immaturity. No increase in blasts or dysplasia is seen. The granulocytic series and megakaryocytes do not appear to be increased. Evaluation of peripheral blood smear does not show an increase in polychromasia, basophilia, leukoerythroblastic effect, macrocytosis or circulating blasts. No increase in spherocytes is seen in the included history shows no increase in bilirubin which would argue against hemolysis. FISH (MDS panel) is negative for abnormalities.   Evaluation for blood loss, nutritional deficiencies and exogenous testosterone administration may be helpful    CT neck and nasopharynx with contrast on 7/9/2021at [facility] documented:  · Scattered non pathologically enlarged lymph nodes are noted bilaterally, measuring up to 7 mm. · No cervical lymphadenopathy. CT CHEST W CONTRAST on  7/9/2021at [facility] documented:  · No enlarged axillary, hilar, or mediastinal lymph nodes. · No intrathoracic lymphadenopathy. · Emphysema. .    CT ABDOMEN PELVIS W IV CONTRAST on  7/9/2021 compared to 3/25/21 at [facility] documented:  · Aretroperitoneal mass in the upper abdomen is again noted. The more superior aspect of the mass measures 4.6 x 2 cm today and previously measured 4 x 2.1 cm. The more posterior and inferior portion of the mass measures 3.4 x 2.2 cm and previously measured 3.1 x 1.9 cm. There are no other enlarged lymph nodes in the abdomen or the pelvis. This is likely an enlarged lymph node. Other retroperitoneal mass lesions are not ruled out. Sarcoma and other tumors would also be in the differential.  · Stable 5-6 mm probable liver cyst.  · Gastric wall thickening likely an artifact of under distention. Mild diverticulosis of the colon. PET CT SKULL BASE TO MID THIGH on 7/9/2021at [facility], compared to CTs performed same day, documented:  · Slightly increased metabolic activity in the retroperitoneal mass seen on recent examination, with maximum SUV of 3.2. Neoplastic process remains in the differential. Tissue diagnosis is recommended. · For reference purposes, maximum SUV in the mediastinum is 2.3 and maximum SUV in the liver is 1.9. Transthoracic Echocardiography Report (TTE) on 7/9/21 at [facility] documented:  Summary:  LV is borderline dilated in size with normal systolic function. LV ejection fraction estimated at 55 to 60%. Diastolic function appears preserved. RV appears normal in size with normal systolic function. Normal left atrial size. Normal right atrial size.   Aortic valve is trileaflet with normal leaflet mobility. No stenosis or significant regurgitation. Mitral valve appears structurally normal with normal leaflet mobility. Trace mitral regurgitation. No stenosis. No significant tricuspid regurgitation    The diagnosis and extent of disease was discussed at length with Rome Morris, his wife and mother. Treatment alternatives include systemic therapy such as ibrutinib or alternatively limited treatment with XRT which would be reasonable given his young age and the limited and early stage of presentation. A call was placed to Dr. Shira Johnson who agreed to see Mr. Steve Galeana in consultation to discuss and consider XRT. I explained to Mr. Jignesh Fisher and family that despite potentially being able to include all of the known SLL in a radiation portal, over time, expectation will be that it is more likely than not that the SLL/CLL would recur but we do not have a timeframe and it could be in his best interest to hold off on systemic therapy. He and his family agree completely. Referral is made to Dr. Shira Johnson for evaluation. XRT to the 4 x 2.1 x 4.9 cm and 3.6 x 1.9 x 4.1 cm enhancing oval lobulated solid masses in the right upper abdomen, retroperitoneal para-aortic region located between the aorta and inferior vena cava was initiated on 8/12/2021. He received 3000 cGy that was completed on 9/01/2021.     CT ABDOMEN PELVIS W IV CONTRAST 9/20/2021: Comparison: CT abdomen and pelvis with contrast 7/9/2021  · Marked interval decrease in size of the retroperitoneal lymph nodes, barely visible on the current exam.  · 5 mm A tiny hypodensity is seen in the anterior right hepatic lobe, stable  · 2.5 x 0.7 cm Previously identified conglomerate of lymph nodes between the aorta and IVC, previously 4.6 x 2 cm  · 1.7 x 0.8 cm lymph node medial to the right kidney, previously 3.4 x 2.2 cm  · Small bowel in small bowel intussusception in the left mid abdomen,which is generally benign and related to peristalsis. No surrounding inflammatory change      TREATMENT SUMMARY:  · XRT to the 4 x 2.1 x 4.9 cm and 3.6 x 1.9 x 4.1 cm enhancing oval lobulated solid masses in the right upper abdomen, retroperitoneal para-aortic region located between the aorta and inferior vena cava was initiated on 8/12/2021. He received 3000 cGy that was completed on 9/01/2021      Allergies:  Patient has no known allergies. Medicines:  Current Outpatient Medications   Medication Sig Dispense Refill    PARoxetine (PAXIL) 40 MG tablet TAKE ONE TABLET IN THE MORNING 30 tablet 5    levothyroxine (SYNTHROID) 125 MCG tablet Take 1 tablet by mouth daily 90 tablet 1    ondansetron (ZOFRAN) 4 MG tablet TAKE ONE TABLET BY MOUTH EVERY 6 HOURS AS NEEDED FOR NAUSEA 30 tablet 1    medical marijuana Take by mouth as needed.  nicotine (NICODERM CQ) 14 MG/24HR Place 1 patch onto the skin every 24 hours      oxybutynin (DITROPAN XL) 10 MG extended release tablet Take 1 tablet by mouth daily 30 tablet 3    omeprazole (PRILOSEC) 40 MG delayed release capsule Take 1 capsule by mouth every morning (before breakfast) 90 capsule 1    clobetasol (TEMOVATE) 0.05 % ointment Apply topically 2 times daily. (Patient taking differently: 2 times daily as needed Apply topically 2 times daily.) 30 g 0    SUMAtriptan (IMITREX) 100 MG tablet As directed 9 tablet 5    testosterone (ANDROGEL) 25 MG/2.5GM (1%) GEL 1 % gel Apply 5 g topically daily for 30 days. Apply 4 clicks  (1ml) daily 1 Package 0    sildenafil (REVATIO) 20 MG tablet Take 2 tablets by mouth as needed (as needed) 60 tablet 3     No current facility-administered medications for this visit.        Past Medical History:      Diagnosis Date    Anemia     Anxiety     CPAP (continuous positive airway pressure) dependence     Depression     Herniated lumbar intervertebral disc     Hyperglycemia 01/05/2021    Hypertriglyceridemia 10/16/2020    Kidney cysts     Liver cyst     Low testosterone     Lymphoma ([facility] [address] 06/07/2021    Dr Yuliana Ly     Migraine     Neck pain     Obstructive sleep apnea     OCD (obsessive compulsive disorder)     Poison ivy 5/18/2021    Somnolence, daytime 11/5/2020    Thyroid disease     Witnessed apneic spells 11/5/2020        Past Surgical History:      Procedure Laterality Date    APPENDECTOMY      COLONOSCOPY  approx 2017    Dr Christopher Nash @ [facility] hos[facility] \" polyps\" per pt recall    COLONOSCOPY N/A 11/06/2020    Dr Patten Pac, internal hemorrhoids-Grade 1, 5 yr recall    HERNIA REPAIR      bih    THYROIDECTOMY      TOTAL    UPPER GASTROINTESTINAL ENDOSCOPY N/A 05/06/2021    Dr Angie Friedman gastritis (avoid NSAIDS) (-)H. pylori, w/push enteroscopy up to the the proximal jejunum-Gastric erosions,  non-obstructing distal esoph ring, sliding hh, gastritis, duodenitis        Family History:      Problem Relation Age of Onset    Stomach Cancer Maternal Uncle     Liver Cancer Maternal Uncle     Cancer Maternal Grandfather     Stomach Cancer Maternal Aunt     Cancer Other         LEUKEMIA    Colon Cancer Neg Hx     Colon Polyps Neg Hx     Esophageal Cancer Neg Hx     Rectal Cancer Neg Hx         Social History  Social History     Tobacco Use    Smoking status: Current Every Day Smoker     Packs/day: 2.00     Years: 34.00     Pack years: 68.00     Types: Cigarettes     Start date: 1986    Smokeless tobacco: Never Used    Tobacco comment: Nicotine Gum severals times daily & Nioctine patches    Vaping Use    Vaping Use: Never used   Substance Use Topics    Alcohol use:  Yes     Alcohol/week: 1.0 standard drink     Types: 1 Cans of beer per week     Comment: occasional     Drug use: Yes     Types: Marijuana [place])     Comment: smokes marijuana daily, along with RSO (marijuana) oil 0.1ml under tongue             Wt Readings from Last 3 Encounters:   11/15/21 190 lb (86.2 kg)   10/20/21 189 lb (85.7 kg) 10/04/21 189 lb 6.4 oz (85.9 kg)        Objective:  Vital Signs: Blood pressure 138/82, pulse 73, height 5' 7\" (1.702 m), weight 190 lb (86.2 kg), SpO2 97 %. Labs:  BMP:   No results for input(s): NA, K, CL, CO2, PHOS, BUN, CREATININE, CALCIUM in the last 72 hours. CBC:   No results for input(s): WBC, HGB, HCT, MCV, PLT in the last 72 hours. PT/INR: No results for input(s): PROTIME, INR in the last 72 hours. APTT: No results for input(s): APTT in the last 72 hours. Magnesium:No results for input(s): MG in the last 72 hours. Phosphorus:No results for input(s): PHOS in the last 72 hours. Hepatic:   No results for input(s): ALKPHOS, ALT, AST, PROT, BILITOT, BILIDIR, LABALBU in the last 72 hours. Cultures:   No results for input(s): CULTURE in the last 72 hours. Radiology reports as per the Radiologist  Radiology: No results found. ASSESSMENT AND PLAN:  #1   Stage I low-grade B-cell lymphoma (SLL - small lymphocytic lymphoma) of retroperitoneal/para-aortic region lymph node conglomerates, 6/2/2021     Mr Bernadette Logan is a 52year old gentleman managed with primary and secondary diagnoses as outlined:  · Stage I low-grade B-cell lymphoma (SLL) (small lymphocytic lymphoma) manifesting with 4 x 2.1 x 4.9 cm and 3.6 x 1.9 x 4.1 cm retroperitoneal para-aortic region lymph node conglomerates, 6/2/2021     Saulo Ribeiro has completed XRT and received 3000 cGy that was completed on 9/01/2021  He is asymptomatic, without B symptoms. He and his wife are remodeling his mothers house that she left to him. He tour of the vinyl, then the subfloor and now is working on the area beneath the house spraying it for mold insects etc.  He appears healthy.       CT ABDOMEN PELVIS W IV CONTRAST 9/20/2021: Comparison: CT abdomen and pelvis with contrast 7/9/2021  · Marked interval decrease in size of the retroperitoneal lymph nodes, barely visible on the current exam.  · 5 mm A tiny hypodensity is seen in the anterior right hepatic lobe, stable  · 2.5 x 0.7 cm Previously identified conglomerate of lymph nodes between the aorta and IVC, previously 4.6 x 2 cm  · 1.7 x 0.8 cm lymph node medial to the right kidney, previously 3.4 x 2.2 cm  · Small bowel in small bowel intussusception in the left mid abdomen,which is generally benign and related to peristalsis. No surrounding inflammatory change    Physical examination today, 11/15/2021, does not reveal evidence of palpable pathological peripheral lymphadenopathy in the cervical, supraclavicular, infraclavicular, axillary or inguinal lymph node areas. Lungs are clear heart is regular normal S1-S2, no S3  Abdominal exam documents an enlarged liver, with fullness in the RUQ, somewhat tender. I am unable to palpate a spleen. Extremities no cyanosis clubbing or edema  Neurological exam is nonfocal intact with an intact mental status and normal cranial nerves II through XII. He tolerated the radiation therapy without acute side effects. He is not having acute side effects at this moment. There are no radiation therapy hyperpigmentation or burn areas on the abdomen. He is pleased with the outcome and result. Conservative monitoring and management only warranted at this time. A CT scan of the abdomen and pelvis will be done prior to his return in 3 months, for continued monitoring of the residual lymphadenopathy described on the CT scan from 9/20/2021      #2  Macrocytic anemia    Anemia was documented at his initial visit on 6/30/2021. Serology on 6/30/21 revealed:  CMP - unremarkable  Iron - 149  TIBC - 460  Saturation - 32%  Ferritin -128  LDH - 441  TSH - 0.267  Uric Acid - 4.5  Folate - 4.2  Retic - 1.0   Haptoglobin-180  MMA - 109    B2M-1.9  Kappa light chains- 18.9  Lambda light chains-24.4  K/L ratio-0.77  IgG-590 ,IgA-176 , IgM-54  Total Protein - 6.2  M-spike-not observed    Hepatitis Panel - negative    CBC on 7/15/2021 revealed a WBC of 4.38.  Hgb is 8.4  with an MCV of 103.7 and platelet count of 795,610. CBC 09/13/2021 revealed a WBC of 3.58 . Hgb is 9.4 with an MCV of 112.8 and platelet count of 345,603 . CBC today 11/15/2021 reveals a WBC of 4.05 Hgb is 14.2 with an MCV of 95.4 and platelet count of 538,114 . No further intervention warranted from the anemia standpoint. Hemoglobin is recovered at 14.2. #3  Tumor screening and health maintenance    GI cancer screening -   Cscope 11/6/2020 per Dr Canchola Notice 5 yr recall  EGD -  5/6/2021 per Dr Canchola Notice      Prostate cancer screening    #4  Immunizations: Vaishalimikaela Becerra Vaccications received in March/April of 2021        Arnaldo Alvarez was seen today for follow-up. Diagnoses and all orders for this visit:    Low grade B-cell lymphoma ([facility] [address]  -     CT ABDOMEN PELVIS W IV CONTRAST Additional Contrast? Oral; Future    Retroperitoneal mass  -     CT ABDOMEN PELVIS W IV CONTRAST Additional Contrast? Oral; Future    Anemia, unspecified type    Health care maintenance        Orders Placed This Encounter   Procedures    CT ABDOMEN PELVIS W IV CONTRAST Additional Contrast? Oral     Compare to prev imaging 9/20/21     Standing Status:   Future     Standing Expiration Date:   11/15/2022     Order Specific Question:   Additional Contrast?     Answer:   Oral     Order Specific Question:   Reason for exam:     Answer:   enlarged lymph node, hx lymphoma       No orders of the defined types were placed in this encounter. Return in 3 months (on 2/15/2022) for follow-up with .

## 2021-11-15 ENCOUNTER — HOSPITAL ENCOUNTER (OUTPATIENT)
Dept: INFUSION THERAPY | Age: 51
Discharge: HOME | End: 2021-11-15
Payer: MEDICAID

## 2021-11-15 ENCOUNTER — OFFICE VISIT (OUTPATIENT)
Dept: HEMATOLOGY | Age: 51
End: 2021-11-15
Payer: MEDICAID

## 2021-11-15 VITALS
BODY MASS INDEX: 29.82 KG/M2 | OXYGEN SATURATION: 97 % | HEIGHT: 67 IN | HEART RATE: 73 BPM | DIASTOLIC BLOOD PRESSURE: 82 MMHG | WEIGHT: 190 LBS | SYSTOLIC BLOOD PRESSURE: 138 MMHG

## 2021-11-15 DIAGNOSIS — R19.00 RETROPERITONEAL MASS: ICD-10-CM

## 2021-11-15 DIAGNOSIS — D64.9 ANEMIA, UNSPECIFIED TYPE: ICD-10-CM

## 2021-11-15 DIAGNOSIS — C85.10 LOW GRADE B-CELL LYMPHOMA (HCC): Primary | ICD-10-CM

## 2021-11-15 DIAGNOSIS — C85.10 LOW GRADE B-CELL LYMPHOMA (HCC): ICD-10-CM

## 2021-11-15 DIAGNOSIS — E03.9 ACQUIRED HYPOTHYROIDISM: ICD-10-CM

## 2021-11-15 DIAGNOSIS — Z00.00 HEALTH CARE MAINTENANCE: ICD-10-CM

## 2021-11-15 LAB
ALBUMIN SERPL-MCNC: 4.2 G/DL (ref 3.5–5.2)
ALP BLD-CCNC: 71 U/L (ref 40–130)
ALT SERPL-CCNC: 19 U/L (ref 21–72)
ANION GAP SERPL CALCULATED.3IONS-SCNC: 7 MMOL/L (ref 7–19)
AST SERPL-CCNC: 22 U/L (ref 17–59)
BASOPHILS ABSOLUTE: 0.01 K/UL (ref 0.01–0.08)
BASOPHILS RELATIVE PERCENT: 0.2 % (ref 0.1–1.2)
BILIRUB SERPL-MCNC: 0.8 MG/DL (ref 0.2–1.3)
BUN BLDV-MCNC: 11 MG/DL (ref 9–20)
CALCIUM SERPL-MCNC: 9.7 MG/DL (ref 8.4–10.2)
CHLORIDE BLD-SCNC: 108 MMOL/L (ref 98–111)
CO2: 28 MMOL/L (ref 22–29)
CREAT SERPL-MCNC: 0.9 MG/DL (ref 0.6–1.2)
EOSINOPHILS ABSOLUTE: 0.09 K/UL (ref 0.04–0.54)
EOSINOPHILS RELATIVE PERCENT: 2.2 % (ref 0.7–7)
FERRITIN: 32.2 NG/ML (ref 17.9–464)
GFR NON-AFRICAN AMERICAN: >60
GLOBULIN: 2.2 G/DL
GLUCOSE BLD-MCNC: 143 MG/DL (ref 74–106)
HCT VFR BLD CALC: 39.1 % (ref 40.1–51)
HEMOGLOBIN: 14.2 G/DL (ref 13.7–17.5)
IRON SATURATION: 22 % (ref 14–50)
IRON: 77 UG/DL (ref 49–181)
LACTATE DEHYDROGENASE: 437 U/L (ref 313–618)
LYMPHOCYTES ABSOLUTE: 0.44 K/UL (ref 1.18–3.74)
LYMPHOCYTES RELATIVE PERCENT: 10.9 % (ref 19.3–53.1)
MCH RBC QN AUTO: 34.6 PG (ref 25.7–32.2)
MCHC RBC AUTO-ENTMCNC: 36.3 G/DL (ref 32.3–36.5)
MCV RBC AUTO: 95.4 FL (ref 79–92.2)
MONOCYTES ABSOLUTE: 0.2 K/UL (ref 0.24–0.82)
MONOCYTES RELATIVE PERCENT: 4.9 % (ref 4.7–12.5)
NEUTROPHILS ABSOLUTE: 3.31 K/UL (ref 1.56–6.13)
NEUTROPHILS RELATIVE PERCENT: 81.8 % (ref 34–71.1)
PDW BLD-RTO: 11 % (ref 11.6–14.4)
PLATELET # BLD: 213 K/UL (ref 163–337)
PMV BLD AUTO: 8.6 FL (ref 7.4–10.4)
POTASSIUM SERPL-SCNC: 3.9 MMOL/L (ref 3.5–5.1)
RBC # BLD: 4.1 M/UL (ref 4.63–6.08)
SODIUM BLD-SCNC: 143 MMOL/L (ref 137–145)
TOTAL IRON BINDING CAPACITY: 346 UG/DL (ref 261–462)
TOTAL PROTEIN: 6.4 G/DL (ref 6.3–8.2)
TSH SERPL DL<=0.05 MIU/L-ACNC: 5.69 UIU/ML (ref 0.47–4.68)
WBC # BLD: 4.05 K/UL (ref 4.23–9.07)

## 2021-11-15 PROCEDURE — 84443 ASSAY THYROID STIM HORMONE: CPT

## 2021-11-15 PROCEDURE — 83540 ASSAY OF IRON: CPT

## 2021-11-15 PROCEDURE — 80053 COMPREHEN METABOLIC PANEL: CPT

## 2021-11-15 PROCEDURE — 83615 LACTATE (LD) (LDH) ENZYME: CPT

## 2021-11-15 PROCEDURE — G8417 CALC BMI ABV UP PARAM F/U: HCPCS | Performed by: INTERNAL MEDICINE

## 2021-11-15 PROCEDURE — 85025 COMPLETE CBC W/AUTO DIFF WBC: CPT

## 2021-11-15 PROCEDURE — 83550 IRON BINDING TEST: CPT

## 2021-11-15 PROCEDURE — 4004F PT TOBACCO SCREEN RCVD TLK: CPT | Performed by: INTERNAL MEDICINE

## 2021-11-15 PROCEDURE — 99211 OFF/OP EST MAY X REQ PHY/QHP: CPT

## 2021-11-15 PROCEDURE — G8427 DOCREV CUR MEDS BY ELIG CLIN: HCPCS | Performed by: INTERNAL MEDICINE

## 2021-11-15 PROCEDURE — 99213 OFFICE O/P EST LOW 20 MIN: CPT | Performed by: INTERNAL MEDICINE

## 2021-11-15 PROCEDURE — G8482 FLU IMMUNIZE ORDER/ADMIN: HCPCS | Performed by: INTERNAL MEDICINE

## 2021-11-15 PROCEDURE — 82728 ASSAY OF FERRITIN: CPT

## 2021-11-24 ENCOUNTER — HOSPITAL ENCOUNTER (OUTPATIENT)
Dept: CT IMAGING | Age: 51
Discharge: HOME | End: 2021-11-24
Payer: MEDICAID

## 2021-11-24 DIAGNOSIS — R19.00 RETROPERITONEAL MASS: ICD-10-CM

## 2021-11-24 DIAGNOSIS — C85.10 LOW GRADE B-CELL LYMPHOMA (HCC): ICD-10-CM

## 2021-11-24 PROCEDURE — 74177 CT ABD & PELVIS W/CONTRAST: CPT

## 2021-11-24 PROCEDURE — 6360000004 HC RX CONTRAST MEDICATION: Performed by: INTERNAL MEDICINE

## 2021-11-24 RX ADMIN — IOPAMIDOL 75 ML: 755 INJECTION, SOLUTION INTRAVENOUS at 09:11

## 2021-12-14 ENCOUNTER — HOSPITAL ENCOUNTER (OUTPATIENT)
Dept: INFUSION THERAPY | Age: 51
Discharge: HOME | End: 2021-12-14
Payer: MEDICAID

## 2021-12-14 DIAGNOSIS — C85.10 LOW GRADE B-CELL LYMPHOMA (HCC): ICD-10-CM

## 2021-12-14 LAB
BASOPHILS ABSOLUTE: 0.02 K/UL (ref 0.01–0.08)
BASOPHILS RELATIVE PERCENT: 0.3 % (ref 0.1–1.2)
EOSINOPHILS ABSOLUTE: 0.15 K/UL (ref 0.04–0.54)
EOSINOPHILS RELATIVE PERCENT: 2.3 % (ref 0.7–7)
HCT VFR BLD CALC: 38.4 % (ref 40.1–51)
HEMOGLOBIN: 13.7 G/DL (ref 13.7–17.5)
LYMPHOCYTES ABSOLUTE: 0.73 K/UL (ref 1.18–3.74)
LYMPHOCYTES RELATIVE PERCENT: 11.1 % (ref 19.3–53.1)
MCH RBC QN AUTO: 32.8 PG (ref 25.7–32.2)
MCHC RBC AUTO-ENTMCNC: 35.7 G/DL (ref 32.3–36.5)
MCV RBC AUTO: 91.9 FL (ref 79–92.2)
MONOCYTES ABSOLUTE: 0.48 K/UL (ref 0.24–0.82)
MONOCYTES RELATIVE PERCENT: 7.3 % (ref 4.7–12.5)
NEUTROPHILS ABSOLUTE: 5.21 K/UL (ref 1.56–6.13)
NEUTROPHILS RELATIVE PERCENT: 79 % (ref 34–71.1)
PDW BLD-RTO: 11.5 % (ref 11.6–14.4)
PLATELET # BLD: 258 K/UL (ref 163–337)
PMV BLD AUTO: 8.7 FL (ref 7.4–10.4)
RBC # BLD: 4.18 M/UL (ref 4.63–6.08)
WBC # BLD: 6.59 K/UL (ref 4.23–9.07)

## 2021-12-14 PROCEDURE — 85025 COMPLETE CBC W/AUTO DIFF WBC: CPT

## 2022-01-10 ENCOUNTER — HOSPITAL ENCOUNTER (OUTPATIENT)
Dept: INFUSION THERAPY | Age: 52
Discharge: HOME | End: 2022-01-10
Payer: MEDICAID

## 2022-01-10 LAB
BASOPHILS ABSOLUTE: 0.01 K/UL (ref 0.01–0.08)
BASOPHILS RELATIVE PERCENT: 0.2 % (ref 0.1–1.2)
EOSINOPHILS ABSOLUTE: 0.13 K/UL (ref 0.04–0.54)
EOSINOPHILS RELATIVE PERCENT: 2.4 % (ref 0.7–7)
HCT VFR BLD CALC: 40.1 % (ref 40.1–51)
HEMOGLOBIN: 14.4 G/DL (ref 13.7–17.5)
LYMPHOCYTES ABSOLUTE: 0.7 K/UL (ref 1.18–3.74)
LYMPHOCYTES RELATIVE PERCENT: 13 % (ref 19.3–53.1)
MCH RBC QN AUTO: 32.6 PG (ref 25.7–32.2)
MCHC RBC AUTO-ENTMCNC: 35.9 G/DL (ref 32.3–36.5)
MCV RBC AUTO: 90.7 FL (ref 79–92.2)
MONOCYTES ABSOLUTE: 0.35 K/UL (ref 0.24–0.82)
MONOCYTES RELATIVE PERCENT: 6.5 % (ref 4.7–12.5)
NEUTROPHILS ABSOLUTE: 4.2 K/UL (ref 1.56–6.13)
NEUTROPHILS RELATIVE PERCENT: 77.9 % (ref 34–71.1)
PDW BLD-RTO: 12.4 % (ref 11.6–14.4)
PLATELET # BLD: 191 K/UL (ref 163–337)
PMV BLD AUTO: 8.7 FL (ref 7.4–10.4)
RBC # BLD: 4.42 M/UL (ref 4.63–6.08)
WBC # BLD: 5.39 K/UL (ref 4.23–9.07)

## 2022-01-10 PROCEDURE — 85025 COMPLETE CBC W/AUTO DIFF WBC: CPT

## 2022-01-19 ENCOUNTER — PATIENT MESSAGE (OUTPATIENT)
Dept: INTERNAL MEDICINE | Age: 52
End: 2022-01-19

## 2022-01-19 DIAGNOSIS — G43.011 INTRACTABLE MIGRAINE WITHOUT AURA AND WITH STATUS MIGRAINOSUS: Primary | ICD-10-CM

## 2022-01-19 RX ORDER — SUMATRIPTAN 100 MG/1
TABLET, FILM COATED ORAL
Qty: 9 TABLET | Refills: 5 | Status: SHIPPED | OUTPATIENT
Start: 2022-01-19

## 2022-01-19 NOTE — TELEPHONE ENCOUNTER
From: Gregg Nixon  To: Dr. Geraldine Reza  Sent: 1/19/2022 10:08 AM CST  Subject: Sumitription    Hi, I need a refill on my sumitription.  Thanks

## 2022-01-19 NOTE — TELEPHONE ENCOUNTER
Dipti Vogel called to request a refill on his medication.       Last office visit : 10/4/2021   Next office visit : 2/25/2022     Requested Prescriptions     Pending Prescriptions Disp Refills    SUMAtriptan (IMITREX) 100 MG tablet 9 tablet 5     Sig: As directed            Romero Meehan MA

## 2022-02-05 NOTE — PROGRESS NOTES
Patient:  Kaden Reeves  YOB: 1970  Date of Service: 7/18/2021  MRN: [id number]    Primary Care Physician: Jeaneen Kussmaul, MD    Chief Complaint   Patient presents with    Follow-up     Low grade B-cell lymphoma [facility])       Patient Seen, Chart, Consults notes, Labs, Radiology studies reviewed. Subjective:  Mr Puja Atkinson is a 52year old gentleman managed with primary and secondary diagnoses as outlined:  · SLL (small lymphocytic lymphoma) CLL manifesting with 4 x 2.1 x 4.9 cm and 3.6 x 1.9 x 4.1 cm retroperitoneal para-aortic region lymph node conglomerate's    Naila Desai has completed the work-up and is here accompanied by his wife and mother to review the results and for decision-making regarding treatment. TARGET SLL/CLL SITES:  · 4 x 2.1 x 4.9 cm and 3.6 x 1.9 x 4.1 cm enhancing oval lobulated solid masses in the right upper abdomen, retroperitoneal para-aortic region located between the aorta and inferior vena cava    TUMOR HISTORY: SLL/CLL 6/2/2021  Mr Puja Atkinson was seen in initial consultation on 6/30/2021, referred by Dr Jeaneen Kussmaul for newly diagnosed low grade B-cell lymphoma. In March of 2021, Naila Desai  decided he wanted to jump on a pogo stick but unfortunately was unsuccessful, fell and hurt his back. Naila Desai presented for work-up with low back pain    MRI LUMBAR SPINE WO CONTRAST  On 3/22/2021 at [facility] documented:  · Multilevel prominent disc osteophyte complexes and facetal arthropathy and resultant neural foramina spinal canal stenosis  · Incidentally noted is an ill-defined soft tissue mass located posterior to the distal left renal vein and inferior vena cava with anterior displacement of both. It measures approximately 4 cm x 1.5 x 5.3 cm. This may represent an enlarged retroperitoneal lymph node? Ángel Riggs A similar mass is seen at a lower level measuring 2.8 x 1.8 x 3.6 cm.       CT ABDOMEN PELVIS W WO CONTRAST on 3/25/2021 at [facility] documented:  · Evidence of moderately enhancing oval lobulated solid masses in the right upper abdomen in the retroperitoneal para-aortic region located between the aorta and inferior vena cava. There is marked asymmetrical thickening of the right diaphragmatic luz. The 2 masses measure 4 cm x 2.1 x 4.9 cm and 3.6 x 1.9 x 4.1 cm. Potential moderate contrast enhancement. Inflammatory or neoplastic process is not excluded. Further evaluation is recommended. · No other enlarged lymph nodes are noted. Lalita Mary was referred to GI for initial evaluation hoping for access to the abdominal mass via EGD/EUS. EGD BIOPSY on 5/6/2021 at [facility] per  Christiano Redmond MD  IMPRESSION:  Esophagus: normal and the EG junction at 38 cm appeared essentially normal except for a nonobstructing distal esophageal ring. There is a small 2 to 3 cm in size sliding hiatal hernia present. Stomach:  Abnormal; patchy mucosal changes multiple small 1 to 2 mm erosions with surrounding small areas of erythema noted in the antrum suggestive of medical gastritis noted -  Gastric biopsies were taken from the antrum and body to rule out Helicobacter pylori infection. The proximal stomach including the fundus, cardia, body and angularis appeared completely normal.  No evidence of abnormal folds or unusual thickening noted in the fundus. NO ulcers or masses or gastric outlet obstruction or retained food or fluid. Rugae were normal and lumen distended well with insufflation. Retroflexed views otherwise revealed a normal GE junction, fundus and cardia as well. Duodenum: abnormal: 3 small erosions with surrounding erythema were noted in the posterior duodenal bulb suggestive of mild duodenitis. Otherwise remainder of the examined duodenum including the bulb second third and fourth portions appeared essentially normal as did the first few centimeters of the examined proximal jejunum. No strictures or mass lesions or ulcers were noted.   FINAL DIAGNOSIS:   Stomach, antrum, biopsy: Antral-type gastric mucosa with acanthosis and tortuous glands, consistent with reactive gastropathy (chemical gastritis). Negative for active inflammation. Negative for Helicobacter pylori organisms by immunohistochemical staining. Negative for intestinal metaplasia. Negative for dysplasia. Dr Eleanor Platt reviewed the images with the Dr. Marcus Ramirez and he opined that the retroperitoneal lesion was not amenable to FNA by EUS or even by radiology here in [place] [address] S. Dr Triston Cortez referred Hienmikaela Boss to Dr Manjula Moe with the surgical oncology department at [facility]     Dr Manjula Moe saw Chester Boss on 5/21/2021. A CT-guided biopsy retroperitoneal mass was performed on 6/2/2021 at [facility]  Microscopic description: The cytospin shows a mixture of small and large lymphoid cells    CYTOLOGIC DIAGNOSIS:  Low-grade B-cell lymphoma most consistent with small lymphocytic lymphoma (SLL/CLL)  CD5 (+) lambda surface restricted B-cell population (48% of cells) by flow cytometry  IHC report the neoplastic cells are positive for PAX5 and LEF1. CD3 is positive and T cells. Cells are negative for cyclin D1 and SOX11. The Ki-67 proliferative index is variable and ranges from 5% to focally up to 20%    Flow cytometry immunophenotypic B cells  POSITIVE FOR CD5, lambda, CD45, CD20, CD22, CD19, CD23, and CD38  NEGATIVE FOR, CD10, CD79B and CD14    COMMENT:   Findings support involvement by a B cell lymphoma. Correlation with the forthcoming morphology ([id number]-) is recommended. Physical examination today does not reveal evidence of palpable pathological peripheral lymphadenopathy in the cervical, supraclavicular, infraclavicular, axillary or inguinal lymph node areas. Lungs are clear heart is regular normal S1-S2, no S3  Abdominal exam documents an enlarged liver, with fullness in the RUQ, somewhat tender. I am unable to palpate a spleen.   Extremities no cyanosis clubbing or edema  Neurological exam is nonfocal intact with an intact mental status and normal cranial nerves II through XII. Serology on 6/30/21 revealed:  CMP - unremarkable  Iron - 149  TIBC - 460  Saturation - 32%  Ferritin -128  LDH - 441  TSH - 0.267  Uric Acid - 4.5  Folate - 4.2  Retic - 1.0  Haptoglobin-180  MMA - 109    B2M-1.9  Kappa light chains- 18.9  Lambda light chains-24.4  K/L ratio-0.77  IgG-590 ,IgA-176 , IgM-54  Total Protein - 6.2  M-spike-not observed    Hepatitis Panel - negative    Bone marrow biopsy and aspirate on 7/8/2021 is preliminary but has been discussed with Dr. Tata Antonio indicating the following:  Morphologically the marrow is hypercellular (90%) with increased erythroid shift. There is no obvious evidence of panmyelosis  Flow cytometry shows no increase in number of blasts and no immunophenotypic evidence of involvement by non-Hodgkin's lymphoproliferative disorder. [facility] study for myelodysplasia profile is completely negative  Cytogenetics documents a normal male karyotype  Pending is JAK2 with reflex    CT neck and nasopharynx with contrast on 7/9/2021at [facility] documented:  · Scattered non pathologically enlarged lymph nodes are noted bilaterally, measuring up to 7 mm. · No cervical lymphadenopathy. CT CHEST W CONTRAST on  7/9/2021at [facility] documented:  · No enlarged axillary, hilar, or mediastinal lymph nodes. · No intrathoracic lymphadenopathy. · Emphysema. .    CT ABDOMEN PELVIS W IV CONTRAST on  7/9/2021 compared to 3/25/21 at [facility] documented:  · Aretroperitoneal mass in the upper abdomen is again noted. The more superior aspect of the mass measures 4.6 x 2 cm today and previously measured 4 x 2.1 cm. The more posterior and inferior portion of the mass measures 3.4 x 2.2 cm and previously measured 3.1 x 1.9 cm. There are no other enlarged lymph nodes in the abdomen or the pelvis. This is likely an enlarged lymph node. Other retroperitoneal mass lesions are not ruled out.  Sarcoma and other tumors would also be in the differential.  · Stable 5-6 mm probable liver cyst.  · Gastric wall thickening likely an artifact of under distention. Mild diverticulosis of the colon. PET CT SKULL BASE TO MID THIGH on 7/9/2021at [facility], compared to CTs performed same day, documented:  · Slightly increased metabolic activity in the retroperitoneal mass seen on recent examination, with maximum SUV of 3.2. Neoplastic process remains in the differential. Tissue diagnosis is recommended. · For reference purposes, maximum SUV in the mediastinum is 2.3 and maximum SUV in the liver is 1.9. Transthoracic Echocardiography Report (TTE) on 7/9/21 at [facility] documented:  Summary:  LV is borderline dilated in size with normal systolic function. LV ejection fraction estimated at 55 to 60%. Diastolic function appears preserved. RV appears normal in size with normal systolic function. Normal left atrial size. Normal right atrial size. Aortic valve is trileaflet with normal leaflet mobility. No stenosis or significant regurgitation. Mitral valve appears structurally normal with normal leaflet mobility. Trace mitral regurgitation. No stenosis. No significant tricuspid regurgitation    The diagnosis and extent of disease was discussed at length with Graham Martin, his wife and mother. Treatment alternatives include systemic therapy such as ibrutinib or alternatively limited treatment with XRT which would be reasonable given his young age and the limited and early stage of presentation. A call was placed to Dr. Nickolas Nguyen who agreed to see Mr. Indiana Gao in consultation to discuss and consider XRT. I explained to Mr. Davina Snell and family that despite potentially being able to include all of the known SLL in a radiation portal, over time, expectation will be that it is more likely than not that the SLL/CLL would recur but we do not have a timeframe and it could be in his best interest to hold off on systemic therapy.   He and his family agree completely. Referral is made to Dr. Fernanda Obrien for evaluation. TREATMENT SUMMARY:  · Anticipate definitive XRT to the retroperitoneal/para-aortic lymph node conglomerate        Allergies:  Patient has no known allergies. Medicines:  Current Outpatient Medications   Medication Sig Dispense Refill    medical marijuana Take by mouth as needed.  nicotine (NICODERM CQ) 14 MG/24HR Place 1 patch onto the skin every 24 hours      NONFORMULARY 4 mg as needed Equate Nicotine Gum      ondansetron (ZOFRAN) 4 MG tablet Take 1 tablet by mouth every 6 hours as needed for Nausea 20 tablet 0    oxybutynin (DITROPAN XL) 10 MG extended release tablet Take 1 tablet by mouth daily 30 tablet 3    PARoxetine (PAXIL) 40 MG tablet Take 1 tablet by mouth every morning 30 tablet 3    omeprazole (PRILOSEC) 40 MG delayed release capsule Take 1 capsule by mouth every morning (before breakfast) 90 capsule 1    clobetasol (TEMOVATE) 0.05 % ointment Apply topically 2 times daily. (Patient taking differently: 2 times daily as needed Apply topically 2 times daily.) 30 g 0    thyroid (ARMOUR THYROID) 120 MG tablet Take 1 tablet by mouth daily 90 tablet 3    SUMAtriptan (IMITREX) 100 MG tablet As directed 9 tablet 5    testosterone (ANDROGEL) 25 MG/2.5GM (1%) GEL 1 % gel Apply 5 g topically daily for 30 days. Apply 4 clicks  (1ml) daily 1 Package 0    sildenafil (REVATIO) 20 MG tablet Take 2 tablets by mouth as needed (as needed) 60 tablet 3     No current facility-administered medications for this visit.        Past Medical History:      Diagnosis Date    Anemia     Anxiety     CPAP (continuous positive airway pressure) dependence     Depression     Herniated lumbar intervertebral disc     Hyperglycemia 01/05/2021    Kidney cysts     Liver cyst     Low testosterone     Lymphoma ([facility] [address] 06/07/2021    Dr Darrius Jones     Migraine     Neck pain     Obstructive sleep apnea     OCD (obsessive compulsive disorder)     Thyroid disease         Past Surgical History:      Procedure Laterality Date    APPENDECTOMY      COLONOSCOPY  approx 2017    Dr Alba Hayes @ [facility] hospt[facility] \" polyps\" per pt recall    COLONOSCOPY N/A 11/06/2020    Dr Nicki Mcburney, internal hemorrhoids-Grade 1, 5 yr recall    HERNIA REPAIR      bih    THYROIDECTOMY      TOTAL    UPPER GASTROINTESTINAL ENDOSCOPY N/A 05/06/2021    Dr Michael Many gastritis (avoid NSAIDS) (-)H. pylori, w/push enteroscopy up to the the proximal jejunum-Gastric erosions,  non-obstructing distal esoph ring, sliding hh, gastritis, duodenitis        Family History:      Problem Relation Age of Onset    Stomach Cancer Maternal Uncle     Liver Cancer Maternal Uncle     Cancer Maternal Grandfather     Stomach Cancer Maternal Aunt     Cancer Other         LEUKEMIA    Colon Cancer Neg Hx     Colon Polyps Neg Hx     Esophageal Cancer Neg Hx     Rectal Cancer Neg Hx         Social History  Social History     Tobacco Use    Smoking status: Current Every Day Smoker     Packs/day: 2.00     Years: 34.00     Pack years: 68.00     Types: Cigarettes     Start date: 1986    Smokeless tobacco: Never Used    Tobacco comment: Nicotine Gum severals times daily & Nioctine patches    Vaping Use    Vaping Use: Never used   Substance Use Topics    Alcohol use: Yes     Comment: occasional     Drug use: Yes     Types: Marijuana     Comment: smokes marijuana daily, along with RSO (marijuana) oil 0.1ml under tongue             Review of Systems:  Constitutional: Negative for chills, fatigue, fever or significant weight loss. HENT: Negative for congestion, hearing loss, nosebleeds or sore throat. Eyes: Negative for photophobia, pain, discharge, redness and visual disturbance. Respiratory: Negative for cough, shortness of breath, or wheezing. Cardiovascular: Negative for chest pain, palpitations or leg swelling.    Gastrointestinal: Negative Pfizer dose 1, 2, and 3 for abdominal pain, blood in stool, constipation, diarrhea, nausea or vomiting. Genitourinary: Negative for dysuria, flank pain, frequency, hematuria or urgency. Musculoskeletal: Negative for back pain, joint swelling, myalgias or neck pain. Skin: Negative for rash or petechiae. Neurological: Negative for tremors, seizures, syncope, weakness or headaches. Hematological: No active bruising or bleeding. Psychiatric/Behavioral: Negative for hallucinations. Wt Readings from Last 3 Encounters:   07/15/21 191 lb 3.2 oz (86.7 kg)   07/08/21 193 lb 3.2 oz (87.6 kg)   07/06/21 191 lb 12.8 oz (87 kg)        Objective:  Vital Signs: Blood pressure 138/64, pulse 83, height 5' 7\" (1.702 m), weight 191 lb 3.2 oz (86.7 kg), SpO2 97 %. Physical Exam   Constitutional: Oriented to person, place, and time. No acute distress. Head: Normocephalic and atraumatic. Nose: Nose normal.   Mouth/Throat: Oropharynx is clear and moist. No oropharyngeal exudate. Eyes: Pupils are equal and round. Conjunctivae and EOM are normal. No scleral icterus. Neck: Normal range of motion. Neck supple. No JVD. No appreciable thyromegaly. Cardiovascular: Normal rate, regular rhythm, normal heart sounds and intact distal pulses. Exam reveals no gallop, murmurs or friction rub. Pulmonary/Chest: Effort normal and breath sounds normal. No respiratory distress. No wheezes. Abdominal: Soft. Bowel sounds are normal. No organomegally or masses. No tenderness. There is no rebound and no guarding. Musculoskeletal: Normal range of motion. No edema or tenderness. Lymphadenopathy: No cervical, axillary or inguinal lymphadenopathy. Neurological: Alert and oriented to person, place, and time. Cranial nerves are intact. Neurological exam is nonfocal  Skin: Skin is warm and dry. No rash noted. No erythema. No pallor.    Psychiatric: Judgment normal.          Labs:  BMP:   No results for input(s): NA, K, CL, CO2, PHOS, BUN, CREATININE, CALCIUM in the last 72 hours. CBC:   No results for input(s): WBC, HGB, HCT, MCV, PLT in the last 72 hours. PT/INR: No results for input(s): PROTIME, INR in the last 72 hours. APTT: No results for input(s): APTT in the last 72 hours. Magnesium:No results for input(s): MG in the last 72 hours. Phosphorus:No results for input(s): PHOS in the last 72 hours. Hepatic:   No results for input(s): ALKPHOS, ALT, AST, PROT, BILITOT, BILIDIR, LABALBU in the last 72 hours. Cultures:   No results for input(s): CULTURE in the last 72 hours. Radiology reports as per the Radiologist  Radiology: No results found. ASSESSMENT AND PLAN:  Mr Guadalupe Apley is a 52year old gentleman managed with primary and secondary diagnoses as outlined:  · SLL (small lymphocytic lymphoma) CLL manifesting with 4 x 2.1 x 4.9 cm and 3.6 x 1.9 x 4.1 cm retroperitoneal para-aortic region lymph node conglomerate's    Nayan Mcneill has completed the work-up and is here accompanied by his wife and mother to review the results and for decision-making regarding treatment. TARGET SLL/CLL SITES:  · 4 x 2.1 x 4.9 cm and 3.6 x 1.9 x 4.1 cm enhancing oval lobulated solid masses in the right upper abdomen, retroperitoneal para-aortic region located between the aorta and inferior vena cava    TUMOR HISTORY: SLL/CLL 6/2/2021  Mr Guadalupe Apley was seen in initial consultation on 6/30/2021, referred by Dr Sanjuana Elam for newly diagnosed low grade B-cell lymphoma. In March of 2021, Nayan Mcneill  decided he wanted to jump on a pogo stick but unfortunately was unsuccessful, fell and hurt his back.     Nayan Mcneill presented for work-up with low back pain    MRI LUMBAR SPINE WO CONTRAST  On 3/22/2021 at [facility] documented:  · Multilevel prominent disc osteophyte complexes and facetal arthropathy and resultant neural foramina spinal canal stenosis  · Incidentally noted is an ill-defined soft tissue mass located posterior to the distal left renal vein and inferior vena cava with anterior displacement of both. It measures approximately 4 cm x 1.5 x 5.3 cm. This may represent an enlarged retroperitoneal lymph node? Quinn Florse A similar mass is seen at a lower level measuring 2.8 x 1.8 x 3.6 cm. CT ABDOMEN PELVIS W WO CONTRAST on 3/25/2021 at [facility] documented:  · Evidence of moderately enhancing oval lobulated solid masses in the right upper abdomen in the retroperitoneal para-aortic region located between the aorta and inferior vena cava. There is marked asymmetrical thickening of the right diaphragmatic luz. The 2 masses measure 4 cm x 2.1 x 4.9 cm and 3.6 x 1.9 x 4.1 cm. Potential moderate contrast enhancement. Inflammatory or neoplastic process is not excluded. Further evaluation is recommended. · No other enlarged lymph nodes are noted. Charmayne Mola was referred to GI for initial evaluation hoping for access to the abdominal mass via EGD/EUS. EGD BIOPSY on 5/6/2021 at [facility] per  Rneee Mann MD  IMPRESSION:  Esophagus: normal and the EG junction at 38 cm appeared essentially normal except for a nonobstructing distal esophageal ring. There is a small 2 to 3 cm in size sliding hiatal hernia present. Stomach:  Abnormal; patchy mucosal changes multiple small 1 to 2 mm erosions with surrounding small areas of erythema noted in the antrum suggestive of medical gastritis noted -  Gastric biopsies were taken from the antrum and body to rule out Helicobacter pylori infection. The proximal stomach including the fundus, cardia, body and angularis appeared completely normal.  No evidence of abnormal folds or unusual thickening noted in the fundus. NO ulcers or masses or gastric outlet obstruction or retained food or fluid. Rugae were normal and lumen distended well with insufflation. Retroflexed views otherwise revealed a normal GE junction, fundus and cardia as well.     Duodenum: abnormal: 3 small erosions with surrounding erythema were noted in the posterior duodenal bulb suggestive of mild duodenitis. Otherwise remainder of the examined duodenum including the bulb second third and fourth portions appeared essentially normal as did the first few centimeters of the examined proximal jejunum. No strictures or mass lesions or ulcers were noted. FINAL DIAGNOSIS:   Stomach, antrum, biopsy:   Antral-type gastric mucosa with acanthosis and tortuous glands, consistent with reactive gastropathy (chemical gastritis). Negative for active inflammation. Negative for Helicobacter pylori organisms by immunohistochemical staining. Negative for intestinal metaplasia. Negative for dysplasia. Dr Cindia Schlatter reviewed the images with the Dr. Victor Manuel Daniels and he opined that the retroperitoneal lesion was not amenable to FNA by EUS or even by radiology here in [address] [place] [address]. Dr Momo Story referred Honor Stamp to Dr Clarissa Adams with the surgical oncology department at [facility]     Dr Clarissa Adams saw Schroeder Stamp on 5/21/2021. A CT-guided biopsy retroperitoneal mass was performed on 6/2/2021 at [facility]  Microscopic description: The cytospin shows a mixture of small and large lymphoid cells    CYTOLOGIC DIAGNOSIS:  Low-grade B-cell lymphoma most consistent with small lymphocytic lymphoma (SLL/CLL)  CD5 (+) lambda surface restricted B-cell population (48% of cells) by flow cytometry  IHC report the neoplastic cells are positive for PAX5 and LEF1. CD3 is positive and T cells. Cells are negative for cyclin D1 and SOX11. The Ki-67 proliferative index is variable and ranges from 5% to focally up to 20%    Flow cytometry immunophenotypic B cells  POSITIVE FOR CD5, lambda, CD45, CD20, CD22, CD19, CD23, and CD38  NEGATIVE FOR, CD10, CD79B and CD14    COMMENT:   Findings support involvement by a B cell lymphoma. Correlation with the forthcoming morphology ([id number]-) is recommended.     Physical examination today, 7/15/2021, does not reveal evidence of palpable pathological peripheral lymphadenopathy in the cervical, supraclavicular, infraclavicular, axillary or inguinal lymph node areas. Lungs are clear heart is regular normal S1-S2, no S3  Abdominal exam documents an enlarged liver, with fullness in the RUQ, somewhat tender. I am unable to palpate a spleen. Extremities no cyanosis clubbing or edema  Neurological exam is nonfocal intact with an intact mental status and normal cranial nerves II through XII. Serology on 6/30/21 revealed:  CMP - unremarkable  Iron - 149  TIBC - 460  Saturation - 32%  Ferritin -128  LDH - 441  TSH - 0.267  Uric Acid - 4.5  Folate - 4.2  Retic - 1.0  Haptoglobin-180  MMA - 109    B2M-1.9  Kappa light chains- 18.9  Lambda light chains-24.4  K/L ratio-0.77  IgG-590 ,IgA-176 , IgM-54  Total Protein - 6.2  M-spike-not observed    Hepatitis Panel - negative    Bone marrow biopsy and aspirate on 7/8/2021 is preliminary but has been discussed with Dr. Callie Youngblood indicating the following:  Morphologically the marrow is hypercellular (90%) with increased erythroid shift. There is no obvious evidence of panmyelosis  Flow cytometry shows no increase in number of blasts and no immunophenotypic evidence of involvement by non-Hodgkin's lymphoproliferative disorder. [address] study for myelodysplasia profile is completely negative  Cytogenetics documents a normal male karyotype  Pending is JAK2 with reflex    CT neck and nasopharynx with contrast on 7/9/2021at [facility] documented:  · Scattered non pathologically enlarged lymph nodes are noted bilaterally, measuring up to 7 mm. · No cervical lymphadenopathy. CT CHEST W CONTRAST on  7/9/2021at [facility] documented:  · No enlarged axillary, hilar, or mediastinal lymph nodes. · No intrathoracic lymphadenopathy. · Emphysema. .    CT ABDOMEN PELVIS W IV CONTRAST on  7/9/2021 compared to 3/25/21 at [facility] documented:  · Aretroperitoneal mass in the upper abdomen is again noted. The more superior aspect of the mass measures 4.6 x 2 cm today and previously measured 4 x 2.1 cm. The more posterior and inferior portion of the mass measures 3.4 x 2.2 cm and previously measured 3.1 x 1.9 cm. There are no other enlarged lymph nodes in the abdomen or the pelvis. This is likely an enlarged lymph node. Other retroperitoneal mass lesions are not ruled out. Sarcoma and other tumors would also be in the differential.  · Stable 5-6 mm probable liver cyst.  · Gastric wall thickening likely an artifact of under distention. Mild diverticulosis of the colon. PET CT SKULL BASE TO MID THIGH on 7/9/2021at [facility], compared to CTs performed same day, documented:  · Slightly increased metabolic activity in the retroperitoneal mass seen on recent examination, with maximum SUV of 3.2. Neoplastic process remains in the differential. Tissue diagnosis is recommended. · For reference purposes, maximum SUV in the mediastinum is 2.3 and maximum SUV in the liver is 1.9. Transthoracic Echocardiography Report (TTE) on 7/9/21 at [facility] documented:  Summary:  LV is borderline dilated in size with normal systolic function. LV ejection fraction estimated at 55 to 60%. Diastolic function appears preserved. RV appears normal in size with normal systolic function. Normal left atrial size. Normal right atrial size. Aortic valve is trileaflet with normal leaflet mobility. No stenosis or significant regurgitation. Mitral valve appears structurally normal with normal leaflet mobility. Trace mitral regurgitation. No stenosis. No significant tricuspid regurgitation    The diagnosis and extent of disease was discussed at length with Haven Guan, his wife and mother. Treatment alternatives include systemic therapy such as ibrutinib or alternatively limited treatment with XRT which would be reasonable given his young age and the limited and early stage of presentation. A call was placed to Dr. Darrin Olivares who agreed to see Mr. Carmelita Florian in consultation to discuss and consider XRT.   I explained to Mr. Krzysztof Kelly and family that despite potentially being able to include all of the known SLL in a radiation portal, over time, expectation will be that it is more likely than not that the SLL/CLL would recur but we do not have a timeframe and it could be in his best interest to hold off on systemic therapy. He and his family agree completely. Referral is made to Dr. Lilly Dempsey for evaluation. #2  Macrocytic anemia    Anemia was documented at his initial visit on 6/30/2021. Serology on 6/30/21 revealed:  CMP - unremarkable  Iron - 149  TIBC - 460  Saturation - 32%  Ferritin -128  LDH - 441  TSH - 0.267  Uric Acid - 4.5  Folate - 4.2  Retic - 1.0  Haptoglobin-180  MMA - 109    B2M-1.9  Kappa light chains- 18.9  Lambda light chains-24.4  K/L ratio-0.77  IgG-590 ,IgA-176 , IgM-54  Total Protein - 6.2  M-spike-not observed    Hepatitis Panel - negative    CBC today 7/15/2021 reveals a WBC of 4.38. Hgb is 8.4  with an MCV of 103.7 and platelet count of 246. Further serology is being sent off and will be reviewed when available. #3  Tumor screening and health maintenance    GI cancer screening -   Cscope 11/6/2020 per Dr Ehsan Macario 5 yr recall  EGD -  5/6/2021 per Dr Blair Alfonso am scribing for Deanna Robison MD. Electronically signed by Marianne Hood RN on 7/18/2021 at 2:18 PM        Lalita Mary was seen today for follow-up. Diagnoses and all orders for this visit:    Low grade B-cell lymphoma ([facility] [address]  -     Amb External Referral To Radiation Oncology    Health care maintenance    Care plan discussed with patient    Anemia, unspecified type  -     Copper, Serum; Future  -     Zinc; Future  -     Annalisa Ferrari MD, Gastroenterology, [place]  -     CBC Auto Differential; Standing  -     Comprehensive Metabolic Panel; Future  -     Lactate Dehydrogenase; Future  -     Reticulocytes;  Future        Orders Placed This Encounter   Procedures    Copper, Serum     Standing Status:   Future     Standing Expiration Date:   7/15/2022    Zinc     Standing Status:   Future     Standing Expiration Date:   7/15/2022    CBC Auto Differential     Standing Status:   Standing     Number of Occurrences:   8     Standing Expiration Date:   7/15/2022    Comprehensive Metabolic Panel     Standing Status:   Future     Number of Occurrences:   1     Standing Expiration Date:   7/15/2022    Lactate Dehydrogenase     Standing Status:   Future     Number of Occurrences:   1     Standing Expiration Date:   7/15/2022    Reticulocytes     Standing Status:   Future     Standing Expiration Date:   7/15/2022    Amb External Referral To Radiation Oncology     Referral Priority:   Routine     Referral Reason:   Specialty Services Required     Referred to Provider:   Anu Mitchell     Requested Specialty:   Radiology     Number of Visits Requested:   Hari Mercedes MD, Gastroenterology, [place]     Referral Priority:   Routine     Referral Type:   Eval and Treat     Referral Reason:   Specialty Services Required     Referred to Provider:   Edilson Lopez MD     Requested Specialty:   Gastroenterology     Number of Visits Requested:   1       No orders of the defined types were placed in this encounter. Return in about 2 months (around 9/15/2021) for follow-up with . I, Dr. Karina Avila, personally performed the services described in this documentation as scribed by Samantha Cohen in my presence, and it is both accurate and complete.

## 2022-02-18 NOTE — PROGRESS NOTES
Patient:  Charlene Damon  YOB: 1970  Date of Service: 2/21/2022  MRN: [id number]    Primary Care Physician: Kristin Grigsby MD    Chief Complaint   Patient presents with    Follow-up     Low grade B-cell lymphoma [facility])       Patient Seen, Chart, Consults notes, Labs, Radiology studies reviewed. Subjective:  Mr Lacho Stephens is a 52year old gentleman managed with primary and secondary diagnoses as outlined:  · Stage I low-grade B-cell lymphoma (SLL) (small lymphocytic lymphoma) manifesting with 4 x 2.1 x 4.9 cm and 3.6 x 1.9 x 4.1 cm retroperitoneal para-aortic region lymph node conglomerates, 6/2/2021   · Minimal cerebellar tonsillar ectopia, not meeting criteria for Chiari I malformation on MRI of the brain 11/19/2020 followed by Dr. Abiodun Agee with neurology    Wilene Schirmer has completed XRT and received 3000 cGy that was completed on 9/01/2021  He is asymptomatic, without B symptoms. He appears healthy, with the only complaint being nauseated in the mornings reportedly secondary to a minimal cerebellar tonsillar ectopia, not meeting criteria for Chiari I malformation on MRI of the brain 11/19/2020. TARGET SLL/CLL SITES:  · 4 x 2.1 x 4.9 cm and 3.6 x 1.9 x 4.1 cm enhancing oval lobulated solid masses in the right upper abdomen, retroperitoneal para-aortic region located between the aorta and inferior vena cava    TUMOR HISTORY: Stage I low-grade B-cell lymphoma (SLL) 6/2/2021  Mr Lacho Stephens was seen in initial consultation on 6/30/2021, referred by Dr Kristin Grigsby for newly diagnosed low grade B-cell lymphoma. In March 2021, Jace Floresirmer decided he wanted to jump on a pogo stick but unfortunately was unsuccessful, fell and hurt his back. Wilene Schirmer presented for work-up with low back pain.     MRI LUMBAR SPINE WO CONTRAST  On 3/22/2021 at [facility] documented:  · Multilevel prominent disc osteophyte complexes and facetal arthropathy and resultant neural foramina spinal canal stenosis  · Incidentally noted is an ill-defined soft tissue mass located posterior to the distal left renal vein and inferior vena cava with anterior displacement of both. It measures approximately 4 cm x 1.5 x 5.3 cm. This may represent an enlarged retroperitoneal lymph node? Marquis Knight A similar mass is seen at a lower level measuring 2.8 x 1.8 x 3.6 cm. CT ABDOMEN PELVIS W WO CONTRAST on 3/25/2021 at [facility] documented:  · Evidence of moderately enhancing oval lobulated solid masses in the right upper abdomen in the retroperitoneal para-aortic region located between the aorta and inferior vena cava. There is marked asymmetrical thickening of the right diaphragmatic luz. The 2 masses measure 4 cm x 2.1 x 4.9 cm and 3.6 x 1.9 x 4.1 cm. Potential moderate contrast enhancement. Inflammatory or neoplastic process is not excluded. Further evaluation is recommended. · No other enlarged lymph nodes are noted. Dipti Tena was referred to GI for initial evaluation hoping for access to the abdominal mass via EGD/EUS. EGD BIOPSY on 5/6/2021 at [facility] per  Phu Bello MD  IMPRESSION:  Esophagus: normal and the EG junction at 38 cm appeared essentially normal except for a nonobstructing distal esophageal ring. There is a small 2 to 3 cm in size sliding hiatal hernia present. Stomach:  Abnormal; patchy mucosal changes multiple small 1 to 2 mm erosions with surrounding small areas of erythema noted in the antrum suggestive of medical gastritis noted -  Gastric biopsies were taken from the antrum and body to rule out Helicobacter pylori infection. The proximal stomach including the fundus, cardia, body and angularis appeared completely normal.  No evidence of abnormal folds or unusual thickening noted in the fundus. NO ulcers or masses or gastric outlet obstruction or retained food or fluid. Rugae were normal and lumen distended well with insufflation.  Retroflexed views otherwise revealed a normal GE junction, fundus and cardia as well. Duodenum: abnormal: 3 small erosions with surrounding erythema were noted in the posterior duodenal bulb suggestive of mild duodenitis. Otherwise remainder of the examined duodenum including the bulb second third and fourth portions appeared essentially normal as did the first few centimeters of the examined proximal jejunum. No strictures or mass lesions or ulcers were noted. FINAL DIAGNOSIS:   Stomach, antrum, biopsy:   Antral-type gastric mucosa with acanthosis and tortuous glands, consistent with reactive gastropathy (chemical gastritis). Negative for active inflammation. Negative for Helicobacter pylori organisms by immunohistochemical staining. Negative for intestinal metaplasia. Negative for dysplasia. Dr Yu Guerrero reviewed the images with the Dr. Shayy Villeda and he opined that the retroperitoneal lesion was not amenable to FNA by EUS or even by radiology here in [facility]. Dr Violet Valentin referred Crecencio Primrose to Dr Lidia Burrell with the surgical oncology department at [facility]     Dr Lidia Burrell saw Crecencio Primrose on 5/21/2021. A CT-guided biopsy retroperitoneal mass was performed on 6/2/2021 at [facility]  Microscopic description: The cytospin shows a mixture of small and large lymphoid cells    CYTOLOGIC DIAGNOSIS:  Low-grade B-cell lymphoma most consistent with small lymphocytic lymphoma (SLL/CLL)  CD5 (+) lambda surface restricted B-cell population (48% of cells) by flow cytometry  IHC report the neoplastic cells are positive for PAX5 and LEF1. CD3 is positive and T cells. Cells are negative for cyclin D1 and SOX11. The Ki-67 proliferative index is variable and ranges from 5% to focally up to 20%    Flow cytometry immunophenotypic B cells  POSITIVE FOR CD5, lambda, CD45, CD20, CD22, CD19, CD23, and CD38  NEGATIVE FOR, CD10, CD79B and CD14    COMMENT:   Findings support involvement by a B cell lymphoma.   Correlation with the forthcoming morphology (17-CH-) is recommended. Physical examination at presentation did not reveal evidence of palpable pathological peripheral lymphadenopathy in the cervical, supraclavicular, infraclavicular, axillary or inguinal lymph node areas. Lungs are clear heart is regular normal S1-S2, no S3  Abdominal exam documents an enlarged liver, with fullness in the RUQ, somewhat tender. I am unable to palpate a spleen. Extremities no cyanosis clubbing or edema  Neurological exam is nonfocal intact with an intact mental status and normal cranial nerves II through XII. Serology on 6/30/21 revealed:  CMP - unremarkable  Iron - 149  TIBC - 460  Saturation - 32%  Ferritin -128  LDH - 441  TSH - 0.267  Uric Acid - 4.5  Folate - 4.2  Retic - 1.0  Haptoglobin-180  MMA - 109    B2M-1.9  Kappa light chains- 18.9  Lambda light chains-24.4  K/L ratio-0.77  IgG-590 ,IgA-176 , IgM-54  Total Protein - 6.2  M-spike-not observed    Hepatitis Panel - negative    Bone marrow biopsy and aspirate on 7/8/2021 is preliminary but has been discussed with Dr. Irina Fang indicating the following:  Morphologically the marrow is hypercellular (90%) with increased erythroid shift. There is no obvious evidence of panmyelosis  Flow cytometry shows no increase in number of blasts and no immunophenotypic evidence of involvement by non-Hodgkin's lymphoproliferative disorder. [address] [employer]or study for myelodysplasia profile is completely negative  Cytogenetics documents a normal male karyotype  JAK2 V617F, JAK2 EXON 12, CALR MPL and KIT MUTATIONS ALL NEGATIVE    COMMENT:  The marrow is hypercellular (90% cellular) with increased erythroid precursors and a shift to immaturity. No increase in blasts or dysplasia is seen. The granulocytic series and megakaryocytes do not appear to be increased. Evaluation of peripheral blood smear does not show an increase in polychromasia, basophilia, leukoerythroblastic effect, macrocytosis or circulating blasts.   No increase in spherocytes is seen in the included history shows no increase in bilirubin which would argue against hemolysis. FISH (MDS panel) is negative for abnormalities. Evaluation for blood loss, nutritional deficiencies and exogenous testosterone administration may be helpful    CT neck and nasopharynx with contrast on 7/9/2021at [facility] documented:  · Scattered non pathologically enlarged lymph nodes are noted bilaterally, measuring up to 7 mm. · No cervical lymphadenopathy. CT CHEST W CONTRAST on  7/9/2021at [facility] documented:  · No enlarged axillary, hilar, or mediastinal lymph nodes. · No intrathoracic lymphadenopathy. · Emphysema. .    CT ABDOMEN PELVIS W IV CONTRAST on  7/9/2021 compared to 3/25/21 at [facility] documented:  · Aretroperitoneal mass in the upper abdomen is again noted. The more superior aspect of the mass measures 4.6 x 2 cm today and previously measured 4 x 2.1 cm. The more posterior and inferior portion of the mass measures 3.4 x 2.2 cm and previously measured 3.1 x 1.9 cm. There are no other enlarged lymph nodes in the abdomen or the pelvis. This is likely an enlarged lymph node. Other retroperitoneal mass lesions are not ruled out. Sarcoma and other tumors would also be in the differential.  · Stable 5-6 mm probable liver cyst.  · Gastric wall thickening likely an artifact of under distention. Mild diverticulosis of the colon. PET CT SKULL BASE TO MID THIGH on 7/9/2021at [facility], compared to CTs performed same day, documented:  · Slightly increased metabolic activity in the retroperitoneal mass seen on recent examination, with maximum SUV of 3.2. Neoplastic process remains in the differential. Tissue diagnosis is recommended. · For reference purposes, maximum SUV in the mediastinum is 2.3 and maximum SUV in the liver is 1.9. Transthoracic Echocardiography Report (TTE) on 7/9/21 at [facility] documented:  Summary:  LV is borderline dilated in size with normal systolic function.  LV ejection fraction estimated at 55 to 34%.  Diastolic function appears preserved. RV appears normal in size with normal systolic function. Normal left atrial size. Normal right atrial size. Aortic valve is trileaflet with normal leaflet mobility. No stenosis or significant regurgitation. Mitral valve appears structurally normal with normal leaflet mobility. Trace mitral regurgitation. No stenosis. No significant tricuspid regurgitation    The diagnosis and extent of disease was discussed at length with Lior Crystal, his wife and mother. Treatment alternatives include systemic therapy such as ibrutinib or alternatively limited treatment with XRT which would be reasonable given his young age and the limited and early stage of presentation. A call was placed to Dr. Asif Arreola who agreed to see Mr. Shannen Torres in consultation to discuss and consider XRT. I explained to Mr. Álvaro Hines and family that despite potentially being able to include all of the known SLL in a radiation portal, over time, expectation will be that it is more likely than not that the SLL/CLL would recur but we do not have a timeframe and it could be in his best interest to hold off on systemic therapy. He and his family agree completely. Referral is made to Dr. Asif Arreola for evaluation. XRT to the 4 x 2.1 x 4.9 cm and 3.6 x 1.9 x 4.1 cm enhancing oval lobulated solid masses in the right upper abdomen, retroperitoneal para-aortic region located between the aorta and inferior vena cava was initiated on 8/12/2021. He received 3000 cGy that was completed on 9/01/2021.     CT ABDOMEN PELVIS W IV CONTRAST 9/20/2021: Comparison: CT abdomen and pelvis with contrast 7/9/2021  · Marked interval decrease in size of the retroperitoneal lymph nodes, barely visible on the current exam.  · 5 mm A tiny hypodensity is seen in the anterior right hepatic lobe, stable  · 2.5 x 0.7 cm Previously identified conglomerate of lymph nodes between the aorta and IVC, previously 4.6 x 2 cm  · 1.7 x 0.8 cm lymph node medial to the right kidney, previously 3.4 x 2.2 cm  · Small bowel in small bowel intussusception in the left mid abdomen,which is generally benign and related to peristalsis. No surrounding inflammatory change    CT ABDOMEN PELVIS W IV CONTRAST at [address] on 11/24/2021: Comparison to 9/20/2021  · A stable CT scan of the abdomen and pelvis. No change in the previous study. · No evidence of abdominal or pelvic lymphadenopathy. · Diverticulosis of the distal colon. No evidence for diverticulitis. · Tiny hepatic nodules which are too small to be further characterized. No change    TREATMENT SUMMARY:  · XRT to the 4 x 2.1 x 4.9 cm and 3.6 x 1.9 x 4.1 cm enhancing oval lobulated solid masses in the right upper abdomen, retroperitoneal para-aortic region located between the aorta and inferior vena cava was initiated on 8/12/2021. He received 3000 cGy that was completed on 9/01/2021      Allergies:  Patient has no known allergies. Medicines:  Current Outpatient Medications   Medication Sig Dispense Refill    SUMAtriptan (IMITREX) 100 MG tablet As directed 9 tablet 5    PARoxetine (PAXIL) 40 MG tablet TAKE ONE TABLET IN THE MORNING 30 tablet 5    levothyroxine (SYNTHROID) 125 MCG tablet Take 1 tablet by mouth daily 90 tablet 1    ondansetron (ZOFRAN) 4 MG tablet TAKE ONE TABLET BY MOUTH EVERY 6 HOURS AS NEEDED FOR NAUSEA 30 tablet 1    medical marijuana Take by mouth as needed.  nicotine (NICODERM CQ) 14 MG/24HR Place 1 patch onto the skin every 24 hours      oxybutynin (DITROPAN XL) 10 MG extended release tablet Take 1 tablet by mouth daily 30 tablet 3    omeprazole (PRILOSEC) 40 MG delayed release capsule Take 1 capsule by mouth every morning (before breakfast) 90 capsule 1    clobetasol (TEMOVATE) 0.05 % ointment Apply topically 2 times daily.  (Patient taking differently: 2 times daily as needed Apply topically 2 times daily.) 30 g 0    testosterone (ANDROGEL) 25 MG/2.5GM (1%) GEL 1 % gel Apply 5 g topically daily for 30 days. Apply 4 clicks  (1ml) daily 1 Package 0    sildenafil (REVATIO) 20 MG tablet Take 2 tablets by mouth as needed (as needed) 60 tablet 3     No current facility-administered medications for this visit. Past Medical History:      Diagnosis Date    Anemia     Anxiety     CPAP (continuous positive airway pressure) dependence     Depression     Herniated lumbar intervertebral disc     Hyperglycemia 01/05/2021    Hypertriglyceridemia 10/16/2020    Kidney cysts     Liver cyst     Low testosterone     Lymphoma ([facility] [address] 06/07/2021    Dr Huey Crawley     Migraine     Neck pain     Obstructive sleep apnea     OCD (obsessive compulsive disorder)     Poison ivy 5/18/2021    Somnolence, daytime 11/5/2020    Thyroid disease     Witnessed apneic spells 11/5/2020        Past Surgical History:      Procedure Laterality Date    APPENDECTOMY      COLONOSCOPY  approx 2017    Dr Lupis Price @ [facility] hospt[facility] \" polyps\" per pt recall    COLONOSCOPY N/A 11/06/2020    Dr Jacquie Ruiz, internal hemorrhoids-Grade 1, 5 yr recall    HERNIA REPAIR      bih    THYROIDECTOMY      TOTAL    UPPER GASTROINTESTINAL ENDOSCOPY N/A 05/06/2021    Dr Arnulfo Alanis gastritis (avoid NSAIDS) (-)H.  pylori, w/push enteroscopy up to the the proximal jejunum-Gastric erosions,  non-obstructing distal esoph ring, sliding hh, gastritis, duodenitis        Family History:      Problem Relation Age of Onset    Stomach Cancer Maternal Uncle     Liver Cancer Maternal Uncle     Cancer Maternal Grandfather     Stomach Cancer Maternal Aunt     Cancer Other         LEUKEMIA    Colon Cancer Neg Hx     Colon Polyps Neg Hx     Esophageal Cancer Neg Hx     Rectal Cancer Neg Hx         Social History  Social History     Tobacco Use    Smoking status: Current Every Day Smoker     Packs/day: 2.00     Years: 34.00     Pack years: 68.00     Types: Cigarettes     Start date: 1986    Smokeless tobacco: Never Used    Tobacco comment: Nicotine Gum severals times daily & Nioctine patches    Vaping Use    Vaping Use: Never used   Substance Use Topics    Alcohol use: Yes     Alcohol/week: 1.0 standard drink     Types: 1 Cans of beer per week     Comment: occasional     Drug use: Yes     Types: Marijuana Westly Cliche)     Comment: smokes marijuana daily, along with RSO (marijuana) oil 0.1ml under tongue             Wt Readings from Last 3 Encounters:   02/21/22 193 lb 11.2 oz (87.9 kg)   11/15/21 190 lb (86.2 kg)   10/20/21 189 lb (85.7 kg)        Objective:  Vital Signs: Blood pressure 122/68, pulse 80, weight 193 lb 11.2 oz (87.9 kg), SpO2 97 %. Labs:  BMP:   No results for input(s): NA, K, CL, CO2, PHOS, BUN, CREATININE, CALCIUM in the last 72 hours. CBC:   Recent Labs     02/21/22  1018   WBC 5.60   HGB 13.5*   HCT 39.0*   MCV 92.6*        PT/INR: No results for input(s): PROTIME, INR in the last 72 hours. APTT: No results for input(s): APTT in the last 72 hours. Magnesium:No results for input(s): MG in the last 72 hours. Phosphorus:No results for input(s): PHOS in the last 72 hours. Hepatic:   No results for input(s): ALKPHOS, ALT, AST, PROT, BILITOT, BILIDIR, LABALBU in the last 72 hours. Cultures:   No results for input(s): CULTURE in the last 72 hours. Radiology reports as per the Radiologist  Radiology: No results found.      ASSESSMENT AND PLAN:  #1   Stage I low-grade B-cell lymphoma (SLL - small lymphocytic lymphoma) of retroperitoneal/para-aortic region lymph node conglomerates, 6/2/2021     Mr Sandra Carrillo is a 52year old gentleman managed with primary and secondary diagnoses as outlined:  · Stage I low-grade B-cell lymphoma (SLL) (small lymphocytic lymphoma) manifesting with 4 x 2.1 x 4.9 cm and 3.6 x 1.9 x 4.1 cm retroperitoneal para-aortic region lymph node conglomerates, 6/2/2021   · Minimal cerebellar tonsillar ectopia, not meeting criteria for Chiari I malformation on MRI of the brain 11/19/2020 followed by Dr. Roshan Miller with neurology    Camacho Montemayor has completed XRT and received 3000 cGy that was completed on 9/01/2021  He is asymptomatic, without B symptoms. He appears healthy, with the only complaint being nauseated in the mornings reportedly secondary to a minimal cerebellar tonsillar ectopia, not meeting criteria for Chiari I malformation on MRI of the brain 11/19/2020. CT ABDOMEN PELVIS W IV CONTRAST 9/20/2021: Comparison: CT abdomen and pelvis with contrast 7/9/2021  · Marked interval decrease in size of the retroperitoneal lymph nodes, barely visible on the current exam.  · 5 mm A tiny hypodensity is seen in the anterior right hepatic lobe, stable  · 2.5 x 0.7 cm Previously identified conglomerate of lymph nodes between the aorta and IVC, previously 4.6 x 2 cm  · 1.7 x 0.8 cm lymph node medial to the right kidney, previously 3.4 x 2.2 cm  · Small bowel in small bowel intussusception in the left mid abdomen,which is generally benign and related to peristalsis. No surrounding inflammatory change    CT ABDOMEN PELVIS W IV CONTRAST at [facility] on 11/24/2021: Comparison to 9/20/2021  · A stable CT scan of the abdomen and pelvis. No change in the previous study. · No evidence of abdominal or pelvic lymphadenopathy. · Diverticulosis of the distal colon. No evidence for diverticulitis. · Tiny hepatic nodules which are too small to be further characterized. No change    Physical examination today, 2/21/2022, does not reveal evidence of palpable pathological peripheral lymphadenopathy in the cervical, supraclavicular, infraclavicular, axillary or inguinal lymph node areas. Lungs are clear heart is regular normal S1-S2, no S3  Abdominal exam documents an enlarged liver, with fullness in the RUQ, somewhat tender. I am unable to palpate a spleen.   Extremities no cyanosis clubbing or edema  Neurological exam is nonfocal intact with an intact mental status and normal cranial nerves II through XII. Conservative monitoring and management only warranted at this time. I will order a repeat CT scan of the abdomen and pelvis when he returns in 3 months. I do not want to get this ordered sooner because he does not need another CT scan for another 3 months. #2  Macrocytic anemia    Anemia was documented at his initial visit on 6/30/2021. Serology on 6/30/21 revealed:  CMP - unremarkable  Iron - 149  TIBC - 460  Saturation - 32%  Ferritin -128  LDH - 441  TSH - 0.267  Uric Acid - 4.5  Folate - 4.2  Retic - 1.0   Haptoglobin-180  MMA - 109    B2M-1.9  Kappa light chains- 18.9  Lambda light chains-24.4  K/L ratio-0.77  IgG-590 ,IgA-176 , IgM-54  Total Protein - 6.2  M-spike-not observed    Hepatitis Panel - negative    Serology on 11/15/2021 revealed:  CMP -ALT 19,   Iron - 77  TIBC - 346  Saturation - 22%  Ferritin -32.2  LDH - 437  TSH - 5.690      CBC on 7/15/2021 revealed a WBC of 4.38. Hgb is 8.4  with an MCV of 103.7 and platelet count of 274,779. CBC 09/13/2021 revealed a WBC of 3.58 . Hgb is 9.4 with an MCV of 112.8 and platelet count of 951,407 . CBC 11/15/2021 revealed a WBC of 4.05 Hgb is 14.2 with an MCV of 95.4 and platelet count of 893,555 . CBC today 2/21/2022  reveals a WBC of  5.60 Hgb is 13.5 with an MCV of 92.6 and platelet count of 760,472 . No further intervention warranted from the anemia standpoint. Hemoglobin is recovered at 13.5.     #3  Tumor screening and health maintenance    GI cancer screening -   Cscope 11/6/2020 per Dr Rita Mendez 5 yr recall  EGD -  5/6/2021 per Dr Rita Mendez      Prostate cancer screening    #4  Immunizations:  Immunization History   Administered Date(s) Administered    COVID-19, Donald Healy, Primary or Immunocompromised, PF, 100mcg/0.5mL 03/24/2021, 04/21/2021, 10/01/2021    Hepatitis A/Hepatitis B (Twinrix) 04/27/2012    Influenza Virus Vaccine 10/20/2020    Influenza, MDCK Quadv, IM, PF (Flucelvax 2 yrs and older) 10/16/2021    Influenza, Quadv, adjuvanted, 65 yrs +, IM, PF (Fluad) 10/16/2020    Pneumococcal Polysaccharide ([id number]) 10/23/2015             Carlie Mathew was seen today for follow-up. Diagnoses and all orders for this visit:    Low grade B-cell lymphoma ([facility] [address]    Retroperitoneal mass    Health care maintenance        No orders of the defined types were placed in this encounter. No orders of the defined types were placed in this encounter. Return in about 3 months (around 5/21/2022) for follow-up with .

## 2022-02-21 ENCOUNTER — HOSPITAL ENCOUNTER (OUTPATIENT)
Dept: INFUSION THERAPY | Age: 52
Discharge: HOME | End: 2022-02-21
Payer: MEDICAID

## 2022-02-21 ENCOUNTER — OFFICE VISIT (OUTPATIENT)
Dept: HEMATOLOGY | Age: 52
End: 2022-02-21
Payer: MEDICAID

## 2022-02-21 VITALS
SYSTOLIC BLOOD PRESSURE: 122 MMHG | DIASTOLIC BLOOD PRESSURE: 68 MMHG | HEART RATE: 80 BPM | BODY MASS INDEX: 30.34 KG/M2 | WEIGHT: 193.7 LBS | OXYGEN SATURATION: 97 %

## 2022-02-21 DIAGNOSIS — C85.10 LOW GRADE B-CELL LYMPHOMA (HCC): Primary | ICD-10-CM

## 2022-02-21 DIAGNOSIS — R19.00 RETROPERITONEAL MASS: ICD-10-CM

## 2022-02-21 DIAGNOSIS — C85.10 LOW GRADE B-CELL LYMPHOMA (HCC): ICD-10-CM

## 2022-02-21 DIAGNOSIS — Z00.00 HEALTH CARE MAINTENANCE: ICD-10-CM

## 2022-02-21 LAB
HCT VFR BLD CALC: 39 % (ref 40.1–51)
HEMOGLOBIN: 13.5 G/DL (ref 13.7–17.5)
MCH RBC QN AUTO: 32.1 PG (ref 25.7–32.2)
MCHC RBC AUTO-ENTMCNC: 34.6 G/DL (ref 32.3–36.5)
MCV RBC AUTO: 92.6 FL (ref 79–92.2)
PDW BLD-RTO: 12.9 % (ref 11.6–14.4)
PLATELET # BLD: 217 K/UL (ref 163–337)
PMV BLD AUTO: 8 FL (ref 7.4–10.4)
RBC # BLD: 4.21 M/UL (ref 4.63–6.08)
WBC # BLD: 5.6 K/UL (ref 4.23–9.07)

## 2022-02-21 PROCEDURE — G8427 DOCREV CUR MEDS BY ELIG CLIN: HCPCS | Performed by: INTERNAL MEDICINE

## 2022-02-21 PROCEDURE — G8482 FLU IMMUNIZE ORDER/ADMIN: HCPCS | Performed by: INTERNAL MEDICINE

## 2022-02-21 PROCEDURE — 4004F PT TOBACCO SCREEN RCVD TLK: CPT | Performed by: INTERNAL MEDICINE

## 2022-02-21 PROCEDURE — 99214 OFFICE O/P EST MOD 30 MIN: CPT | Performed by: INTERNAL MEDICINE

## 2022-02-21 PROCEDURE — 99211 OFF/OP EST MAY X REQ PHY/QHP: CPT

## 2022-02-21 PROCEDURE — 85027 COMPLETE CBC AUTOMATED: CPT

## 2022-02-21 PROCEDURE — G8417 CALC BMI ABV UP PARAM F/U: HCPCS | Performed by: INTERNAL MEDICINE

## 2022-02-25 ENCOUNTER — OFFICE VISIT (OUTPATIENT)
Dept: INTERNAL MEDICINE | Age: 52
End: 2022-02-25
Payer: MEDICAID

## 2022-02-25 VITALS
SYSTOLIC BLOOD PRESSURE: 128 MMHG | WEIGHT: 198.4 LBS | HEART RATE: 79 BPM | HEIGHT: 67 IN | BODY MASS INDEX: 31.14 KG/M2 | DIASTOLIC BLOOD PRESSURE: 60 MMHG | OXYGEN SATURATION: 98 %

## 2022-02-25 DIAGNOSIS — E34.9 HYPOTESTOSTERONISM: ICD-10-CM

## 2022-02-25 DIAGNOSIS — K44.9 HIATAL HERNIA: ICD-10-CM

## 2022-02-25 DIAGNOSIS — G47.33 OBSTRUCTIVE SLEEP APNEA: ICD-10-CM

## 2022-02-25 DIAGNOSIS — J30.81 ALLERGY TO ANIMAL DANDER: ICD-10-CM

## 2022-02-25 DIAGNOSIS — I34.1 MITRAL VALVE PROLAPSE: ICD-10-CM

## 2022-02-25 DIAGNOSIS — F17.200 TOBACCO USE DISORDER: ICD-10-CM

## 2022-02-25 DIAGNOSIS — E03.9 ACQUIRED HYPOTHYROIDISM: Primary | ICD-10-CM

## 2022-02-25 DIAGNOSIS — K57.30 DIVERTICULOSIS OF COLON: ICD-10-CM

## 2022-02-25 DIAGNOSIS — K21.9 GASTROESOPHAGEAL REFLUX DISEASE WITHOUT ESOPHAGITIS: ICD-10-CM

## 2022-02-25 DIAGNOSIS — R73.03 PREDIABETES: ICD-10-CM

## 2022-02-25 DIAGNOSIS — D64.9 SYMPTOMATIC ANEMIA: ICD-10-CM

## 2022-02-25 DIAGNOSIS — R14.0 BLOATED ABDOMEN: ICD-10-CM

## 2022-02-25 DIAGNOSIS — C85.10 LOW GRADE B-CELL LYMPHOMA (HCC): ICD-10-CM

## 2022-02-25 DIAGNOSIS — E78.2 COMBINED HYPERLIPIDEMIA: ICD-10-CM

## 2022-02-25 PROCEDURE — 4004F PT TOBACCO SCREEN RCVD TLK: CPT | Performed by: INTERNAL MEDICINE

## 2022-02-25 PROCEDURE — G8482 FLU IMMUNIZE ORDER/ADMIN: HCPCS | Performed by: INTERNAL MEDICINE

## 2022-02-25 PROCEDURE — 99214 OFFICE O/P EST MOD 30 MIN: CPT | Performed by: INTERNAL MEDICINE

## 2022-02-25 PROCEDURE — G8427 DOCREV CUR MEDS BY ELIG CLIN: HCPCS | Performed by: INTERNAL MEDICINE

## 2022-02-25 PROCEDURE — G8417 CALC BMI ABV UP PARAM F/U: HCPCS | Performed by: INTERNAL MEDICINE

## 2022-02-25 RX ORDER — ONDANSETRON 4 MG/1
TABLET, FILM COATED ORAL
Qty: 30 TABLET | Refills: 1 | Status: SHIPPED | OUTPATIENT
Start: 2022-02-25 | End: 2022-06-20

## 2022-02-25 RX ORDER — LEVOCETIRIZINE DIHYDROCHLORIDE 5 MG/1
5 TABLET, FILM COATED ORAL NIGHTLY
Qty: 90 TABLET | Refills: 1 | Status: SHIPPED | OUTPATIENT
Start: 2022-02-25 | End: 2022-05-26

## 2022-02-25 RX ORDER — PANTOPRAZOLE SODIUM 40 MG/1
40 TABLET, DELAYED RELEASE ORAL
Qty: 90 TABLET | Refills: 1 | Status: SHIPPED | OUTPATIENT
Start: 2022-02-25 | End: 2022-08-11 | Stop reason: ALTCHOICE

## 2022-02-25 ASSESSMENT — PATIENT HEALTH QUESTIONNAIRE - PHQ9
SUM OF ALL RESPONSES TO PHQ QUESTIONS 1-9: 0
SUM OF ALL RESPONSES TO PHQ QUESTIONS 1-9: 0
SUM OF ALL RESPONSES TO PHQ9 QUESTIONS 1 & 2: 0
SUM OF ALL RESPONSES TO PHQ QUESTIONS 1-9: 0
SUM OF ALL RESPONSES TO PHQ QUESTIONS 1-9: 0
1. LITTLE INTEREST OR PLEASURE IN DOING THINGS: 0
2. FEELING DOWN, DEPRESSED OR HOPELESS: 0

## 2022-02-27 ASSESSMENT — ENCOUNTER SYMPTOMS
CONSTIPATION: 0
BLOOD IN STOOL: 0
DIARRHEA: 0
SHORTNESS OF BREATH: 0
WHEEZING: 0
COLOR CHANGE: 0
STRIDOR: 0
TROUBLE SWALLOWING: 0
SORE THROAT: 0
ABDOMINAL PAIN: 0
NAUSEA: 1
COUGH: 0
BACK PAIN: 1

## 2022-02-27 NOTE — ASSESSMENT & PLAN NOTE
Unclear control, lifestyle modifications recommended and Patient will undergo a repeat hemoglobin A1c to assess hemoglobin A1c to

## 2022-02-27 NOTE — PROGRESS NOTES
[facility] Gustavo Vogel ( 1970) is a 54 y.o. male,  Established, here for evaluation of the following chief complaint(s).   6 Month Follow-Up, Headache, and Nausea        ASSESSMENT/PLAN:  Problem List        Circulatory    Mitral valve prolapse      Asymptomatic, no treatment            Respiratory    Obstructive sleep apnea      Well-controlled, continue CPAP            Digestive    Gastroesophageal reflux disease without esophagitis      Well-controlled, continue current medications, medication adherence emphasized and lifestyle modifications recommended         Relevant Medications    omeprazole (PRILOSEC) 40 MG delayed release capsule    ondansetron (ZOFRAN) 4 MG tablet    pantoprazole (PROTONIX) 40 MG tablet    Diverticulosis of colon      Asymptomatic, continue current medications and lifestyle modifications recommended   High-fiber diet he is still continues to have problems with moving his bowels will try Metamucil or Benefiber            Endocrine    Acquired hypothyroidism - Primary      Unclear control, Patient has had no recent thyroid function test will continue current dose and await for thyroid function test clinically patient is clinically euthyroid         Relevant Medications    levothyroxine (SYNTHROID) 125 MCG tablet    Other Relevant Orders    TSH with Reflex to FT4       Other    Tobacco use disorder      Uncontrolled, STILL SMOKING         Symptomatic anemia    Relevant Orders    CBC    Prediabetes      Unclear control, lifestyle modifications recommended and Patient will undergo a repeat hemoglobin A1c to assess hemoglobin A1c to         Relevant Orders    Basic Metabolic Panel    Hemoglobin A1C    Low grade B-cell lymphoma (HCC)      MANAGED by Oncology         Relevant Medications    ondansetron (ZOFRAN) 4 MG tablet    levocetirizine (XYZAL) 5 MG tablet    Hypotestosteronism      on Testosterone gel         Hiatal hernia    Relevant Medications    pantoprazole (PROTONIX) 40 MG tablet Combined hyperlipidemia    Relevant Orders    Hepatic Function Panel    Lipid Panel    Bloated abdomen          Results for orders placed or performed during the hospital encounter of 02/21/22   CBC   Result Value Ref Range    WBC 5.60 4.23 - 9.07 K/uL    RBC 4.21 (L) 4.63 - 6.08 M/uL    Hemoglobin 13.5 (L) 13.7 - 17.5 g/dL    Hematocrit 39.0 (L) 40.1 - 51.0 %    MCV 92.6 (H) 79.0 - 92.2 fL    MCH 32.1 25.7 - 32.2 pg    MCHC 34.6 32.3 - 36.5 g/dL    RDW 12.9 11.6 - 14.4 %    Platelets 568 984 - 086 K/uL    MPV 8.0 7.4 - 10.4 fL       No follow-ups on file. HPI  53-year-old male presents for follow-up visit  Patient is known to have chronic low back pain  GERD well-controlled with PPI  Diverticulosis with constipation plans to buy fiber still has constipation  Allergies are well controlled he has cats at home  Patient has obstructive sleep apnea on CPAP  Hypothyroidism on Synthroid replacement therapy  Low-grade B-cell lymphoma managed by oncology in remission  Patient also is a chronic smoker who is still actively smoking  Prediabetes unclear control patient has not gone for lab work      Review of Systems   Constitutional: Positive for activity change, appetite change and fatigue. Negative for chills, diaphoresis and fever. HENT: Negative for congestion, hearing loss, postnasal drip, sore throat, tinnitus and trouble swallowing. Eyes: Negative for visual disturbance. Respiratory: Negative for cough, shortness of breath, wheezing and stridor. Cardiovascular: Negative for chest pain, palpitations and leg swelling. Gastrointestinal: Positive for nausea (and vomitting). Negative for abdominal pain, blood in stool, constipation and diarrhea. Heartburn   Endocrine: Negative for cold intolerance. Genitourinary: Negative for dysuria, enuresis and frequency. Erectile dysfunction   Musculoskeletal: Positive for back pain. Negative for arthralgias and joint swelling.    Skin: Negative for color change and wound. Allergic/Immunologic: Negative for environmental allergies. Neurological: Positive for numbness. Negative for dizziness, weakness, light-headedness and headaches. Hematological: Negative for adenopathy. Does not bruise/bleed easily. Psychiatric/Behavioral: Positive for sleep disturbance. Negative for decreased concentration and dysphoric mood. The patient is nervous/anxious. Physical Exam  Vitals and nursing note reviewed. Constitutional:       General: He is not in acute distress. Appearance: He is well-developed. HENT:      Head: Normocephalic and atraumatic. Right Ear: External ear normal.      Left Ear: External ear normal.      Nose: Nose normal.   Eyes:      General: No scleral icterus. Conjunctiva/sclera: Conjunctivae normal.      Pupils: Pupils are equal, round, and reactive to light. Neck:      Thyroid: No thyromegaly. Cardiovascular:      Rate and Rhythm: Normal rate and regular rhythm. Heart sounds: Normal heart sounds. No murmur heard. Pulmonary:      Effort: Pulmonary effort is normal.      Breath sounds: Normal breath sounds. No wheezing or rales. Abdominal:      General: Bowel sounds are normal. There is no distension. Palpations: Abdomen is soft. There is no mass. Tenderness: There is no abdominal tenderness. There is no rebound. Musculoskeletal:         General: No tenderness. Normal range of motion. Cervical back: Normal range of motion and neck supple. Lymphadenopathy:      Cervical: No cervical adenopathy. Skin:     General: Skin is warm and dry. Findings: No rash. Neurological:      Mental Status: He is alert and oriented to person, place, and time. Cranial Nerves: No cranial nerve deficit. Deep Tendon Reflexes: Reflexes normal.   Psychiatric:         Behavior: Behavior normal.         Thought Content:  Thought content normal.         Judgment: Judgment normal.           (Time Documentation Optional [id number])    An electronic signaturewaas used to authenticate this note  -Iliana Avila MD on 2/27/2022 at 4:48 PM

## 2022-02-27 NOTE — ASSESSMENT & PLAN NOTE
Asymptomatic, continue current medications and lifestyle modifications recommended   High-fiber diet he is still continues to have problems with moving his bowels will try Metamucil or Benefiber

## 2022-02-27 NOTE — ASSESSMENT & PLAN NOTE
Unclear control, Patient has had no recent thyroid function test will continue current dose and await for thyroid function test clinically patient is clinically euthyroid

## 2022-02-28 ENCOUNTER — TELEPHONE (OUTPATIENT)
Dept: INTERNAL MEDICINE | Age: 52
End: 2022-02-28

## 2022-02-28 NOTE — TELEPHONE ENCOUNTER
----- Message from Emily Pillai MD sent at 2/27/2022  4:46 PM CST -----  Clarify if he is on pantoprazole or omeprazole and discontinue what he is not on

## 2022-03-02 DIAGNOSIS — E78.2 COMBINED HYPERLIPIDEMIA: ICD-10-CM

## 2022-03-02 DIAGNOSIS — E03.9 ACQUIRED HYPOTHYROIDISM: ICD-10-CM

## 2022-03-02 DIAGNOSIS — R73.03 PREDIABETES: ICD-10-CM

## 2022-03-02 LAB
ALBUMIN SERPL-MCNC: 4.6 G/DL (ref 3.5–5.2)
ALP BLD-CCNC: 82 U/L (ref 40–130)
ALT SERPL-CCNC: 14 U/L (ref 5–41)
ANION GAP SERPL CALCULATED.3IONS-SCNC: 14 MMOL/L (ref 7–19)
AST SERPL-CCNC: 15 U/L (ref 5–40)
BILIRUB SERPL-MCNC: 0.3 MG/DL (ref 0.2–1.2)
BILIRUBIN DIRECT: 0.1 MG/DL (ref 0–0.3)
BILIRUBIN, INDIRECT: 0.2 MG/DL (ref 0.1–1)
BUN BLDV-MCNC: 13 MG/DL (ref 6–20)
CALCIUM SERPL-MCNC: 9.6 MG/DL (ref 8.6–10)
CHLORIDE BLD-SCNC: 105 MMOL/L (ref 98–111)
CHOLESTEROL, TOTAL: 231 MG/DL (ref 160–199)
CO2: 25 MMOL/L (ref 22–29)
CREAT SERPL-MCNC: 1.1 MG/DL (ref 0.5–1.2)
GFR AFRICAN AMERICAN: >59
GFR NON-AFRICAN AMERICAN: >60
GLUCOSE BLD-MCNC: 107 MG/DL (ref 74–109)
HBA1C MFR BLD: 5.4 % (ref 4–6)
HDLC SERPL-MCNC: 35 MG/DL (ref 55–121)
LDL CHOLESTEROL CALCULATED: 161 MG/DL
POTASSIUM SERPL-SCNC: 4.4 MMOL/L (ref 3.5–5)
SODIUM BLD-SCNC: 144 MMOL/L (ref 136–145)
T4 FREE: 1.19 NG/DL (ref 0.93–1.7)
TOTAL PROTEIN: 7.1 G/DL (ref 6.6–8.7)
TRIGL SERPL-MCNC: 177 MG/DL (ref 0–149)
TSH REFLEX FT4: 4.26 UIU/ML (ref 0.35–5.5)

## 2022-03-10 ENCOUNTER — OFFICE VISIT (OUTPATIENT)
Age: 52
End: 2022-03-10
Payer: MEDICAID

## 2022-03-10 VITALS
SYSTOLIC BLOOD PRESSURE: 134 MMHG | HEIGHT: 67 IN | RESPIRATION RATE: 24 BRPM | DIASTOLIC BLOOD PRESSURE: 88 MMHG | TEMPERATURE: 99.3 F | HEART RATE: 73 BPM | WEIGHT: 196 LBS | OXYGEN SATURATION: 98 % | BODY MASS INDEX: 30.76 KG/M2

## 2022-03-10 DIAGNOSIS — M62.838 MUSCLE SPASM: Primary | ICD-10-CM

## 2022-03-10 DIAGNOSIS — M54.50 BILATERAL LOW BACK PAIN, UNSPECIFIED CHRONICITY, UNSPECIFIED WHETHER SCIATICA PRESENT: ICD-10-CM

## 2022-03-10 LAB
APPEARANCE FLUID: CLEAR
BILIRUBIN, POC: ABNORMAL
BLOOD URINE, POC: ABNORMAL
CLARITY, POC: CLEAR
COLOR, POC: YELLOW
GLUCOSE URINE, POC: ABNORMAL
KETONES, POC: ABNORMAL
LEUKOCYTE EST, POC: ABNORMAL
NITRITE, POC: ABNORMAL
PH, POC: 7
PROTEIN, POC: ABNORMAL
SPECIFIC GRAVITY, POC: 1.02
UROBILINOGEN, POC: ABNORMAL

## 2022-03-10 PROCEDURE — 99213 OFFICE O/P EST LOW 20 MIN: CPT

## 2022-03-10 PROCEDURE — 81002 URINALYSIS NONAUTO W/O SCOPE: CPT

## 2022-03-10 PROCEDURE — G8482 FLU IMMUNIZE ORDER/ADMIN: HCPCS

## 2022-03-10 PROCEDURE — 4004F PT TOBACCO SCREEN RCVD TLK: CPT

## 2022-03-10 PROCEDURE — G8427 DOCREV CUR MEDS BY ELIG CLIN: HCPCS

## 2022-03-10 PROCEDURE — G8417 CALC BMI ABV UP PARAM F/U: HCPCS

## 2022-03-10 RX ORDER — DEXAMETHASONE SODIUM PHOSPHATE 10 MG/ML
10 INJECTION INTRAMUSCULAR; INTRAVENOUS ONCE
Status: COMPLETED | OUTPATIENT
Start: 2022-03-10 | End: 2022-03-10

## 2022-03-10 RX ORDER — TIZANIDINE 2 MG/1
2-4 TABLET ORAL 3 TIMES DAILY PRN
Qty: 20 TABLET | Refills: 0 | Status: SHIPPED | OUTPATIENT
Start: 2022-03-10

## 2022-03-10 RX ADMIN — DEXAMETHASONE SODIUM PHOSPHATE 10 MG: 10 INJECTION INTRAMUSCULAR; INTRAVENOUS at 12:57

## 2022-03-10 ASSESSMENT — ENCOUNTER SYMPTOMS
DIARRHEA: 1
BACK PAIN: 1
ABDOMINAL PAIN: 0

## 2022-03-10 NOTE — PROGRESS NOTES
[address]  [address] [address] [place] [address]  Dept: [phone]  Dept Fax: [phone]  Loc: [phone]    Anastacio Tripathi is a 54 y.o. male who presents today for his medical conditions/complaints as noted below. Kash Vogel is c/o of Back Pain and Urinary Frequency (urgency )        HPI:     [address] presents for complaints of right sided low back pain for 3-4 days. He reports history of multiple herniated discs and chronic back pain. He denies fever and dysuria. He states he has had some diarrhea episodes but no abdominal pain. He denies parasthesias and incontinence. He has been taking tylenol and smoking marijuana at home for pain control. He reports he has never experienced this particular pain and is concerned for a kidney stone. Past Medical History:   Diagnosis Date    Anemia     Anxiety     CPAP (continuous positive airway pressure) dependence     Depression     Herniated lumbar intervertebral disc     Hyperglycemia 01/05/2021    Hypertriglyceridemia 10/16/2020    Kidney cysts     Liver cyst     Low testosterone     Lymphoma ([facility] [address] 06/07/2021    Dr Karon Montoya     Migraine     Neck pain     Obstructive sleep apnea     OCD (obsessive compulsive disorder)     Poison ivy 5/18/2021    Somnolence, daytime 11/5/2020    Thyroid disease     Witnessed apneic spells 11/5/2020     Past Surgical History:   Procedure Laterality Date    APPENDECTOMY      COLONOSCOPY  approx 2017    [facility] \" polyps\" per pt recall    COLONOSCOPY N/A 11/06/2020    Dr Stephanie Parkinson, internal hemorrhoids-Grade 1, 5 yr recall    HERNIA REPAIR      bih    THYROIDECTOMY      TOTAL    UPPER GASTROINTESTINAL ENDOSCOPY N/A 05/06/2021    Dr Madina Pinzon gastritis (avoid NSAIDS) (-)H.  pylori, w/push enteroscopy up to the the proximal jejunum-Gastric erosions,  non-obstructing distal esoph ring, sliding hh, gastritis, duodenitis       Family History   Problem Relation Age of Onset    Stomach Cancer Maternal Uncle     Liver Cancer Maternal Uncle     Cancer Maternal Grandfather     Stomach Cancer Maternal Aunt     Cancer Other         LEUKEMIA    Colon Cancer Neg Hx     Colon Polyps Neg Hx     Esophageal Cancer Neg Hx     Rectal Cancer Neg Hx        Social History     Tobacco Use    Smoking status: Current Every Day Smoker     Packs/day: 2.00     Years: 34.00     Pack years: 68.00     Types: Cigarettes     Start date: 1986    Smokeless tobacco: Never Used    Tobacco comment: Nicotine Gum severals times daily & Nioctine patches    Substance Use Topics    Alcohol use: Yes     Alcohol/week: 1.0 standard drink     Types: 1 Cans of beer per week     Comment: occasional       Current Outpatient Medications   Medication Sig Dispense Refill    tiZANidine (ZANAFLEX) 2 MG tablet Take 1-2 tablets by mouth 3 times daily as needed (muscle spasm) 20 tablet 0    ondansetron (ZOFRAN) 4 MG tablet TAKE ONE TABLET BY MOUTH EVERY 6 HOURS AS NEEDED FOR NAUSEA 30 tablet 1    pantoprazole (PROTONIX) 40 MG tablet Take 1 tablet by mouth every morning (before breakfast) 90 tablet 1    levocetirizine (XYZAL) 5 MG tablet Take 1 tablet by mouth nightly 90 tablet 1    SUMAtriptan (IMITREX) 100 MG tablet As directed 9 tablet 5    PARoxetine (PAXIL) 40 MG tablet TAKE ONE TABLET IN THE MORNING 30 tablet 5    levothyroxine (SYNTHROID) 125 MCG tablet Take 1 tablet by mouth daily 90 tablet 1    medical marijuana Take by mouth as needed.  nicotine (NICODERM CQ) 14 MG/24HR Place 1 patch onto the skin every 24 hours      omeprazole (PRILOSEC) 40 MG delayed release capsule Take 1 capsule by mouth every morning (before breakfast) 90 capsule 1    clobetasol (TEMOVATE) 0.05 % ointment Apply topically 2 times daily.  (Patient taking differently: 2 times daily as needed Apply topically 2 times daily.) 30 g 0  testosterone (ANDROGEL) 25 MG/2.5GM (1%) GEL 1 % gel Apply 5 g topically daily for 30 days. Apply 4 clicks  (1ml) daily 1 Package 0    sildenafil (REVATIO) 20 MG tablet Take 2 tablets by mouth as needed (as needed) 60 tablet 3     No current facility-administered medications for this visit. No Known Allergies    Health Maintenance   Topic Date Due    COVID-19 Vaccine (4 - Booster for Moderna series) 03/01/2022    Pneumococcal 0-64 years Vaccine (2 of 4 - PCV13) 08/15/2022 (Originally 10/23/2016)    DTaP/Tdap/Td vaccine (1 - Tdap) 02/25/2023 (Originally 8/21/1991)    Depression Screen  02/25/2023    A1C test (Diabetic or Prediabetic)  03/02/2023    TSH testing  03/02/2023    Colorectal Cancer Screen  11/06/2025    Lipid screen  03/02/2027    Flu vaccine  Completed    Hepatitis C screen  Completed    HIV screen  Completed    Hepatitis A vaccine  Aged Out    Hepatitis B vaccine  Aged Out    Hib vaccine  Aged Out    Meningococcal (ACWY) vaccine  Aged Out       Subjective:     Review of Systems   Constitutional: Negative for chills and fever. Gastrointestinal: Positive for diarrhea. Negative for abdominal pain. Musculoskeletal: Positive for back pain (right flank). Negative for gait problem.       :Objective      Physical Exam  Constitutional:       General: He is not in acute distress. Appearance: Normal appearance. He is not toxic-appearing. HENT:      Head: Normocephalic and atraumatic. Right Ear: Tympanic membrane, ear canal and external ear normal.      Left Ear: Tympanic membrane, ear canal and external ear normal.      Nose: Nose normal.      Mouth/Throat:      Mouth: Mucous membranes are moist.   Eyes:      General:         Right eye: No discharge. Left eye: No discharge. Conjunctiva/sclera: Conjunctivae normal.   Cardiovascular:      Rate and Rhythm: Normal rate and regular rhythm. Pulmonary:      Effort: Pulmonary effort is normal. No respiratory distress. Abdominal:      General: Abdomen is flat. Palpations: Abdomen is soft. Musculoskeletal:         General: Normal range of motion. Cervical back: Normal range of motion. Lumbar back: Spasms (right) present. No signs of trauma. Normal range of motion. Back:    Lymphadenopathy:      Cervical: No cervical adenopathy. Skin:     General: Skin is warm and dry. Capillary Refill: Capillary refill takes less than 2 seconds. Findings: No rash. Neurological:      General: No focal deficit present. Mental Status: He is alert. Psychiatric:         Mood and Affect: Mood normal.       /88   Pulse 73   Temp 99.3 °F (37.4 °C)   Resp 24   Ht 5' 7\" (1.702 m)   Wt 196 lb (88.9 kg)   SpO2 98%   BMI 30.70 kg/m²     :Assessment       Diagnosis Orders   1. Muscle spasm  dexamethasone (DECADRON) injection 10 mg    tiZANidine (ZANAFLEX) 2 MG tablet   2. Bilateral low back pain, unspecified chronicity, unspecified whether sciatica present  POCT Urinalysis no Micro       :Plan    Pt denies dysuria. He does have trace blood in his urine but TTP during exam is more promising for spasm. He has known DDD with herniations, spasm likely. Will treat with dex injection in office and PRN muscle relaxer. He is encouraged to f/u with ortho if no improvement. Orders Placed This Encounter   Procedures    POCT Urinalysis no Micro   No results found for this visit on 03/10/22. No follow-ups on file. Orders Placed This Encounter   Medications    dexamethasone (DECADRON) injection 10 mg    tiZANidine (ZANAFLEX) 2 MG tablet     Sig: Take 1-2 tablets by mouth 3 times daily as needed (muscle spasm)     Dispense:  20 tablet     Refill:  0       Patient given educational materials- see patient instructions. Discussed use, benefit, and side effects of prescribed medications. All patient questions answered. Pt voiced understanding. Patient Instructions   1.  Steroid shot was given in office today- avoid steroids for 3 months  2. Zanaflex 1-2 tabs every 8 hours as needed for muscle spasm  3. Alternate heat and ice  4. Avoid painful motion  5. Ibuprofen every 6 hours as needed for pain  6. F/U with ortho if no improvement      Patient Education        Learning About Low Back Pain  What is low back pain? Low back pain is pain that can occur anywhere below the ribs and above the legs. It is very common. Almost everyone has it at one time or another. Low back pain can be:  · Acute. This is new pain that can last a few days to a few weeks--at the most a few months. · Chronic. This pain can last for more than a few months. Sometimes it can last for years. What are some myths about low back pain? Here are some common myths about low back pain--and the facts:  Myth: \"I need to rest my back when I have back pain. \"   Fact: Staying active won't hurt you. It may help you get better faster. Myth: \"I need prescription pain medicine. \"   Fact: It's best to try to let time and being active heal your back. Opioid pain medicines--such as hydrocodone or oxycodone--usually don't work any better than over-the-counter medicines like ibuprofen or naproxen. And opioids can cause serious problems like opioid use disorder or overdose. Moderate to severe opioid use disorder is sometimes called addiction. Myth: \"I need a test like an X-ray or an MRI to diagnose my low back pain. \"   Fact: Getting a test right away won't help you get better faster. And it could lead you down a treatment path you may not need, since most people get better on their own. What causes low back pain? In most cases, there isn't a clear cause. This can be frustrating, because your back hurts and there's no obvious reason. Your back pain can be caused by:  Overuse or muscle strain. This can happen from playing sports, lifting heavy things, or not being physically fit. A herniated disc.   This is a problem with the cushion between the bones in your back.  Arthritis. With age, you may have changes in your bones that can narrow the space around your nerves. Other causes. In rare cases, the cause is a serious illness like an infection or cancer. But there are usually other symptoms too. What are the symptoms? Back pain can come on quickly or over time. You may feel:  · Pain in your hips or buttock. · Leg pain, numbness, tingling, or weakness. When a nerve gets squeezed--such as from a disc problem or arthritis--you may have symptoms in your leg or foot. You can even have leg symptoms from a back problem without having any pain in your back. · Pain that's sharp or dull, sometimes with stiffness or muscle spasms. It may be in one small area or over a broad area. But even bad pain doesn't mean that it's caused by something serious. How is low back pain diagnosed? A physical exam is the main way to diagnose low back pain. Your doctor may examine your back, check your nerves by testing your reflexes, and make sure that your muscles are strong. Your doctor also will ask questions about your back and overall health. Most people don't need any tests right away. Tests often don't show the reason for your pain. If your pain lasts more than 6 weeks or you have symptoms that your doctor is more concerned about, then your doctor may order tests. These may include an X-ray, a CT scan, or an MRI. Sometimes other tests such as a bone scan or nerve conduction test may be done. How is low back pain treated? Most acute low back pain gets better on its own within a few weeks, no matter what the cause. Time and doing usual activities are all that most people need to feel better. Using heat or ice and taking over-the-counter pain medicine also can help while your body heals. If you aren't getting better on your own or your pain is very bad, your doctor may recommend:  · Physical therapy.   · Spinal manipulation, such as by a chiropractor. · Acupuncture. · Massage. · Injections of steroid medicine in your back (especially for pain that involves your legs). If you have chronic low back pain, treatment will help you understand and manage your pain. Treatment may include:  · Staying active. This may include walking or doing back exercises. · Physical therapy. · Medicines. Some of these medicines are also used for other problems, like depression. · Pain management. Your doctor may have you see a pain specialist.  · Counseling. Having chronic pain can be hard. It may help to talk to someone who can help you cope with your pain. Surgery isn't needed for most people. But it may help some types of low back pain. Follow-up care is a key part of your treatment and safety. Be sure to make and go to all appointments, and call your doctor if you are having problems. It's also a good idea to know your test results and keep a list of the medicines you take. When should you call for help? Call 911 anytime you think you may need emergency care. For example, call if:  · You can't move a leg at all. Call your doctor now or seek immediate medical care if:  · You have new or worse symptoms in your legs, belly, or buttocks. Symptoms may include:  ? Numbness or tingling. ? Weakness. ? Pain. · You lose bladder or bowel control. Watch closely for changes in your health, and be sure to contact your doctor if:  · Along with the back pain, you have a fever, lose weight, or don't feel well. · You do not get better as expected. Where can you learn more? Go to https://[email or web address].Newzstand. org and sign in to your [employer] account. Enter A007 in the [facility] box to learn more about \"Learning About Low Back Pain. \"     If you do not have an account, please click on the \"Sign Up Now\" link. Current as of: July 1, 2021               Content Version: 13.1  © 6142-8714 [employer], [employer].    Care instructions adapted under license by [facility] ([facility]). If you have questions about a medical condition or this instruction, always ask your healthcare professional. [address] [place] [address] any warranty or liability for your use of this information.                Electronically signed by AR Cardoza CNP on 3/10/2022 at 1:01 PM

## 2022-03-10 NOTE — PATIENT INSTRUCTIONS
1. Steroid shot was given in office today- avoid steroids for 3 months  2. Zanaflex 1-2 tabs every 8 hours as needed for muscle spasm  3. Alternate heat and ice  4. Avoid painful motion  5. Ibuprofen every 6 hours as needed for pain  6. F/U with ortho if no improvement      Patient Education        Learning About Low Back Pain  What is low back pain? Low back pain is pain that can occur anywhere below the ribs and above the legs. It is very common. Almost everyone has it at one time or another. Low back pain can be:  · Acute. This is new pain that can last a few days to a few weeks--at the most a few months. · Chronic. This pain can last for more than a few months. Sometimes it can last for years. What are some myths about low back pain? Here are some common myths about low back pain--and the facts:  Myth: \"I need to rest my back when I have back pain. \"   Fact: Staying active won't hurt you. It may help you get better faster. Myth: \"I need prescription pain medicine. \"   Fact: It's best to try to let time and being active heal your back. Opioid pain medicines--such as hydrocodone or oxycodone--usually don't work any better than over-the-counter medicines like ibuprofen or naproxen. And opioids can cause serious problems like opioid use disorder or overdose. Moderate to severe opioid use disorder is sometimes called addiction. Myth: \"I need a test like an X-ray or an MRI to diagnose my low back pain. \"   Fact: Getting a test right away won't help you get better faster. And it could lead you down a treatment path you may not need, since most people get better on their own. What causes low back pain? In most cases, there isn't a clear cause. This can be frustrating, because your back hurts and there's no obvious reason. Your back pain can be caused by:  Overuse or muscle strain. This can happen from playing sports, lifting heavy things, or not being physically fit. A herniated disc.   This is a problem with the cushion between the bones in your back. Arthritis. With age, you may have changes in your bones that can narrow the space around your nerves. Other causes. In rare cases, the cause is a serious illness like an infection or cancer. But there are usually other symptoms too. What are the symptoms? Back pain can come on quickly or over time. You may feel:  · Pain in your hips or buttock. · Leg pain, numbness, tingling, or weakness. When a nerve gets squeezed--such as from a disc problem or arthritis--you may have symptoms in your leg or foot. You can even have leg symptoms from a back problem without having any pain in your back. · Pain that's sharp or dull, sometimes with stiffness or muscle spasms. It may be in one small area or over a broad area. But even bad pain doesn't mean that it's caused by something serious. How is low back pain diagnosed? A physical exam is the main way to diagnose low back pain. Your doctor may examine your back, check your nerves by testing your reflexes, and make sure that your muscles are strong. Your doctor also will ask questions about your back and overall health. Most people don't need any tests right away. Tests often don't show the reason for your pain. If your pain lasts more than 6 weeks or you have symptoms that your doctor is more concerned about, then your doctor may order tests. These may include an X-ray, a CT scan, or an MRI. Sometimes other tests such as a bone scan or nerve conduction test may be done. How is low back pain treated? Most acute low back pain gets better on its own within a few weeks, no matter what the cause. Time and doing usual activities are all that most people need to feel better. Using heat or ice and taking over-the-counter pain medicine also can help while your body heals. If you aren't getting better on your own or your pain is very bad, your doctor may recommend:  · Physical therapy.   · Spinal manipulation, such as by a license by [facility] ([facility]). If you have questions about a medical condition or this instruction, always ask your healthcare professional. [place] [address] any warranty or liability for your use of this information.

## 2022-03-14 DIAGNOSIS — N52.9 ERECTILE DYSFUNCTION, UNSPECIFIED ERECTILE DYSFUNCTION TYPE: ICD-10-CM

## 2022-03-14 RX ORDER — SILDENAFIL CITRATE 20 MG/1
TABLET ORAL
Qty: 60 TABLET | Refills: 1 | Status: SHIPPED | OUTPATIENT
Start: 2022-03-14 | End: 2022-06-27

## 2022-03-14 NOTE — TELEPHONE ENCOUNTER
Mac Vogel called to request a refill on his medication. Last office visit : 2/25/2022   Next office visit : Visit date not found     Requested Prescriptions     Pending Prescriptions Disp Refills    sildenafil (REVATIO) 20 MG tablet [Pharmacy Med Name: SILDENAFIL 20 MG TABLET] 60 tablet 1     Sig: TAKE 2 TABLETS BY MOUTH AS NEEDED AS DIRECTED.             Nitza Camara MA

## 2022-03-15 ENCOUNTER — OFFICE VISIT (OUTPATIENT)
Age: 52
End: 2022-03-15
Payer: MEDICAID

## 2022-03-15 VITALS
WEIGHT: 193 LBS | DIASTOLIC BLOOD PRESSURE: 84 MMHG | HEIGHT: 67 IN | RESPIRATION RATE: 16 BRPM | HEART RATE: 65 BPM | SYSTOLIC BLOOD PRESSURE: 112 MMHG | TEMPERATURE: 98.6 F | OXYGEN SATURATION: 98 % | BODY MASS INDEX: 30.29 KG/M2
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
## 2022-03-15 PROCEDURE — 90471 IMMUNIZATION ADMIN: CPT | Performed by: NURSE PRACTITIONER

## 2022-03-15 PROCEDURE — 90715 TDAP VACCINE 7 YRS/> IM: CPT | Performed by: NURSE PRACTITIONER

## 2022-03-15 PROCEDURE — G8427 DOCREV CUR MEDS BY ELIG CLIN: HCPCS | Performed by: NURSE PRACTITIONER

## 2022-03-15 PROCEDURE — G8482 FLU IMMUNIZE ORDER/ADMIN: HCPCS | Performed by: NURSE PRACTITIONER

## 2022-03-15 PROCEDURE — 99213 OFFICE O/P EST LOW 20 MIN: CPT | Performed by: NURSE PRACTITIONER

## 2022-03-15 PROCEDURE — 4004F PT TOBACCO SCREEN RCVD TLK: CPT | Performed by: NURSE PRACTITIONER

## 2022-03-15 PROCEDURE — G8417 CALC BMI ABV UP PARAM F/U: HCPCS | Performed by: NURSE PRACTITIONER

## 2022-03-15 RX ORDER — AMOXICILLIN AND CLAVULANATE POTASSIUM 875; 125 MG/1; MG/1
1 TABLET, FILM COATED ORAL 2 TIMES DAILY
Qty: 20 TABLET | Refills: 0 | Status: SHIPPED | OUTPATIENT
Start: 2022-03-15 | End: 2022-03-25

## 2022-03-15 RX ORDER — CHLORHEXIDINE GLUCONATE 213 G/1000ML
SOLUTION TOPICAL
Qty: 118 ML | Refills: 0 | Status: SHIPPED | OUTPATIENT
Start: 2022-03-15 | End: 2022-03-29

## 2022-03-15 ASSESSMENT — ENCOUNTER SYMPTOMS
RESPIRATORY NEGATIVE: 1
EYES NEGATIVE: 1
DIARRHEA: 0
COLOR CHANGE: 1

## 2022-03-15 NOTE — PROGRESS NOTES
[address]  [address] [place] [address] Maribell Whalen [address]  Dept: [phone]  Dept Fax: [phone]  Loc: [phone]     Oleg Walters is a 54 y.o. male who presents today for his medical conditions/complaintsas noted below. Oleg Mode is c/o of Animal Bite (bite on left index knuckle,  scratches on right hand and awrist and neck and nose )        HPI:     HPI     Pt presents with c/o cat bite yesterday. States he flushed the wound several times. States hand is swollen and red. Denies fever, n/v/d or any other symptoms. States he doesn't remember when his last tetanus was given. Denies any other symptoms. Past Medical History:   Diagnosis Date    Anemia     Anxiety     CPAP (continuous positive airway pressure) dependence     Depression     Herniated lumbar intervertebral disc     Hyperglycemia 01/05/2021    Hypertriglyceridemia 10/16/2020    Kidney cysts     Liver cyst     Low testosterone     Lymphoma ([facility] [address] 06/07/2021    Dr Brigitte Hare     Migraine     Neck pain     Obstructive sleep apnea     OCD (obsessive compulsive disorder)     Poison ivy 5/18/2021    Somnolence, daytime 11/5/2020    Thyroid disease     Witnessed apneic spells 11/5/2020      Past Surgical History:   Procedure Laterality Date    APPENDECTOMY      COLONOSCOPY  approx 2017    Dr Adrian Hou @ [facility] hospt. \" polyps\" per pt recall    COLONOSCOPY N/A 11/06/2020    Dr Lucila Morgan, internal hemorrhoids-Grade 1, 5 yr recall    HERNIA REPAIR      bih    THYROIDECTOMY      TOTAL    UPPER GASTROINTESTINAL ENDOSCOPY N/A 05/06/2021    Dr Rebecca Smith gastritis (avoid NSAIDS) (-)H.  pylori, w/push enteroscopy up to the the proximal jejunum-Gastric erosions,  non-obstructing distal esoph ring, sliding hh, gastritis, duodenitis       Family History   Problem Relation Age of Onset    Stomach Cancer Maternal Uncle     Liver Cancer Maternal Uncle     Cancer Maternal Grandfather     Stomach Cancer Maternal Aunt     Cancer Other         LEUKEMIA    Colon Cancer Neg Hx     Colon Polyps Neg Hx     Esophageal Cancer Neg Hx     Rectal Cancer Neg Hx        Social History     Tobacco Use    Smoking status: Current Every Day Smoker     Packs/day: 2.00     Years: 34.00     Pack years: 68.00     Types: Cigarettes     Start date: 14    Smokeless tobacco: Never Used    Tobacco comment: Nicotine Gum severals times daily & Nioctine patches    Substance Use Topics    Alcohol use: Yes     Alcohol/week: 1.0 standard drink     Types: 1 Cans of beer per week     Comment: occasional       Current Outpatient Medications   Medication Sig Dispense Refill    amoxicillin-clavulanate (AUGMENTIN) 875-125 MG per tablet Take 1 tablet by mouth 2 times daily for 10 days 20 tablet 0    chlorhexidine (HIBICLENS) 4 % external liquid Apply topically daily as needed. 118 mL 0    sildenafil (REVATIO) 20 MG tablet TAKE 2 TABLETS BY MOUTH AS NEEDED AS DIRECTED. 60 tablet 1    tiZANidine (ZANAFLEX) 2 MG tablet Take 1-2 tablets by mouth 3 times daily as needed (muscle spasm) 20 tablet 0    ondansetron (ZOFRAN) 4 MG tablet TAKE ONE TABLET BY MOUTH EVERY 6 HOURS AS NEEDED FOR NAUSEA 30 tablet 1    pantoprazole (PROTONIX) 40 MG tablet Take 1 tablet by mouth every morning (before breakfast) 90 tablet 1    levocetirizine (XYZAL) 5 MG tablet Take 1 tablet by mouth nightly 90 tablet 1    SUMAtriptan (IMITREX) 100 MG tablet As directed 9 tablet 5    PARoxetine (PAXIL) 40 MG tablet TAKE ONE TABLET IN THE MORNING 30 tablet 5    levothyroxine (SYNTHROID) 125 MCG tablet Take 1 tablet by mouth daily 90 tablet 1    medical marijuana Take by mouth as needed.       nicotine (NICODERM CQ) 14 MG/24HR Place 1 patch onto the skin every 24 hours      omeprazole (PRILOSEC) 40 MG delayed release capsule Take 1 capsule by mouth every morning (before breakfast) 90 capsule 1    clobetasol (TEMOVATE) 0.05 % ointment Apply topically 2 times daily. (Patient taking differently: 2 times daily as needed Apply topically 2 times daily.) 30 g 0    testosterone (ANDROGEL) 25 MG/2.5GM (1%) GEL 1 % gel Apply 5 g topically daily for 30 days. Apply 4 clicks  (1ml) daily 1 Package 0     No current facility-administered medications for this visit. No Known Allergies    Health Maintenance   Topic Date Due    COVID-19 Vaccine (4 - Booster for Moderna series) 03/01/2022    Pneumococcal 0-64 years Vaccine (2 of 4 - PCV13) 08/15/2022 (Originally 10/23/2016)    Depression Screen  02/25/2023    A1C test (Diabetic or Prediabetic)  03/02/2023    TSH testing  03/02/2023    Colorectal Cancer Screen  11/06/2025    Lipid screen  03/02/2027    DTaP/Tdap/Td vaccine (2 - Td or Tdap) 03/15/2032    Flu vaccine  Completed    Hepatitis C screen  Completed    HIV screen  Completed    Hepatitis A vaccine  Aged Out    Hepatitis B vaccine  Aged Out    Hib vaccine  Aged Out    Meningococcal (ACWY) vaccine  Aged Out       Subjective:     Review of Systems   Constitutional: Negative. Negative for fatigue and fever. HENT: Negative. Eyes: Negative. Respiratory: Negative. Cardiovascular: Negative. Gastrointestinal: Negative for diarrhea. Musculoskeletal: Negative. Skin: Positive for color change and wound. Neurological: Negative. Psychiatric/Behavioral: Negative. Objective:     Physical Exam  Vitals and nursing note reviewed. Constitutional:       Appearance: He is well-developed. HENT:      Head: Normocephalic and atraumatic. Cardiovascular:      Rate and Rhythm: Normal rate. Heart sounds: Normal heart sounds. Pulmonary:      Effort: Pulmonary effort is normal.      Breath sounds: Normal breath sounds. Musculoskeletal:      Right hand: Swelling and tenderness present. Decreased range of motion. Normal pulse. Comments: Mild warmth noted.  No streaks or drainage. Two puncture wounds on either side of thumb. Skin:     General: Skin is warm. Capillary Refill: Capillary refill takes less than 2 seconds. Findings: Erythema present. Neurological:      Mental Status: He is alert and oriented to person, place, and time. Psychiatric:         Behavior: Behavior normal.         Thought Content: Thought content normal.         Judgment: Judgment normal.       /84   Pulse 65   Temp 98.6 °F (37 °C)   Resp 16   Ht 5' 7\" (1.702 m)   Wt 193 lb (87.5 kg)   SpO2 98%   BMI 30.23 kg/m²     Assessment:          Diagnosis Orders   1. Cat bite, initial encounter  amoxicillin-clavulanate (AUGMENTIN) 875-125 MG per tablet    Tdap (age 6y and older) IM ([address])       Plan:      Orders Placed This Encounter   Procedures    Tdap (age 6y and older) IM ([address])        No follow-ups on file. Orders Placed This Encounter   Procedures    Tdap (age 6y and older) IM ([address])     Orders Placed This Encounter   Medications    amoxicillin-clavulanate (AUGMENTIN) 875-125 MG per tablet     Sig: Take 1 tablet by mouth 2 times daily for 10 days     Dispense:  20 tablet     Refill:  0    chlorhexidine (HIBICLENS) 4 % external liquid     Sig: Apply topically daily as needed. Dispense:  118 mL     Refill:  0       Patient given educationalmaterials - see patient instructions. Discussed use, benefit, and side effectsof prescribed medications. All patient questions answered. Pt voiced understanding. Reviewed health maintenance. Instructed to continue current medications, diet andexercise. Patient agreed with treatment plan. Follow up as directed. Patient Instructions   1. Take antibiotic as prescribed. 2. Soak with hebiclens 4 times daily  3.  Follow up ER with worsening symptoms such as fever, streaks up the arm, body aches, chills        Electronically signed by AR León CNP on 3/15/2022 at 3:03 PM

## 2022-03-15 NOTE — PATIENT INSTRUCTIONS
1. Take antibiotic as prescribed. 2. Soak with hebiclens 4 times daily  3.  Follow up ER with worsening symptoms such as fever, streaks up the arm, body aches, chills

## 2022-03-25 DIAGNOSIS — N39.46 MIXED STRESS AND URGE URINARY INCONTINENCE: Primary | ICD-10-CM

## 2022-03-25 NOTE — TELEPHONE ENCOUNTER
Richard Vogel called to request a refill on his medication.       Last office visit : 2/25/2022   Next office visit : Visit date not found     Requested Prescriptions     Pending Prescriptions Disp Refills    oxybutynin (DITROPAN-XL) 10 MG extended release tablet [Pharmacy Med Name: OXYBUTYNIN CL ER 10 MG TABLET] 90 tablet 1     Sig: TAKE 1 TABLET BY MOUTH EVERY [address]

## 2022-03-28 RX ORDER — OXYBUTYNIN CHLORIDE 10 MG/1
TABLET, EXTENDED RELEASE ORAL
Qty: 90 TABLET | Refills: 1 | Status: SHIPPED | OUTPATIENT
Start: 2022-03-28

## 2022-04-12 DIAGNOSIS — E03.9 ACQUIRED HYPOTHYROIDISM: ICD-10-CM

## 2022-04-12 RX ORDER — LEVOTHYROXINE SODIUM 0.12 MG/1
125 TABLET ORAL DAILY
Qty: 90 TABLET | Refills: 1 | Status: SHIPPED | OUTPATIENT
Start: 2022-04-12 | End: 2022-08-29

## 2022-04-12 NOTE — TELEPHONE ENCOUNTER
Leila Vogel called to request a refill on his medication.       Last office visit : 2/25/2022   Next office visit : Visit date not found     Requested Prescriptions     Pending Prescriptions Disp Refills    levothyroxine (SYNTHROID) 125 MCG tablet [Pharmacy Med Name: LEVOTHYROXINE 125 MCG TABLET] 90 tablet 1     Sig: TAKE 1 TABLET BY MOUTH DAILY            Bridgette Connolly MA

## 2022-04-19 DIAGNOSIS — E34.9 HYPOTESTOSTERONISM: Primary | ICD-10-CM

## 2022-04-19 RX ORDER — TESTOSTERONE MICRONIZED 100 %
POWDER (GRAM) MISCELLANEOUS
Qty: 30 EACH | Refills: 0 | Status: SHIPPED | OUTPATIENT
Start: 2022-04-19 | End: 2022-05-31 | Stop reason: SDUPTHER

## 2022-04-19 NOTE — TELEPHONE ENCOUNTER
Isac Kenton Vogel called to request a refill on his medication. Last office visit : 2/25/2022   Next office visit : Visit date not found     Last UDS:   Opiate Scrn, Ur   Date Value Ref Range Status   03/26/2015 Negative Pmgxmm=282 ng/mL Final     Comment:     Opiate test includes Codeine, Morphine, Hydromorphone, Hydrocodone. Last Marie Embs: 04/19/2022  Medication Contract: 03/02/2021   Last Fill: 07/26/2021    Requested Prescriptions     Pending Prescriptions Disp Refills    Testosterone POWD, Versabase GEL, Ethyl Alcohol 95 % SOLN [Pharmacy Med Name: TESTOSTERONE 5% GEL] 30 each 0     Sig: APPLY 4 CLICKS TO CHEST OR SHOULDER ONCE DAILY. Please approve or refuse this medication.    Tenisha Pastrana MA

## 2022-04-20 ENCOUNTER — OFFICE VISIT (OUTPATIENT)
Dept: NEUROLOGY | Age: 52
End: 2022-04-20
Payer: MEDICAID

## 2022-04-20 VITALS
DIASTOLIC BLOOD PRESSURE: 83 MMHG | OXYGEN SATURATION: 99 % | SYSTOLIC BLOOD PRESSURE: 130 MMHG | HEART RATE: 70 BPM | WEIGHT: 190 LBS | HEIGHT: 67 IN | BODY MASS INDEX: 29.82 KG/M2

## 2022-04-20 DIAGNOSIS — G43.011 INTRACTABLE MIGRAINE WITHOUT AURA AND WITH STATUS MIGRAINOSUS: ICD-10-CM

## 2022-04-20 DIAGNOSIS — G89.29 CHRONIC NECK PAIN: ICD-10-CM

## 2022-04-20 DIAGNOSIS — Z99.89 CPAP (CONTINUOUS POSITIVE AIRWAY PRESSURE) DEPENDENCE: ICD-10-CM

## 2022-04-20 DIAGNOSIS — M54.2 CHRONIC NECK PAIN: ICD-10-CM

## 2022-04-20 DIAGNOSIS — Q04.8 CEREBELLAR TONSILLAR ECTOPIA (HCC): ICD-10-CM

## 2022-04-20 DIAGNOSIS — G47.33 OBSTRUCTIVE SLEEP APNEA: Primary | ICD-10-CM

## 2022-04-20 PROCEDURE — 99214 OFFICE O/P EST MOD 30 MIN: CPT | Performed by: PHYSICIAN ASSISTANT

## 2022-04-20 PROCEDURE — 4004F PT TOBACCO SCREEN RCVD TLK: CPT | Performed by: PHYSICIAN ASSISTANT

## 2022-04-20 PROCEDURE — G8417 CALC BMI ABV UP PARAM F/U: HCPCS | Performed by: PHYSICIAN ASSISTANT

## 2022-04-20 PROCEDURE — G8427 DOCREV CUR MEDS BY ELIG CLIN: HCPCS | Performed by: PHYSICIAN ASSISTANT

## 2022-04-20 RX ORDER — AMITRIPTYLINE HYDROCHLORIDE 25 MG/1
25 TABLET, FILM COATED ORAL NIGHTLY
Qty: 30 TABLET | Refills: 5 | Status: SHIPPED | OUTPATIENT
Start: 2022-04-20 | End: 2022-05-31 | Stop reason: ALTCHOICE

## 2022-04-20 NOTE — LETTER
[facility]  [address] [place] [address] [address], [address] [place] [address]  Phone [phone]  Fax [phone]             Re:  Trista Moscoso    24  :  1970  Address: [place] [address]       Replinishible PAP Supplies, 1 year supply  Item HPCPS Code Frequency   Mask of choice  or  1 per 3 months   Nasal Mask cushion/pillows  or  2 per 30 days   Full Face Mask Interface  1 per 30 days   Headgear  1 per 6 months   Tubing, length of choice  or  1 per 3 months   Water Chamber  1 per 6 months   Chinstrap  1 per 6 months   Disposable Filters  2 per 30 days   Reusable Filters  1 per 6 months     Diagnoses:  Obstructive sleep apnea (G47.33)  Length of Need: Lifetime, 99    Ordering Provider: Shandra Ritchie PA-C  NPI:  [id number]        Signature: [unfilled]        Date: 2022      Electronically Signed by Shandra Ritchie PA-C  on 2022 at 8:58 AM

## 2022-04-20 NOTE — PROGRESS NOTES
[facility] Neurology and Sleep Medicine  [address] [place] [address] Drive, [address]  [place], [place]  Phone [phone]  Fax [phone]       [facility] Sleep Follow Up Encounter      Information:   Patient Name: Dottie Brand  :   1970  Age:   54 y.o. MRN:   [id number]  Account #:  [de-identified]  Today:                22    Provider:  Cadence Henry PA-C    Chief Complaint   Patient presents with    Sleep Apnea     follow up         Subjective:   Dottie Brand is a 54 y.o. male  with a history of severe JANET, migraines, and neck pain who comes in for a sleep clinic follow up. The HST, 2020 revealed an AHI of 38.2. The HST, 12/3/2020 revealed an AHI of 21.6. He is prescribed auto CPAP therapy with a pressure range of 8cm to 20cm. The compliance report indicates that he is averaging almost 9 hours of CPAP use per day. He reports that consistent PAP use has alleviated the previous JANET symptoms.      Location or symptom:  JANET  Onset:  HST: 2020   Timing:  q hs  Severity:  Severe  Associated:  Snoring, witnessed apneas, and excessive daytime somnolence  Alleviated:  CPAP      The neck pain is stable. The migraine is not always daily but he has a headache daily. Tylenol or sumatriptan may alleviate it. He has been taking Tylenol daily. He sometimes has occipital pain, especially if he extends his neck. It hurts on the coronal aspect of his head. The MRI brain, 2020 showed minimal chronic paranasal sinus disease. It showed minimal cerebellar tonsillar ectopia.      He was diagnosed with lymphoma in . It is remission.      Objective:     Past Medical History:   Diagnosis Date    Anemia     Anxiety     CPAP (continuous positive airway pressure) dependence     Depression     Herniated lumbar intervertebral disc     Hyperglycemia 2021    Hypertriglyceridemia 10/16/2020    Kidney cysts     Liver cyst     Low testosterone     Lymphoma ([facility] [address] 2021    Dr Renaldo Coffey     Migraine  Neck pain     Obstructive sleep apnea     OCD (obsessive compulsive disorder)     Poison ivy 5/18/2021    Somnolence, daytime 11/5/2020    Thyroid disease     Witnessed apneic spells 11/5/2020       Past Surgical History:   Procedure Laterality Date    APPENDECTOMY      COLONOSCOPY  approx 2017    Dr Christine Key @ [facility] hos[facility] \" polyps\" per pt recall    COLONOSCOPY N/A 11/06/2020    Dr Caesar Jeffers, internal hemorrhoids-Grade 1, 5 yr recall    HERNIA REPAIR      bih    THYROIDECTOMY      TOTAL    UPPER GASTROINTESTINAL ENDOSCOPY N/A 05/06/2021    Dr Echevarria Fleeting gastritis (avoid NSAIDS) (-)H.  pylori, w/push enteroscopy up to the the proximal jejunum-Gastric erosions,  non-obstructing distal esoph ring, sliding hh, gastritis, duodenitis       Recent Hospitalizations  ·     Significant Injuries  ·     Family History   Problem Relation Age of Onset    Stomach Cancer Maternal Uncle     Liver Cancer Maternal Uncle     Cancer Maternal Grandfather     Stomach Cancer Maternal Aunt     Cancer Other         LEUKEMIA    Colon Cancer Neg Hx     Colon Polyps Neg Hx     Esophageal Cancer Neg Hx     Rectal Cancer Neg Hx        Social History  Social History     Tobacco Use   Smoking Status Current Every Day Smoker    Packs/day: 2.00    Years: 34.00    Pack years: 68.00    Types: Cigarettes    Start date: 1986   Smokeless Tobacco Never Used   Tobacco Comment    Nicotine Gum severals times daily & Nioctine patches      Social History     Substance and Sexual Activity   Alcohol Use Yes    Alcohol/week: 1.0 standard drink    Types: 1 Cans of beer per week    Comment: occasional      Social History     Substance and Sexual Activity   Drug Use Yes    Types: Marijuana (Weed)    Comment: smokes marijuana daily, along with RSO (marijuana) oil 0.1ml under tongue            Current Outpatient Medications   Medication Sig Dispense Refill    amitriptyline (ELAVIL) 25 MG tablet Take 1 tablet by mouth nightly 30 tablet 5    Testosterone POWD, Versabase GEL, Ethyl Alcohol 95 % SOLN APPLY 4 CLICKS TO CHEST OR SHOULDER ONCE DAILY. 30 each 0    levothyroxine (SYNTHROID) 125 MCG tablet TAKE 1 TABLET BY MOUTH DAILY 90 tablet 1    oxybutynin (DITROPAN-XL) 10 MG extended release tablet TAKE 1 TABLET BY MOUTH EVERY DAY FOR BLADDER 90 tablet 1    sildenafil (REVATIO) 20 MG tablet TAKE 2 TABLETS BY MOUTH AS NEEDED AS DIRECTED. 60 tablet 1    tiZANidine (ZANAFLEX) 2 MG tablet Take 1-2 tablets by mouth 3 times daily as needed (muscle spasm) 20 tablet 0    ondansetron (ZOFRAN) 4 MG tablet TAKE ONE TABLET BY MOUTH EVERY 6 HOURS AS NEEDED FOR NAUSEA 30 tablet 1    pantoprazole (PROTONIX) 40 MG tablet Take 1 tablet by mouth every morning (before breakfast) 90 tablet 1    levocetirizine (XYZAL) 5 MG tablet Take 1 tablet by mouth nightly 90 tablet 1    SUMAtriptan (IMITREX) 100 MG tablet As directed 9 tablet 5    PARoxetine (PAXIL) 40 MG tablet TAKE ONE TABLET IN THE MORNING 30 tablet 5    medical marijuana Take by mouth as needed.  nicotine (NICODERM CQ) 14 MG/24HR Place 1 patch onto the skin every 24 hours      omeprazole (PRILOSEC) 40 MG delayed release capsule Take 1 capsule by mouth every morning (before breakfast) 90 capsule 1    clobetasol (TEMOVATE) 0.05 % ointment Apply topically 2 times daily. (Patient taking differently: 2 times daily as needed Apply topically 2 times daily.) 30 g 0    testosterone (ANDROGEL) 25 MG/2.5GM (1%) GEL 1 % gel Apply 5 g topically daily for 30 days. Apply 4 clicks  (1ml) daily 1 Package 0     No current facility-administered medications for this visit. Allergies:  Patient has no known allergies. REVIEW OF SYSTEMS     Constitutional: []? Fever []? Sweats []? Chills []? Recent Injury   [x]? Denies all unless marked  HENT:[]? Headache  []? Head Injury  []? Sore Throat  []? Ear Pain  []? Dizziness []? Hearing Loss   [x]?  Denies all unless marked  Musculoskeletal: []? Arthralgia  []? Myalgias []? Muscle cramps  []? Muscle twitches   [x]? Denies all unless marked   Spine:  []? Neck pain  []? Back pain  []? Sciaticia  [x]? Denies all unless marked  Neurological:[]? Visual Disturbance []? Double Vision []? Slurred Speech []? Trouble swallowing  []? Vertigo []? Tingling []? Numbness []? Weakness []? Loss of Balance   []? Loss of Consciousness []? Memory Loss []? Seizures  [x]? Denies all unless marked  Psychiatric/Behavioral:[]? Depression []? Anxiety  [x]? Denies all unless marked  Sleep: []? Insomnia []? Sleep Disturbance []? Snoring []? Restless Legs []? Daytime Sleepiness [x]? Sleep Apnea  []? Denies all unless marked    The MA has completed the ROS with the patient. I have reviewed it in its' entirety with the patient and agree with the documentation. PHYSICAL EXAM  /83   Pulse 70   Ht 5' 7\" (1.702 m)   Wt 190 lb (86.2 kg)   SpO2 99%   BMI 29.76 kg/m²     Constitutional   Alert in NAD, well developed, pleasant and cooperative with exam  HEENT- Conjunctiva normal.  No scars, masses, or lesions over external nose or ears, hearing intact, no neck masses noted, no jugular vein distension, no bruit  Cardiac- Regular rate and rhythm  Pulmonary- Clear to auscultation, good expansion, normal effort without use of accessory muscles  Musculoskeletal  No significant wasting of muscles noted. No bony deformities  Extremities - No clubbing, cyanosis or edema  Skin  Warm, dry, and intact.   No rash, erythema, or pallor  Psychiatric  Mood, affect, and behavior appear normal      Neurological exam  Awake, alert, fluent oriented  appropriate affect  Attention and concentration appear appropriate  Recent and remote memory appears unremarkable  Speech normal without dysarthria  No clear issues with language of fund of knowledge    Cranial Nerve Exam     CN III, IV,VI-EOMI, No nystagmus, conjugate eye movements, no ptosis  CN VII-No facial assymetry    Motor Exam    Antigravity throughout upper and lower extremities bilaterally    Tremors and coordination    No tremors in hands or head noted     Gait    Normal base and speed  No ataxia    I reviewed the following studies:       []  :  Clinical laboratory test results     []  :  Radiology reports                    [x]  :  Review and summarization of medical records-compliance report      []     Request for medical records       []  :  Reviewed previous/recent polysomnogram report(s)      []  :  [place] Sleepiness Scale       [x]  :  Compliance report :  The auto CPAP is set at a pressure range of 8cm to 20cm. Compliance download shows that he uses device: 100% of the time;  percentage of days with usage >=4 hours: 93%. AHI: 2.1    Assessment:       ICD-10-CM    1. Obstructive sleep apnea  G47.33    2. Intractable migraine without aura and with status migrainosus  G43.011    3. Chronic neck pain  M54.2     G89.29    4. Cerebellar tonsillar ectopia (HCC)  Q04.8    5. CPAP (continuous positive airway pressure) dependence  Z99.89           [x]  :  Stable-neck pain/migraine     []  :  Improved                       [x]  :  Well controlled-JANET              []  :  Resolving     []  :  Resolved     []  :  Inadequately controlled     []  :  Worsening     []  :  Additional workup planned    Caio Vogel is compliant and benefiting from therapy as indicated by compliance evaluation and patient report. Plan:     Orders Placed This Encounter   Medications    amitriptyline (ELAVIL) 25 MG tablet     Sig: Take 1 tablet by mouth nightly     Dispense:  30 tablet     Refill:  5       1. Previously advised of the etiology,  pathophysiology, diagnosis, treatment options, and risks of untreated JANET. Risks may include, but are not limited to  hypertension, coronary artery disease, atrial fibrillation, CHF, diabetes, stroke, weight gain, impaired cognition, daytime somnolence, and motor vehicle accidents. Advised to abstain from driving or operating heavy machinery when drowsy and the use of respiratory suppressants. Discussed diagnostic studies and potential treatment plan. 2.  Will evaluate for PAP clinical benefit and and compliance during a 30 day period within the preceding 90 days PRN. 3.  The following educational material has been included in this visit after visit summary for your review: JANET/PAP guidelines-Discussed with the patient and all questions fully answered. 4.  Continue PAP  therapy. The patient voices understanding and recognizes the need for adherence to the prescribed therapy  5. Order-supplies-[employer]  6. Start amitriptyline 25 mg   7.   Follow up in 6 months    Replinishible PAP Supplies, 1 year supply  Item HPCPS Code Frequency   Mask of choice  or  1 per 3 months   Nasal Mask cushion/pillows  or  2 per 30 days   Full Face Mask Interface  1 per 30 days   Headgear  1 per 6 months   Tubing, length of choice  or  1 per 3 months   Water Chamber  1 per 6 months   Chinstrap  1 per 6 months   Disposable Filters  2 per 30 days   Reusable Filters  1 per 6 months     Diagnoses:  Obstructive sleep apnea (G47.33)  Length of Need: Lifetime, 99    Ordering Provider: Allen Ponce PA-C  NPI:  [id number]

## 2022-04-20 NOTE — PATIENT INSTRUCTIONS
Patient education: Sleep apnea in adults       INTRODUCTION  Normally during sleep, air moves through the throat and in and out of the lungs at a regular rhythm. In a person with sleep apnea, air movement is periodically diminished or stopped. There are two types of sleep apnea: obstructive sleep apnea and central sleep apnea. In obstructive sleep apnea, breathing is abnormal because of narrowing or closure of the throat. In central sleep apnea, breathing is abnormal because of a change in the breathing control and rhythm. Sleep apnea is a serious condition that can affect a person's ability to safely perform normal daily activities and can affect long term health. Approximately 25 percent of adults are at risk for sleep apnea of some degree. Men are more commonly affected than women. Other risk factors include middle and older age, being overweight or obese, and having a small mouth and throat. This topic review focuses on the most common type of sleep apnea in adults, obstructive sleep apnea (JANET). HOW SLEEP APNEA OCCURS  The throat is surrounded by muscles that control the airway for speaking, swallowing, and breathing. During sleep, these muscles are less active, and this causes the throat to narrow. In most people, this narrowing does not affect breathing. In others, it can cause snoring, sometimes with reduced or completely blocked airflow. A completely blocked airway without airflow is called an obstructive apnea. Partial obstruction with diminished airflow is called a hypopnea. A person may have apnea and hypopnea during sleep. Insufficient breathing due to apnea or hypopnea causes oxygen levels to fall and carbon dioxide to rise. Because the airway is blocked, breathing faster or harder does not help to improve oxygen levels until the airway is reopened. Typically, the obstruction requires the person to awaken to activate the upper airway muscles.  Once the airway is opened, the person then takes several deep breaths to catch up on breathing. As the person awakens, he or she may move briefly, snort or snore, and take a deep breath. Less frequently, a person may awaken completely with a sensation of gasping, smothering, or choking. If the person falls back to sleep quickly, he or she will not remember the event. Many people with sleep apnea are unaware of their abnormal breathing in sleep, and all patients underestimate how often their sleep is interrupted. Awakening from sleep causes sleep to be unrefreshing and causes fatigue and daytime sleepiness. Anatomic causes of obstructive sleep apnea   Most patients have JANET because of a small upper airway. As the bones of the face and skull develop, some people develop a small lower face, a small mouth, and a tongue that seems too large for the mouth. These features are genetically determined, which explains why JANET tends to cluster in families. Obesity is another major factor. Tonsil enlargement can be an important cause, especially in children. SLEEP APNEA SYMPTOMS  The main symptoms of JANET are loud snoring, fatigue, and daytime sleepiness. However, some people have no symptoms. For example, if the person does not have a bed partner, he or she may not be aware of the snoring. Fatigue and sleepiness have many causes and are often attributed to overwork and increasing age. As a result, a person may be slow to recognize that they have a problem. A bed partner or spouse often prompts the patient to seek medical care. Other symptoms may include one or more of the following:  ?Restless sleep  ? Awakening with choking, gasping, or smothering  ? Morning headaches, dry mouth, or sore throat  ? Waking frequently to urinate  ? Awakening unrested, groggy  ? Low energy, difficulty concentrating, memory impairment    Risk factors  Certain factors increase the risk of sleep apnea.   ?Increasing age [de-identified] JANET occurs at all ages, but it is more common in middle and older age adults. ?Male sex  JANET is two times more common in men, especially in middle age. ?Obesity  The more obese a person is, the more likely he or she is to have JANET. ? Sedation from medication or alcohol  This interferes with the ability to awaken from sleep and can lengthen periods of apnea (no breathing), with potentially dangerous consequences. ? Abnormality of the airway. SLEEP APNEA CONSEQUENCES  Complications of sleep apnea can include daytime sleepiness and difficulty concentrating. The consequence of this is an increased risk of accidents and errors in daily activities. Studies have shown that people with severe JANET are more than twice as likely to be involved in a motor vehicle accident as people without these conditions. People with JANET are encouraged to discuss options for driving, working, and performing other high-risk tasks with a healthcare provider. In addition, people with untreated JANET may have an increased risk of cardiovascular problems such as high blood pressure, heart attack, abnormal heart rhythms, or stroke. This risk may be due to changes in the heart rate and blood pressure that occur during sleep. SLEEP APNEA DIAGNOSIS  The diagnosis of JANET is best made by a knowledgeable sleep medicine specialist who has an understanding of the individual's health issues. The diagnosis is usually based upon the person's medical history, physical examination, and testing, including:  ? A complaint of snoring and ineffective sleep  ? Neck size (greater than 16 inches in men or 14 inches in women) is associated with an increased risk of sleep apnea  ? A small upper airway: difficulty seeing the throat because of a tongue that is large for the mouth  ? High blood pressure, especially if it is resistant to treatment  ? If a bed partner has observed the patient during episodes of stopped breathing (apnea), choking, or gasping during sleep, there is a strong possibility of sleep apnea.     Testing is usually performed in a sleep laboratory. A full sleep study is called a polysomnogram. The polysomnogram measures the breathing effort and airflow, blood oxygen level, heart rate and rhythm, duration of the various stages of sleep, body position, and movement of the arms/legs. Home monitoring devices are available that can perform a sleep study. This is a reasonable alternative to conventional testing in a sleep laboratory if the clinician strongly suspects moderate or severe sleep apnea and the patient does not have other illnesses or sleep disorders that may interfere with the results. SLEEP APNEA TREATMENT  Sleep apnea is best treated by a knowledgeable sleep medicine specialist. The goal of treatment is to maintain an open airway during sleep. Effective treatment will eliminate the symptoms of sleep disturbance; long-term health consequences are also reduced. Most treatments require nightly use. The challenge for the clinician and the patient is to select an effective therapy that is appropriate for the patient's problem and that is acceptable for long term use. Auto-titrating CPAP delivers an amount of PAP that varies during the night. The variation is dependent on event detection software algorithms, which will increase the pressure gradually in response to flow changes until adequate patency is detected. After a period of sustained upper airway patency, the delivered level of pressure gradually decreases until the algorithm identifies recurrent upper airway obstruction, at which point the delivered pressure again increases. The result is that the delivered pressure varies throughout the night, in an effort to provide the lowest pressure that is necessary to maintain upper airway patency. Continuous positive airway pressure (CPAP)  The most effective treatment for sleep apnea uses air pressure from a mechanical device to keep the upper airway open during sleep.  A CPAP (continuous positive airway pressure)  device uses an air-tight attachment to the nose, typically a mask, connected to a tube and a blower which generates the pressure. Devices that fit comfortably into the nasal opening, rather than over the nose, are also available. CPAP should be used any time the person sleeps (day or night). The CPAP device is usually used for the first time in the sleep lab, where a technician can adjust the pressure and select the best equipment to keep the airway open. Alternatively, an auto device with a self-adjusting pressure feature, provided with proper education and training, can get treatment started without another sleep test. While the treatment may seem uncomfortable, noisy, or bulky at first, most people accept the treatment after experiencing better sleep. However, difficulty with mask comfort and nasal congestion prevent up to 50 percent of people from using the treatment on a regular basis. Continued follow up with a healthcare provider helps to ensure that the treatment is effective and comfortable. Information from the CPAP machine is often used by physicians, therapists, and insurers to track the success of treatment. CPAP can be delivered with different features to improve comfort and solve problems that may come up during treatment. Changes in treatment may be needed if symptoms do not improve or if the persons condition changes, such as a gain or loss of weight. Adjust sleep position  Adjusting sleep position (to stay off the back) may help improve sleep quality in people who have JANET when sleeping on the back. However, this is difficult to maintain throughout the night and is rarely an adequate solution. Weight loss  Weight loss may be helpful for obese or overweight patients. Weight loss may be accomplished with dietary changes, exercise, and/or surgical treatment.  However, it can be difficult to maintain weight loss; the five-year success of non-surgical weight loss is only 5 percent, meaning that 95 percent of people regain lost weight. Avoid alcohol and other sedatives  Alcohol can worsen sleepiness, potentially increasing the risk of accidents or injury. People with JANET are often counseled to drink little to no alcohol, even during the daytime. Similarly, people who take anti-anxiety medications or sedatives to sleep should speak with their healthcare provider about the safety of these medications. People with JANET must notify all healthcare providers, including surgeons, about their condition and the potential risks of being sedated. People with JANET who are given anesthesia and/or pain medications require special management and close monitoring to reduce the risk of a blocked airway. Dental devices  A dental device, called an oral appliance or mandibular advancement device, can reposition the jaw (mandible), bringing the tongue and soft palate forward as well. This may relieve obstruction in some people. This treatment is excellent for reducing snoring, although the effect on JANET is sometimes more limited. As a result, dental devices are best used for mild cases of JANET when relief of snoring is the main goal. Failure to tolerate and accept CPAP is another indication for dental devices. While dental devices are not as effective as CPAP for JANET, some patients prefer a dental device to CPAP. Side effects of dental devices are generally minor but may include changes to the bite with prolonged use. Surgical treatment  Surgery is an alternative therapy for patients who cannot tolerate or do not improve with nonsurgical treatments such as CPAP or oral devices. Surgery can also be used in combination with other nonsurgical treatments. Surgical procedures reshape structures in the upper airways or surgically reposition bone or soft tissue. Uvulopalatopharyngoplasty (UPPP) removes the uvula and excessive tissue in the throat, including the tonsils, if present.  Other procedures, such as maxillomandibular advancement (MMA), address both the upper and lower pharyngeal airway more globally. UPPP alone has limited success rates (less than 50 percent) and people can relapse (when JANET symptoms return after surgery). As a result, this surgery is only recommended in a minority of people and should be considered with caution. MMA may have a higher success rate, particularly in people with abnormal jaw (maxilla and mandible) anatomy, but it is the most complicated procedure. A newer surgical approach, nerve stimulation to protrude the tongue, has promising success rates in very selected people. Tracheostomy creates a permanent opening in the neck. It is reserved for people with severe disease in whom less drastic measures have failed or are inappropriate. Although it is always successful in eliminating obstructive sleep apnea, tracheostomy requires significant lifestyle changes and carries some serious risks (eg, infection, bleeding, blockage). All surgical treatments require discussions about the goals of treatment, the expected outcomes, and potential complications. Hypoglossal nerve stimulator- \"Inspire\" device    PAP treatment failure:  Possible causes of treatment failure include nonadherence or suboptimal adherence, weight gain, an inappropriate level of prescribed positive pressure, or an additional disorder causing sleepiness (eg, narcolepsy) that may require alterations in the therapeutic regimen. A review of medications should also be undertaken since many drugs may lead to sleepiness. Inadequate sleep time may also negate the expected effects from treatment of JANET. Also, pt's can have persistent hypersomnolence associated with sleep apnea even in the presence of adequate therapy and at those times Provigil or Nuvigil or other stimulants may be indicated.     Once the patient's positive airway pressure therapy has been optimized and symptoms resolved, a regimen of long-term follow-up should be established. Annual visits are reasonable, with more frequent visits in between if new issues arise. The purpose of long-term follow-up is to assess usage and monitor for recurrent JANET, new side effects, air leakage, and fluctuations in body weight. WHERE TO GET MORE INFORMATION  Your healthcare provider is the best source of information for questions and concerns related to your medical problem. Organizations  [employer]  Provides information about sleep apnea to the public, publishes a newsletter, and serves as an advocate for people with the disorder. Janell, [address]   Felicia@[email or web address]. org   [employer].. org   Tel: [phone]   Fax: [facility] that works to [employer] and safety by promoting public understanding of sleep and sleep disorders. Supports sleep-related education, research, and advocacy; produces and distributes educational materials to the public and healthcare professionals; and offers postdoctoral fellowships and grants for sleep researchers. [address]   Bella@[email or web address]. org   [email or web address]. org   Tel: [phone]   Fax: [phone]    Important information:  Medicare/private insurance CPAP/BiPAP/APAP requirements:  Medicare/private insurance has specific requirements for PAP compliance that must be met during the first 90 days of use to continue coverage for CPAP/BiPAP/APAP  from day 91 and beyond. The policy requires that patients use a PAP device 4 hours per 24 hour period, at least 70% of the time over a 30 day period. This data must be downloaded as a report direct from the PAP devices. This is called a compliance download. Your PAP supplier will assist you in this matter.      Note:  Where applicable, we will utilize PAP device efficiency reports, additional testing, and face-to-face  clinical evaluation subsequent to any treatment, changes in treatment, and continued treatment. Caution:  Please abstain from driving or engaging in other activities which may be hazardous in the presence of diminished alertness or daytime drowsiness. And avoid the use of sedatives or alcohol, which can worsen sleep apnea and daytime drowsiness. Mask suggestions:  -     Resmed Airfit N20 (Nasal) or F20 (Full face mask). They conform to your face, thus decreasing the potential for mask leakage. You might like the AirTouch F20(full face mask). It has a \"memory foam\" like cushion. The AirFit F30 is a smaller style full face mask designed to sit low on and cover less of your face for fewer facial marks. AirFit N30i has a top of the head tube with a nasal mask. AirFit P10 reported to be the most comfortable nasal pillow mask. Resmed Mirage FX reported to be the most comfortable nasal mask. Resmed Mirage McDowell reported to be the most comfortable hybrid mask. AirTouch N20-memory foam nasal mask. Respironics: You might also like to try a nasal mask called a Dreamwear nasal mask or the Dreamwear nasal pillow. Another suggestion is the [employer], it is a minimal contact full face mask. The Charlee Milesus incredible under the nose design makes it the only full face mask that won't cause red marks on the bridge of your nose when compared to other full face masks. The Dreamwear full face mask has a  soft feel, unique in-frame air-flow, and innovative air tube connection at the top of the head for the ultimate in sleep comfort. Comfort Gel Blue. Dreamwear gel pillows. [employer]: Brevida nasal pillow mask and Simplus FFM    The use of a memory foam CPAP pillow supports the head and neck throughout the night.

## 2022-04-25 DIAGNOSIS — F41.9 ANXIETY: ICD-10-CM

## 2022-04-25 RX ORDER — PAROXETINE HYDROCHLORIDE 40 MG/1
TABLET, FILM COATED ORAL
Qty: 90 TABLET | Refills: 1 | Status: SHIPPED | OUTPATIENT
Start: 2022-04-25

## 2022-04-25 NOTE — TELEPHONE ENCOUNTER
Susie Vogel called to request a refill on his medication.       Last office visit : 2/25/2022   Next office visit : Visit date not found     Requested Prescriptions     Pending Prescriptions Disp Refills    PARoxetine (PAXIL) 40 MG tablet [Pharmacy Med Name: PAROXETINE HCL 40 MG TABLET] 90 tablet 1     Sig: TAKE ONE TABLET IN THE MORNING            Debo Laughlin MA

## 2022-05-03 ENCOUNTER — PATIENT MESSAGE (OUTPATIENT)
Dept: INTERNAL MEDICINE | Age: 52
End: 2022-05-03

## 2022-05-03 DIAGNOSIS — M54.40 BACK PAIN OF LUMBAR REGION WITH SCIATICA: Primary | ICD-10-CM

## 2022-05-03 NOTE — TELEPHONE ENCOUNTER
From: Pradip Villalba  To: Dr. Sanjuana Elam  Sent: 5/3/2022 3:32 PM CDT  Subject: Back     Ricki Duran, my back has went out again. Could I have a steroid pack called in to [facility]?  Thank you

## 2022-05-04 RX ORDER — METHYLPREDNISOLONE 4 MG/1
TABLET ORAL
Qty: 1 KIT | Refills: 0 | Status: SHIPPED | OUTPATIENT
Start: 2022-05-04 | End: 2022-05-10

## 2022-05-19 NOTE — PROGRESS NOTES
Patient:  Ashlyn Walters  YOB: 1970  Date of Service: 2022  MRN: [id number]    Primary Care Physician: Ely Ambrosio MD    Chief Complaint   Patient presents with    Follow-up     Low grade B-cell lymphoma [facility])       Patient Seen, Chart, Consults notes, Labs, Radiology studies reviewed. Subjective:  Mr Cally Iverson is a 54year old gentleman managed with primary and secondary diagnoses as outlined:  · Stage I low-grade B-cell lymphoma (SLL) (small lymphocytic lymphoma) manifesting with 4 x 2.1 x 4.9 cm and 3.6 x 1.9 x 4.1 cm retroperitoneal para-aortic region lymph node conglomerates, 2021   · Minimal cerebellar tonsillar ectopia, not meeting criteria for Chiari I malformation on MRI of the brain 2020 followed by Dr. Ami Ignacio with neurology    Leon Gonzalez has completed XRT and received 3000 cGy that was completed on 2021  He is asymptomatic, without B symptoms. He appears healthy, with the only complaint being nauseated in the mornings reportedly secondary to a minimal cerebellar tonsillar ectopia, not meeting criteria for Chiari I malformation on MRI of the brain 2020. His father just  a month ago, he is grieving having cared for him on an intermittent basis bathing him etc. for the past year. TARGET SLL/CLL SITES:  · 4 x 2.1 x 4.9 cm and 3.6 x 1.9 x 4.1 cm enhancing oval lobulated solid masses in the right upper abdomen, retroperitoneal para-aortic region located between the aorta and inferior vena cava    TUMOR HISTORY: Stage I low-grade B-cell lymphoma (SLL) 2021  Mr Cally Iverson was seen in initial consultation on 2021, referred by Dr Ely Ambrosio for newly diagnosed low grade B-cell lymphoma. In 2021, Leon Gonzalez decided he wanted to jump on a pogo stick but unfortunately was unsuccessful, fell and hurt his back. Leon Gonzalez presented for work-up with low back pain.     MRI LUMBAR SPINE WO CONTRAST  On 3/22/2021 at [facility] documented:  · Multilevel prominent disc osteophyte complexes and facetal arthropathy and resultant neural foramina spinal canal stenosis  · Incidentally noted is an ill-defined soft tissue mass located posterior to the distal left renal vein and inferior vena cava with anterior displacement of both. It measures approximately 4 cm x 1.5 x 5.3 cm. This may represent an enlarged retroperitoneal lymph node? Sam Winters A similar mass is seen at a lower level measuring 2.8 x 1.8 x 3.6 cm. CT ABDOMEN PELVIS W WO CONTRAST on 3/25/2021 at [facility] documented:  · Evidence of moderately enhancing oval lobulated solid masses in the right upper abdomen in the retroperitoneal para-aortic region located between the aorta and inferior vena cava. There is marked asymmetrical thickening of the right diaphragmatic luz. The 2 masses measure 4 cm x 2.1 x 4.9 cm and 3.6 x 1.9 x 4.1 cm. Potential moderate contrast enhancement. Inflammatory or neoplastic process is not excluded. Further evaluation is recommended. · No other enlarged lymph nodes are noted. [facility] was referred to GI for initial evaluation hoping for access to the abdominal mass via EGD/EUS. EGD BIOPSY on 5/6/2021 at [facility] per  Laureano Campos MD  IMPRESSION:  Esophagus: normal and the EG junction at 38 cm appeared essentially normal except for a nonobstructing distal esophageal ring. There is a small 2 to 3 cm in size sliding hiatal hernia present. Stomach:  Abnormal; patchy mucosal changes multiple small 1 to 2 mm erosions with surrounding small areas of erythema noted in the antrum suggestive of medical gastritis noted -  Gastric biopsies were taken from the antrum and body to rule out Helicobacter pylori infection. The proximal stomach including the fundus, cardia, body and angularis appeared completely normal.  No evidence of abnormal folds or unusual thickening noted in the fundus.   NO ulcers or masses or gastric outlet obstruction or retained food or CD14    COMMENT:   Findings support involvement by a B cell lymphoma. Correlation with the forthcoming morphology (53-AD-) is recommended. Physical examination at presentation did not reveal evidence of palpable pathological peripheral lymphadenopathy in the cervical, supraclavicular, infraclavicular, axillary or inguinal lymph node areas. Lungs are clear heart is regular normal S1-S2, no S3  Abdominal exam documents an enlarged liver, with fullness in the RUQ, somewhat tender. I am unable to palpate a spleen. Extremities no cyanosis clubbing or edema  Neurological exam is nonfocal intact with an intact mental status and normal cranial nerves II through XII. Serology on 6/30/21 revealed:  CMP - unremarkable  Iron - 149  TIBC - 460  Saturation - 32%  Ferritin -128  LDH - 441  TSH - 0.267  Uric Acid - 4.5  Folate - 4.2  Retic - 1.0  Haptoglobin-180  MMA - 109    B2M-1.9  Kappa light chains- 18.9  Lambda light chains-24.4  K/L ratio-0.77  IgG-590 ,IgA-176 , IgM-54  Total Protein - 6.2  M-spike-not observed    Hepatitis Panel - negative    Bone marrow biopsy and aspirate on 7/8/2021 is preliminary but has been discussed with Dr. Carlos Good indicating the following:  Morphologically the marrow is hypercellular (90%) with increased erythroid shift. There is no obvious evidence of panmyelosis  Flow cytometry shows no increase in number of blasts and no immunophenotypic evidence of involvement by non-Hodgkin's lymphoproliferative disorder. [address] study for myelodysplasia profile is completely negative  Cytogenetics documents a normal male karyotype  JAK2 V617F, JAK2 EXON 12, CALR MPL and KIT MUTATIONS ALL NEGATIVE    COMMENT:  The marrow is hypercellular (90% cellular) with increased erythroid precursors and a shift to immaturity. No increase in blasts or dysplasia is seen. The granulocytic series and megakaryocytes do not appear to be increased.   Evaluation of peripheral blood smear does not show an increase in polychromasia, basophilia, leukoerythroblastic effect, macrocytosis or circulating blasts. No increase in spherocytes is seen in the included history shows no increase in bilirubin which would argue against hemolysis. FISH (MDS panel) is negative for abnormalities. Evaluation for blood loss, nutritional deficiencies and exogenous testosterone administration may be helpful    CT neck and nasopharynx with contrast on 7/9/2021at [facility] documented:  · Scattered non pathologically enlarged lymph nodes are noted bilaterally, measuring up to 7 mm. · No cervical lymphadenopathy. CT CHEST W CONTRAST on  7/9/2021at [facility] documented:  · No enlarged axillary, hilar, or mediastinal lymph nodes. · No intrathoracic lymphadenopathy. · Emphysema. .    CT ABDOMEN PELVIS W IV CONTRAST on  7/9/2021 compared to 3/25/21 at [facility] documented:  · Aretroperitoneal mass in the upper abdomen is again noted. The more superior aspect of the mass measures 4.6 x 2 cm today and previously measured 4 x 2.1 cm. The more posterior and inferior portion of the mass measures 3.4 x 2.2 cm and previously measured 3.1 x 1.9 cm. There are no other enlarged lymph nodes in the abdomen or the pelvis. This is likely an enlarged lymph node. Other retroperitoneal mass lesions are not ruled out. Sarcoma and other tumors would also be in the differential.  · Stable 5-6 mm probable liver cyst.  · Gastric wall thickening likely an artifact of under distention. Mild diverticulosis of the colon. PET CT SKULL BASE TO MID THIGH on 7/9/2021at [facility], compared to CTs performed same day, documented:  · Slightly increased metabolic activity in the retroperitoneal mass seen on recent examination, with maximum SUV of 3.2. Neoplastic process remains in the differential. Tissue diagnosis is recommended. · For reference purposes, maximum SUV in the mediastinum is 2.3 and maximum SUV in the liver is 1.9.     Transthoracic Echocardiography Report (TTE) on 7/9/21 at stable  · 2.5 x 0.7 cm Previously identified conglomerate of lymph nodes between the aorta and IVC, previously 4.6 x 2 cm  · 1.7 x 0.8 cm lymph node medial to the right kidney, previously 3.4 x 2.2 cm  · Small bowel in small bowel intussusception in the left mid abdomen,which is generally benign and related to peristalsis. No surrounding inflammatory change    CT ABDOMEN PELVIS W IV CONTRAST at [address] on 11/24/2021: Comparison to 9/20/2021  · A stable CT scan of the abdomen and pelvis. No change in the previous study. · No evidence of abdominal or pelvic lymphadenopathy. · Diverticulosis of the distal colon. No evidence for diverticulitis. · Tiny hepatic nodules which are too small to be further characterized. No change    TREATMENT SUMMARY:  · XRT to the 4 x 2.1 x 4.9 cm and 3.6 x 1.9 x 4.1 cm enhancing oval lobulated solid masses in the right upper abdomen, retroperitoneal para-aortic region located between the aorta and inferior vena cava was initiated on 8/12/2021. He received 3000 cGy that was completed on 9/01/2021      Allergies:  Patient has no known allergies. Medicines:  Current Outpatient Medications   Medication Sig Dispense Refill    PARoxetine (PAXIL) 40 MG tablet TAKE ONE TABLET IN THE MORNING 90 tablet 1    amitriptyline (ELAVIL) 25 MG tablet Take 1 tablet by mouth nightly 30 tablet 5    levothyroxine (SYNTHROID) 125 MCG tablet TAKE 1 TABLET BY MOUTH DAILY 90 tablet 1    oxybutynin (DITROPAN-XL) 10 MG extended release tablet TAKE 1 TABLET BY MOUTH EVERY DAY FOR BLADDER 90 tablet 1    sildenafil (REVATIO) 20 MG tablet TAKE 2 TABLETS BY MOUTH AS NEEDED AS DIRECTED.  60 tablet 1    tiZANidine (ZANAFLEX) 2 MG tablet Take 1-2 tablets by mouth 3 times daily as needed (muscle spasm) 20 tablet 0    ondansetron (ZOFRAN) 4 MG tablet TAKE ONE TABLET BY MOUTH EVERY 6 HOURS AS NEEDED FOR NAUSEA 30 tablet 1    pantoprazole (PROTONIX) 40 MG tablet Take 1 tablet by mouth every morning (before breakfast) 90 tablet 1    levocetirizine (XYZAL) 5 MG tablet Take 1 tablet by mouth nightly 90 tablet 1    SUMAtriptan (IMITREX) 100 MG tablet As directed 9 tablet 5    medical marijuana Take by mouth as needed.  nicotine (NICODERM CQ) 14 MG/24HR Place 1 patch onto the skin every 24 hours      omeprazole (PRILOSEC) 40 MG delayed release capsule Take 1 capsule by mouth every morning (before breakfast) 90 capsule 1    Testosterone POWD, Versabase GEL, Ethyl Alcohol 95 % SOLN APPLY 4 CLICKS TO CHEST OR SHOULDER ONCE DAILY. 30 each 0    testosterone (ANDROGEL) 25 MG/2.5GM (1%) GEL 1 % gel Apply 5 g topically daily for 30 days. Apply 4 clicks  (1ml) daily 1 Package 0    clobetasol (TEMOVATE) 0.05 % ointment Apply topically 2 times daily. (Patient not taking: Reported on 5/23/2022) 30 g 0     No current facility-administered medications for this visit. Past Medical History:      Diagnosis Date    Anemia     Anxiety     CPAP (continuous positive airway pressure) dependence     Depression     Herniated lumbar intervertebral disc     Hyperglycemia 01/05/2021    Hypertriglyceridemia 10/16/2020    Kidney cysts     Liver cyst     Low testosterone     Lymphoma ([facility] [address] 06/07/2021    Dr Fozia Calles     Migraine     Neck pain     Obstructive sleep apnea     OCD (obsessive compulsive disorder)     Poison ivy 5/18/2021    Somnolence, daytime 11/5/2020    Thyroid disease     Witnessed apneic spells 11/5/2020        Past Surgical History:      Procedure Laterality Date    APPENDECTOMY      COLONOSCOPY  approx 2017    Dr Mildred Mittal @ [facility] hospt. \" polyps\" per pt recall    COLONOSCOPY N/A 11/06/2020    Dr Koby Mathis, internal hemorrhoids-Grade 1, 5 yr recall    HERNIA REPAIR      bih    THYROIDECTOMY      TOTAL    UPPER GASTROINTESTINAL ENDOSCOPY N/A 05/06/2021    Dr Rosa Maria Ulloa gastritis (avoid NSAIDS) (-)H.  pylori, w/push enteroscopy up to the the proximal jejunum-Gastric erosions,  non-obstructing distal esoph ring, sliding hh, gastritis, duodenitis        Family History:      Problem Relation Age of Onset    Stomach Cancer Maternal Uncle     Liver Cancer Maternal Uncle     Cancer Maternal Grandfather     Stomach Cancer Maternal Aunt     Cancer Other         LEUKEMIA    Colon Cancer Neg Hx     Colon Polyps Neg Hx     Esophageal Cancer Neg Hx     Rectal Cancer Neg Hx         Social History  Social History     Tobacco Use    Smoking status: Current Every Day Smoker     Packs/day: 2.00     Years: 34.00     Pack years: 68.00     Types: Cigarettes     Start date: 1986    Smokeless tobacco: Never Used    Tobacco comment: Nicotine Gum severals times daily & Nioctine patches    Vaping Use    Vaping Use: Never used   Substance Use Topics    Alcohol use: Yes     Alcohol/week: 1.0 standard drink     Types: 1 Cans of beer per week     Comment: occasional     Drug use: Yes     Types: Marijuana Liudmila Dasen)     Comment: smokes marijuana daily, along with RSO (marijuana) oil 0.1ml under tongue             Wt Readings from Last 3 Encounters:   05/23/22 191 lb (86.6 kg)   04/20/22 190 lb (86.2 kg)   03/15/22 193 lb (87.5 kg)        Objective:  Vital Signs: Blood pressure 112/82, pulse 78, height 5' 7\" (1.702 m), weight 191 lb (86.6 kg), SpO2 97 %. Labs:  BMP:   No results for input(s): NA, K, CL, CO2, PHOS, BUN, CREATININE, CALCIUM in the last 72 hours. CBC:   No results for input(s): WBC, HGB, HCT, MCV, PLT in the last 72 hours. PT/INR: No results for input(s): PROTIME, INR in the last 72 hours. APTT: No results for input(s): APTT in the last 72 hours. Magnesium:No results for input(s): MG in the last 72 hours. Phosphorus:No results for input(s): PHOS in the last 72 hours. Hepatic:   No results for input(s): ALKPHOS, ALT, AST, PROT, BILITOT, BILIDIR, LABALBU in the last 72 hours. Cultures:   No results for input(s): CULTURE in the last 72 hours.     Radiology reports as per the Radiologist  Radiology: No results found. ASSESSMENT AND PLAN:  #1   Stage I low-grade B-cell lymphoma (SLL - small lymphocytic lymphoma) of retroperitoneal/para-aortic region lymph node conglomerates, 2021     Mr Nguyễn Tiwari is a 54year old gentleman managed with primary and secondary diagnoses as outlined:  · Stage I low-grade B-cell lymphoma (SLL) (small lymphocytic lymphoma) manifesting with 4 x 2.1 x 4.9 cm and 3.6 x 1.9 x 4.1 cm retroperitoneal para-aortic region lymph node conglomerates, 2021   · Minimal cerebellar tonsillar ectopia, not meeting criteria for Chiari I malformation on MRI of the brain 2020 followed by Dr. Reji Krueger with neurology    [facility] has completed XRT and received 3000 cGy that was completed on 2021  He is asymptomatic, without B symptoms. He appears healthy, with the only complaint being nauseated in the mornings reportedly secondary to a minimal cerebellar tonsillar ectopia, not meeting criteria for Chiari I malformation on MRI of the brain 2020. His father just  a month ago, he is grieving having cared for him on an intermittent basis bathing him etc. for the past year. .     CT ABDOMEN PELVIS W IV CONTRAST 2021: Comparison: CT abdomen and pelvis with contrast 2021  · Marked interval decrease in size of the retroperitoneal lymph nodes, barely visible on the current exam.  · 5 mm A tiny hypodensity is seen in the anterior right hepatic lobe, stable  · 2.5 x 0.7 cm Previously identified conglomerate of lymph nodes between the aorta and IVC, previously 4.6 x 2 cm  · 1.7 x 0.8 cm lymph node medial to the right kidney, previously 3.4 x 2.2 cm  · Small bowel in small bowel intussusception in the left mid abdomen,which is generally benign and related to peristalsis.  No surrounding inflammatory change    CT ABDOMEN PELVIS W IV CONTRAST at [facility] on 2021: Comparison to 2021  · A stable CT scan of the abdomen and pelvis. No change in the previous study. · No evidence of abdominal or pelvic lymphadenopathy. · Diverticulosis of the distal colon. No evidence for diverticulitis. · Tiny hepatic nodules which are too small to be further characterized. No change    Physical examination today, 5/23/2022, does not reveal evidence of palpable pathological peripheral lymphadenopathy in the cervical, supraclavicular, infraclavicular, axillary or inguinal lymph node areas. Lungs are clear heart is regular normal S1-S2, no S3  Abdominal exam documents an enlarged liver, with fullness in the RUQ, somewhat tender. I am unable to palpate a spleen. Extremities no cyanosis clubbing or edema  Neurological exam is nonfocal intact with an intact mental status and normal cranial nerves II through XII. CBC today 5/23/2022 reveals a WBC of 5.6, only 15.8% lymphocytes Hgb is 13.9 with an MCV of 93.8 and platelet count of 802,608. Conservative monitoring and management only warranted at this time. I will order a repeat CT scan of the abdomen and pelvis when he returns in 3 months. #2  Macrocytic anemia    Anemia was documented at his initial visit on 6/30/2021. Serology on 6/30/21 revealed:  CMP - unremarkable  Iron - 149  TIBC - 460  Saturation - 32%  Ferritin -128  LDH - 441  TSH - 0.267  Uric Acid - 4.5  Folate - 4.2  Retic - 1.0   Haptoglobin-180  MMA - 109    B2M-1.9  Kappa light chains- 18.9  Lambda light chains-24.4  K/L ratio-0.77  IgG-590 ,IgA-176 , IgM-54  Total Protein - 6.2  M-spike-not observed    Hepatitis Panel - negative    Serology on 11/15/2021 revealed:  CMP -ALT 19,   Iron - 77  TIBC - 346  Saturation - 22%  Ferritin -32.2  LDH - 437  TSH - 5.690      CBC on 7/15/2021 revealed a WBC of 4.38. Hgb is 8.4  with an MCV of 103.7 and platelet count of 215,792. CBC 09/13/2021 revealed a WBC of 3.58 . Hgb is 9.4 with an MCV of 112.8 and platelet count of 687,692 .     CBC 11/15/2021 revealed a WBC of 4.05 Hgb is 14.2 with an MCV of 95.4 and platelet count of 419,872 . CBC 2/21/2022  reveals a WBC of  5.60 Hgb is 13.5 with an MCV of 92.6 and platelet count of 941,798 . No further intervention warranted from the anemia standpoint. Hemoglobin is recovered at 13.5. #3  Tumor screening and health maintenance    GI cancer screening -   Cscope 11/6/2020 per Dr Ehsan Macario 5 yr recall  EGD -  5/6/2021 per Dr Ehsan Macario      Prostate cancer screening    #4  Immunizations:  Immunization History   Administered Date(s) Administered    COVID-19, Renzo Sox, Primary or Immunocompromised, PF, 100mcg/0.5mL 03/24/2021, 04/21/2021, 10/01/2021    Hepatitis A/Hepatitis B (Twinrix) 04/27/2012    Influenza Virus Vaccine 10/20/2020    Influenza, MDCK Quadv, IM, PF (Flucelvax 2 yrs and older) 10/16/2021    Influenza, Quadv, adjuvanted, 65 yrs +, IM, PF (Fluad) 10/16/2020    Pneumococcal Polysaccharide ([id number]) 10/23/2015    Tdap (Boostrix, Adacel) 03/15/2022             Lalita Mary was seen today for follow-up. Diagnoses and all orders for this visit:    Low grade B-cell lymphoma ([facility] [address]  -     Comprehensive Metabolic Panel; Future  -     Lactate Dehydrogenase; Future  -     CT ABDOMEN PELVIS W IV CONTRAST Additional Contrast? None; Future  -     CT CHEST W CONTRAST;  Future    Health care maintenance        Orders Placed This Encounter   Procedures    CT ABDOMEN PELVIS W IV CONTRAST Additional Contrast? None     Standing Status:   Future     Standing Expiration Date:   5/19/2023     Order Specific Question:   Additional Contrast?     Answer:   None     Order Specific Question:   STAT Creatinine as needed:     Answer:   No     Order Specific Question:   Reason for exam:     Answer:   Monitor B-Cell Lymphoma    CT CHEST W CONTRAST     Standing Status:   Future     Standing Expiration Date:   5/23/2023     Order Specific Question:   STAT Creatinine as needed:     Answer:   No     Order Specific Question:   Reason for exam: Answer:   monitor B cell lymphoma    Comprehensive Metabolic Panel     Standing Status:   Future     Standing Expiration Date:   5/19/2023    Lactate Dehydrogenase     Standing Status:   Future     Standing Expiration Date:   5/19/2023       No orders of the defined types were placed in this encounter. Return in about 3 months (around 8/23/2022) for follow-up with .

## 2022-05-23 ENCOUNTER — OFFICE VISIT (OUTPATIENT)
Dept: HEMATOLOGY | Age: 52
End: 2022-05-23
Payer: MEDICAID

## 2022-05-23 ENCOUNTER — HOSPITAL ENCOUNTER (OUTPATIENT)
Dept: INFUSION THERAPY | Age: 52
Discharge: HOME | End: 2022-05-23
Payer: MEDICAID

## 2022-05-23 VITALS
DIASTOLIC BLOOD PRESSURE: 82 MMHG | HEART RATE: 78 BPM | OXYGEN SATURATION: 97 % | HEIGHT: 67 IN | SYSTOLIC BLOOD PRESSURE: 112 MMHG | BODY MASS INDEX: 29.98 KG/M2 | WEIGHT: 191 LBS

## 2022-05-23 DIAGNOSIS — Z00.00 HEALTH CARE MAINTENANCE: ICD-10-CM

## 2022-05-23 DIAGNOSIS — C85.10 LOW GRADE B-CELL LYMPHOMA (HCC): ICD-10-CM

## 2022-05-23 DIAGNOSIS — C85.10 LOW GRADE B-CELL LYMPHOMA (HCC): Primary | ICD-10-CM

## 2022-05-23 LAB
ALBUMIN SERPL-MCNC: 4.2 G/DL (ref 3.5–5.2)
ALP BLD-CCNC: 67 U/L (ref 40–130)
ALT SERPL-CCNC: 21 U/L (ref 21–72)
ANION GAP SERPL CALCULATED.3IONS-SCNC: 12 MMOL/L (ref 7–19)
AST SERPL-CCNC: 21 U/L (ref 17–59)
BILIRUB SERPL-MCNC: 0.4 MG/DL (ref 0.2–1.3)
BUN BLDV-MCNC: 11 MG/DL (ref 9–20)
CALCIUM SERPL-MCNC: 9.3 MG/DL (ref 8.4–10.2)
CHLORIDE BLD-SCNC: 105 MMOL/L (ref 98–111)
CO2: 26 MMOL/L (ref 22–29)
CREAT SERPL-MCNC: 0.9 MG/DL (ref 0.6–1.2)
GFR NON-AFRICAN AMERICAN: >60
GLOBULIN: 2.4 G/DL
GLUCOSE BLD-MCNC: 108 MG/DL (ref 74–106)
HCT VFR BLD CALC: 42.5 % (ref 40.1–51)
HEMOGLOBIN: 13.9 G/DL (ref 13.7–17.5)
LACTATE DEHYDROGENASE: 500 U/L (ref 313–618)
LYMPHOCYTES ABSOLUTE: 0.9 K/UL (ref 1.18–3.74)
LYMPHOCYTES RELATIVE PERCENT: 15.8 % (ref 19.3–53.1)
MCH RBC QN AUTO: 30.7 PG (ref 25.7–32.2)
MCHC RBC AUTO-ENTMCNC: 32.7 G/DL (ref 32.3–36.5)
MCV RBC AUTO: 93.8 FL (ref 79–92.2)
MONOCYTES ABSOLUTE: 0.3 K/UL (ref 0.24–0.82)
MONOCYTES RELATIVE PERCENT: 6 % (ref 4.7–12.5)
NEUTROPHILS ABSOLUTE: 4.4 K/UL (ref 1.56–6.13)
NEUTROPHILS RELATIVE PERCENT: 78.2 % (ref 34–71.1)
PDW BLD-RTO: 13.4 % (ref 11.6–14.4)
PLATELET # BLD: 236 K/UL (ref 163–337)
PMV BLD AUTO: 8.4 FL (ref 7.4–10.4)
POTASSIUM SERPL-SCNC: 4.5 MMOL/L (ref 3.5–5.1)
RBC # BLD: 4.53 M/UL (ref 4.63–6.08)
SODIUM BLD-SCNC: 143 MMOL/L (ref 137–145)
TOTAL PROTEIN: 6.5 G/DL (ref 6.3–8.2)
WBC # BLD: 5.6 K/UL (ref 4.23–9.07)

## 2022-05-23 PROCEDURE — 80053 COMPREHEN METABOLIC PANEL: CPT

## 2022-05-23 PROCEDURE — G8417 CALC BMI ABV UP PARAM F/U: HCPCS | Performed by: INTERNAL MEDICINE

## 2022-05-23 PROCEDURE — G8427 DOCREV CUR MEDS BY ELIG CLIN: HCPCS | Performed by: INTERNAL MEDICINE

## 2022-05-23 PROCEDURE — 99213 OFFICE O/P EST LOW 20 MIN: CPT

## 2022-05-23 PROCEDURE — 4004F PT TOBACCO SCREEN RCVD TLK: CPT | Performed by: INTERNAL MEDICINE

## 2022-05-23 PROCEDURE — 83615 LACTATE (LD) (LDH) ENZYME: CPT

## 2022-05-23 PROCEDURE — 99214 OFFICE O/P EST MOD 30 MIN: CPT | Performed by: INTERNAL MEDICINE

## 2022-05-23 PROCEDURE — 85025 COMPLETE CBC W/AUTO DIFF WBC: CPT

## 2022-05-23 PROCEDURE — 36415 COLL VENOUS BLD VENIPUNCTURE: CPT
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
## 2022-05-31 ENCOUNTER — OFFICE VISIT (OUTPATIENT)
Dept: INTERNAL MEDICINE | Age: 52
End: 2022-05-31
Payer: MEDICAID

## 2022-05-31 VITALS
HEART RATE: 84 BPM | SYSTOLIC BLOOD PRESSURE: 132 MMHG | BODY MASS INDEX: 30.45 KG/M2 | HEIGHT: 67 IN | WEIGHT: 194 LBS | OXYGEN SATURATION: 98 % | RESPIRATION RATE: 20 BRPM | DIASTOLIC BLOOD PRESSURE: 80 MMHG

## 2022-05-31 DIAGNOSIS — F17.200 TOBACCO USE DISORDER: ICD-10-CM

## 2022-05-31 DIAGNOSIS — E03.9 ACQUIRED HYPOTHYROIDISM: ICD-10-CM

## 2022-05-31 DIAGNOSIS — M54.40 BACK PAIN OF LUMBAR REGION WITH SCIATICA: Primary | ICD-10-CM

## 2022-05-31 DIAGNOSIS — K21.9 GASTROESOPHAGEAL REFLUX DISEASE WITHOUT ESOPHAGITIS: ICD-10-CM

## 2022-05-31 DIAGNOSIS — Z12.5 PROSTATE CANCER SCREENING: ICD-10-CM

## 2022-05-31 DIAGNOSIS — E34.9 HYPOTESTOSTERONISM: ICD-10-CM

## 2022-05-31 DIAGNOSIS — E78.2 COMBINED HYPERLIPIDEMIA: ICD-10-CM

## 2022-05-31 DIAGNOSIS — R73.03 PREDIABETES: ICD-10-CM

## 2022-05-31 PROCEDURE — G8417 CALC BMI ABV UP PARAM F/U: HCPCS | Performed by: INTERNAL MEDICINE

## 2022-05-31 PROCEDURE — 4004F PT TOBACCO SCREEN RCVD TLK: CPT | Performed by: INTERNAL MEDICINE

## 2022-05-31 PROCEDURE — 99213 OFFICE O/P EST LOW 20 MIN: CPT | Performed by: INTERNAL MEDICINE

## 2022-05-31 PROCEDURE — G8427 DOCREV CUR MEDS BY ELIG CLIN: HCPCS | Performed by: INTERNAL MEDICINE

## 2022-05-31 RX ORDER — TESTOSTERONE MICRONIZED 100 %
POWDER (GRAM) MISCELLANEOUS
Qty: 30 EACH | Refills: 0 | Status: SHIPPED | OUTPATIENT
Start: 2022-05-31 | End: 2022-05-31

## 2022-05-31 RX ORDER — TESTOSTERONE MICRONIZED 100 %
POWDER (GRAM) MISCELLANEOUS
Qty: 30 EACH | Refills: 0 | Status: SHIPPED | OUTPATIENT
Start: 2022-05-31 | End: 2022-06-01 | Stop reason: SDUPTHER

## 2022-05-31 SDOH — ECONOMIC STABILITY: FOOD INSECURITY: WITHIN THE PAST 12 MONTHS, YOU WORRIED THAT YOUR FOOD WOULD RUN OUT BEFORE YOU GOT MONEY TO BUY MORE.: SOMETIMES TRUE

## 2022-05-31 SDOH — ECONOMIC STABILITY: FOOD INSECURITY: WITHIN THE PAST 12 MONTHS, THE FOOD YOU BOUGHT JUST DIDN'T LAST AND YOU DIDN'T HAVE MONEY TO GET MORE.: NEVER TRUE

## 2022-05-31 SDOH — ECONOMIC STABILITY: TRANSPORTATION INSECURITY
IN THE PAST 12 MONTHS, HAS THE LACK OF TRANSPORTATION KEPT YOU FROM MEDICAL APPOINTMENTS OR FROM GETTING MEDICATIONS?: NO

## 2022-05-31 SDOH — ECONOMIC STABILITY: TRANSPORTATION INSECURITY
IN THE PAST 12 MONTHS, HAS LACK OF TRANSPORTATION KEPT YOU FROM MEETINGS, WORK, OR FROM GETTING THINGS NEEDED FOR DAILY LIVING?: NO

## 2022-05-31 ASSESSMENT — ENCOUNTER SYMPTOMS
CONSTIPATION: 0
COUGH: 0
COLOR CHANGE: 0
BACK PAIN: 1
ABDOMINAL PAIN: 0
TROUBLE SWALLOWING: 0
NAUSEA: 0
SHORTNESS OF BREATH: 0
BLOOD IN STOOL: 0
SORE THROAT: 0
WHEEZING: 0
STRIDOR: 0
DIARRHEA: 0

## 2022-05-31 ASSESSMENT — SOCIAL DETERMINANTS OF HEALTH (SDOH): HOW HARD IS IT FOR YOU TO PAY FOR THE VERY BASICS LIKE FOOD, HOUSING, MEDICAL CARE, AND HEATING?: VERY HARD

## 2022-05-31 NOTE — ASSESSMENT & PLAN NOTE
must have a clean urine, he will also return in one month to submit a Utox,discussed several options will try tramadol.

## 2022-06-01 RX ORDER — TESTOSTERONE MICRONIZED 100 %
POWDER (GRAM) MISCELLANEOUS
Qty: 30 EACH | Refills: 0 | Status: SHIPPED | OUTPATIENT
Start: 2022-06-01 | End: 2022-10-20 | Stop reason: ALTCHOICE

## 2022-06-01 RX ORDER — TESTOSTERONE MICRONIZED 100 %
POWDER (GRAM) MISCELLANEOUS
Qty: 30 EACH | Refills: 0 | Status: SHIPPED | OUTPATIENT
Start: 2022-06-01 | End: 2022-06-01 | Stop reason: SDUPTHER

## 2022-06-13 DIAGNOSIS — E34.9 HYPOTESTOSTERONISM: Primary | ICD-10-CM

## 2022-06-13 RX ORDER — TESTOSTERONE MICRONIZED 100 %
POWDER (GRAM) MISCELLANEOUS
Qty: 1 EACH | Refills: 0 | Status: SHIPPED | OUTPATIENT
Start: 2022-06-13 | End: 2022-08-11 | Stop reason: SDUPTHER

## 2022-06-13 NOTE — TELEPHONE ENCOUNTER
Requested Prescriptions     Pending Prescriptions Disp Refills    Testosterone POWD, Versabase GEL, Ethyl Alcohol 95 % SOLN [Pharmacy Med Name: TESTOSTERONE 5% GEL]  0     Sig: APPLY 4 CLICKS TO CHEST OR SHOULDER ONCE DAILY.      Lanny Magallon 04/19/2022  UDS 03/02/2021  Med contract 03/02/2021

## 2022-06-14 DIAGNOSIS — C85.10 LOW GRADE B-CELL LYMPHOMA (HCC): ICD-10-CM

## 2022-06-14 NOTE — TELEPHONE ENCOUNTER
Requested Prescriptions     Pending Prescriptions Disp Refills    ondansetron (ZOFRAN) 4 MG tablet [Pharmacy Med Name: ONDANSETRON HCL 4 MG TABLET] 30 tablet 1     Sig: TAKE ONE TABLET BY MOUTH EVERY 6 HOURS AS NEEDED FOR NAUSEA

## 2022-06-20 RX ORDER — ONDANSETRON 4 MG/1
TABLET, FILM COATED ORAL
Qty: 30 TABLET | Refills: 1 | Status: SHIPPED | OUTPATIENT
Start: 2022-06-20 | End: 2022-09-26

## 2022-06-27 DIAGNOSIS — N52.9 ERECTILE DYSFUNCTION, UNSPECIFIED ERECTILE DYSFUNCTION TYPE: ICD-10-CM

## 2022-06-27 RX ORDER — SILDENAFIL CITRATE 20 MG/1
TABLET ORAL
Qty: 60 TABLET | Refills: 1 | Status: SHIPPED | OUTPATIENT
Start: 2022-06-27 | End: 2022-09-26

## 2022-06-29 DIAGNOSIS — N52.9 ERECTILE DYSFUNCTION, UNSPECIFIED ERECTILE DYSFUNCTION TYPE: ICD-10-CM

## 2022-06-29 RX ORDER — SILDENAFIL CITRATE 20 MG/1
20 TABLET ORAL 2 TIMES DAILY
Qty: 60 TABLET | Refills: 1 | OUTPATIENT
Start: 2022-06-29 | End: 2022-07-29

## 2022-06-29 NOTE — TELEPHONE ENCOUNTER
Requested Prescriptions     Pending Prescriptions Disp Refills    sildenafil (REVATIO) 20 MG tablet 60 tablet 1     Sig: Take 1 tablet by mouth in the morning and at bedtime

## 2022-08-02 NOTE — PROGRESS NOTES
Patient:  Benito Brice  YOB: 1970  Date of Service: 2022  MRN: [id number]    Primary Care Physician: Thea Damon MD    Chief Complaint   Patient presents with    Follow-up     Low grade B-cell lymphoma [facility])         Patient Seen, Chart, Consults notes, Labs, Radiology studies reviewed. Subjective:  Mr Jackson Posey is a 50year old gentleman managed with primary and secondary diagnoses as outlined:  Stage I low-grade B-cell lymphoma (SLL) (small lymphocytic lymphoma) manifesting with 4 x 2.1 x 4.9 cm and 3.6 x 1.9 x 4.1 cm retroperitoneal para-aortic region lymph node conglomerates, 2021   Minimal cerebellar tonsillar ectopia, not meeting criteria for Chiari I malformation on MRI of the brain 2020 followed by Dr. Jak Sanchez with neurology    Lena Dann completed XRT, and received 3000 cGy that was completed on 2021  He is asymptomatic, without B symptoms. He has never received systemic therapy for SLL. He is being monitored conservatively for symptoms or radiographic progression. He appears healthy, with the only complaint being nauseated in the mornings reportedly secondary to a minimal cerebellar tonsillar ectopia, not meeting criteria for Chiari I malformation on MRI of the brain 2020. His father  in 2022, he is grieving having cared for him on an intermittent basis bathing him etc. for a year prior to his passing. He comes today accompanied by his wife with only complaints of epigastric discomfort and gastritis, treated by his PCP.     TARGET SLL/CLL SITES:  4 x 2.1 x 4.9 cm and 3.6 x 1.9 x 4.1 cm enhancing oval lobulated solid masses in the right upper abdomen, retroperitoneal para-aortic region located between the aorta and inferior vena cava    TUMOR HISTORY: Stage I low-grade B-cell lymphoma (SLL) 2021  Mr Jackson Posey was seen in initial consultation on 2021, referred by Dr Thea Damon for newly diagnosed low grade B-cell lymphoma. In March 2021, Ricky Hall decided he wanted to jump on a pogo stick but unfortunately was unsuccessful, fell and hurt his back. Ricky Hall presented for work-up with low back pain. MRI LUMBAR SPINE WO CONTRAST  On 3/22/2021 at [facility] documented:  Multilevel prominent disc osteophyte complexes and facetal arthropathy and resultant neural foramina spinal canal stenosis  Incidentally noted is an ill-defined soft tissue mass located posterior to the distal left renal vein and inferior vena cava with anterior displacement of both. It measures approximately 4 cm x 1.5 x 5.3 cm. This may represent an enlarged retroperitoneal lymph node? Suzie Del Toro A similar mass is seen at a lower level measuring 2.8 x 1.8 x 3.6 cm. CT ABDOMEN PELVIS W WO CONTRAST on 3/25/2021 at [facility] documented:  Evidence of moderately enhancing oval lobulated solid masses in the right upper abdomen in the retroperitoneal para-aortic region located between the aorta and inferior vena cava. There is marked asymmetrical thickening of the right diaphragmatic luz. The 2 masses measure 4 cm x 2.1 x 4.9 cm and 3.6 x 1.9 x 4.1 cm. Potential moderate contrast enhancement. Inflammatory or neoplastic process is not excluded. Further evaluation is recommended. No other enlarged lymph nodes are noted. Ricky Hall was referred to GI for initial evaluation hoping for access to the abdominal mass via EGD/EUS. EGD BIOPSY on 5/6/2021 at [facility] per  Sandra Hinosn MD  IMPRESSION:  Esophagus: normal and the EG junction at 38 cm appeared essentially normal except for a nonobstructing distal esophageal ring. There is a small 2 to 3 cm in size sliding hiatal hernia present. Stomach:  Abnormal; patchy mucosal changes multiple small 1 to 2 mm erosions with surrounding small areas of erythema noted in the antrum suggestive of medical gastritis noted -  Gastric biopsies were taken from the antrum and body to rule out Helicobacter pylori infection.   The proximal stomach including the fundus, cardia, body and angularis appeared completely normal.  No evidence of abnormal folds or unusual thickening noted in the fundus. NO ulcers or masses or gastric outlet obstruction or retained food or fluid. Rugae were normal and lumen distended well with insufflation. Retroflexed views otherwise revealed a normal GE junction, fundus and cardia as well. Duodenum: abnormal: 3 small erosions with surrounding erythema were noted in the posterior duodenal bulb suggestive of mild duodenitis. Otherwise remainder of the examined duodenum including the bulb second third and fourth portions appeared essentially normal as did the first few centimeters of the examined proximal jejunum. No strictures or mass lesions or ulcers were noted. FINAL DIAGNOSIS:   Stomach, antrum, biopsy:   Antral-type gastric mucosa with acanthosis and tortuous glands, consistent with reactive gastropathy (chemical gastritis). Negative for active inflammation. Negative for Helicobacter pylori organisms by immunohistochemical staining. Negative for intestinal metaplasia. Negative for dysplasia. Dr Sarah Grimaldo reviewed the images with the Dr. Adan Keenan and he opined that the retroperitoneal lesion was not amenable to FNA by EUS or even by radiology here in [place], [place]. Dr Raul Heller referred Jovanimark Greene to Dr Jef Veronica with the surgical oncology department at [facility]     Dr Jef Veronica saw Laxmi Greene on 5/21/2021. A CT-guided biopsy retroperitoneal mass was performed on 6/2/2021 at [facility]  Microscopic description: The cytospin shows a mixture of small and large lymphoid cells    CYTOLOGIC DIAGNOSIS:  Low-grade B-cell lymphoma most consistent with small lymphocytic lymphoma (SLL/CLL)  CD5 (+) lambda surface restricted B-cell population (48% of cells) by flow cytometry  IHC report the neoplastic cells are positive for PAX-5 and LEF1. CD3 is positive and T cells.   Cells are negative for cyclin D1 and SOX11. The Ki-67 proliferative index is variable and ranges from 5% to focally up to 20%    Flow cytometry immunophenotypic B cells  POSITIVE FOR CD5, lambda, CD45, CD20, CD22, CD19, CD23, and CD38  NEGATIVE FOR, CD10, CD79B and CD14    COMMENT:   Findings support involvement by a B cell lymphoma. Correlation with the forthcoming morphology (88-WY-) is recommended. Physical examination at presentation did not reveal evidence of palpable pathological peripheral lymphadenopathy in the cervical, supraclavicular, infraclavicular, axillary or inguinal lymph node areas. Lungs are clear heart is regular normal S1-S2, no S3  Abdominal exam documents an enlarged liver, with fullness in the RUQ, somewhat tender. I am unable to palpate a spleen. Extremities no cyanosis clubbing or edema  Neurological exam is nonfocal intact with an intact mental status and normal cranial nerves II through XII. Serology on 6/30/21 revealed:  CMP - unremarkable  Iron - 149  TIBC - 460  Saturation - 32%  Ferritin -128  LDH - 441  TSH - 0.267  Uric Acid - 4.5  Folate - 4.2  Retic - 1.0  Haptoglobin-180  MMA - 109    B2M-1.9  Kappa light chains- 18.9  Lambda light chains-24.4  K/L ratio-0.77  IgG-590 ,IgA-176 , IgM-54  Total Protein - 6.2  M-spike-not observed    Hepatitis Panel - negative    Bone marrow biopsy and aspirate on 7/8/2021 is preliminary but has been discussed with Dr. Roberta Farah indicating the following:  Morphologically the marrow is hypercellular (90%) with increased erythroid shift. There is no obvious evidence of panmyelosis  Flow cytometry shows no increase in number of blasts and no immunophenotypic evidence of involvement by non-Hodgkin's lymphoproliferative disorder.   [address] study for myelodysplasia profile is completely negative  Cytogenetics documents a normal male karyotype  JAK2 V617F, JAK2 EXON 12, CALR MPL and KIT MUTATIONS ALL NEGATIVE    COMMENT:  The marrow is hypercellular (90% cellular) with increased erythroid precursors and a shift to immaturity. No increase in blasts or dysplasia is seen. The granulocytic series and megakaryocytes do not appear to be increased. Evaluation of peripheral blood smear does not show an increase in polychromasia, basophilia, leukoerythroblastic effect, macrocytosis or circulating blasts. No increase in spherocytes is seen in the included history shows no increase in bilirubin which would argue against hemolysis. FISH (MDS panel) is negative for abnormalities. Evaluation for blood loss, nutritional deficiencies and exogenous testosterone administration may be helpful    CT neck and nasopharynx with contrast on 7/9/2021at [facility] documented:  Scattered non pathologically enlarged lymph nodes are noted bilaterally, measuring up to 7 mm. No cervical lymphadenopathy. CT CHEST W CONTRAST on  7/9/2021at [facility] documented:  No enlarged axillary, hilar, or mediastinal lymph nodes. No intrathoracic lymphadenopathy. Emphysema. .    CT ABDOMEN PELVIS W IV CONTRAST on  7/9/2021 compared to 3/25/21 at [facility] documented:  Aretroperitoneal mass in the upper abdomen is again noted. The more superior aspect of the mass measures 4.6 x 2 cm today and previously measured 4 x 2.1 cm. The more posterior and inferior portion of the mass measures 3.4 x 2.2 cm and previously measured 3.1 x 1.9 cm. There are no other enlarged lymph nodes in the abdomen or the pelvis. This is likely an enlarged lymph node. Other retroperitoneal mass lesions are not ruled out. Sarcoma and other tumors would also be in the differential.  Stable 5-6 mm probable liver cyst.  Gastric wall thickening likely an artifact of under distention. Mild diverticulosis of the colon. PET CT SKULL BASE TO MID THIGH on 7/9/2021at [facility], compared to CTs performed same day, documented:  Slightly increased metabolic activity in the retroperitoneal mass seen on recent examination, with maximum SUV of 3.2.  Neoplastic process remains in the differential. Tissue diagnosis is recommended. For reference purposes, maximum SUV in the mediastinum is 2.3 and maximum SUV in the liver is 1.9. Transthoracic Echocardiography Report (TTE) on 7/9/21 at [facility] documented:  Summary:  LV is borderline dilated in size with normal systolic function. LV ejection fraction estimated at 55 to 60%. Diastolic function appears preserved. RV appears normal in size with normal systolic function. Normal left atrial size. Normal right atrial size. Aortic valve is trileaflet with normal leaflet mobility. No stenosis or significant regurgitation. Mitral valve appears structurally normal with normal leaflet mobility. Trace mitral regurgitation. No stenosis. No significant tricuspid regurgitation    The diagnosis and extent of disease was discussed at length with Lalito Segundo, his wife and mother. Treatment alternatives include systemic therapy such as ibrutinib or alternatively limited treatment with XRT which would be reasonable given his young age and the limited and early stage of presentation. A call was placed to Dr. Graciela Mckeon who agreed to see Mr. Gio Jackson in consultation to discuss and consider XRT. I explained to Mr. Gabbie Guzman and family that despite potentially being able to include all of the known SLL in a radiation portal, over time, expectation will be that it is more likely than not that the SLL/CLL would recur but we do not have a timeframe and it could be in his best interest to hold off on systemic therapy. He and his family agree completely. Referral is made to Dr. Graciela Mckeon for evaluation. XRT to the 4 x 2.1 x 4.9 cm and 3.6 x 1.9 x 4.1 cm enhancing oval lobulated solid masses in the right upper abdomen, retroperitoneal para-aortic region located between the aorta and inferior vena cava was initiated on 8/12/2021. He received 3000 cGy that was completed on 9/01/2021.     CT ABDOMEN PELVIS W IV CONTRAST 9/20/2021: Comparison: CT abdomen and pelvis with contrast 7/9/2021  Marked interval decrease in size of the retroperitoneal lymph nodes, barely visible on the current exam.  5 mm A tiny hypodensity is seen in the anterior right hepatic lobe, stable  2.5 x 0.7 cm Previously identified conglomerate of lymph nodes between the aorta and IVC, previously 4.6 x 2 cm  1.7 x 0.8 cm lymph node medial to the right kidney, previously 3.4 x 2.2 cm  Small bowel in small bowel intussusception in the left mid abdomen,which is generally benign and related to peristalsis. No surrounding inflammatory change    CT ABDOMEN PELVIS W IV CONTRAST at [address] on 11/24/2021: Comparison to 9/20/2021  A stable CT scan of the abdomen and pelvis. No change in the previous study. No evidence of abdominal or pelvic lymphadenopathy. Diverticulosis of the distal colon. No evidence for diverticulitis. Tiny hepatic nodules which are too small to be further characterized. No change    CT CHEST WITH CONTRAST at [address] on 8/18/2022:Comparison CT scan of chest dated 7/09/2021  No mediastinal, axillary or supraclavicular adenopathy identified  Negative study. No acute cardiopulmonary disease  Stable and unchanged since the previous study 7/9/21    CT ABDOMEN/PELVIS with IV contrast At [address] on 8/18/2022: Comparison 11/24/2021  No evidence for acute abdominal or pelvic process. Negative for mass lesion or pathologic lymphadenopathy. Colonic diverticulosis without diverticulitis. 2.5 cm right paraumbilical fat-containing hernia. Moderate to severe L5-S1 degenerative disc disease    Conservative monitoring and management only warranted at this time. Cordell Castro completed XRT, and received 3000 cGy that was completed on 9/01/2021  He is asymptomatic, without B symptoms. He has never received systemic therapy for SLL. He is being monitored conservatively for symptoms or radiographic progression.       TREATMENT SUMMARY:  XRT to the 4 x 2.1 x 4.9 cm and 3.6 x 1.9 x 4.1 cm enhancing oval lobulated solid masses in the right upper abdomen, retroperitoneal para-aortic region located between the aorta and inferior vena cava was initiated on 8/12/2021. He received 3000 cGy that was completed on 9/01/2021      Allergies:  Patient has no known allergies. Medicines:  Current Outpatient Medications   Medication Sig Dispense Refill    Testosterone POWD, Versabase GEL, Ethyl Alcohol 95 % SOLN APPLY 4 CLICKS TO CHEST OR SHOULDER ONCE DAILY. 1 each 0    sildenafil (REVATIO) 20 MG tablet TAKE 2 TABLETS BY MOUTH AS NEEDED AS DIRECTED. 60 tablet 1    ondansetron (ZOFRAN) 4 MG tablet TAKE ONE TABLET BY MOUTH EVERY 6 HOURS AS NEEDED FOR NAUSEA 30 tablet 1    PARoxetine (PAXIL) 40 MG tablet TAKE ONE TABLET IN THE MORNING 90 tablet 1    levothyroxine (SYNTHROID) 125 MCG tablet TAKE 1 TABLET BY MOUTH DAILY 90 tablet 1    SUMAtriptan (IMITREX) 100 MG tablet As directed 9 tablet 5    medical marijuana Take by mouth as needed. omeprazole (PRILOSEC) 40 MG delayed release capsule Take 1 capsule by mouth every morning (before breakfast) 90 capsule 1    ketorolac (TORADOL) 10 MG tablet Take 1 tablet by mouth every 6 hours as needed for Pain (Patient not taking: Reported on 8/22/2022) 20 tablet 0    Testosterone POWD, Versabase GEL, Ethyl Alcohol 95 % SOLN APPLY 4 CLICKS TO CHEST OR SHOULDER ONCE DAILY. 30 each 0    oxybutynin (DITROPAN-XL) 10 MG extended release tablet TAKE 1 TABLET BY MOUTH EVERY DAY FOR BLADDER (Patient not taking: Reported on 5/31/2022) 90 tablet 1    tiZANidine (ZANAFLEX) 2 MG tablet Take 1-2 tablets by mouth 3 times daily as needed (muscle spasm) (Patient not taking: Reported on 5/31/2022) 20 tablet 0    nicotine (NICODERM CQ) 14 MG/24HR Place 1 patch onto the skin every 24 hours (Patient not taking: No sig reported)      clobetasol (TEMOVATE) 0.05 % ointment Apply topically 2 times daily. (Patient not taking: No sig reported) 30 g 0     No current facility-administered medications for this visit. Past Medical History:      Diagnosis Date    Anemia     Anxiety     CPAP (continuous positive airway pressure) dependence     Depression     Herniated lumbar intervertebral disc     Hyperglycemia 01/05/2021    Hypertriglyceridemia 10/16/2020    Kidney cysts     Liver cyst     Low testosterone     Lymphoma ([facility] [address] 06/07/2021    Dr Benjamin Antonio     Migraine     Neck pain     Obstructive sleep apnea     OCD (obsessive compulsive disorder)     Poison ivy 5/18/2021    Somnolence, daytime 11/5/2020    Thyroid disease     Witnessed apneic spells 11/5/2020        Past Surgical History:      Procedure Laterality Date    APPENDECTOMY      COLONOSCOPY  approx 2017    Dr Lee Ann Arnold @ [facility] hospt. \" polyps\" per pt recall    COLONOSCOPY N/A 11/06/2020    Dr Supa Santiago, internal hemorrhoids-Grade 1, 5 yr recall    HERNIA REPAIR      bih    THYROIDECTOMY      TOTAL    UPPER GASTROINTESTINAL ENDOSCOPY N/A 05/06/2021    Dr John Kent gastritis (avoid NSAIDS) (-)H. pylori, w/push enteroscopy up to the the proximal jejunum-Gastric erosions,  non-obstructing distal esoph ring, sliding hh, gastritis, duodenitis        Family History:      Problem Relation Age of Onset    Stomach Cancer Maternal Uncle     Liver Cancer Maternal Uncle     Cancer Maternal Grandfather     Stomach Cancer Maternal Aunt     Cancer Other         LEUKEMIA    Colon Cancer Neg Hx     Colon Polyps Neg Hx     Esophageal Cancer Neg Hx     Rectal Cancer Neg Hx         Social History  Social History     Tobacco Use    Smoking status: Every Day     Packs/day: 2.00     Years: 34.00     Pack years: 68.00     Types: Cigarettes     Start date: 1986    Smokeless tobacco: Never    Tobacco comments:     Nicotine Gum severals times daily & Nioctine patches    Vaping Use    Vaping Use: Never used   Substance Use Topics    Alcohol use:  Yes     Alcohol/week: 1.0 standard drink     Types: 1 Cans of beer per week     Comment: occasional Drug use: Yes     Types: Marijuana [employer])     Comment: smokes marijuana daily, along with RSO (marijuana) oil 0.1ml under tongue             Wt Readings from Last 3 Encounters:   08/22/22 193 lb 1.6 oz (87.6 kg)   05/31/22 194 lb (88 kg)   05/23/22 191 lb (86.6 kg)        Objective:  Vital Signs: Blood pressure 120/80, pulse 77, height 5' 7\" (1.702 m), weight 193 lb 1.6 oz (87.6 kg), SpO2 98 %. Labs:  BMP:   No results for input(s): NA, K, CL, CO2, PHOS, BUN, CREATININE, CALCIUM in the last 72 hours. CBC:   Recent Labs     08/22/22  1032   WBC 5.76   HGB 14.6   HCT 44.6   MCV 94.7*        PT/INR: No results for input(s): PROTIME, INR in the last 72 hours. APTT: No results for input(s): APTT in the last 72 hours. Magnesium:No results for input(s): MG in the last 72 hours. Phosphorus:No results for input(s): PHOS in the last 72 hours. Hepatic:   No results for input(s): ALKPHOS, ALT, AST, PROT, BILITOT, BILIDIR, LABALBU in the last 72 hours. Cultures:   No results for input(s): CULTURE in the last 72 hours. Radiology reports as per the Radiologist  Radiology: No results found. ASSESSMENT AND PLAN:  #1   Stage I low-grade B-cell lymphoma (SLL - small lymphocytic lymphoma) of retroperitoneal/para-aortic region lymph node conglomerates, 6/2/2021     Mr Bernadette Logan is a 50year old gentleman managed with primary and secondary diagnoses as outlined:  Stage I low-grade B-cell lymphoma (SLL) (small lymphocytic lymphoma) manifesting with 4 x 2.1 x 4.9 cm and 3.6 x 1.9 x 4.1 cm retroperitoneal para-aortic region lymph node conglomerates, 6/2/2021   Minimal cerebellar tonsillar ectopia, not meeting criteria for Chiari I malformation on MRI of the brain 11/19/2020 followed by Dr. Holly Varner with neurology    [facility] completed XRT, and received 3000 cGy that was completed on 9/01/2021  He is asymptomatic, without B symptoms. He has never received systemic therapy for SLL.    He is being monitored conservatively for symptoms or radiographic progression. He appears healthy, with the only complaint being nauseated in the mornings reportedly secondary to a minimal cerebellar tonsillar ectopia, not meeting criteria for Chiari I malformation on MRI of the brain 2020. His father  in 2022, he is grieving having cared for him on an intermittent basis bathing him etc. for a year prior to his passing. He comes today accompanied by his wife with only complaints of epigastric discomfort and gastritis, treated by his PCP. CT ABDOMEN PELVIS W IV CONTRAST 2021: Comparison: CT abdomen and pelvis with contrast 2021  Marked interval decrease in size of the retroperitoneal lymph nodes, barely visible on the current exam.  5 mm A tiny hypodensity is seen in the anterior right hepatic lobe, stable  2.5 x 0.7 cm Previously identified conglomerate of lymph nodes between the aorta and IVC, previously 4.6 x 2 cm  1.7 x 0.8 cm lymph node medial to the right kidney, previously 3.4 x 2.2 cm  Small bowel in small bowel intussusception in the left mid abdomen,which is generally benign and related to peristalsis. No surrounding inflammatory change    CT ABD[facility]ELVIS W [facility] on 2021: Comparison to 2021  A stable CT scan of the abdomen and pelvis. No change in the previous study. No evidence of abdominal or pelvic lymphadenopathy. Diverticulosis of the distal colon. No evidence for diverticulitis. Tiny hepatic nodules which are too small to be further characterized. No change    Physical examination today, 2022, does not reveal evidence of palpable pathological peripheral lymphadenopathy in the cervical, supraclavicular, infraclavicular, axillary or inguinal lymph node areas. Lungs are clear heart is regular normal S1-S2, no S3  Abdominal exam documents an enlarged liver, with fullness in the RUQ, somewhat tender. I am unable to palpate a spleen.   Extremities no cyanosis clubbing or edema  Neurological exam is nonfocal intact with an intact mental status and normal cranial nerves II through XII. CBC today 8/22/2022  reveals a WBC of 5.76 Hgb is 14.6 with an MCV of  94.7 and platelet count of 920,676. CT CHEST WITH CONTRAST at [facility] on 8/18/2022:Comparison CT scan of chest dated 7/09/2021  No mediastinal, axillary or supraclavicular adenopathy identified  Negative study. No acute cardiopulmonary disease  Stable and unchanged since the previous study 7/9/21      CT ABDOMEN/PELVIS with IV contrast At [facility] on 8/18/2022: Comparison 11/24/2021  No evidence for acute abdominal or pelvic process. Negative for mass lesion or pathologic lymphadenopathy. Colonic diverticulosis without diverticulitis. 2.5 cm right paraumbilical fat-containing hernia. Moderate to severe L5-S1 degenerative disc disease    Conservative monitoring and management only warranted at this time. I will see him back in follow-up in 3 months with lab work prior to his follow-up visit. #2  Macrocytic anemia    Anemia was documented at his initial visit on 6/30/2021. Serology on 6/30/21 revealed:  CMP - unremarkable  Iron - 149  TIBC - 460  Saturation - 32%  Ferritin -128  LDH - 441  TSH - 0.267  Uric Acid - 4.5  Folate - 4.2  Retic - 1.0   Haptoglobin-180  MMA - 109    B2M-1.9  Kappa light chains- 18.9  Lambda light chains-24.4  K/L ratio-0.77  IgG-590 ,IgA-176 , IgM-54  Total Protein - 6.2  M-spike-not observed    Hepatitis Panel - negative    Serology on 11/15/2021 revealed:  CMP -ALT 19,   Iron - 77  TIBC - 346  Saturation - 22%  Ferritin -32.2  LDH - 437  TSH - 5.690    Serology on 5/23/2022 revealed:  CMP -ALT 19,   LDH - 500      CBC on 7/15/2021 revealed a WBC of 4.38. Hgb is 8.4  with an MCV of 103.7 and platelet count of 203,064. CBC 09/13/2021 revealed a WBC of 3.58 . Hgb is 9.4 with an MCV of 112.8 and platelet count of 785,940 .     CBC 11/15/2021 revealed a WBC of 4.05 Hgb is 14.2 with an MCV of 95.4 and platelet count of 397,402 . CBC 2/21/2022  revealed a WBC of  5.60 Hgb is 13.5 with an MCV of 92.6 and platelet count of 630,946 . CBC today 8/22/2022  reveals a WBC of 5.76 Hgb 14.6 is with an MCV of  94.7 and platelet count of 879,013. No further intervention warranted from the anemia standpoint. Hemoglobin is recovered at 14.6. #3  Tumor screening and health maintenance    GI cancer screening -   Cscope 11/6/2020 per Dr Liliana Santos 5 yr recall  EGD -  5/6/2021 per Dr Liliana Santos      Prostate cancer screening    #4  Immunizations:  Immunization History   Administered Date(s) Administered    COVID-19, MODERNA BLUE border, Primary or Immunocompromised, (age 12y+), IM, 100 mcg/0.5mL 03/24/2021, 04/21/2021, 10/01/2021    COVID-19, PFIZER GRAY top, DO NOT Dilute, (age 15 y+), IM, 30 mcg/0.3 mL 08/11/2022    Hepatitis A/Hepatitis B (Twinrix) 04/27/2012    Influenza Virus Vaccine 10/20/2020    Influenza, FLUAD, (age 72 y+), Adjuvanted 10/16/2020    Influenza, FLUCELVAX, (age 10 mo+), MDCK, PF 10/16/2021    Pneumococcal Polysaccharide ([id number]) 10/23/2015    Tdap (Boostrix, Adacel) 03/15/2022             Jerl Boeck was seen today for follow-up. Diagnoses and all orders for this visit:    Low grade B-cell lymphoma ([facility] [address]  -     Comprehensive Metabolic Panel; Future  -     Lactate Dehydrogenase; Future  -     Comprehensive Metabolic Panel; Future  -     Lactate Dehydrogenase;  Future        Orders Placed This Encounter   Procedures    Comprehensive Metabolic Panel     Standing Status:   Future     Standing Expiration Date:   8/22/2023    Lactate Dehydrogenase     Standing Status:   Future     Standing Expiration Date:   8/22/2023    Comprehensive Metabolic Panel     Standing Status:   Future     Standing Expiration Date:   8/22/2023    Lactate Dehydrogenase     Standing Status:   Future     Standing Expiration Date:   8/22/2023         No orders of the defined types were placed in this encounter. Return in about 3 months (around 11/22/2022) for Follow-up with Dr. Jesse Costa with labs prior to.

## 2022-08-09 DIAGNOSIS — C85.10 LOW GRADE B-CELL LYMPHOMA (HCC): Primary | ICD-10-CM

## 2022-08-11 ENCOUNTER — OFFICE VISIT (OUTPATIENT)
Dept: PRIMARY CARE CLINIC | Age: 52
End: 2022-08-11
Payer: MEDICAID

## 2022-08-11 DIAGNOSIS — Z99.89 CPAP (CONTINUOUS POSITIVE AIRWAY PRESSURE) DEPENDENCE: ICD-10-CM

## 2022-08-11 DIAGNOSIS — E34.9 HYPOTESTOSTERONISM: ICD-10-CM

## 2022-08-11 DIAGNOSIS — M67.80 TENDINOSIS: Primary | ICD-10-CM

## 2022-08-11 DIAGNOSIS — Z23 NEED FOR VACCINATION: ICD-10-CM

## 2022-08-11 DIAGNOSIS — F17.200 TOBACCO USE DISORDER: ICD-10-CM

## 2022-08-11 DIAGNOSIS — R73.03 PREDIABETES: ICD-10-CM

## 2022-08-11 DIAGNOSIS — E78.2 COMBINED HYPERLIPIDEMIA: ICD-10-CM

## 2022-08-11 PROCEDURE — 91305 COVID-19, PFIZER GRAY TOP, DO NOT DILUTE, (AGE 12 Y+), IM, 30MCG/0.3 ML: CPT | Performed by: INTERNAL MEDICINE

## 2022-08-11 PROCEDURE — 0054A COVID-19, PFIZER GRAY TOP, DO NOT DILUTE, (AGE 12 Y+), IM, 30MCG/0.3 ML: CPT | Performed by: INTERNAL MEDICINE

## 2022-08-11 PROCEDURE — G8417 CALC BMI ABV UP PARAM F/U: HCPCS | Performed by: INTERNAL MEDICINE

## 2022-08-11 PROCEDURE — 4004F PT TOBACCO SCREEN RCVD TLK: CPT | Performed by: INTERNAL MEDICINE

## 2022-08-11 PROCEDURE — 99214 OFFICE O/P EST MOD 30 MIN: CPT | Performed by: INTERNAL MEDICINE

## 2022-08-11 PROCEDURE — G8428 CUR MEDS NOT DOCUMENT: HCPCS | Performed by: INTERNAL MEDICINE

## 2022-08-11 RX ORDER — KETOROLAC TROMETHAMINE 30 MG/ML
30 INJECTION, SOLUTION INTRAMUSCULAR; INTRAVENOUS ONCE
Status: COMPLETED | OUTPATIENT
Start: 2022-08-11 | End: 2022-08-11

## 2022-08-11 RX ORDER — KETOROLAC TROMETHAMINE 30 MG/ML
30 INJECTION, SOLUTION INTRAMUSCULAR; INTRAVENOUS ONCE
Qty: 1 ML | Refills: 0
Start: 2022-08-11 | End: 2022-08-11

## 2022-08-11 RX ORDER — METHYLPREDNISOLONE 4 MG/1
TABLET ORAL
Qty: 1 KIT | Refills: 0 | Status: SHIPPED | OUTPATIENT
Start: 2022-08-11 | End: 2022-08-17

## 2022-08-11 RX ORDER — KETOROLAC TROMETHAMINE 10 MG/1
10 TABLET, FILM COATED ORAL EVERY 6 HOURS PRN
Qty: 20 TABLET | Refills: 0 | Status: SHIPPED | OUTPATIENT
Start: 2022-08-11 | End: 2022-08-29 | Stop reason: ALTCHOICE

## 2022-08-11 RX ORDER — TESTOSTERONE MICRONIZED 100 %
POWDER (GRAM) MISCELLANEOUS
Qty: 1 EACH | Refills: 0 | Status: SHIPPED | OUTPATIENT
Start: 2022-08-11 | End: 2022-10-06 | Stop reason: SDUPTHER

## 2022-08-11 RX ADMIN — KETOROLAC TROMETHAMINE 30 MG: 30 INJECTION, SOLUTION INTRAMUSCULAR; INTRAVENOUS at 11:35

## 2022-08-11 ASSESSMENT — ENCOUNTER SYMPTOMS
COLOR CHANGE: 0
COUGH: 0
STRIDOR: 0
CONSTIPATION: 0
NAUSEA: 1
ABDOMINAL PAIN: 0
BACK PAIN: 1
WHEEZING: 0
SHORTNESS OF BREATH: 0
SORE THROAT: 0
BLOOD IN STOOL: 0
DIARRHEA: 0
TROUBLE SWALLOWING: 0

## 2022-08-11 NOTE — PROGRESS NOTES
Anahi Vogel ( 1970) is a 54 y.o. male,  Established, here for evaluation of the following chief complaint(s). No chief complaint on file. ASSESSMENT/PLAN:  Problem List    None    Results for orders placed or performed during the hospital encounter of 22   CBC Auto Differential   Result Value Ref Range    WBC 5.60 4.23 - 9.07 K/uL    RBC 4.53 (L) 4.63 - 6.08 M/uL    Hemoglobin 13.9 13.7 - 17.5 g/dL    Hematocrit 42.5 40.1 - 51.0 %    MCV 93.8 (H) 79.0 - 92.2 fL    MCH 30.7 25.7 - 32.2 pg    MCHC 32.7 32.3 - 36.5 g/dL    RDW 13.4 11.6 - 14.4 %    Platelets 110 824 - 430 K/uL    MPV 8.4 7.4 - 10.4 fL    Neutrophils % 78.2 (H) 34.0 - 71.1 %    Lymphocytes % 15.8 (L) 19.3 - 53.1 %    Monocytes % 6.0 4.7 - 12.5 %    Neutrophils Absolute 4.40 1.56 - 6.13 K/uL    Lymphocytes Absolute 0.90 (L) 1.18 - 3.74 K/uL    Monocytes Absolute 0.30 0.24 - 0.82 K/uL   Comprehensive Metabolic Panel   Result Value Ref Range    Sodium 143 137 - 145 mmol/L    Potassium 4.5 3.5 - 5.1 mmol/L    Chloride 105 98 - 111 mmol/L    CO2 26 22 - 29 mmol/L    Anion Gap 12 7 - 19 mmol/L    Glucose 108 (H) 74 - 106 mg/dL    BUN 11 9 - 20 mg/dL    Creatinine 0.9 0.6 - 1.2 mg/dL    GFR Non-African American >60 >60    Calcium 9.3 8.4 - 10.2 mg/dL    Total Protein 6.5 6.3 - 8.2 g/dL    Albumin 4.2 3.5 - 5.2 g/dL    Total Bilirubin 0.4 0.2 - 1.3 mg/dL    Alkaline Phosphatase 67 40 - 130 U/L    ALT 21 21 - 72 U/L    AST 21 17 - 59 U/L    Globulin 2.4 g/dL   Lactate Dehydrogenase   Result Value Ref Range     313 - 618 U/L       No follow-ups on file.     HPI  44-year-old male presents for follow-up visit  Patient is known to have chronic low back pain being left shoulder pain as well as new right knee pain has been over working with physical work at home   GERD well-controlled with PPI  Diverticulosis with constipation plans to buy fiber still has constipation  Allergies are well controlled he has cats at home  Patient has obstructive sleep apnea on CPAP  Hypothyroidism on Synthroid replacement therapy  Low-grade B-cell lymphoma managed by oncology in remission  Patient also is a chronic smoker who is still actively smoking  Prediabetes unclear control patient has not gone for lab work      Review of Systems   Constitutional:  Positive for activity change, appetite change and fatigue. Negative for chills, diaphoresis and fever. HENT:  Negative for congestion, hearing loss, postnasal drip, sore throat, tinnitus and trouble swallowing. Eyes:  Negative for visual disturbance. Respiratory:  Negative for cough, shortness of breath, wheezing and stridor. Cardiovascular:  Negative for chest pain, palpitations and leg swelling. Gastrointestinal:  Positive for nausea (and vomitting). Negative for abdominal pain, blood in stool, constipation and diarrhea. Heartburn   Endocrine: Negative for cold intolerance. Genitourinary:  Negative for dysuria, enuresis and frequency. Erectile dysfunction   Musculoskeletal:  Positive for arthralgias and back pain. Negative for joint swelling. Skin:  Negative for color change and wound. Allergic/Immunologic: Negative for environmental allergies. Neurological:  Positive for numbness. Negative for dizziness, weakness, light-headedness and headaches. Hematological:  Negative for adenopathy. Does not bruise/bleed easily. Psychiatric/Behavioral:  Positive for sleep disturbance. Negative for decreased concentration and dysphoric mood. The patient is nervous/anxious. Physical Exam  Vitals and nursing note reviewed. Constitutional:       General: He is not in acute distress. Appearance: He is well-developed. HENT:      Head: Normocephalic and atraumatic. Right Ear: External ear normal.      Left Ear: External ear normal.      Nose: Nose normal.   Eyes:      General: No scleral icterus.      Conjunctiva/sclera: Conjunctivae normal.      Pupils: Pupils are equal, round, and reactive to light. Neck:      Thyroid: No thyromegaly. Cardiovascular:      Rate and Rhythm: Normal rate and regular rhythm. Heart sounds: Normal heart sounds. No murmur heard. Pulmonary:      Effort: Pulmonary effort is normal.      Breath sounds: Normal breath sounds. No wheezing or rales. Abdominal:      General: Bowel sounds are normal. There is no distension. Palpations: Abdomen is soft. There is no mass. Tenderness: There is no abdominal tenderness. There is no rebound. Musculoskeletal:      Left shoulder: Tenderness present. No swelling. Decreased range of motion. Arms:       Cervical back: Normal range of motion and neck supple. Right knee: Normal range of motion. Tenderness present. Lymphadenopathy:      Cervical: No cervical adenopathy. Skin:     General: Skin is warm and dry. Findings: No rash. Neurological:      Mental Status: He is alert and oriented to person, place, and time. Cranial Nerves: No cranial nerve deficit. Deep Tendon Reflexes: Reflexes normal.   Psychiatric:         Behavior: Behavior normal.         Thought Content:  Thought content normal.         Judgment: Judgment normal.         (Time Documentation Optional [id number])    An electronic signaturewaas used to authenticate this note  -Jason Cox MD on 8/11/2022 at 8:44 AM

## 2022-08-18 ENCOUNTER — HOSPITAL ENCOUNTER (OUTPATIENT)
Dept: CT IMAGING | Age: 52
Discharge: HOME | End: 2022-08-18
Payer: MEDICAID

## 2022-08-18 DIAGNOSIS — C85.10 LOW GRADE B-CELL LYMPHOMA (HCC): ICD-10-CM

## 2022-08-18 PROCEDURE — 6360000004 HC RX CONTRAST MEDICATION: Performed by: INTERNAL MEDICINE

## 2022-08-18 PROCEDURE — 71260 CT THORAX DX C+: CPT

## 2022-08-18 PROCEDURE — 71260 CT THORAX DX C+: CPT | Performed by: RADIOLOGY

## 2022-08-18 PROCEDURE — 74177 CT ABD & PELVIS W/CONTRAST: CPT

## 2022-08-18 RX ADMIN — IOPAMIDOL 75 ML: 755 INJECTION, SOLUTION INTRAVENOUS at 10:30

## 2022-08-22 ENCOUNTER — OFFICE VISIT (OUTPATIENT)
Dept: HEMATOLOGY | Age: 52
End: 2022-08-22
Payer: MEDICAID

## 2022-08-22 ENCOUNTER — HOSPITAL ENCOUNTER (OUTPATIENT)
Dept: INFUSION THERAPY | Age: 52
Discharge: HOME | End: 2022-08-22
Payer: MEDICAID

## 2022-08-22 VITALS
DIASTOLIC BLOOD PRESSURE: 80 MMHG | HEIGHT: 67 IN | WEIGHT: 193.1 LBS | OXYGEN SATURATION: 98 % | SYSTOLIC BLOOD PRESSURE: 120 MMHG | HEART RATE: 77 BPM | BODY MASS INDEX: 30.31 KG/M2

## 2022-08-22 DIAGNOSIS — C85.10 LOW GRADE B-CELL LYMPHOMA (HCC): ICD-10-CM

## 2022-08-22 DIAGNOSIS — C85.10 LOW GRADE B-CELL LYMPHOMA (HCC): Primary | ICD-10-CM

## 2022-08-22 LAB
ALBUMIN SERPL-MCNC: 4.1 G/DL (ref 3.5–5.2)
ALP BLD-CCNC: 68 U/L (ref 40–130)
ALT SERPL-CCNC: 15 U/L (ref 21–72)
ANION GAP SERPL CALCULATED.3IONS-SCNC: 5 MMOL/L (ref 7–19)
AST SERPL-CCNC: 15 U/L (ref 17–59)
BASOPHILS ABSOLUTE: 0.04 K/UL (ref 0.01–0.08)
BASOPHILS RELATIVE PERCENT: 0.7 % (ref 0.1–1.2)
BILIRUB SERPL-MCNC: 0.5 MG/DL (ref 0.2–1.3)
BUN BLDV-MCNC: 16 MG/DL (ref 9–20)
CALCIUM SERPL-MCNC: 9.4 MG/DL (ref 8.4–10.2)
CHLORIDE BLD-SCNC: 105 MMOL/L (ref 98–111)
CO2: 29 MMOL/L (ref 22–29)
CREAT SERPL-MCNC: 1 MG/DL (ref 0.6–1.2)
EOSINOPHILS ABSOLUTE: 0.14 K/UL (ref 0.04–0.54)
EOSINOPHILS RELATIVE PERCENT: 2.4 % (ref 0.7–7)
GFR NON-AFRICAN AMERICAN: >60
GLOBULIN: 3.1 G/DL
GLUCOSE BLD-MCNC: 107 MG/DL (ref 74–106)
HCT VFR BLD CALC: 44.6 % (ref 40.1–51)
HEMOGLOBIN: 14.6 G/DL (ref 13.7–17.5)
LACTATE DEHYDROGENASE: 399 U/L (ref 313–618)
LYMPHOCYTES ABSOLUTE: 0.68 K/UL (ref 1.18–3.74)
LYMPHOCYTES RELATIVE PERCENT: 11.8 % (ref 19.3–53.1)
MCH RBC QN AUTO: 31 PG (ref 25.7–32.2)
MCHC RBC AUTO-ENTMCNC: 32.7 G/DL (ref 32.3–36.5)
MCV RBC AUTO: 94.7 FL (ref 79–92.2)
MONOCYTES ABSOLUTE: 0.36 K/UL (ref 0.24–0.82)
MONOCYTES RELATIVE PERCENT: 6.3 % (ref 4.7–12.5)
NEUTROPHILS ABSOLUTE: 4.52 K/UL (ref 1.56–6.13)
NEUTROPHILS RELATIVE PERCENT: 78.5 % (ref 34–71.1)
PDW BLD-RTO: 13 % (ref 11.6–14.4)
PLATELET # BLD: 182 K/UL (ref 163–337)
PMV BLD AUTO: 8.9 FL (ref 7.4–10.4)
POTASSIUM SERPL-SCNC: 4.9 MMOL/L (ref 3.5–5.1)
RBC # BLD: 4.71 M/UL (ref 4.63–6.08)
SODIUM BLD-SCNC: 139 MMOL/L (ref 137–145)
TOTAL PROTEIN: 6.3 G/DL (ref 6.3–8.2)
WBC # BLD: 5.76 K/UL (ref 4.23–9.07)

## 2022-08-22 PROCEDURE — G8427 DOCREV CUR MEDS BY ELIG CLIN: HCPCS | Performed by: INTERNAL MEDICINE

## 2022-08-22 PROCEDURE — 85025 COMPLETE CBC W/AUTO DIFF WBC: CPT

## 2022-08-22 PROCEDURE — G8417 CALC BMI ABV UP PARAM F/U: HCPCS | Performed by: INTERNAL MEDICINE

## 2022-08-22 PROCEDURE — 4004F PT TOBACCO SCREEN RCVD TLK: CPT | Performed by: INTERNAL MEDICINE

## 2022-08-22 PROCEDURE — 83615 LACTATE (LD) (LDH) ENZYME: CPT

## 2022-08-22 PROCEDURE — 3017F COLORECTAL CA SCREEN DOC REV: CPT | Performed by: INTERNAL MEDICINE

## 2022-08-22 PROCEDURE — 99212 OFFICE O/P EST SF 10 MIN: CPT

## 2022-08-22 PROCEDURE — 36415 COLL VENOUS BLD VENIPUNCTURE: CPT

## 2022-08-22 PROCEDURE — 80053 COMPREHEN METABOLIC PANEL: CPT

## 2022-08-22 PROCEDURE — 99214 OFFICE O/P EST MOD 30 MIN: CPT | Performed by: INTERNAL MEDICINE

## 2022-08-26 DIAGNOSIS — R73.03 PREDIABETES: ICD-10-CM

## 2022-08-26 DIAGNOSIS — Z12.5 PROSTATE CANCER SCREENING: ICD-10-CM

## 2022-08-26 DIAGNOSIS — E78.2 COMBINED HYPERLIPIDEMIA: ICD-10-CM

## 2022-08-26 DIAGNOSIS — E03.9 ACQUIRED HYPOTHYROIDISM: ICD-10-CM

## 2022-08-26 LAB
CHOLESTEROL, TOTAL: 235 MG/DL (ref 160–199)
HBA1C MFR BLD: 5.5 % (ref 4–6)
HDLC SERPL-MCNC: 45 MG/DL (ref 55–121)
LDL CHOLESTEROL CALCULATED: 168 MG/DL
PROSTATE SPECIFIC ANTIGEN: 1.71 NG/ML (ref 0–4)
TRIGL SERPL-MCNC: 109 MG/DL (ref 0–149)
TSH SERPL DL<=0.05 MIU/L-ACNC: 6.41 UIU/ML (ref 0.27–4.2)

## 2022-08-28 ASSESSMENT — ENCOUNTER SYMPTOMS
SORE THROAT: 0
DIARRHEA: 0
COLOR CHANGE: 0
SHORTNESS OF BREATH: 0
STRIDOR: 0
BACK PAIN: 1
NAUSEA: 1
ABDOMINAL PAIN: 0
WHEEZING: 0
TROUBLE SWALLOWING: 0
CONSTIPATION: 0
BLOOD IN STOOL: 0
COUGH: 0

## 2022-08-29 ENCOUNTER — OFFICE VISIT (OUTPATIENT)
Dept: PRIMARY CARE CLINIC | Age: 52
End: 2022-08-29
Payer: MEDICAID

## 2022-08-29 VITALS
HEIGHT: 67 IN | BODY MASS INDEX: 30.76 KG/M2 | DIASTOLIC BLOOD PRESSURE: 78 MMHG | OXYGEN SATURATION: 99 % | WEIGHT: 196 LBS | HEART RATE: 79 BPM | SYSTOLIC BLOOD PRESSURE: 118 MMHG

## 2022-08-29 DIAGNOSIS — R73.03 PREDIABETES: ICD-10-CM

## 2022-08-29 DIAGNOSIS — E34.9 HYPOTESTOSTERONISM: ICD-10-CM

## 2022-08-29 DIAGNOSIS — E78.2 MIXED HYPERLIPIDEMIA: Primary | ICD-10-CM

## 2022-08-29 DIAGNOSIS — J31.0 PERENNIAL NON-ALLERGIC RHINITIS: ICD-10-CM

## 2022-08-29 DIAGNOSIS — E03.9 ACQUIRED HYPOTHYROIDISM: ICD-10-CM

## 2022-08-29 DIAGNOSIS — E78.2 COMBINED HYPERLIPIDEMIA: ICD-10-CM

## 2022-08-29 DIAGNOSIS — F17.200 TOBACCO USE DISORDER: ICD-10-CM

## 2022-08-29 DIAGNOSIS — C85.10 LOW GRADE B-CELL LYMPHOMA (HCC): ICD-10-CM

## 2022-08-29 DIAGNOSIS — G47.33 OBSTRUCTIVE SLEEP APNEA: ICD-10-CM

## 2022-08-29 DIAGNOSIS — K21.9 GASTROESOPHAGEAL REFLUX DISEASE WITHOUT ESOPHAGITIS: ICD-10-CM

## 2022-08-29 PROCEDURE — G8417 CALC BMI ABV UP PARAM F/U: HCPCS | Performed by: INTERNAL MEDICINE

## 2022-08-29 PROCEDURE — 4004F PT TOBACCO SCREEN RCVD TLK: CPT | Performed by: INTERNAL MEDICINE

## 2022-08-29 PROCEDURE — 99214 OFFICE O/P EST MOD 30 MIN: CPT | Performed by: INTERNAL MEDICINE

## 2022-08-29 PROCEDURE — 3017F COLORECTAL CA SCREEN DOC REV: CPT | Performed by: INTERNAL MEDICINE

## 2022-08-29 PROCEDURE — G8427 DOCREV CUR MEDS BY ELIG CLIN: HCPCS | Performed by: INTERNAL MEDICINE

## 2022-08-29 RX ORDER — ATORVASTATIN CALCIUM 20 MG/1
20 TABLET, FILM COATED ORAL DAILY
Qty: 90 TABLET | Refills: 1 | Status: SHIPPED | OUTPATIENT
Start: 2022-08-29 | End: 2022-11-27

## 2022-08-29 RX ORDER — LEVOTHYROXINE SODIUM 137 UG/1
125 TABLET ORAL DAILY
Qty: 90 TABLET | Refills: 1 | Status: SHIPPED | OUTPATIENT
Start: 2022-08-29 | End: 2022-09-25 | Stop reason: ALTCHOICE

## 2022-08-29 NOTE — PATIENT INSTRUCTIONS
Please schedule FOLLOW UP VISIT in 4  months  Have lab work 2 weeks before scheduled Follow up Visit       Will need repeat Thyroid test in 2 months

## 2022-08-29 NOTE — PROGRESS NOTES
Justin Vogel ( 1970) is a 50 y.o. male,  Established, here for evaluation of the following chief complaint(s).   Follow-up (3 month )        ASSESSMENT/PLAN:  Problem List          Respiratory    Perennial non-allergic rhinitis      Well-controlled, continue current medications, medication adherence emphasized and lifestyle modifications recommended         Obstructive sleep apnea      Well-controlled, continue current medications            Digestive    Gastroesophageal reflux disease without esophagitis      Well-controlled, continue current medications, medication adherence emphasized and lifestyle modifications recommended         Relevant Medications    omeprazole (PRILOSEC) 40 MG delayed release capsule    ondansetron (ZOFRAN) 4 MG tablet       Endocrine    Acquired hypothyroidism      Borderline controlled, changes made today: Patient feels that his thyroid is underdosed and TSH to reveal that he is compliant with medication and therefore we are going to make adjustments with his levothyroxine from - and he will recheck his thyroid function test in 4 to 6 weeks         Relevant Medications    levothyroxine (SYNTHROID) 137 MCG tablet    Other Relevant Orders    TSH       Other    Tobacco use disorder      Uncontrolled, lifestyle modifications recommended   Advised again to stop smoking         Prediabetes      Well-controlled, continue current medications         Low grade B-cell lymphoma ([facility] [address]      Monitored by specialist- no acute findings meriting change in the plan         Relevant Medications    ondansetron (ZOFRAN) 4 MG tablet    Hypotestosteronism      He is getting androgen gel         Relevant Medications    Testosterone POWD, Versabase GEL, Ethyl Alcohol 95 % SOLN    Testosterone POWD, Versabase GEL, Ethyl Alcohol 95 % SOLN    Combined hyperlipidemia      Uncontrolled, changes made today: Restarted statin patient was not compliant with regimen advised to cut down his moderate meat intake         Relevant Medications    sildenafil (REVATIO) 20 MG tablet    atorvastatin (LIPITOR) 20 MG tablet     Results for orders placed or performed in visit on 08/26/22   PSA Screening   Result Value Ref Range    PSA 1.71 0.00 - 4.00 ng/mL   Lipid Panel   Result Value Ref Range    Cholesterol, Total 235 (H) 160 - 199 mg/dL    Triglycerides 109 0 - 149 mg/dL    HDL 45 (L) 55 - 121 mg/dL    LDL Calculated 168 <100 mg/dL   Hemoglobin A1C   Result Value Ref Range    Hemoglobin A1C 5.5 4.0 - 6.0 %   TSH   Result Value Ref Range    TSH 6.410 (H) 0.270 - 4.200 uIU/mL       Return in about 6 months (around 2/28/2023). HPI  61-year-old male presents for follow-up visit  Patient is known to have chronic low back pain, MRI with multi disc bulge  GERD well-controlled with PPI  Diverticulosis with constipation plans to buy fiber still has constipation  Allergies are well controlled he has cats at home  Patient has obstructive sleep apnea on CPAP  Hypothyroidism on Synthroid replacement therapy, elevated today he is willing to increase his dose  Low-grade B-cell lymphoma managed by oncology in remission  Patient also is a chronic smoker who is still actively smoking  Prediabetes unclear control patient has not gone for lab work  HL not at goal        Review of Systems   Constitutional:  Positive for activity change, appetite change and fatigue. Negative for chills, diaphoresis and fever. HENT:  Negative for congestion, hearing loss, postnasal drip, sore throat, tinnitus and trouble swallowing. Eyes:  Negative for visual disturbance. Respiratory:  Negative for cough, shortness of breath, wheezing and stridor. Cardiovascular:  Negative for chest pain, palpitations and leg swelling. Gastrointestinal:  Positive for nausea (and vomitting). Negative for abdominal pain, blood in stool, constipation and diarrhea. Heartburn   Endocrine: Negative for cold intolerance.    Genitourinary:  Negative for dysuria, enuresis and frequency. Erectile dysfunction   Musculoskeletal:  Positive for back pain. Negative for arthralgias and joint swelling. Skin:  Negative for color change and wound. Allergic/Immunologic: Negative for environmental allergies. Neurological:  Positive for numbness. Negative for dizziness, weakness, light-headedness and headaches. Hematological:  Negative for adenopathy. Does not bruise/bleed easily. Psychiatric/Behavioral:  Positive for sleep disturbance. Negative for decreased concentration and dysphoric mood. The patient is nervous/anxious. Physical Exam  Vitals and nursing note reviewed. Constitutional:       General: He is not in acute distress. Appearance: He is well-developed. HENT:      Head: Normocephalic and atraumatic. Right Ear: External ear normal.      Left Ear: External ear normal.      Nose: Nose normal.   Eyes:      General: No scleral icterus. Conjunctiva/sclera: Conjunctivae normal.      Pupils: Pupils are equal, round, and reactive to light. Neck:      Thyroid: No thyromegaly. Cardiovascular:      Rate and Rhythm: Normal rate and regular rhythm. Heart sounds: Normal heart sounds. No murmur heard. Pulmonary:      Effort: Pulmonary effort is normal.      Breath sounds: Normal breath sounds. No wheezing or rales. Abdominal:      General: Bowel sounds are normal. There is no distension. Palpations: Abdomen is soft. There is no mass. Tenderness: There is no abdominal tenderness. There is no rebound. Musculoskeletal:         General: No tenderness. Normal range of motion. Cervical back: Normal range of motion and neck supple. Lymphadenopathy:      Cervical: No cervical adenopathy. Skin:     General: Skin is warm and dry. Findings: No rash. Neurological:      Mental Status: He is alert and oriented to person, place, and time. Cranial Nerves: No cranial nerve deficit.       Deep Tendon Reflexes: Reflexes normal. Psychiatric:         Behavior: Behavior normal.         Thought Content:  Thought content normal.         Judgment: Judgment normal.         (Time Documentation Optional [id number])    An electronic signaturewaas used to authenticate this note  -Kishore Anderson MD on 8/29/2022 at 5:48 PM

## 2022-08-29 NOTE — ASSESSMENT & PLAN NOTE
Borderline controlled, changes made today: Patient feels that his thyroid is underdosed and TSH to reveal that he is compliant with medication and therefore we are going to make adjustments with his levothyroxine from 1 25-1 37 and he will recheck his thyroid function test in 4 to 6 weeks

## 2022-08-29 NOTE — ASSESSMENT & PLAN NOTE
Uncontrolled, changes made today: Restarted statin patient was not compliant with regimen advised to cut down his moderate meat intake

## 2022-09-23 DIAGNOSIS — E03.9 ACQUIRED HYPOTHYROIDISM: ICD-10-CM

## 2022-09-23 LAB — TSH SERPL DL<=0.05 MIU/L-ACNC: 5.67 UIU/ML (ref 0.27–4.2)

## 2022-09-25 RX ORDER — LEVOTHYROXINE SODIUM 0.15 MG/1
150 TABLET ORAL DAILY
Qty: 90 TABLET | Refills: 1 | Status: SHIPPED | OUTPATIENT
Start: 2022-09-25

## 2022-09-26 DIAGNOSIS — R11.0 NAUSEA: Primary | ICD-10-CM

## 2022-09-26 DIAGNOSIS — C85.10 LOW GRADE B-CELL LYMPHOMA (HCC): ICD-10-CM

## 2022-09-26 DIAGNOSIS — N52.9 ERECTILE DYSFUNCTION, UNSPECIFIED ERECTILE DYSFUNCTION TYPE: ICD-10-CM

## 2022-09-26 DIAGNOSIS — E34.9 HYPOTESTOSTERONISM: ICD-10-CM

## 2022-09-26 RX ORDER — TESTOSTERONE MICRONIZED 100 %
POWDER (GRAM) MISCELLANEOUS
Qty: 1 EACH | Refills: 0 | OUTPATIENT
Start: 2022-09-26

## 2022-09-26 RX ORDER — SILDENAFIL CITRATE 20 MG/1
TABLET ORAL
Qty: 60 TABLET | Refills: 0 | Status: SHIPPED | OUTPATIENT
Start: 2022-09-26

## 2022-09-26 RX ORDER — ONDANSETRON 4 MG/1
TABLET, FILM COATED ORAL
Qty: 30 TABLET | Refills: 0 | Status: SHIPPED | OUTPATIENT
Start: 2022-09-26

## 2022-09-26 RX ORDER — SILDENAFIL CITRATE 20 MG/1
TABLET ORAL
Qty: 60 TABLET | Refills: 1 | Status: CANCELLED | OUTPATIENT
Start: 2022-09-26

## 2022-09-26 RX ORDER — ONDANSETRON 4 MG/1
TABLET, FILM COATED ORAL
Qty: 30 TABLET | Refills: 1 | Status: CANCELLED | OUTPATIENT
Start: 2022-09-26

## 2022-09-26 NOTE — TELEPHONE ENCOUNTER
Patient is wanting a refill for his Sildenafil and his Odanestron HCL, But the Zofran was first prescribed by Dr. Yaquelin Geiger

## 2022-10-06 DIAGNOSIS — E34.9 HYPOTESTOSTERONISM: ICD-10-CM

## 2022-10-06 RX ORDER — TESTOSTERONE MICRONIZED 100 %
POWDER (GRAM) MISCELLANEOUS
Qty: 1 EACH | Refills: 0 | Status: SHIPPED | OUTPATIENT
Start: 2022-10-06 | End: 2022-10-19 | Stop reason: SDUPTHER

## 2022-10-06 RX ORDER — TESTOSTERONE MICRONIZED 100 %
POWDER (GRAM) MISCELLANEOUS
Qty: 30 EACH | Refills: 0 | OUTPATIENT
Start: 2022-10-06 | End: 2023-01-03

## 2022-10-06 NOTE — TELEPHONE ENCOUNTER
Mariaelena Vogel called to request a refill on his medication. Last office visit : 8/29/2022   Next office visit : 2/13/2023     Last UDS:   Opiate Scrn, Ur   Date Value Ref Range Status   03/26/2015 Negative Xtyowy=942 ng/mL Final     Comment:     Opiate test includes Codeine, Morphine, Hydromorphone, Hydrocodone. Last Lonney Niece: 8/26/22  Medication Contract 3/2/21  Last Fill: 8/11/22    Requested Prescriptions     Pending Prescriptions Disp Refills    Testosterone POWD, Versabase GEL, Ethyl Alcohol 95 % SOLN 1 each 0     Sig: APPLY 4 CLICKS TO CHEST OR SHOULDER ONCE DAILY. Please approve or refuse this medication.    [place], [place]

## 2022-10-18 DIAGNOSIS — E34.9 HYPOTESTOSTERONISM: ICD-10-CM

## 2022-10-19 ENCOUNTER — TELEPHONE (OUTPATIENT)
Dept: PRIMARY CARE CLINIC | Age: 52
End: 2022-10-19

## 2022-10-19 ENCOUNTER — TELEPHONE (OUTPATIENT)
Dept: NEUROLOGY | Age: 52
End: 2022-10-19

## 2022-10-19 ENCOUNTER — OFFICE VISIT (OUTPATIENT)
Dept: NEUROLOGY | Age: 52
End: 2022-10-19
Payer: MEDICAID

## 2022-10-19 VITALS
SYSTOLIC BLOOD PRESSURE: 132 MMHG | HEART RATE: 75 BPM | HEIGHT: 67 IN | BODY MASS INDEX: 29.82 KG/M2 | DIASTOLIC BLOOD PRESSURE: 88 MMHG | OXYGEN SATURATION: 99 % | WEIGHT: 190 LBS

## 2022-10-19 DIAGNOSIS — E34.9 HYPOTESTOSTERONISM: ICD-10-CM

## 2022-10-19 DIAGNOSIS — Z99.89 CPAP (CONTINUOUS POSITIVE AIRWAY PRESSURE) DEPENDENCE: ICD-10-CM

## 2022-10-19 DIAGNOSIS — G89.29 CHRONIC NECK PAIN: ICD-10-CM

## 2022-10-19 DIAGNOSIS — G47.33 OBSTRUCTIVE SLEEP APNEA: Primary | ICD-10-CM

## 2022-10-19 DIAGNOSIS — G43.011 INTRACTABLE MIGRAINE WITHOUT AURA AND WITH STATUS MIGRAINOSUS: ICD-10-CM

## 2022-10-19 DIAGNOSIS — M54.2 CHRONIC NECK PAIN: ICD-10-CM

## 2022-10-19 PROCEDURE — 99214 OFFICE O/P EST MOD 30 MIN: CPT | Performed by: PHYSICIAN ASSISTANT

## 2022-10-19 PROCEDURE — G8484 FLU IMMUNIZE NO ADMIN: HCPCS | Performed by: PHYSICIAN ASSISTANT

## 2022-10-19 PROCEDURE — 4004F PT TOBACCO SCREEN RCVD TLK: CPT | Performed by: PHYSICIAN ASSISTANT

## 2022-10-19 PROCEDURE — G8427 DOCREV CUR MEDS BY ELIG CLIN: HCPCS | Performed by: PHYSICIAN ASSISTANT

## 2022-10-19 PROCEDURE — 3017F COLORECTAL CA SCREEN DOC REV: CPT | Performed by: PHYSICIAN ASSISTANT

## 2022-10-19 PROCEDURE — G8417 CALC BMI ABV UP PARAM F/U: HCPCS | Performed by: PHYSICIAN ASSISTANT

## 2022-10-19 RX ORDER — TESTOSTERONE MICRONIZED 100 %
POWDER (GRAM) MISCELLANEOUS
Qty: 1 EACH | Refills: 0 | Status: SHIPPED | OUTPATIENT
Start: 2022-10-19 | End: 2022-10-20 | Stop reason: SDUPTHER

## 2022-10-19 RX ORDER — TESTOSTERONE MICRONIZED 100 %
POWDER (GRAM) MISCELLANEOUS
Qty: 1 EACH | Refills: 0 | OUTPATIENT
Start: 2022-10-19 | End: 2022-11-19

## 2022-10-19 RX ORDER — RIMEGEPANT SULFATE 75 MG/75MG
TABLET, ORALLY DISINTEGRATING ORAL
Qty: 16 TABLET | Refills: 5 | Status: SHIPPED | OUTPATIENT
Start: 2022-10-19

## 2022-10-19 RX ORDER — TESTOSTERONE MICRONIZED 100 %
POWDER (GRAM) MISCELLANEOUS
Qty: 1 EACH | Refills: 0 | Status: SHIPPED | OUTPATIENT
Start: 2022-10-19 | End: 2022-10-19

## 2022-10-19 NOTE — PROGRESS NOTES
[facility]  [address] [place] [address] [address], [address]  [facility], [address]  Phone [phone]  Fax [phone]       [facility] Sleep Follow Up Encounter      Information:   Patient Name: Manuela Jimenez  :   1970  Age:   50 y.o. MRN:   [id number]  Account #:  [de-identified]  Today:                10/19/22    Provider:  Wild Colin PA-C    Chief Complaint   Patient presents with    Sleep Apnea        Subjective:   Manuela Jimenez is a 50 y.o. male  with a history of  severe JANET, migraines, mild cerebellar tonsillar ectopia/incidental finding per 2020 MRI brain, and neck pain who comes in for a sleep clinic follow up. The HST, 2020 revealed an AHI of 38.2. The HST, 12/3/2020 revealed an AHI of 21.6. He is prescribed auto CPAP therapy with a pressure range of 8cm to 20cm. The compliance report indicates that he is averaging >8  hours of CPAP use per day. He reports that consistent PAP use has alleviated the previous JANET symptoms. The neck pain is stable. The migraine is not always daily but he has a headache daily. Tylenol or sumatriptan may alleviate it. He has been taking Tylenol daily. He sometimes has occipital pain, especially if he extends his neck. It hurts on the coronal aspect of his head. The MRI brain, 2020 showed minimal chronic paranasal sinus disease. It showed minimal cerebellar tonsillar ectopia. He was prescribed amitriptyline at the last office visit, but was unable to take it as he is taking Paxil. Beta blockers are contraindicated due to side effects of fatigue and insomnia, as he has a sleep disorder. It is also contraindicated with history of depression. He has not tried a CGRP inhibitor. He was diagnosed with lymphoma in . It is remission.      Objective:     Past Medical History:   Diagnosis Date    Anemia     Anxiety     CPAP (continuous positive airway pressure) dependence     Depression     Herniated lumbar intervertebral disc     Hyperglycemia 01/05/2021    Hypertriglyceridemia 10/16/2020    Kidney cysts     Liver cyst     Low testosterone     Lymphoma ([facility] [address] 06/07/2021    Dr Eligio Kaufman     Migraine     Neck pain     Obstructive sleep apnea     OCD (obsessive compulsive disorder)     Poison ivy 5/18/2021    Somnolence, daytime 11/5/2020    Thyroid disease     Witnessed apneic spells 11/5/2020       Past Surgical History:   Procedure Laterality Date    APPENDECTOMY      COLONOSCOPY  approx 2017    Dr Betito Rabago @ [facility] hospt. \" polyps\" per pt recall    COLONOSCOPY N/A 11/06/2020    Dr Malinda Hernandez, internal hemorrhoids-Grade 1, 5 yr recall    HERNIA REPAIR      bi[facility]    THYROIDECTOMY      TOTAL    UPPER GASTROINTESTINAL ENDOSCOPY N/A 05/06/2021    Dr Sonia Navas gastritis (avoid NSAIDS) (-)H.  pylori, w/push enteroscopy up to the the proximal jejunum-Gastric erosions,  non-obstructing distal esoph ring, sliding hh, gastritis, duodenitis       Recent Hospitalizations      Significant Injuries      Family History   Problem Relation Age of Onset    Stomach Cancer Maternal Uncle     Liver Cancer Maternal Uncle     Cancer Maternal Grandfather     Stomach Cancer Maternal Aunt     Cancer Other         LEUKEMIA    Colon Cancer Neg Hx     Colon Polyps Neg Hx     Esophageal Cancer Neg Hx     Rectal Cancer Neg Hx        Social History  Social History     Tobacco Use   Smoking Status Every Day    Packs/day: 2.00    Years: 34.00    Pack years: 68.00    Types: Cigarettes    Start date: 1986   Smokeless Tobacco Never   Tobacco Comments    Nicotine Gum severals times daily & Nioctine patches      Social History     Substance and Sexual Activity   Alcohol Use Yes    Alcohol/week: 1.0 standard drink    Types: 1 Cans of beer per week    Comment: occasional      Social History     Substance and Sexual Activity   Drug Use Yes    Types: Marijuana (Weed)    Comment: smokes marijuana daily, along with RSO (marijuana) oil 0.1ml under tongue Current Outpatient Medications   Medication Sig Dispense Refill    Rimegepant Sulfate (NURTEC) 75 MG TBDP 1 QOD for migraine prevention 16 tablet 5    sildenafil (REVATIO) 20 MG tablet TAKE 2 TABLETS BY MOUTH AS NEEDED AS DIRECTED. 60 tablet 0    ondansetron (ZOFRAN) 4 MG tablet TAKE ONE TABLET BY MOUTH EVERY 6 HOURS AS NEEDED FOR NAUSEA 30 tablet 0    levothyroxine (SYNTHROID) 150 MCG tablet Take 1 tablet by mouth daily 90 tablet 1    atorvastatin (LIPITOR) 20 MG tablet Take 1 tablet by mouth daily 90 tablet 1    PARoxetine (PAXIL) 40 MG tablet TAKE ONE TABLET IN THE MORNING 90 tablet 1    oxybutynin (DITROPAN-XL) 10 MG extended release tablet TAKE 1 TABLET BY MOUTH EVERY DAY FOR BLADDER 90 tablet 1    tiZANidine (ZANAFLEX) 2 MG tablet Take 1-2 tablets by mouth 3 times daily as needed (muscle spasm) 20 tablet 0    SUMAtriptan (IMITREX) 100 MG tablet As directed 9 tablet 5    medical marijuana Take by mouth as needed. nicotine (NICODERM CQ) 14 MG/24HR Place 1 patch onto the skin every 24 hours      omeprazole (PRILOSEC) 40 MG delayed release capsule Take 1 capsule by mouth every morning (before breakfast) 90 capsule 1    Testosterone POWD, Versabase GEL, Ethyl Alcohol 95 % SOLN APPLY 4 CLICKS TO CHEST OR SHOULDER ONCE DAILY. 1 each 0    Testosterone POWD, Versabase GEL, Ethyl Alcohol 95 % SOLN APPLY 4 CLICKS TO CHEST OR SHOULDER ONCE DAILY. 30 each 0    clobetasol (TEMOVATE) 0.05 % ointment Apply topically 2 times daily. (Patient not taking: No sig reported) 30 g 0     No current facility-administered medications for this visit. Allergies:  Patient has no known allergies.     REVIEW OF SYSTEMS     Constitutional: []Fever []Sweats []Chills [] Recent Injury   [x] Denies all unless marked  HENT:[x]Headache  [] Head Injury  [] Sore Throat  [] Ear Pain  [] Dizziness [] Hearing Loss   [x] Denies all unless marked  Musculoskeletal: [] Arthralgia  [] Myalgias [] Muscle cramps  [] Muscle twitches   [x] Denies all unless marked   Spine:  [x] Neck pain  [] Back pain  [] Sciaticia  [x] Denies all unless marked  Neurological:[] Visual Disturbance [] Double Vision [] Slurred Speech [] Trouble swallowing  [] Vertigo [] Tingling [] Numbness [] Weakness [] Loss of Balance   [] Loss of Consciousness [] Memory Loss [] Seizures  [x] Denies all unless marked  Psychiatric/Behavioral:[] Depression [] Anxiety  [x] Denies all unless marked  Sleep: []  Insomnia [] Sleep Disturbance [] Snoring [] Restless Legs [] Daytime Sleepiness [x] Sleep Apnea  [] Denies all unless marked    The MA has completed the ROS with the patient. I have reviewed it in its' entirety with the patient and agree with the documentation. PHYSICAL EXAM  /88   Pulse 75   Ht 5' 7\" (1.702 m)   Wt 190 lb (86.2 kg)   SpO2 99%   BMI 29.76 kg/m²     Constitutional -  Alert in NAD, well developed, pleasant and cooperative with exam  HEENT- Conjunctiva normal.  No scars, masses, or lesions over external nose or ears, hearing intact, no neck masses noted, no jugular vein distension, no bruit  Cardiac- Regular rate and rhythm  Pulmonary- Clear to auscultation, good expansion, normal effort without use of accessory muscles  Musculoskeletal - No significant wasting of muscles noted. No bony deformities  Extremities - No clubbing, cyanosis or edema  Skin - Warm, dry, and intact.   No rash, erythema, or pallor  Psychiatric - Mood, affect, and behavior appear normal      Neurological exam  Awake, alert, fluent oriented  appropriate affect  Attention and concentration appear appropriate  Recent and remote memory appears unremarkable  Speech normal without dysarthria  No clear issues with language of fund of knowledge    Cranial Nerve Exam     CN III, IV,VI-EOMI, No nystagmus, conjugate eye movements, no ptosis  CN VII-No facial assymetry    Motor Exam    Antigravity throughout upper and lower extremities bilaterally    Tremors and coordination    No tremors in treatment plan. 2.  Will evaluate for PAP clinical benefit and and compliance during a 30 day period within the preceding 90 days PRN. 3.  The following educational material has been included in this visit after visit summary for your review: JANET/PAP guidelines/Nurtec-Discussed with the patient and all questions fully answered. 4.  Continue PAP therapy. The patient voices understanding and recognizes the need for adherence to the prescribed therapy  5. Order-supplies-[employer]  6. The nature of migraine has been discussed. Various episodic and prophylactic choices have been explained. Both propranolol and amitriptyline are contraindicated, will  start prophylactic therapy with Nurtec  due to high frequency of pain and can continue migraine abortive, Imitrex for now. 6.  Nurtec 75 mg QOD  7.   Follow up in 3 months      Replinishible PAP Supplies, 1 year supply  Item HPCPS Code Frequency   Mask of choice  or  1 per 3 months   Nasal Mask cushion/pillows  or  2 per 30 days   Full Face Mask Interface  1 per 30 days   Headgear  1 per 6 months   Tubing, length of choice  or  1 per 3 months   Water Chamber  1 per 6 months   Chinstrap  1 per 6 months   Disposable Filters  2 per 30 days   Reusable Filters  1 per 6 months     Diagnoses:  Obstructive sleep apnea (G47.33)  Length of Need: Lifetime, 99    Ordering Provider: Keo Marin PA-C  NPI:  [id number]

## 2022-10-19 NOTE — PATIENT INSTRUCTIONS
I am going to prescribe NURTEC for migraine prevention. You will take it every other day. It might not be covered by your insurance. Let me know if it is not covered. We can try something else. Patient education: Sleep apnea in adults       INTRODUCTION -- Normally during sleep, air moves through the throat and in and out of the lungs at a regular rhythm. In a person with sleep apnea, air movement is periodically diminished or stopped. There are two types of sleep apnea: obstructive sleep apnea and central sleep apnea. In obstructive sleep apnea, breathing is abnormal because of narrowing or closure of the throat. In central sleep apnea, breathing is abnormal because of a change in the breathing control and rhythm. Sleep apnea is a serious condition that can affect a person's ability to safely perform normal daily activities and can affect long term health. Approximately 25 percent of adults are at risk for sleep apnea of some degree. Men are more commonly affected than women. Other risk factors include middle and older age, being overweight or obese, and having a small mouth and throat. This topic review focuses on the most common type of sleep apnea in adults, obstructive sleep apnea (JANET). HOW SLEEP APNEA OCCURS -- The throat is surrounded by muscles that control the airway for speaking, swallowing, and breathing. During sleep, these muscles are less active, and this causes the throat to narrow. In most people, this narrowing does not affect breathing. In others, it can cause snoring, sometimes with reduced or completely blocked airflow. A completely blocked airway without airflow is called an obstructive apnea. Partial obstruction with diminished airflow is called a hypopnea. A person may have apnea and hypopnea during sleep. Insufficient breathing due to apnea or hypopnea causes oxygen levels to fall and carbon dioxide to rise.  Because the airway is blocked, breathing faster or harder does not help to improve oxygen levels until the airway is reopened. Typically, the obstruction requires the person to awaken to activate the upper airway muscles. Once the airway is opened, the person then takes several deep breaths to catch up on breathing. As the person awakens, he or she may move briefly, snort or snore, and take a deep breath. Less frequently, a person may awaken completely with a sensation of gasping, smothering, or choking. If the person falls back to sleep quickly, he or she will not remember the event. Many people with sleep apnea are unaware of their abnormal breathing in sleep, and all patients underestimate how often their sleep is interrupted. Awakening from sleep causes sleep to be unrefreshing and causes fatigue and daytime sleepiness. Anatomic causes of obstructive sleep apnea --  Most patients have JANET because of a small upper airway. As the bones of the face and skull develop, some people develop a small lower face, a small mouth, and a tongue that seems too large for the mouth. These features are genetically determined, which explains why JANET tends to cluster in families. Obesity is another major factor. Tonsil enlargement can be an important cause, especially in children. SLEEP APNEA SYMPTOMS -- The main symptoms of JANET are loud snoring, fatigue, and daytime sleepiness. However, some people have no symptoms. For example, if the person does not have a bed partner, he or she may not be aware of the snoring. Fatigue and sleepiness have many causes and are often attributed to overwork and increasing age. As a result, a person may be slow to recognize that they have a problem. A bed partner or spouse often prompts the patient to seek medical care. Other symptoms may include one or more of the following:  ?Restless sleep  ? Awakening with choking, gasping, or smothering  ? Morning headaches, dry mouth, or sore throat  ? Waking frequently to urinate  ? Awakening unrested, groggy  ? Low energy, difficulty concentrating, memory impairment    Risk factors -- Certain factors increase the risk of sleep apnea. ?Increasing age - JANET occurs at all ages, but it is more common in middle and older age adults. ?Male sex - JANET is two times more common in men, especially in middle age. ?Obesity - The more obese a person is, the more likely he or she is to have JANET. ? Sedation from medication or alcohol - This interferes with the ability to awaken from sleep and can lengthen periods of apnea (no breathing), with potentially dangerous consequences. ? Abnormality of the airway. SLEEP APNEA CONSEQUENCES -- Complications of sleep apnea can include daytime sleepiness and difficulty concentrating. The consequence of this is an increased risk of accidents and errors in daily activities. Studies have shown that people with severe JANET are more than twice as likely to be involved in a motor vehicle accident as people without these conditions. People with JANET are encouraged to discuss options for driving, working, and performing other high-risk tasks with a healthcare provider. In addition, people with untreated JANET may have an increased risk of cardiovascular problems such as high blood pressure, heart attack, abnormal heart rhythms, or stroke. This risk may be due to changes in the heart rate and blood pressure that occur during sleep. SLEEP APNEA DIAGNOSIS -- The diagnosis of JANET is best made by a knowledgeable sleep medicine specialist who has an understanding of the individual's health issues. The diagnosis is usually based upon the person's medical history, physical examination, and testing, including:  ? A complaint of snoring and ineffective sleep  ? Neck size (greater than 16 inches in men or 14 inches in women) is associated with an increased risk of sleep apnea  ? A small upper airway: difficulty seeing the throat because of a tongue that is large for the mouth  ? High blood pressure, especially if it is resistant to treatment  ? If a bed partner has observed the patient during episodes of stopped breathing (apnea), choking, or gasping during sleep, there is a strong possibility of sleep apnea. Testing is usually performed in a sleep laboratory. A full sleep study is called a polysomnogram. The polysomnogram measures the breathing effort and airflow, blood oxygen level, heart rate and rhythm, duration of the various stages of sleep, body position, and movement of the arms/legs. Home monitoring devices are available that can perform a sleep study. This is a reasonable alternative to conventional testing in a sleep laboratory if the clinician strongly suspects moderate or severe sleep apnea and the patient does not have other illnesses or sleep disorders that may interfere with the results. SLEEP APNEA TREATMENT -- Sleep apnea is best treated by a knowledgeable sleep medicine specialist. The goal of treatment is to maintain an open airway during sleep. Effective treatment will eliminate the symptoms of sleep disturbance; long-term health consequences are also reduced. Most treatments require nightly use. The challenge for the clinician and the patient is to select an effective therapy that is appropriate for the patient's problem and that is acceptable for long term use. Auto-titrating CPAP delivers an amount of PAP that varies during the night. The variation is dependent on event detection software algorithms, which will increase the pressure gradually in response to flow changes until adequate patency is detected. After a period of sustained upper airway patency, the delivered level of pressure gradually decreases until the algorithm identifies recurrent upper airway obstruction, at which point the delivered pressure again increases.  The result is that the delivered pressure varies throughout the night, in an effort to provide the lowest pressure that is necessary to maintain upper airway patency. Continuous positive airway pressure (CPAP) -- The most effective treatment for sleep apnea uses air pressure from a mechanical device to keep the upper airway open during sleep. A CPAP (continuous positive airway pressure)  device uses an air-tight attachment to the nose, typically a mask, connected to a tube and a blower which generates the pressure. Devices that fit comfortably into the nasal opening, rather than over the nose, are also available. CPAP should be used any time the person sleeps (day or night). The CPAP device is usually used for the first time in the sleep lab, where a technician can adjust the pressure and select the best equipment to keep the airway open. Alternatively, an auto device with a self-adjusting pressure feature, provided with proper education and training, can get treatment started without another sleep test. While the treatment may seem uncomfortable, noisy, or bulky at first, most people accept the treatment after experiencing better sleep. However, difficulty with mask comfort and nasal congestion prevent up to 50 percent of people from using the treatment on a regular basis. Continued follow up with a healthcare provider helps to ensure that the treatment is effective and comfortable. Information from the CPAP machine is often used by physicians, therapists, and insurers to track the success of treatment. CPAP can be delivered with different features to improve comfort and solve problems that may come up during treatment. Changes in treatment may be needed if symptoms do not improve or if the persons condition changes, such as a gain or loss of weight. Adjust sleep position -- Adjusting sleep position (to stay off the back) may help improve sleep quality in people who have JANET when sleeping on the back. However, this is difficult to maintain throughout the night and is rarely an adequate solution.   Weight loss -- Weight loss may be helpful for obese or overweight patients. Weight loss may be accomplished with dietary changes, exercise, and/or surgical treatment. However, it can be difficult to maintain weight loss; the five-year success of non-surgical weight loss is only 5 percent, meaning that 95 percent of people regain lost weight. Avoid alcohol and other sedatives -- Alcohol can worsen sleepiness, potentially increasing the risk of accidents or injury. People with JANET are often counseled to drink little to no alcohol, even during the daytime. Similarly, people who take anti-anxiety medications or sedatives to sleep should speak with their healthcare provider about the safety of these medications. People with JANET must notify all healthcare providers, including surgeons, about their condition and the potential risks of being sedated. People with JANET who are given anesthesia and/or pain medications require special management and close monitoring to reduce the risk of a blocked airway. Dental devices -- A dental device, called an oral appliance or mandibular advancement device, can reposition the jaw (mandible), bringing the tongue and soft palate forward as well. This may relieve obstruction in some people. This treatment is excellent for reducing snoring, although the effect on JANET is sometimes more limited. As a result, dental devices are best used for mild cases of JANET when relief of snoring is the main goal. Failure to tolerate and accept CPAP is another indication for dental devices. While dental devices are not as effective as CPAP for JANET, some patients prefer a dental device to CPAP. Side effects of dental devices are generally minor but may include changes to the bite with prolonged use. Surgical treatment -- Surgery is an alternative therapy for patients who cannot tolerate or do not improve with nonsurgical treatments such as CPAP or oral devices. Surgery can also be used in combination with other nonsurgical treatments.   Surgical procedures reshape structures in the upper airways or surgically reposition bone or soft tissue. Uvulopalatopharyngoplasty (UPPP) removes the uvula and excessive tissue in the throat, including the tonsils, if present. Other procedures, such as maxillomandibular advancement (MMA), address both the upper and lower pharyngeal airway more globally. UPPP alone has limited success rates (less than 50 percent) and people can relapse (when JANET symptoms return after surgery). As a result, this surgery is only recommended in a minority of people and should be considered with caution. MMA may have a higher success rate, particularly in people with abnormal jaw (maxilla and mandible) anatomy, but it is the most complicated procedure. A newer surgical approach, nerve stimulation to protrude the tongue, has promising success rates in very selected people. Tracheostomy creates a permanent opening in the neck. It is reserved for people with severe disease in whom less drastic measures have failed or are inappropriate. Although it is always successful in eliminating obstructive sleep apnea, tracheostomy requires significant lifestyle changes and carries some serious risks (eg, infection, bleeding, blockage). All surgical treatments require discussions about the goals of treatment, the expected outcomes, and potential complications. Hypoglossal nerve stimulator- \"Inspire\" device    PAP treatment failure:  Possible causes of treatment failure include nonadherence or suboptimal adherence, weight gain, an inappropriate level of prescribed positive pressure, or an additional disorder causing sleepiness (eg, narcolepsy) that may require alterations in the therapeutic regimen. A review of medications should also be undertaken since many drugs may lead to sleepiness. Inadequate sleep time may also negate the expected effects from treatment of JANET.  Also, pt's can have persistent hypersomnolence associated with sleep apnea even in the presence of adequate therapy and at those times Provigil or Nuvigil or other stimulants may be indicated. Once the patient's positive airway pressure therapy has been optimized and symptoms resolved, a regimen of long-term follow-up should be established. Annual visits are reasonable, with more frequent visits in between if new issues arise. The purpose of long-term follow-up is to assess usage and monitor for recurrent JANET, new side effects, air leakage, and fluctuations in body weight. WHERE TO GET MORE INFORMATION -- Your healthcare provider is the best source of information for questions and concerns related to your medical problem. Organizations  American Sleep Apnea Association  Provides information about sleep apnea to the public, publishes a newsletter, and serves as an advocate for people with the disorder. Burakmaria alejandra, [address]   El@[employer]. org   AdminParking.[employer]. org   Tel: [phone]   Fax: [facility] organization that works to [employer] and safety by promoting public understanding of sleep and sleep disorders. Supports sleep-related education, research, and advocacy; produces and distributes educational materials to the public and healthcare professionals; and offers postdoctoral fellowships and grants for sleep researchers. [address]   Cy@[employer]. org   SurferLive.[employer]. org   Tel: [phone]   Fax: [phone]    Important information:  Medicare/private insurance CPAP/BiPAP/APAP requirements:  Medicare/private insurance has specific requirements for PAP compliance that must be met during the first 90 days of use to continue coverage for CPAP/BiPAP/APAP  from day 91 and beyond. The policy requires that patients use a PAP device 4 hours per 24 hour period, at least 70% of the time over a 30 day period.  This data must be downloaded as a the head and neck throughout the night.

## 2022-10-19 NOTE — LETTER
[id number] [facility]  [address] [place] [address] Drive, [address] [place] [address]  Phone [phone]  Fax [phone]             Re:  Fani Brendan    10/19/22  :  1970  Address: [facility]       Replinishible PAP Supplies, 1 year supply  Item HPCPS Code Frequency   Mask of choice  or  1 per 3 months   Nasal Mask cushion/pillows  or  2 per 30 days   Full Face Mask Interface  1 per 30 days   Headgear  1 per 6 months   Tubing, length of choice  or  1 per 3 months   Water Chamber  1 per 6 months   Chinstrap  1 per 6 months   Disposable Filters  2 per 30 days   Reusable Filters  1 per 6 months     Diagnoses:  Obstructive sleep apnea (G47.33)  Length of Need: Lifetime, 99    Ordering Provider: Arnulfo Rowan PA-C  NPI:  [id number]        Signature: [unfilled]        Date: 10/19/2022      Electronically Signed by Arnulfo Rowan PA-C  on 10/19/2022 at 1:29 PM

## 2022-10-19 NOTE — TELEPHONE ENCOUNTER
Approved  PA Detail   Prior authorization approved Case ID: [id number]      Payer:  [employer]    [phone]    The request has been approved. The authorization is effective for a maximum of 3 fills from 10/19/2022 to 2023, as long as the member is enrolled in their current health plan. The request was approved as submitted. A written notification letter will follow with additional details. Approval Details    Authorized from 2022 to 2023      Electronic appeal:  Not supported   View History     Notes   Time User Attachment    Attachment received from payer. 10/19/2022  3:05 PM [employer], [employer] Prescription Prior Authorization Response Document     Medication Being Authorized     Rimegepant Sulfate (NURTEC) 75 MG TBDP    1 QOD for migraine prevention    Dispense: 16 tablet Refills: 5     Start: 10/19/2022      Class: Normal      This order has been released to its destination.    To be filled at: [facility], [place] [address] [address] [facility]    Covered:  Retail    Not covered:  Mail Order    Unknown:  Specialty, Long-Term Care   Member ID:  [id number]   Group ID:  Hailey Hope name:      Yana Chalino:  [id number]   PCN:  Lauren Babcock    :  1970   Legal sex:  M   Address:  [address] [place] [phone]

## 2022-10-19 NOTE — TELEPHONE ENCOUNTER
Patient camTestosterone POWD, Versabase GEL, Ethyl Alcohol 95 % SOLNe in requesting his testosterone cream.  Please send to [facility]. For questions please call [phone].

## 2022-10-20 DIAGNOSIS — E34.9 HYPOTESTOSTERONISM: ICD-10-CM

## 2022-10-20 RX ORDER — TESTOSTERONE MICRONIZED 100 %
POWDER (GRAM) MISCELLANEOUS
Qty: 1 EACH | Refills: 0 | Status: SHIPPED | OUTPATIENT
Start: 2022-10-20 | End: 2022-10-21 | Stop reason: SDUPTHER

## 2022-10-20 RX ORDER — TESTOSTERONE MICRONIZED 100 %
POWDER (GRAM) MISCELLANEOUS
Qty: 1 EACH | Refills: 0 | Status: SHIPPED | OUTPATIENT
Start: 2022-10-20 | End: 2022-10-20 | Stop reason: ALTCHOICE

## 2022-10-20 NOTE — TELEPHONE ENCOUNTER
Dylan Vogel called to request a refill on his medication. Last office visit : 8/29/2022   Next office visit : 2/13/2023     Pt has tried to get this refilled and there was an error when it tried to send to pharmacy on 10/19    Requested Prescriptions     Pending Prescriptions Disp Refills    Testosterone POWD, Versabase GEL, Ethyl Alcohol 95 % SOLN 1 each 0     Sig: APPLY 4 CLICKS TO CHEST OR SHOULDER ONCE DAILY.             Rita Judd [place]

## 2022-10-20 NOTE — TELEPHONE ENCOUNTER
Pt has requested to get this filled.  The refill was approved yesterday on 10/19 but \"E-Prescribing Status: Transmission to pharmacy failed\"

## 2022-10-21 DIAGNOSIS — E34.9 HYPOTESTOSTERONISM: ICD-10-CM

## 2022-10-21 DIAGNOSIS — F41.9 ANXIETY: ICD-10-CM

## 2022-10-21 RX ORDER — TESTOSTERONE MICRONIZED 100 %
POWDER (GRAM) MISCELLANEOUS
Qty: 1 EACH | Refills: 0 | Status: CANCELLED | OUTPATIENT
Start: 2022-10-21 | End: 2022-11-22

## 2022-10-21 RX ORDER — TESTOSTERONE MICRONIZED 100 %
POWDER (GRAM) MISCELLANEOUS
Qty: 1 EACH | Refills: 0 | Status: SHIPPED | OUTPATIENT
Start: 2022-10-21 | End: 2022-11-22

## 2022-10-21 RX ORDER — TESTOSTERONE MICRONIZED 100 %
POWDER (GRAM) MISCELLANEOUS
Qty: 30 G | Refills: 0 | OUTPATIENT
Start: 2022-10-21

## 2022-10-21 NOTE — TELEPHONE ENCOUNTER
Josefinadaria Acostaham called to request a refill on his medication. Last office visit : 8/29/2022   Next office visit : 2/13/2023     Last UDS:   Opiate Scrn, Ur   Date Value Ref Range Status   03/26/2015 Negative Tkughi=739 ng/mL Final     Comment:     Opiate test includes Codeine, Morphine, Hydromorphone, Hydrocodone. Last Francinedanielapawel Saari: 8/26/22  Medication Contract: 3/2/21   Last Fill: 8/11/22    Requested Prescriptions     Pending Prescriptions Disp Refills    Testosterone POWD, Versabase GEL, Ethyl Alcohol 95 % SOLN 1 each 0     Sig: APPLY 4 CLICKS TO CHEST OR SHOULDER ONCE DAILY. Please approve or refuse this medication.    Jostin Macias [place]

## 2022-10-21 NOTE — TELEPHONE ENCOUNTER
Requested Prescriptions     Pending Prescriptions Disp Refills    Testosterone POWD, Versabase GEL, Ethyl Alcohol 95 % SOLN [Pharmacy Med Name: TESTOSTERONE 5% GEL] 30 g 0     Sig: APPLY 4 CLICKS TO CHEST OR SHOULDER ONCE DAILY.

## 2022-11-05 PROCEDURE — 99284 EMERGENCY DEPT VISIT MOD MDM: CPT

## 2022-11-05 PROCEDURE — 96372 THER/PROPH/DIAG INJ SC/IM: CPT

## 2022-11-06 ENCOUNTER — HOSPITAL ENCOUNTER (EMERGENCY)
Age: 52
Discharge: HOME | End: 2022-11-06
Attending: EMERGENCY MEDICINE
Payer: MEDICAID

## 2022-11-06 VITALS
DIASTOLIC BLOOD PRESSURE: 78 MMHG | RESPIRATION RATE: 16 BRPM | OXYGEN SATURATION: 98 % | SYSTOLIC BLOOD PRESSURE: 132 MMHG | HEART RATE: 72 BPM | WEIGHT: 190 LBS | HEIGHT: 67 IN | BODY MASS INDEX: 29.82 KG/M2 | TEMPERATURE: 98 F

## 2022-11-06 DIAGNOSIS — K02.9 DENTAL CARIES: Primary | ICD-10-CM

## 2022-11-06 DIAGNOSIS — K08.89 ODONTALGIA: ICD-10-CM

## 2022-11-06 PROCEDURE — 6360000002 HC RX W HCPCS: Performed by: EMERGENCY MEDICINE

## 2022-11-06 RX ORDER — HYDROMORPHONE HYDROCHLORIDE 1 MG/ML
1 INJECTION, SOLUTION INTRAMUSCULAR; INTRAVENOUS; SUBCUTANEOUS ONCE
Status: COMPLETED | OUTPATIENT
Start: 2022-11-06 | End: 2022-11-06

## 2022-11-06 RX ORDER — CEPHALEXIN 500 MG/1
500 CAPSULE ORAL 3 TIMES DAILY
Qty: 21 CAPSULE | Refills: 0 | Status: SHIPPED | OUTPATIENT
Start: 2022-11-06 | End: 2022-11-13

## 2022-11-06 RX ADMIN — HYDROMORPHONE HYDROCHLORIDE 1 MG: 1 INJECTION, SOLUTION INTRAMUSCULAR; INTRAVENOUS; SUBCUTANEOUS at 02:30

## 2022-11-06 ASSESSMENT — PAIN DESCRIPTION - ORIENTATION
ORIENTATION: LEFT;LOWER
ORIENTATION: LEFT;LOWER

## 2022-11-06 ASSESSMENT — PAIN DESCRIPTION - LOCATION
LOCATION: JAW
LOCATION: TEETH

## 2022-11-06 ASSESSMENT — PAIN SCALES - GENERAL
PAINLEVEL_OUTOF10: 6
PAINLEVEL_OUTOF10: 7

## 2022-11-06 ASSESSMENT — PAIN DESCRIPTION - DESCRIPTORS
DESCRIPTORS: ACHING
DESCRIPTORS: ACHING

## 2022-11-06 ASSESSMENT — PAIN - FUNCTIONAL ASSESSMENT: PAIN_FUNCTIONAL_ASSESSMENT: 0-10

## 2022-11-06 NOTE — ED PROVIDER NOTES
[facility]  eMERGENCY dEPARTMENT eNCOUnter      Pt Name: Ami Avila  MRN: [id number]  [place] 1970  Date of evaluation: 11/5/2022  Provider: Anish Douglas MD    CHIEF COMPLAINT       Chief Complaint   Patient presents with    Dental Pain     Lower left mouth. Patient reports he has had this complaint \"for a while\"           HISTORY OF PRESENT ILLNESS   (Location/Symptom, Timing/Onset,Context/Setting, Quality, Duration, Modifying Factors, Severity)  Note limiting factors. Ami Avila is a 50 y.o. male who presents to the emergency department for evaluation regarding pain and swelling over the left lower portion of his mouth and jaw area. Patient states he has had ongoing issues with his teeth for a while although over the past couple of days it became quite a bit worse. States he still able to open and close his mouth without difficulty. Denies any difficulty swallowing or feelings of significant neck swelling. He has not had fevers or chills. Has not been on any recent oral antibiotics. HPI    NursingNotes were reviewed. REVIEW OF SYSTEMS    (2-9 systems for level 4, 10 or more for level 5)     Review of Systems   Constitutional:  Negative for fever. HENT:  Positive for dental problem. Negative for drooling, ear pain, facial swelling, trouble swallowing and voice change. Respiratory:  Negative for shortness of breath. Gastrointestinal:  Negative for vomiting. Musculoskeletal:  Negative for neck pain. All other systems reviewed and are negative.          PAST MEDICALHISTORY     Past Medical History:   Diagnosis Date    Anemia     Anxiety     CPAP (continuous positive airway pressure) dependence     Depression     Herniated lumbar intervertebral disc     Hyperglycemia 01/05/2021    Hypertriglyceridemia 10/16/2020    Kidney cysts     Liver cyst     Low testosterone     Lymphoma ([facility] [address] 06/07/2021    Dr Kay Preston     Migraine     Neck pain     Obstructive sleep apnea     OCD (obsessive compulsive disorder)     Poison ivy 5/18/2021    Somnolence, daytime 11/5/2020    Thyroid disease     Witnessed apneic spells 11/5/2020         SURGICAL HISTORY       Past Surgical History:   Procedure Laterality Date    APPENDECTOMY      COLONOSCOPY  approx 2017    Dr Everardo Martinez @ [facility] hospt. \" polyps\" per pt recall    COLONOSCOPY N/A 11/06/2020    Dr Harriett Salcedo, internal hemorrhoids-Grade 1, 5 yr recall    HERNIA REPAIR      bih    THYROIDECTOMY      TOTAL    UPPER GASTROINTESTINAL ENDOSCOPY N/A 05/06/2021    Dr Renée Cortez gastritis (avoid NSAIDS) (-)H. pylori, w/push enteroscopy up to the the proximal jejunum-Gastric erosions,  non-obstructing distal esoph ring, sliding hh, gastritis, duodenitis         CURRENT MEDICATIONS     Discharge Medication List as of 11/6/2022  2:27 AM        CONTINUE these medications which have NOT CHANGED    Details   Testosterone POWD, Versabase GEL, Ethyl Alcohol 95 % SOLN APPLY 4 CLICKS TO CHEST OR SHOULDER ONCE DAILY. , Disp-1 each, R-0Print      Rimegepant Sulfate (NURTEC) 75 MG TBDP 1 QOD for migraine prevention, Disp-16 tablet, R-5Normal      sildenafil (REVATIO) 20 MG tablet TAKE 2 TABLETS BY MOUTH AS NEEDED AS DIRECTED., Disp-60 tablet, R-0Normal      ondansetron (ZOFRAN) 4 MG tablet TAKE ONE TABLET BY MOUTH EVERY 6 HOURS AS NEEDED FOR NAUSEA, Disp-30 tablet, R-0Normal      levothyroxine (SYNTHROID) 150 MCG tablet Take 1 tablet by mouth daily, Disp-90 tablet, R-1Normal      atorvastatin (LIPITOR) 20 MG tablet Take 1 tablet by mouth daily, Disp-90 tablet, R-1Normal      PARoxetine (PAXIL) 40 MG tablet TAKE ONE TABLET IN THE MORNING, Disp-90 tablet, R-1Normal      oxybutynin (DITROPAN-XL) 10 MG extended release tablet TAKE 1 TABLET BY MOUTH EVERY DAY FOR BLADDER, Disp-90 tablet, R-1Normal      SUMAtriptan (IMITREX) 100 MG tablet As directed, Disp-9 tablet, R-5Take one at the onset of the migraine,may repeat once in 2 hours if migraine persists,maximum of 2 in 24 hours of 4 in 1 weekNormal      medical marijuana Take by mouth as needed. Historical Med      nicotine (NICODERM CQ) 14 MG/24HR Place 1 patch onto the skin every 24 hoursHistorical Med      omeprazole (PRILOSEC) 40 MG delayed release capsule Take 1 capsule by mouth every morning (before breakfast), Disp-90 capsule, R-1Normal      clobetasol (TEMOVATE) 0.05 % ointment Apply topically 2 times daily. , Disp-30 g, R-0, Normal             ALLERGIES     Patient has no known allergies. FAMILY HISTORY       Family History   Problem Relation Age of Onset    Stomach Cancer Maternal Uncle     Liver Cancer Maternal Uncle     Cancer Maternal Grandfather     Stomach Cancer Maternal Aunt     Cancer Other         LEUKEMIA    Colon Cancer Neg Hx     Colon Polyps Neg Hx     Esophageal Cancer Neg Hx     Rectal Cancer Neg Hx           SOCIAL HISTORY       Social History     Socioeconomic History    Marital status:      Spouse name: None    Number of children: None    Years of education: None    Highest education level: None   Tobacco Use    Smoking status: Every Day     Packs/day: 2.00     Years: 34.00     Pack years: 68.00     Types: Cigarettes     Start date: 1986    Smokeless tobacco: Never    Tobacco comments:     Nicotine Gum severals times daily & Nioctine patches    Vaping Use    Vaping Use: Never used   Substance and Sexual Activity    Alcohol use:  Yes     Alcohol/week: 1.0 standard drink     Types: 1 Cans of beer per week     Comment: occasional     Drug use: Yes     Types: Marijuana Kanchan Eliel)     Comment: smokes marijuana daily, along with RSO (marijuana) oil 0.1ml under tongue        Social Determinants of Health     Financial Resource Strain: High Risk    Difficulty of Paying Living Expenses: Very hard   Food Insecurity: Food Insecurity Present    Worried About [address] in the Last Year: Sometimes true    Ran Out of Food in the Last Year: Never true Transportation Needs: No Transportation Needs    Lack of Transportation (Medical): No    Lack of Transportation (Non-Medical): No       SCREENINGS    Tea Coma Scale  Eye Opening: Spontaneous  Best Verbal Response: Oriented  Best Motor Response: Obeys commands  Tea Coma Scale Score: 15        PHYSICAL EXAM    (up to 7 for level 4, 8 or more for level 5)     ED Triage Vitals   BP Temp Temp Source Heart Rate Resp SpO2 Height Weight   11/06/22 0049 11/06/22 0049 11/06/22 0049 11/06/22 0049 11/06/22 0049 11/06/22 0049 11/06/22 0050 11/06/22 0050   136/79 98.2 °F (36.8 °C) Temporal 73 18 98 % 5' 7\" (1.702 m) 190 lb (86.2 kg)       Physical Exam  Vitals and nursing note reviewed. Constitutional:       General: He is not in acute distress. HENT:      Nose: Nose normal.      Mouth/Throat:      Dentition: Dental tenderness and dental caries present. Pharynx: No oropharyngeal exudate. Eyes:      General:         Right eye: No discharge. Left eye: No discharge. Conjunctiva/sclera: Conjunctivae normal.      Pupils: Pupils are equal, round, and reactive to light. Neck:      Trachea: No tracheal deviation. Cardiovascular:      Rate and Rhythm: Normal rate and regular rhythm. Heart sounds: Normal heart sounds. Pulmonary:      Effort: No respiratory distress. Breath sounds: Normal breath sounds. No stridor. Musculoskeletal:      Cervical back: Neck supple. Neurological:      Mental Status: He is alert and oriented to person, place, and time. DIAGNOSTIC RESULTS         LABS:  Labs Reviewed - No data to display    All other labs were within normal range or not returned as of this dictation.     EMERGENCY DEPARTMENT COURSE and DIFFERENTIAL DIAGNOSIS/MDM:   Vitals:    Vitals:    11/06/22 0049 11/06/22 0050 11/06/22 0235   BP: 136/79  132/78   Pulse: 73  72   Resp: 18  16   Temp: 98.2 °F (36.8 °C)  98 °F (36.7 °C)   TempSrc: Temporal     SpO2: 98%     Weight:  190 lb (86.2 kg) Height:  5' 7\" (1.702 m)        MDM    Patient was started on outpatient course of oral antibiotics and referred for outpatient dental follow-up. PROCEDURES:  Unless otherwise noted below, none     Procedures    FINAL IMPRESSION      1. Dental caries    2.  Odontalgia          DISPOSITION/PLAN   DISPOSITION Decision To Discharge 11/06/2022 02:13:56 AM      PATIENT REFERRED TO:  [facility]  [address], [address]        DISCHARGE MEDICATIONS:  Discharge Medication List as of 11/6/2022  2:27 AM        START taking these medications    Details   cephALEXin (KEFLEX) 500 MG capsule Take 1 capsule by mouth 3 times daily for 7 days, Disp-21 capsule, R-0Normal                (Please note that portions of this note were completed with a voice recognition program.  Efforts were made to edit thedictations but occasionally words are mis-transcribed.)    Carolyne Vazquez MD (electronically signed)  Attending Emergency Physician          Carolyne Vazquez MD  11/17/22 [id number]

## 2022-11-17 ASSESSMENT — ENCOUNTER SYMPTOMS
SHORTNESS OF BREATH: 0
FACIAL SWELLING: 0
VOMITING: 0
VOICE CHANGE: 0
TROUBLE SWALLOWING: 0

## 2022-11-21 ENCOUNTER — HOSPITAL ENCOUNTER (OUTPATIENT)
Dept: INFUSION THERAPY | Age: 52
Discharge: HOME | End: 2022-11-21
Payer: MEDICAID

## 2022-11-21 DIAGNOSIS — C85.10 LOW GRADE B-CELL LYMPHOMA (HCC): ICD-10-CM

## 2022-11-21 LAB
ALBUMIN SERPL-MCNC: 4.4 G/DL (ref 3.5–5.2)
ALP BLD-CCNC: 75 U/L (ref 40–130)
ALT SERPL-CCNC: 26 U/L (ref 21–72)
ANION GAP SERPL CALCULATED.3IONS-SCNC: 10 MMOL/L (ref 7–19)
AST SERPL-CCNC: 22 U/L (ref 17–59)
BILIRUB SERPL-MCNC: 0.5 MG/DL (ref 0.2–1.3)
BUN BLDV-MCNC: 22 MG/DL (ref 9–20)
CALCIUM SERPL-MCNC: 9.7 MG/DL (ref 8.4–10.2)
CHLORIDE BLD-SCNC: 105 MMOL/L (ref 98–111)
CO2: 28 MMOL/L (ref 22–29)
CREAT SERPL-MCNC: 1.1 MG/DL (ref 0.6–1.2)
GFR SERPL CREATININE-BSD FRML MDRD: >60 ML/MIN/{1.73_M2}
GLOBULIN: 2.8 G/DL
GLUCOSE BLD-MCNC: 131 MG/DL (ref 74–106)
HCT VFR BLD CALC: 42 % (ref 40.1–51)
HEMOGLOBIN: 15 G/DL (ref 13.7–17.5)
LYMPHOCYTES ABSOLUTE: 0.92 K/UL (ref 1.18–3.74)
LYMPHOCYTES RELATIVE PERCENT: 15.5 % (ref 19.3–53.1)
MCH RBC QN AUTO: 31.4 PG (ref 25.7–32.2)
MCHC RBC AUTO-ENTMCNC: 35.7 G/DL (ref 32.3–36.5)
MCV RBC AUTO: 88.1 FL (ref 79–92.2)
MONOCYTES ABSOLUTE: 0.44 K/UL (ref 0.24–0.82)
MONOCYTES RELATIVE PERCENT: 7.4 % (ref 4.7–12.5)
NEUTROPHILS ABSOLUTE: 4.34 K/UL (ref 1.56–6.13)
NEUTROPHILS RELATIVE PERCENT: 72.9 % (ref 34–71.1)
PDW BLD-RTO: 12.8 % (ref 11.6–14.4)
PLATELET # BLD: 265 K/UL (ref 163–337)
PMV BLD AUTO: 8.8 FL (ref 7.4–10.4)
POTASSIUM SERPL-SCNC: 4 MMOL/L (ref 3.5–5.1)
RBC # BLD: 4.77 M/UL (ref 4.63–6.08)
SODIUM BLD-SCNC: 143 MMOL/L (ref 137–145)
TOTAL PROTEIN: 7.2 G/DL (ref 6.3–8.2)
WBC # BLD: 5.95 K/UL (ref 4.23–9.07)

## 2022-11-21 PROCEDURE — 36415 COLL VENOUS BLD VENIPUNCTURE: CPT

## 2022-11-21 PROCEDURE — 80053 COMPREHEN METABOLIC PANEL: CPT

## 2022-11-21 PROCEDURE — 85025 COMPLETE CBC W/AUTO DIFF WBC: CPT

## 2022-11-22 NOTE — PROGRESS NOTES
Patient:  Dewayne Ulloa  YOB: 1970  Date of Service: 2022  MRN: [id number]    Primary Care Physician: Koki Hernández MD    Chief Complaint   Patient presents with    Follow-up     Low grade B-cell lymphoma [facility])           Patient Seen, Chart, Consults notes, Labs, Radiology studies reviewed. Subjective:    Mr Roland Campbell is a 50year old gentleman managed with primary and secondary diagnoses as outlined:  Stage I low-grade B-cell lymphoma (SLL) (small lymphocytic lymphoma) manifesting with 4 x 2.1 x 4.9 cm and 3.6 x 1.9 x 4.1 cm retroperitoneal para-aortic region lymph node conglomerates, 2021   Minimal cerebellar tonsillar ectopia, not meeting criteria for Chiari I malformation on MRI of the brain 2020 followed by Dr. Germain Ngyuen with neurology    Mateo Serrano completed XRT, and received 3000 cGy that was completed on 2021  He is asymptomatic, without B symptoms. He has never received systemic therapy for SLL. He is being monitored conservatively for symptoms or radiographic progression. He appears healthy, with the only complaint being nauseated in the mornings reportedly secondary to a minimal cerebellar tonsillar ectopia, not meeting criteria for Chiari I malformation on MRI of the brain 2020. His father  in 2022, he is grieving having cared for him on an intermittent basis bathing him etc. for a year prior to his passing. He comes today accompanied by his wife without new complaints. TARGET SLL/CLL SITES:  4 x 2.1 x 4.9 cm and 3.6 x 1.9 x 4.1 cm enhancing oval lobulated solid masses in the right upper abdomen, retroperitoneal para-aortic region located between the aorta and inferior vena cava    TUMOR HISTORY: Stage I low-grade B-cell lymphoma (SLL) 2021  Mr Roland Campbell was seen in initial consultation on 2021, referred by Dr Koki Hernández for newly diagnosed low grade B-cell lymphoma.     In 2021, Mateo Serrano decided he wanted to jump on a pogo stick but unfortunately was unsuccessful, fell and hurt his back. Valentino Decant presented for work-up with low back pain. MRI LUMBAR SPINE WO CONTRAST  On 3/22/2021 at [facility] documented:  Multilevel prominent disc osteophyte complexes and facetal arthropathy and resultant neural foramina spinal canal stenosis  Incidentally noted is an ill-defined soft tissue mass located posterior to the distal left renal vein and inferior vena cava with anterior displacement of both. It measures approximately 4 cm x 1.5 x 5.3 cm. This may represent an enlarged retroperitoneal lymph node? Mark Patella A similar mass is seen at a lower level measuring 2.8 x 1.8 x 3.6 cm. CT ABDOMEN PELVIS W WO CONTRAST on 3/25/2021 at [facility] documented:  Evidence of moderately enhancing oval lobulated solid masses in the right upper abdomen in the retroperitoneal para-aortic region located between the aorta and inferior vena cava. There is marked asymmetrical thickening of the right diaphragmatic luz. The 2 masses measure 4 cm x 2.1 x 4.9 cm and 3.6 x 1.9 x 4.1 cm. Potential moderate contrast enhancement. Inflammatory or neoplastic process is not excluded. Further evaluation is recommended. No other enlarged lymph nodes are noted. Valentino Decant was referred to GI for initial evaluation hoping for access to the abdominal mass via EGD/EUS. EGD BIOPSY on 5/6/2021 at [facility] per  Janine Malave MD  IMPRESSION:  Esophagus: normal and the EG junction at 38 cm appeared essentially normal except for a nonobstructing distal esophageal ring. There is a small 2 to 3 cm in size sliding hiatal hernia present. Stomach:  Abnormal; patchy mucosal changes multiple small 1 to 2 mm erosions with surrounding small areas of erythema noted in the antrum suggestive of medical gastritis noted -  Gastric biopsies were taken from the antrum and body to rule out Helicobacter pylori infection.   The proximal stomach including the fundus, cardia, body and angularis appeared completely normal.  No evidence of abnormal folds or unusual thickening noted in the fundus. NO ulcers or masses or gastric outlet obstruction or retained food or fluid. Rugae were normal and lumen distended well with insufflation. Retroflexed views otherwise revealed a normal GE junction, fundus and cardia as well. Duodenum: abnormal: 3 small erosions with surrounding erythema were noted in the posterior duodenal bulb suggestive of mild duodenitis. Otherwise remainder of the examined duodenum including the bulb second third and fourth portions appeared essentially normal as did the first few centimeters of the examined proximal jejunum. No strictures or mass lesions or ulcers were noted. FINAL DIAGNOSIS:   Stomach, antrum, biopsy:   Antral-type gastric mucosa with acanthosis and tortuous glands, consistent with reactive gastropathy (chemical gastritis). Negative for active inflammation. Negative for Helicobacter pylori organisms by immunohistochemical staining. Negative for intestinal metaplasia. Negative for dysplasia. Dr Josephine Martinez reviewed the images with the Dr. Starr Munoz and he opined that the retroperitoneal lesion was not amenable to FNA by EUS or even by radiology here in [facility]. Dr Saul Lam referred Jaxson Rodriguez to Dr Jewels Sauer with the surgical oncology department at [facility]     Dr Jewels Sauer saw Jaxson Ax on 5/21/2021. A CT-guided biopsy retroperitoneal mass was performed on 6/2/2021 at [facility]  Microscopic description: The cytospin shows a mixture of small and large lymphoid cells    CYTOLOGIC DIAGNOSIS:  Low-grade B-cell lymphoma most consistent with small lymphocytic lymphoma (SLL/CLL)  CD5 (+) lambda surface restricted B-cell population (48% of cells) by flow cytometry  IHC report the neoplastic cells are positive for PAX-5 and LEF1. CD3 is positive and T cells. Cells are negative for cyclin D1 and SOX11.   The Ki-67 proliferative index is variable and ranges from 5% to focally up to 20%    Flow cytometry immunophenotypic B cells  POSITIVE FOR CD5, lambda, CD45, CD20, CD22, CD19, CD23, and CD38  NEGATIVE FOR, CD10, CD79B and CD14    COMMENT:   Findings support involvement by a B cell lymphoma. Correlation with the forthcoming morphology ([id number] is recommended. Physical examination at presentation did not reveal evidence of palpable pathological peripheral lymphadenopathy in the cervical, supraclavicular, infraclavicular, axillary or inguinal lymph node areas. Lungs are clear heart is regular normal S1-S2, no S3  Abdominal exam documents an enlarged liver, with fullness in the RUQ, somewhat tender. I am unable to palpate a spleen. Extremities no cyanosis clubbing or edema  Neurological exam is nonfocal intact with an intact mental status and normal cranial nerves II through XII. Serology on 6/30/21 revealed:  CMP - unremarkable  Iron - 149  TIBC - 460  Saturation - 32%  Ferritin -128  LDH - 441  TSH - 0.267  Uric Acid - 4.5  Folate - 4.2  Retic - 1.0  Haptoglobin-180  MMA - 109    B2M-1.9  Kappa light chains- 18.9  Lambda light chains-24.4  K/L ratio-0.77  IgG-590 ,IgA-176 , IgM-54  Total Protein - 6.2  M-spike-not observed    Hepatitis Panel - negative    Bone marrow biopsy and aspirate on 7/8/2021 is preliminary but has been discussed with Dr. Renee Loyd indicating the following:  Morphologically the marrow is hypercellular (90%) with increased erythroid shift. There is no obvious evidence of panmyelosis  Flow cytometry shows no increase in number of blasts and no immunophenotypic evidence of involvement by non-Hodgkin's lymphoproliferative disorder. [address] [place] study for myelodysplasia profile is completely negative  Cytogenetics documents a normal male karyotype  JAK2 V617F, JAK2 EXON 12, CALR MPL and KIT MUTATIONS ALL NEGATIVE    COMMENT:  The marrow is hypercellular (90% cellular) with increased erythroid precursors and a shift to immaturity. No increase in blasts or dysplasia is seen. The granulocytic series and megakaryocytes do not appear to be increased. Evaluation of peripheral blood smear does not show an increase in polychromasia, basophilia, leukoerythroblastic effect, macrocytosis or circulating blasts. No increase in spherocytes is seen in the included history shows no increase in bilirubin which would argue against hemolysis. FISH (MDS panel) is negative for abnormalities. Evaluation for blood loss, nutritional deficiencies and exogenous testosterone administration may be helpful    CT neck and nasopharynx with contrast on 7/9/2021at [facility] documented:  Scattered non pathologically enlarged lymph nodes are noted bilaterally, measuring up to 7 mm. No cervical lymphadenopathy. CT CHEST W CONTRAST on  7/9/2021at [facility] documented:  No enlarged axillary, hilar, or mediastinal lymph nodes. No intrathoracic lymphadenopathy. Emphysema. .    CT ABDOMEN PELVIS W IV CONTRAST on  7/9/2021 compared to 3/25/21 at [facility] documented:  Aretroperitoneal mass in the upper abdomen is again noted. The more superior aspect of the mass measures 4.6 x 2 cm today and previously measured 4 x 2.1 cm. The more posterior and inferior portion of the mass measures 3.4 x 2.2 cm and previously measured 3.1 x 1.9 cm. There are no other enlarged lymph nodes in the abdomen or the pelvis. This is likely an enlarged lymph node. Other retroperitoneal mass lesions are not ruled out. Sarcoma and other tumors would also be in the differential.  Stable 5-6 mm probable liver cyst.  Gastric wall thickening likely an artifact of under distention. Mild diverticulosis of the colon. PET CT SKULL BASE TO MID THIGH on 7/9/2021at [facility], compared to CTs performed same day, documented:  Slightly increased metabolic activity in the retroperitoneal mass seen on recent examination, with maximum SUV of 3.2. Neoplastic process remains in the differential. Tissue diagnosis is recommended.    For reference purposes, maximum SUV in the mediastinum is 2.3 and maximum SUV in the liver is 1.9. Transthoracic Echocardiography Report (TTE) on 7/9/21 at [facility] documented:  Summary:  LV is borderline dilated in size with normal systolic function. LV ejection fraction estimated at 55 to 60%. Diastolic function appears preserved. RV appears normal in size with normal systolic function. Normal left atrial size. Normal right atrial size. Aortic valve is trileaflet with normal leaflet mobility. No stenosis or significant regurgitation. Mitral valve appears structurally normal with normal leaflet mobility. Trace mitral regurgitation. No stenosis. No significant tricuspid regurgitation    The diagnosis and extent of disease was discussed at length with Ondina Bryant, his wife and mother. Treatment alternatives include systemic therapy such as ibrutinib or alternatively limited treatment with XRT which would be reasonable given his young age and the limited and early stage of presentation. A call was placed to Dr. Elke Arias who agreed to see Mr. Carter Sellers in consultation to discuss and consider XRT. I explained to Mr. Singh Stager and family that despite potentially being able to include all of the known SLL in a radiation portal, over time, expectation will be that it is more likely than not that the SLL/CLL would recur but we do not have a timeframe and it could be in his best interest to hold off on systemic therapy. He and his family agree completely. Referral is made to Dr. Elke Arias for evaluation. XRT to the 4 x 2.1 x 4.9 cm and 3.6 x 1.9 x 4.1 cm enhancing oval lobulated solid masses in the right upper abdomen, retroperitoneal para-aortic region located between the aorta and inferior vena cava was initiated on 8/12/2021. He received 3000 cGy that was completed on 9/01/2021.     CT ABDOMEN PELVIS W IV CONTRAST 9/20/2021: Comparison: CT abdomen and pelvis with contrast 7/9/2021  Marked interval decrease in size of the retroperitoneal lymph nodes, barely visible on the current exam.  5 mm A tiny hypodensity is seen in the anterior right hepatic lobe, stable  2.5 x 0.7 cm Previously identified conglomerate of lymph nodes between the aorta and IVC, previously 4.6 x 2 cm  1.7 x 0.8 cm lymph node medial to the right kidney, previously 3.4 x 2.2 cm  Small bowel in small bowel intussusception in the left mid abdomen,which is generally benign and related to peristalsis. No surrounding inflammatory change    CT ABDOMEN PELVIS W IV CONTRAST at [address] on 11/24/2021: Comparison to 9/20/2021  A stable CT scan of the abdomen and pelvis. No change in the previous study. No evidence of abdominal or pelvic lymphadenopathy. Diverticulosis of the distal colon. No evidence for diverticulitis. Tiny hepatic nodules which are too small to be further characterized. No change    CT CHEST WITH CONTRAST at [address] on 8/18/2022:Comparison CT scan of chest dated 7/09/2021  No mediastinal, axillary or supraclavicular adenopathy identified  Negative study. No acute cardiopulmonary disease  Stable and unchanged since the previous study 7/9/21    CT ABDOMEN/PELVIS with IV contrast At [address] on 8/18/2022: Comparison 11/24/2021  No evidence for acute abdominal or pelvic process. Negative for mass lesion or pathologic lymphadenopathy. Colonic diverticulosis without diverticulitis. 2.5 cm right paraumbilical fat-containing hernia. Moderate to severe L5-S1 degenerative disc disease    Conservative monitoring and management only warranted at this time. Jayant Weiss completed XRT, and received 3000 cGy that was completed on 9/01/2021  He is asymptomatic, without B symptoms. He has never received systemic therapy for SLL. He is being monitored conservatively for symptoms or radiographic progression.       TREATMENT SUMMARY:  XRT to the 4 x 2.1 x 4.9 cm and 3.6 x 1.9 x 4.1 cm enhancing oval lobulated solid masses in the right upper abdomen, retroperitoneal para-aortic region located between the aorta and inferior vena cava was initiated on 8/12/2021. He received 3000 cGy that was completed on 9/01/2021      Allergies:  Patient has no known allergies. Medicines:  Current Outpatient Medications   Medication Sig Dispense Refill    sildenafil (REVATIO) 20 MG tablet Take 1 tablet by mouth daily as needed (erectile dysfunction) 60 tablet 0    ondansetron (ZOFRAN) 4 MG tablet TAKE ONE TABLET BY MOUTH EVERY 6 HOURS AS NEEDED FOR NAUSEA 30 tablet 0    SUMAtriptan (IMITREX) 100 MG tablet As directed 9 tablet 5    Rimegepant Sulfate (NURTEC) 75 MG TBDP 1 QOD for migraine prevention 16 tablet 5    levothyroxine (SYNTHROID) 150 MCG tablet Take 1 tablet by mouth daily 90 tablet 1    PARoxetine (PAXIL) 40 MG tablet TAKE ONE TABLET IN THE MORNING 90 tablet 1    oxybutynin (DITROPAN-XL) 10 MG extended release tablet TAKE 1 TABLET BY MOUTH EVERY DAY FOR BLADDER 90 tablet 1    medical marijuana Take by mouth as needed. nicotine (NICODERM CQ) 14 MG/24HR Place 1 patch onto the skin every 24 hours      omeprazole (PRILOSEC) 40 MG delayed release capsule Take 1 capsule by mouth every morning (before breakfast) 90 capsule 1    Testosterone POWD, Versabase GEL, Ethyl Alcohol 95 % SOLN APPLY 4 CLICKS TO CHEST OR SHOULDER ONCE DAILY. 1 each 0    atorvastatin (LIPITOR) 20 MG tablet Take 1 tablet by mouth daily 90 tablet 1    clobetasol (TEMOVATE) 0.05 % ointment Apply topically 2 times daily. (Patient not taking: No sig reported) 30 g 0     No current facility-administered medications for this visit.        Past Medical History:      Diagnosis Date    Anemia     Anxiety     CPAP (continuous positive airway pressure) dependence     Depression     Herniated lumbar intervertebral disc     Hyperglycemia 01/05/2021    Hypertriglyceridemia 10/16/2020    Kidney cysts     Liver cyst     Low testosterone     Lymphoma ([facility] [address] 06/07/2021    Dr Gregory Braga     Migraine     Neck pain Obstructive sleep apnea     OCD (obsessive compulsive disorder)     Poison ivy 5/18/2021    Somnolence, daytime 11/5/2020    Thyroid disease     Witnessed apneic spells 11/5/2020        Past Surgical History:      Procedure Laterality Date    APPENDECTOMY      COLONOSCOPY  approx 2017    Dr Salvador Kruger @ [facility] \" polyps\" per pt recall    COLONOSCOPY N/A 11/06/2020    Dr Basilia Patterson, internal hemorrhoids-Grade 1, 5 yr recall    HERNIA REPAIR      bih    THYROIDECTOMY      TOTAL    UPPER GASTROINTESTINAL ENDOSCOPY N/A 05/06/2021    Dr Elizabeth Cameron gastritis (avoid NSAIDS) (-)H. pylori, w/push enteroscopy up to the the proximal jejunum-Gastric erosions,  non-obstructing distal esoph ring, sliding hh, gastritis, duodenitis        Family History:      Problem Relation Age of Onset    Stomach Cancer Maternal Uncle     Liver Cancer Maternal Uncle     Cancer Maternal Grandfather     Stomach Cancer Maternal Aunt     Cancer Other         LEUKEMIA    Colon Cancer Neg Hx     Colon Polyps Neg Hx     Esophageal Cancer Neg Hx     Rectal Cancer Neg Hx         Social History  Social History     Tobacco Use    Smoking status: Every Day     Packs/day: 2.00     Years: 34.00     Pack years: 68.00     Types: Cigarettes     Start date: 1986    Smokeless tobacco: Never    Tobacco comments:     Nicotine Gum severals times daily & Nioctine patches    Vaping Use    Vaping Use: Never used   Substance Use Topics    Alcohol use:  Yes     Alcohol/week: 1.0 standard drink     Types: 1 Cans of beer per week     Comment: occasional     Drug use: Yes     Types: Marijuana Edgardo Shoemaker)     Comment: smokes marijuana daily, along with RSO (marijuana) oil 0.1ml under tongue             Wt Readings from Last 3 Encounters:   12/08/22 196 lb (88.9 kg)   11/06/22 190 lb (86.2 kg)   10/19/22 190 lb (86.2 kg)        Objective:  Vital Signs: Blood pressure 120/80, pulse 77, height 5' 7\" (1.702 m), weight 196 lb (88.9 kg), SpO2 99 %.    Labs:  BMP:   Recent Labs     22  1014      K 4.2      CO2 27   BUN 17   CREATININE 1.0   CALCIUM 9.3       CBC:   Recent Labs     22  1014   WBC 4.52   HGB 13.4*   HCT 38.4*   MCV 89.9        PT/INR: No results for input(s): PROTIME, INR in the last 72 hours. APTT: No results for input(s): APTT in the last 72 hours. Magnesium:No results for input(s): MG in the last 72 hours. Phosphorus:No results for input(s): PHOS in the last 72 hours. Hepatic:   Recent Labs     22  1014   ALKPHOS 72   ALT 24   AST 21   PROT 6.1*   BILITOT <0.2   LABALBU 4.0         Cultures:   No results for input(s): CULTURE in the last 72 hours. Radiology reports as per the Radiologist  Radiology: No results found. ASSESSMENT AND PLAN:  #1   Stage I low-grade B-cell lymphoma (SLL - small lymphocytic lymphoma) of retroperitoneal/para-aortic region lymph node conglomerates, 2021     Mr Emna Solo is a 50year old gentleman managed with primary and secondary diagnoses as outlined:  Stage I low-grade B-cell lymphoma (SLL) (small lymphocytic lymphoma) manifesting with 4 x 2.1 x 4.9 cm and 3.6 x 1.9 x 4.1 cm retroperitoneal para-aortic region lymph node conglomerates, 2021   Minimal cerebellar tonsillar ectopia, not meeting criteria for Chiari I malformation on MRI of the brain 2020 followed by Dr. Guzman Bowden with neurology    [facility] completed XRT, and received 3000 cGy that was completed on 2021  He is asymptomatic, without B symptoms. He has never received systemic therapy for SLL. He is being monitored conservatively for symptoms or radiographic progression. He appears healthy, with the only complaint being nauseated in the mornings reportedly secondary to a minimal cerebellar tonsillar ectopia, not meeting criteria for Chiari I malformation on MRI of the brain 2020.      His father  in 2022, he is grieving having cared for him on an intermittent basis bathing him etc. for a year prior to his passing. He comes today accompanied by his wife without new complaints. Physical examination today, 8/22/2022, does not reveal evidence of palpable pathological peripheral lymphadenopathy in the cervical, supraclavicular, infraclavicular, axillary or inguinal lymph node areas. Lungs are clear heart is regular normal S1-S2, no S3  Abdominal exam documents an enlarged liver, with fullness in the RUQ, somewhat tender. I am unable to palpate a spleen. Extremities no cyanosis clubbing or edema  Neurological exam is nonfocal intact with an intact mental status and normal cranial nerves II through XII. CBC today 8/22/2022  reveals a WBC of 5.76 Hgb is 14.6 with an MCV of  94.7 and platelet count of 085,988. CT CHEST WITH CONTRAST at [facility] on 8/18/2022:Comparison CT scan of chest dated 7/09/2021  No mediastinal, axillary or supraclavicular adenopathy identified  Negative study. No acute cardiopulmonary disease  Stable and unchanged since the previous study 7/9/21      CT ABDOMEN/PELVIS with IV contrast At [facility] on 8/18/2022: Comparison 11/24/2021  No evidence for acute abdominal or pelvic process. Negative for mass lesion or pathologic lymphadenopathy. Colonic diverticulosis without diverticulitis. 2.5 cm right paraumbilical fat-containing hernia. Moderate to severe L5-S1 degenerative disc disease    Conservative monitoring and management only warranted at this time. I will see him back in follow-up in 4 months with lab work prior to his follow-up visit. CT scans of the chest abdomen pelvis will be repeated in August 2023      #2  Macrocytic anemia    Anemia was documented at his initial visit on 6/30/2021.     Serology on 6/30/21 revealed:  CMP - unremarkable  Iron - 149  TIBC - 460  Saturation - 32%  Ferritin -128  LDH - 441  TSH - 0.267  Uric Acid - 4.5  Folate - 4.2  Retic - 1.0   Haptoglobin-180  MMA - 109    B2M-1.9  Kappa light chains- 18.9  Lambda light chains-24.4  K/L ratio-0.77  IgG-590 ,IgA-176 , IgM-54  Total Protein - 6.2  M-spike-not observed    Hepatitis Panel - negative    Serology on 11/15/2021 revealed:  CMP -ALT 19,   Iron - 77  TIBC - 346  Saturation - 22%  Ferritin -32.2  LDH - 437  TSH - 5.690    Serology on 5/23/2022 revealed:  CMP -ALT 19,   LDH - 500      CBC on 7/15/2021 revealed a WBC of 4.38. Hgb is 8.4  with an MCV of 103.7 and platelet count of 328,246. CBC 09/13/2021 revealed a WBC of 3.58 . Hgb is 9.4 with an MCV of 112.8 and platelet count of 653,991 . CBC 11/15/2021 revealed a WBC of 4.05 Hgb is 14.2 with an MCV of 95.4 and platelet count of 918,596 . CBC 2/21/2022  revealed a WBC of  5.60 Hgb is 13.5 with an MCV of 92.6 and platelet count of 198,523 . CBC 8/22/2022  revealed a WBC of 5.76 Hgb 14.6 is with an MCV of  94.7 and platelet count of 688,396. CBC today 12/8/2022 reveals a WBC of 4.52. Hgb is 13.4 with an MCV of 89.9 and platelet count of 808,976. No further intervention warranted from the anemia standpoint. Hemoglobin is recovered at 14.6. #3  Tumor screening and health maintenance    GI cancer screening -   Cscope 11/6/2020 per Dr Kate Sherman 5 yr recall  EGD -  5/6/2021 per Dr Kate Sherman      Prostate cancer screening    #4  Immunizations:  Immunization History   Administered Date(s) Administered    COVID-19, MODERNA BLUE border, Primary or Immunocompromised, (age 12y+), IM, 100 mcg/0.5mL 03/24/2021, 04/21/2021, 10/01/2021    COVID-19, PFIZER GRAY top, DO NOT Dilute, (age 15 y+), IM, 30 mcg/0.3 mL 08/11/2022    Hepatitis A/Hepatitis B (Twinrix) 04/27/2012    Influenza Virus Vaccine 10/20/2020    Influenza, FLUAD, (age 72 y+), Adjuvanted, 0.5mL 10/16/2020    Influenza, FLUCELVAX, (age 10 mo+), MDCK, PF, 0.5mL 10/16/2021    Pneumococcal Polysaccharide ([id number]) 10/23/2015    Tdap (Boostrix, Adacel) 03/15/2022             Violet Damon was seen today for follow-up.     Diagnoses and all orders for this visit:    Low grade B-cell lymphoma ([facility] [address]  -     CBC with Auto Differential  -     Comprehensive Metabolic Panel; Future  -     Lactate Dehydrogenase; Future    Health care maintenance          Orders Placed This Encounter   Procedures    Comprehensive Metabolic Panel     Standing Status:   Future     Standing Expiration Date:   12/8/2023    Lactate Dehydrogenase     Standing Status:   Future     Standing Expiration Date:   12/8/2023           No orders of the defined types were placed in this encounter. Return in 4 months (on 4/8/2023) for  Follow Up with Yaz Avendano

## 2022-11-28 ENCOUNTER — TELEPHONE (OUTPATIENT)
Dept: HEMATOLOGY | Age: 52
End: 2022-11-28

## 2022-12-06 DIAGNOSIS — N52.9 ERECTILE DYSFUNCTION, UNSPECIFIED ERECTILE DYSFUNCTION TYPE: ICD-10-CM

## 2022-12-06 DIAGNOSIS — R11.0 NAUSEA: ICD-10-CM

## 2022-12-06 DIAGNOSIS — G43.011 INTRACTABLE MIGRAINE WITHOUT AURA AND WITH STATUS MIGRAINOSUS: ICD-10-CM

## 2022-12-06 RX ORDER — SUMATRIPTAN 100 MG/1
TABLET, FILM COATED ORAL
Qty: 9 TABLET | Refills: 5 | Status: SHIPPED | OUTPATIENT
Start: 2022-12-06

## 2022-12-07 RX ORDER — ONDANSETRON 4 MG/1
TABLET, FILM COATED ORAL
Qty: 30 TABLET | Refills: 0 | Status: SHIPPED | OUTPATIENT
Start: 2022-12-07

## 2022-12-07 RX ORDER — SILDENAFIL CITRATE 20 MG/1
TABLET ORAL
Qty: 60 TABLET | Refills: 0 | OUTPATIENT
Start: 2022-12-07

## 2022-12-07 RX ORDER — SILDENAFIL CITRATE 20 MG/1
20 TABLET ORAL DAILY PRN
Qty: 60 TABLET | Refills: 0 | Status: SHIPPED | OUTPATIENT
Start: 2022-12-07

## 2022-12-08 ENCOUNTER — HOSPITAL ENCOUNTER (OUTPATIENT)
Dept: INFUSION THERAPY | Age: 52
Discharge: HOME | End: 2022-12-08
Payer: MEDICAID

## 2022-12-08 ENCOUNTER — OFFICE VISIT (OUTPATIENT)
Dept: HEMATOLOGY | Age: 52
End: 2022-12-08
Payer: MEDICAID

## 2022-12-08 VITALS
WEIGHT: 196 LBS | HEIGHT: 67 IN | BODY MASS INDEX: 30.76 KG/M2 | DIASTOLIC BLOOD PRESSURE: 80 MMHG | OXYGEN SATURATION: 99 % | SYSTOLIC BLOOD PRESSURE: 120 MMHG | HEART RATE: 77 BPM

## 2022-12-08 DIAGNOSIS — Z00.00 HEALTH CARE MAINTENANCE: ICD-10-CM

## 2022-12-08 DIAGNOSIS — C85.10 LOW GRADE B-CELL LYMPHOMA (HCC): Primary | ICD-10-CM

## 2022-12-08 DIAGNOSIS — C85.10 LOW GRADE B-CELL LYMPHOMA (HCC): ICD-10-CM

## 2022-12-08 LAB
ALBUMIN SERPL-MCNC: 4 G/DL (ref 3.5–5.2)
ALP BLD-CCNC: 72 U/L (ref 40–130)
ALT SERPL-CCNC: 24 U/L (ref 21–72)
ANION GAP SERPL CALCULATED.3IONS-SCNC: 7 MMOL/L (ref 7–19)
AST SERPL-CCNC: 21 U/L (ref 17–59)
BILIRUB SERPL-MCNC: <0.2 MG/DL (ref 0.2–1.3)
BUN BLDV-MCNC: 17 MG/DL (ref 9–20)
CALCIUM SERPL-MCNC: 9.3 MG/DL (ref 8.4–10.2)
CHLORIDE BLD-SCNC: 108 MMOL/L (ref 98–111)
CO2: 27 MMOL/L (ref 22–29)
CREAT SERPL-MCNC: 1 MG/DL (ref 0.6–1.2)
GFR SERPL CREATININE-BSD FRML MDRD: >60 ML/MIN/{1.73_M2}
GLUCOSE BLD-MCNC: 112 MG/DL (ref 74–106)
HCT VFR BLD CALC: 38.4 % (ref 40.1–51)
HEMOGLOBIN: 13.4 G/DL (ref 13.7–17.5)
LACTATE DEHYDROGENASE: 180 U/L (ref 120–246)
LYMPHOCYTES ABSOLUTE: 0.66 K/UL (ref 1.18–3.74)
LYMPHOCYTES RELATIVE PERCENT: 14.6 % (ref 19.3–53.1)
MCH RBC QN AUTO: 31.4 PG (ref 25.7–32.2)
MCHC RBC AUTO-ENTMCNC: 34.9 G/DL (ref 32.3–36.5)
MCV RBC AUTO: 89.9 FL (ref 79–92.2)
MONOCYTES ABSOLUTE: 0.36 K/UL (ref 0.24–0.82)
MONOCYTES RELATIVE PERCENT: 8 % (ref 4.7–12.5)
NEUTROPHILS ABSOLUTE: 3.26 K/UL (ref 1.56–6.13)
NEUTROPHILS RELATIVE PERCENT: 72.1 % (ref 34–71.1)
PDW BLD-RTO: 13 % (ref 11.6–14.4)
PLATELET # BLD: 205 K/UL (ref 163–337)
PMV BLD AUTO: 8.8 FL (ref 7.4–10.4)
POTASSIUM SERPL-SCNC: 4.2 MMOL/L (ref 3.5–5.1)
RBC # BLD: 4.27 M/UL (ref 4.63–6.08)
SODIUM BLD-SCNC: 142 MMOL/L (ref 137–145)
TOTAL PROTEIN: 6.1 G/DL (ref 6.3–8.2)
WBC # BLD: 4.52 K/UL (ref 4.23–9.07)

## 2022-12-08 PROCEDURE — G8427 DOCREV CUR MEDS BY ELIG CLIN: HCPCS | Performed by: INTERNAL MEDICINE

## 2022-12-08 PROCEDURE — G8484 FLU IMMUNIZE NO ADMIN: HCPCS | Performed by: INTERNAL MEDICINE

## 2022-12-08 PROCEDURE — G8417 CALC BMI ABV UP PARAM F/U: HCPCS | Performed by: INTERNAL MEDICINE

## 2022-12-08 PROCEDURE — 99212 OFFICE O/P EST SF 10 MIN: CPT

## 2022-12-08 PROCEDURE — 99213 OFFICE O/P EST LOW 20 MIN: CPT | Performed by: INTERNAL MEDICINE

## 2022-12-08 PROCEDURE — 83615 LACTATE (LD) (LDH) ENZYME: CPT

## 2022-12-08 PROCEDURE — 85025 COMPLETE CBC W/AUTO DIFF WBC: CPT

## 2022-12-08 PROCEDURE — 36415 COLL VENOUS BLD VENIPUNCTURE: CPT

## 2022-12-08 PROCEDURE — 4004F PT TOBACCO SCREEN RCVD TLK: CPT | Performed by: INTERNAL MEDICINE

## 2022-12-08 PROCEDURE — 36415 COLL VENOUS BLD VENIPUNCTURE: CPT | Performed by: INTERNAL MEDICINE

## 2022-12-08 PROCEDURE — 3017F COLORECTAL CA SCREEN DOC REV: CPT | Performed by: INTERNAL MEDICINE

## 2022-12-08 PROCEDURE — 80053 COMPREHEN METABOLIC PANEL: CPT

## 2023-01-10 ENCOUNTER — TELEPHONE (OUTPATIENT)
Dept: NEUROLOGY | Age: 53
End: 2023-01-10

## 2023-01-10 NOTE — TELEPHONE ENCOUNTER
David Vogel (Key: [id number])  Nurtec 75MG dispersible tablets     Form  [facility] ePA Form 2017 NCPDP  Created  4 days ago  Sent to Plan  6 minutes ago  Plan Response  6 minutes ago  Submit Clinical Questions  2 minutes ago  Determination  Favorable  1 minute ago  Message from Plan  The request has been approved. The authorization is effective for a maximum of 12 fills from 01/10/2023 to 01/09/2024, as long as the member is enrolled in their current health plan. The request was approved as submitted. A written notification letter will follow with additional details.

## 2023-01-14 ENCOUNTER — HOSPITAL ENCOUNTER (EMERGENCY)
Age: 53
Discharge: HOME | End: 2023-01-14
Payer: MEDICAID

## 2023-01-14 VITALS
DIASTOLIC BLOOD PRESSURE: 86 MMHG | HEART RATE: 67 BPM | OXYGEN SATURATION: 91 % | TEMPERATURE: 98 F | BODY MASS INDEX: 30.61 KG/M2 | SYSTOLIC BLOOD PRESSURE: 134 MMHG | RESPIRATION RATE: 20 BRPM | WEIGHT: 195 LBS | HEIGHT: 67 IN

## 2023-01-14 DIAGNOSIS — J02.9 ACUTE PHARYNGITIS, UNSPECIFIED ETIOLOGY: Primary | ICD-10-CM

## 2023-01-14 PROCEDURE — 6370000000 HC RX 637 (ALT 250 FOR IP): Performed by: NURSE PRACTITIONER

## 2023-01-14 PROCEDURE — 99283 EMERGENCY DEPT VISIT LOW MDM: CPT

## 2023-01-14 RX ORDER — AMOXICILLIN AND CLAVULANATE POTASSIUM 875; 125 MG/1; MG/1
1 TABLET, FILM COATED ORAL ONCE
Status: COMPLETED | OUTPATIENT
Start: 2023-01-14 | End: 2023-01-14

## 2023-01-14 RX ORDER — AMOXICILLIN AND CLAVULANATE POTASSIUM 875; 125 MG/1; MG/1
1 TABLET, FILM COATED ORAL 2 TIMES DAILY
Qty: 20 TABLET | Refills: 0 | Status: SHIPPED | OUTPATIENT
Start: 2023-01-14 | End: 2023-01-24

## 2023-01-14 RX ADMIN — AMOXICILLIN AND CLAVULANATE POTASSIUM 1 TABLET: 875; 125 TABLET, FILM COATED ORAL at 22:11

## 2023-01-14 ASSESSMENT — ENCOUNTER SYMPTOMS
COUGH: 0
TROUBLE SWALLOWING: 0
SORE THROAT: 1

## 2023-01-14 ASSESSMENT — PAIN - FUNCTIONAL ASSESSMENT
PAIN_FUNCTIONAL_ASSESSMENT: 0-10
PAIN_FUNCTIONAL_ASSESSMENT: 0-10

## 2023-01-14 ASSESSMENT — PAIN DESCRIPTION - FREQUENCY: FREQUENCY: INTERMITTENT

## 2023-01-14 ASSESSMENT — PAIN SCALES - GENERAL
PAINLEVEL_OUTOF10: 2
PAINLEVEL_OUTOF10: 1

## 2023-01-14 ASSESSMENT — PAIN DESCRIPTION - DESCRIPTORS
DESCRIPTORS: ACHING
DESCRIPTORS: ACHING;DULL

## 2023-01-14 ASSESSMENT — PAIN DESCRIPTION - LOCATION
LOCATION: THROAT
LOCATION: THROAT

## 2023-01-14 ASSESSMENT — PAIN DESCRIPTION - ONSET: ONSET: GRADUAL

## 2023-01-14 ASSESSMENT — PAIN DESCRIPTION - ORIENTATION: ORIENTATION: MID

## 2023-01-14 ASSESSMENT — PAIN DESCRIPTION - PAIN TYPE: TYPE: ACUTE PAIN

## 2023-01-15 NOTE — ED PROVIDER NOTES
[facility]Y DEPT  eMERGENCY dEPARTMENT eNCOUnter      Pt Name: Xiomara Tejada  MRN: [id number]  [place] 1970  Date of evaluation: 1/14/2023  Provider: AR Mooney CNP    CHIEF COMPLAINT       Chief Complaint   Patient presents with    Pharyngitis     Patient has had a sore throat for 3-4 days. Patient has lymphoma which is in remission           HISTORY OF PRESENT ILLNESS   (Location/Symptom, Timing/Onset,Context/Setting, Quality, Duration, Modifying Factors, Severity)  Note limiting factors. Neeru Cardona a 50 y.o. male who presents to the emergency department for evaluation of sore throat. Pt tells me that he has had sore throat over past 3-4 days worsening tonight. He denies fevers, difficulty swallowing as well as cough. Pt has history of b-cell lymphoma in remission followed by Dr. Sammy Cyr. HPI    Nursing Notes were reviewed. REVIEW OF SYSTEMS    (2-9 systems for level 4, 10 or more for level 5)     Review of Systems   Constitutional:  Negative for fever. HENT:  Positive for sore throat. Negative for trouble swallowing. Respiratory:  Negative for cough. A complete review of systems was performed and is negative except as noted above in the HPI.        PAST MEDICAL HISTORY     Past Medical History:   Diagnosis Date    Anemia     Anxiety     CPAP (continuous positive airway pressure) dependence     Depression     Herniated lumbar intervertebral disc     Hyperglycemia 01/05/2021    Hypertriglyceridemia 10/16/2020    Kidney cysts     Liver cyst     Low testosterone     Lymphoma ([facility] [address] 06/07/2021    Dr Anna Turner     Migraine     Neck pain     Obstructive sleep apnea     OCD (obsessive compulsive disorder)     Poison ivy 5/18/2021    Somnolence, daytime 11/5/2020    Thyroid disease     Witnessed apneic spells 11/5/2020         SURGICAL HISTORY       Past Surgical History:   Procedure Laterality Date    APPENDECTOMY      [facility]  approx 2017    [facility] \" [facility] per pt recall    COLONOSCOPY N/A 11/06/2020    Dr Cordell Maloney, internal hemorrhoids-Grade 1, 5 yr recall    HERNIA REPAIR      bih    THYROIDECTOMY      TOTAL    UPPER GASTROINTESTINAL ENDOSCOPY N/A 05/06/2021    Dr Duran Ramsey gastritis (avoid NSAIDS) (-)H. pylori, w/push enteroscopy up to the the proximal jejunum-Gastric erosions,  non-obstructing distal esoph ring, sliding hh, gastritis, duodenitis         CURRENT MEDICATIONS       Previous Medications    ATORVASTATIN (LIPITOR) 20 MG TABLET    Take 1 tablet by mouth daily    CLOBETASOL (TEMOVATE) 0.05 % OINTMENT    Apply topically 2 times daily. LEVOTHYROXINE (SYNTHROID) 150 MCG TABLET    Take 1 tablet by mouth daily    MEDICAL MARIJUANA    Take by mouth as needed. NICOTINE (NICODERM CQ) 14 MG/24HR    Place 1 patch onto the skin every 24 hours    OMEPRAZOLE (PRILOSEC) 40 MG DELAYED RELEASE CAPSULE    Take 1 capsule by mouth every morning (before breakfast)    ONDANSETRON (ZOFRAN) 4 MG TABLET    TAKE ONE TABLET BY MOUTH EVERY 6 HOURS AS NEEDED FOR NAUSEA    OXYBUTYNIN (DITROPAN-XL) 10 MG EXTENDED RELEASE TABLET    TAKE 1 TABLET BY MOUTH EVERY DAY FOR BLADDER    PAROXETINE (PAXIL) 40 MG TABLET    TAKE ONE TABLET IN THE MORNING    RIMEGEPANT SULFATE (NURTEC) 75 MG TBDP    1 QOD for migraine prevention    SILDENAFIL (REVATIO) 20 MG TABLET    Take 1 tablet by mouth daily as needed (erectile dysfunction)    SUMATRIPTAN (IMITREX) 100 MG TABLET    As directed    TESTOSTERONE POWD, VERSABASE GEL, ETHYL ALCOHOL 95 % SOLN    APPLY 4 CLICKS TO CHEST OR SHOULDER ONCE DAILY. ALLERGIES     Patient has no known allergies.     FAMILY HISTORY       Family History   Problem Relation Age of Onset    Stomach Cancer Maternal Uncle     Liver Cancer Maternal Uncle     Cancer Maternal Grandfather     Stomach Cancer Maternal Aunt     Cancer Other         LEUKEMIA    Colon Cancer Neg Hx     Colon Polyps Neg Hx Esophageal Cancer Neg Hx     Rectal Cancer Neg Hx           SOCIAL HISTORY       Social History     Socioeconomic History    Marital status:      Spouse name: None    Number of children: None    Years of education: None    Highest education level: None   Tobacco Use    Smoking status: Every Day     Packs/day: 2.00     Years: 34.00     Pack years: 68.00     Types: Cigarettes     Start date: 1986    Smokeless tobacco: Never    Tobacco comments:     Nicotine Gum severals times daily & Nioctine patches    Vaping Use    Vaping Use: Never used   Substance and Sexual Activity    Alcohol use: Yes     Alcohol/week: 1.0 standard drink     Types: 1 Cans of beer per week     Comment: occasional     Drug use: Yes     Types: Marijuana [place])     Comment: smokes marijuana daily, along with RSO (marijuana) oil 0.1ml under tongue        Social Determinants of Health     Financial Resource Strain: High Risk    Difficulty of Paying Living Expenses: Very hard   Food Insecurity: Food Insecurity Present    [facility]: Sometimes true    Ran Out of Food in the Last Year: Never true   Transportation Needs: No Transportation Needs    Lack of Transportation (Medical): No    Lack of Transportation (Non-Medical): No       SCREENINGS    Tea Coma Scale  Eye Opening: Spontaneous  Best Verbal Response: Oriented  Best Motor Response: Obeys commands  Tea Coma Scale Score: 15        PHYSICAL EXAM    (up to 7 for level 4, 8 or more for level 5)     ED Triage Vitals [01/14/23 2124]   BP Temp Temp Source Heart Rate Resp SpO2 Height Weight   133/77 98.2 °F (36.8 °C) Oral 70 20 97 % 5' 7\" (1.702 m) 195 lb (88.5 kg)       Physical Exam  Vitals reviewed. Constitutional:       Appearance: He is well-developed.       Comments: 50 yr male with clear speech, swallows oral secretions, no trismus present    HENT:      Mouth/Throat:      Mouth: Mucous membranes are moist.      Tonsils: No tonsillar exudate or tonsillar abscesses. 0 on the right. 0 on the left. Cardiovascular:      Rate and Rhythm: Normal rate. Pulmonary:      Effort: Pulmonary effort is normal.      Breath sounds: Normal breath sounds. Lymphadenopathy:      Cervical: No cervical adenopathy. Skin:     General: Skin is warm and dry. Neurological:      Mental Status: He is alert and oriented to person, place, and time. DIAGNOSTIC RESULTS     EKG: All EKG's are interpreted by the Emergency Department Physician who either signs or Co-signs this chart in the absence of acardiologist.        RADIOLOGY:   Non-plain film images such as CT, Ultrasound andMRI are read by the radiologist. Plain radiographic images are visualized and preliminarily interpreted by the emergency physician with the below findings:        Interpretation per the Radiologist below, if available at the time of this note:    No orders to display         ED BEDSIDE ULTRASOUND:   Performed by ED Physician - none    LABS:  Labs Reviewed - No data to display    All other labs were within normal range or not returned as of this dictation. RE-ASSESSMENT     Discussed testing versus treatment-patient request prescription for antibiotic only. This seems reasonable as he does not have other viral symptoms. Return precautions discussed. EMERGENCY DEPARTMENT COURSE and DIFFERENTIALDIAGNOSIS/MDM:   Vitals:    Vitals:    01/14/23 2124 01/14/23 2140 01/14/23 2141 01/14/23 2202   BP: 133/77 131/83 131/83 134/86   Pulse: 70 67     Resp: 20 20     Temp: 98.2 °F (36.8 °C) 98 °F (36.7 °C)     TempSrc: Oral Oral     SpO2: 97% 97% 93% 91%   Weight: 195 lb (88.5 kg) 195 lb (88.5 kg)     Height: 5' 7\" (1.702 m) 5' 7\" (1.702 m)         MDM        CONSULTS:  None    PROCEDURES:  Unless otherwise notedbelow, none     Procedures    FINAL IMPRESSION     1.  Acute pharyngitis, unspecified etiology          DISPOSITION/PLAN   DISPOSITION Discharge - Pending Orders Complete 01/14/2023 09:49:13 PM      PATIENT REFERRED TO:  Rosy Dunne MD  [address] [facility] [address] [address] [place]    Schedule an appointment as soon as possible for a visit in 3 days  fail to improve    DISCHARGE MEDICATIONS:       Current Discharge Medication List           Medication List        START taking these medications      amoxicillin-clavulanate 875-125 MG per tablet  Commonly known as: Augmentin  Take 1 tablet by mouth 2 times daily for 10 days            CONTINUE taking these medications      medical marijuana            ASK your doctor about these medications      atorvastatin 20 MG tablet  Commonly known as: LIPITOR  Take 1 tablet by mouth daily     clobetasol 0.05 % ointment  Commonly known as: Temovate  Apply topically 2 times daily. levothyroxine 150 MCG tablet  Commonly known as: SYNTHROID  Take 1 tablet by mouth daily     nicotine 14 MG/24HR  Commonly known as: NICODERM CQ     Nurtec 75 MG Tbdp  Generic drug: Rimegepant Sulfate  1 QOD for migraine prevention     omeprazole 40 MG delayed release capsule  Commonly known as: PRILOSEC  Take 1 capsule by mouth every morning (before breakfast)     ondansetron 4 MG tablet  Commonly known as: ZOFRAN  TAKE ONE TABLET BY MOUTH EVERY 6 HOURS AS NEEDED FOR NAUSEA     oxybutynin 10 MG extended release tablet  Commonly known as: DITROPAN-XL  TAKE 1 TABLET BY MOUTH EVERY DAY FOR BLADDER     PARoxetine 40 MG tablet  Commonly known as: PAXIL  TAKE ONE TABLET IN THE MORNING     sildenafil 20 MG tablet  Commonly known as: REVATIO  Take 1 tablet by mouth daily as needed (erectile dysfunction)     SUMAtriptan 100 MG tablet  Commonly known as: IMITREX  As directed     Testosterone POWD, Versabase GEL, Ethyl Alcohol 95 % SOLN  APPLY 4 CLICKS TO CHEST OR SHOULDER ONCE DAILY.                Where to Get Your Medications        These medications were sent to [address] [place] [address]  [address] [place] [address]      Hours: 24-hours Phone: [phone]   amoxicillin-clavulanate 875-125 MG per tablet           (Pleasenote that portions of this note were completed with a voice recognition program.  Efforts were made to edit the dictations but occasionally words are mis-transcribed.)               AR Weir - NORBERT  01/14/23 0793

## 2023-01-15 NOTE — ED NOTES
Patient states his throat is sore. States he is in remission from [facility].  Concerned he might have infection     Brigitte Atwood RN  01/14/23 2136

## 2023-01-18 ENCOUNTER — OFFICE VISIT (OUTPATIENT)
Dept: NEUROLOGY | Age: 53
End: 2023-01-18
Payer: MEDICAID

## 2023-01-18 VITALS
WEIGHT: 195 LBS | BODY MASS INDEX: 30.61 KG/M2 | DIASTOLIC BLOOD PRESSURE: 74 MMHG | HEIGHT: 67 IN | SYSTOLIC BLOOD PRESSURE: 127 MMHG | HEART RATE: 70 BPM

## 2023-01-18 DIAGNOSIS — Z99.89 CPAP (CONTINUOUS POSITIVE AIRWAY PRESSURE) DEPENDENCE: ICD-10-CM

## 2023-01-18 DIAGNOSIS — M54.2 CHRONIC NECK PAIN: ICD-10-CM

## 2023-01-18 DIAGNOSIS — R11.0 NAUSEA: ICD-10-CM

## 2023-01-18 DIAGNOSIS — G89.29 CHRONIC NECK PAIN: ICD-10-CM

## 2023-01-18 DIAGNOSIS — Q04.8 CEREBELLAR TONSILLAR ECTOPIA (HCC): ICD-10-CM

## 2023-01-18 DIAGNOSIS — G47.33 OBSTRUCTIVE SLEEP APNEA: Primary | ICD-10-CM

## 2023-01-18 DIAGNOSIS — G43.011 INTRACTABLE MIGRAINE WITHOUT AURA AND WITH STATUS MIGRAINOSUS: ICD-10-CM

## 2023-01-18 PROCEDURE — 3017F COLORECTAL CA SCREEN DOC REV: CPT | Performed by: PHYSICIAN ASSISTANT

## 2023-01-18 PROCEDURE — G8484 FLU IMMUNIZE NO ADMIN: HCPCS | Performed by: PHYSICIAN ASSISTANT

## 2023-01-18 PROCEDURE — G8428 CUR MEDS NOT DOCUMENT: HCPCS | Performed by: PHYSICIAN ASSISTANT

## 2023-01-18 PROCEDURE — 4004F PT TOBACCO SCREEN RCVD TLK: CPT | Performed by: PHYSICIAN ASSISTANT

## 2023-01-18 PROCEDURE — 99214 OFFICE O/P EST MOD 30 MIN: CPT | Performed by: PHYSICIAN ASSISTANT

## 2023-01-18 PROCEDURE — G8417 CALC BMI ABV UP PARAM F/U: HCPCS | Performed by: PHYSICIAN ASSISTANT

## 2023-01-18 RX ORDER — ONDANSETRON 4 MG/1
TABLET, FILM COATED ORAL
Qty: 30 TABLET | Refills: 1 | Status: SHIPPED | OUTPATIENT
Start: 2023-01-18

## 2023-01-18 RX ORDER — RIMEGEPANT SULFATE 75 MG/75MG
TABLET, ORALLY DISINTEGRATING ORAL
Qty: 16 TABLET | Refills: 5 | Status: SHIPPED | OUTPATIENT
Start: 2023-01-18

## 2023-01-18 NOTE — PROGRESS NOTES
[facility] Neurology and Sleep Medicine  [address] [place] [address] [address], [address]  Mavis Otto  Phone [phone]  Fax [phone]       [facility] Sleep Follow Up Encounter      Information:   Patient Name: Braulio Davies  :   1970  Age:   50 y.o. MRN:   [id number]  Account #:  [de-identified]  Today:                23    Provider:  Reji Givens PA-C    Chief Complaint   Patient presents with    Follow-up    Sleep Apnea        Subjective:   Braulio Davies is a 50 y.o. male  with a history of  severe JANET, migraines, mild cerebellar tonsillar ectopia/incidental finding per  MRI brain, and neck pain who comes in for a sleep clinic follow up. The HST, 2020 revealed an AHI of 38.2. The HST, 12/3/2020 revealed an AHI of 21.6. He is prescribed auto CPAP therapy with a pressure range of 8cm to 20cm. The compliance report indicates that he is averaging 9 hours of CPAP use per day. He reports that consistent PAP use has alleviated the previous JANET symptoms. The migraines improved with Nurtec x 2 weeks. He only received #8 and then the insurance denied it. It was prescribed for migraine prophylaxis. It was very effective. He stopped taking daily analgesics. It is the only thing that has ever worked that well. He has restarted daily analgesics. It hurts on the coronal aspect of his head. The MRI brain, 2020 showed minimal chronic paranasal sinus disease. It showed minimal cerebellar tonsillar ectopia. Amitriptyline is contraindicated as he takes Paxil. Beta blockers are contraindicated due to side effects of fatigue and insomnia, as he has a sleep disorder. It is also contraindicated with history of depression. The neck pain is stable. He sometimes has occipital pain, especially if he extends his neck. He was diagnosed with lymphoma in . It is remission.        Objective:     Past Medical History:   Diagnosis Date    Anemia     Anxiety     CPAP (continuous positive airway pressure) dependence     Depression     Herniated lumbar intervertebral disc     Hyperglycemia 01/05/2021    Hypertriglyceridemia 10/16/2020    Kidney cysts     Liver cyst     Low testosterone     Lymphoma ([facility] [address] 06/07/2021    Dr Ascencion Austin     Migraine     Neck pain     Obstructive sleep apnea     OCD (obsessive compulsive disorder)     Poison ivy 5/18/2021    Somnolence, daytime 11/5/2020    Thyroid disease     Witnessed apneic spells 11/5/2020       Past Surgical History:   Procedure Laterality Date    APPENDECTOMY      COLONOSCOPY  approx 2017    Dr Fredy Celis @ [facility] hospt. \" polyps\" per pt recall    COLONOSCOPY N/A 11/06/2020    Dr Trena Gann, internal hemorrhoids-Grade 1, 5 yr recall    HERNIA REPAIR      bih    THYROIDECTOMY      TOTAL    UPPER GASTROINTESTINAL ENDOSCOPY N/A 05/06/2021    Dr Dayo Jara gastritis (avoid NSAIDS) (-)H.  pylori, w/push enteroscopy up to the the proximal jejunum-Gastric erosions,  non-obstructing distal esoph ring, sliding hh, gastritis, duodenitis       Recent Hospitalizations      Significant Injuries      Family History   Problem Relation Age of Onset    Stomach Cancer Maternal Uncle     Liver Cancer Maternal Uncle     Cancer Maternal Grandfather     Stomach Cancer Maternal Aunt     Cancer Other         LEUKEMIA    Colon Cancer Neg Hx     Colon Polyps Neg Hx     Esophageal Cancer Neg Hx     Rectal Cancer Neg Hx        Social History  Social History     Tobacco Use   Smoking Status Every Day    Packs/day: 2.00    Years: 34.00    Pack years: 68.00    Types: Cigarettes    Start date: 1986   Smokeless Tobacco Never   Tobacco Comments    Nicotine Gum severals times daily & Nioctine patches      Social History     Substance and Sexual Activity   Alcohol Use Yes    Alcohol/week: 1.0 standard drink    Types: 1 Cans of beer per week    Comment: occasional      Social History     Substance and Sexual Activity   Drug Use Yes    Types: Marijuana ([place]) Comment: smokes marijuana daily, along with RSO (marijuana) oil 0.1ml under tongue            Current Outpatient Medications   Medication Sig Dispense Refill    Rimegepant Sulfate (NURTEC) 75 MG TBDP As directed 16 tablet 5    ondansetron (ZOFRAN) 4 MG tablet TAKE ONE TABLET BY MOUTH EVERY 6 HOURS AS NEEDED FOR NAUSEA 30 tablet 1    amoxicillin-clavulanate (AUGMENTIN) 875-125 MG per tablet Take 1 tablet by mouth 2 times daily for 10 days 20 tablet 0    sildenafil (REVATIO) 20 MG tablet Take 1 tablet by mouth daily as needed (erectile dysfunction) 60 tablet 0    SUMAtriptan (IMITREX) 100 MG tablet As directed 9 tablet 5    Testosterone POWD, Versabase GEL, Ethyl Alcohol 95 % SOLN APPLY 4 CLICKS TO CHEST OR SHOULDER ONCE DAILY. 1 each 0    Rimegepant Sulfate (NURTEC) 75 MG TBDP 1 QOD for migraine prevention 16 tablet 5    levothyroxine (SYNTHROID) 150 MCG tablet Take 1 tablet by mouth daily 90 tablet 1    atorvastatin (LIPITOR) 20 MG tablet Take 1 tablet by mouth daily 90 tablet 1    PARoxetine (PAXIL) 40 MG tablet TAKE ONE TABLET IN THE MORNING 90 tablet 1    medical marijuana Take by mouth as needed. nicotine (NICODERM CQ) 14 MG/24HR Place 1 patch onto the skin every 24 hours      omeprazole (PRILOSEC) 40 MG delayed release capsule Take 1 capsule by mouth every morning (before breakfast) 90 capsule 1     No current facility-administered medications for this visit. Allergies:  Patient has no known allergies.     REVIEW OF SYSTEMS     Constitutional: []Fever []Sweats []Chills [] Recent Injury   [x] Denies all unless marked  HENT:[x]Headache  [] Head Injury  [] Sore Throat  [] Ear Pain  [] Dizziness [] Hearing Loss   [x] Denies all unless marked  Musculoskeletal: [] Arthralgia  [] Myalgias [] Muscle cramps  [] Muscle twitches   [x] Denies all unless marked   Spine:  [x] Neck pain  [] Back pain  [] Sciatica  [x] Denies all unless marked  Neurological:[] Visual Disturbance [] Double Vision [] Slurred Speech [] Trouble swallowing  [] Vertigo [] Tingling [] Numbness [] Weakness [] Loss of Balance   [] Loss of Consciousness [] Memory Loss [] Seizures  [x] Denies all unless marked  Psychiatric/Behavioral:[] Depression [] Anxiety  [x] Denies all unless marked  Sleep: []  Insomnia [] Sleep Disturbance [] Snoring [] Restless Legs [] Daytime Sleepiness [x] Sleep Apnea  [] Denies all unless marked    The MA has completed the ROS with the patient. I have reviewed it in its' entirety with the patient and agree with the documentation. PHYSICAL EXAM  /74   Pulse 70   Ht 5' 7\" (1.702 m)   Wt 195 lb (88.5 kg)   BMI 30.54 kg/m²     Constitutional -  Alert in NAD, well developed, pleasant and cooperative with exam  HEENT- Conjunctiva normal.  No scars, masses, or lesions over external nose or ears, hearing intact, no neck masses noted, no jugular vein distension, no bruit  Cardiac- Regular rate and rhythm  Pulmonary- Clear to auscultation, good expansion, normal effort without use of accessory muscles  Musculoskeletal - No significant wasting of muscles noted. No bony deformities  Extremities - No clubbing, cyanosis or edema  Skin - Warm, dry, and intact.   No rash, erythema, or pallor  Psychiatric - Mood, affect, and behavior appear normal      Neurological exam  Awake, alert, fluent oriented  appropriate affect  Attention and concentration appear appropriate  Recent and remote memory appears unremarkable  Speech normal without dysarthria  No clear issues with language of fund of knowledge    Cranial Nerve Exam     CN III, IV,VI-EOMI, No nystagmus, conjugate eye movements, no ptosis  CN VII-No facial assymetry    Motor Exam    Antigravity throughout upper and lower extremities bilaterally    Tremors and coordination    No tremors in hands or head noted     Gait    Normal base and speed  No ataxia    I reviewed the following studies:       []  :  Clinical laboratory test results     []  :  Radiology reports [x]  :  Review and summarization of medical records-compliance report      []     Request for medical records       []  :  Reviewed previous/recent polysomnogram report(s)      []  :  Butler Sleepiness Scale       [x]  :  Compliance report :  The auto CPAP is set at a pressure of 8cm to 20cm. Compliance download shows that he uses device: 100% of the time;  percentage of days with usage >=4 hours: 100%. AHI: 3.6 (large leaks)-discussed    Assessment:       ICD-10-CM    1. Obstructive sleep apnea  G47.33       2. Intractable migraine without aura and with status migrainosus  G43.011       3. Chronic neck pain  M54.2     G89.29       4. Cerebellar tonsillar ectopia (HCC)  Q04.8       5. CPAP (continuous positive airway pressure) dependence  Z99.89       6. Nausea  R11.0 ondansetron (ZOFRAN) 4 MG tablet             []  :  Stable     []  :  Improved                       [x]  :  Well controlled-JANET             []  :  Resolving     []  :  Resolved     [x]  :  Inadequately controlled-migraines/neck pain     []  :  Worsening     []  :  Additional workup planned    Caio Vogel is compliant and benefiting from therapy as indicated by compliance evaluation and patient report. Plan:     Orders Placed This Encounter   Medications    Rimegepant Sulfate (NURTEC) 75 MG TBDP     Sig: As directed     Dispense:  16 tablet     Refill:  5     Take Nurtec ODT 75 mg QOD for migraine prevention. ondansetron (ZOFRAN) 4 MG tablet     Sig: TAKE ONE TABLET BY MOUTH EVERY 6 HOURS AS NEEDED FOR NAUSEA     Dispense:  30 tablet     Refill:  1       1. Previously advised of the etiology,  pathophysiology, diagnosis, treatment options, and risks of untreated JANET. Risks may include, but are not limited to  hypertension, coronary artery disease, atrial fibrillation, CHF, diabetes, stroke, weight gain, impaired cognition, daytime somnolence, and motor vehicle accidents.  Advised to abstain from driving or operating heavy machinery when drowsy and the use of respiratory suppressants. Discussed diagnostic studies and potential treatment plan. Counseled on multimodal approach to treatment of sleep apnea to include but not limited to diet, exercise, sleep hygiene, and compliance with pap therapy. 2.  Will evaluate for PAP clinical benefit and compliance during a 30 day period within the preceding 90 days PRN. 3.  The following educational material has been included in this visit after visit summary for your review: JANET/PAP guidelines-Discussed with the patient and all questions fully answered. 4.  Continue PAP therapy. Replace the supplies. Leaks. The patient voices understanding and recognizes the need for adherence to the prescribed therapy  5. Refill Nurtec (see notes above)  6. Refill Zofran   7.   Follow up in 6 months

## 2023-01-18 NOTE — PATIENT INSTRUCTIONS

## 2023-01-23 DIAGNOSIS — E34.9 HYPOTESTOSTERONISM: ICD-10-CM

## 2023-01-23 DIAGNOSIS — N52.9 ERECTILE DYSFUNCTION, UNSPECIFIED ERECTILE DYSFUNCTION TYPE: ICD-10-CM

## 2023-01-23 RX ORDER — TESTOSTERONE MICRONIZED 100 %
POWDER (GRAM) MISCELLANEOUS
Qty: 1 EACH | Refills: 4 | Status: CANCELLED | OUTPATIENT
Start: 2023-01-23 | End: 2023-02-23

## 2023-01-23 RX ORDER — SILDENAFIL CITRATE 20 MG/1
20 TABLET ORAL DAILY PRN
Qty: 60 TABLET | Refills: 0 | Status: SHIPPED | OUTPATIENT
Start: 2023-01-23

## 2023-01-23 RX ORDER — TESTOSTERONE MICRONIZED 100 %
POWDER (GRAM) MISCELLANEOUS
Qty: 1 EACH | Refills: 4 | Status: SHIPPED | OUTPATIENT
Start: 2023-01-23 | End: 2023-02-23

## 2023-01-23 NOTE — TELEPHONE ENCOUNTER
Smoaks Michelle Vogel called to request a refill on his medication. Last office visit : 8/29/2022   Next office visit : 2/13/2023     Last UDS:   Opiate Scrn, Ur   Date Value Ref Range Status   03/26/2015 Negative Lpfgrc=163 ng/mL Final     Comment:     Opiate test includes Codeine, Morphine, Hydromorphone, Hydrocodone. Last Rick Chao: 08/26/2022  Medication Contract: none   Last Fill: 10/21/2022    Note added to upcoming appt to collect UDS and sign contract. Requested Prescriptions     Pending Prescriptions Disp Refills    Testosterone POWD, Versabase GEL, Ethyl Alcohol 95 % SOLN [Pharmacy Med Name: TESTOSTERONE 5% GEL] 1 each 4     Sig: APPLY 4 CLICKS TO CHEST OR SHOULDER ONCE DAILY. Please approve or refuse this medication.    Cally Martin LPN

## 2023-01-23 NOTE — TELEPHONE ENCOUNTER
Juanito Vogel called to request a refill on his medication.       Last office visit : 8/29/2022   Next office visit : 2/13/2023     Requested Prescriptions     Pending Prescriptions Disp Refills    sildenafil (REVATIO) 20 MG tablet 60 tablet 0     Sig: Take 1 tablet by mouth daily as needed (erectile dysfunction)            Sridhar Brown MA

## 2023-02-20 ASSESSMENT — ENCOUNTER SYMPTOMS
COLOR CHANGE: 0
DIARRHEA: 0
SHORTNESS OF BREATH: 0
STRIDOR: 0
SORE THROAT: 0
WHEEZING: 0
ABDOMINAL PAIN: 0
BACK PAIN: 1
BLOOD IN STOOL: 0
TROUBLE SWALLOWING: 0
CONSTIPATION: 0
NAUSEA: 1
COUGH: 0

## 2023-02-20 NOTE — PROGRESS NOTES
Alfred Vogel ( 1970) is a 50 y.o. male,  Established, here for evaluation of the following chief complaint(s). 6 Month Follow-Up        ASSESSMENT/PLAN:  Problem List          Respiratory    Obstructive sleep apnea      Well-controlled, continue current medications continues to benefit from the use of CPAP mask and equipment            Digestive    Gastroesophageal reflux disease without esophagitis      Well-controlled, continue current medications         Relevant Medications    ondansetron (ZOFRAN) 4 MG tablet    omeprazole (PRILOSEC) 40 MG delayed release capsule       Endocrine    Acquired hypothyroidism      Well-controlled, continue current medications chemically and clinically euthyroid         Relevant Medications    levothyroxine (SYNTHROID) 150 MCG tablet       Other    Tobacco use disorder      Uncontrolled, changes made today: Patient will try Chantix again if he gets nauseous he will use it so for more than         Erectile dysfunction    Relevant Medications    sildenafil (REVATIO) 20 MG tablet    Anxiety    Relevant Medications    PARoxetine (PAXIL) 40 MG tablet     Results for orders placed or performed in visit on 23   Lipid Panel   Result Value Ref Range    Cholesterol, Total 167 160 - 199 mg/dL    Triglycerides 82 0 - 149 mg/dL    HDL 37 (L) 55 - 121 mg/dL    LDL Calculated 114 <100 mg/dL   Hemoglobin A1C   Result Value Ref Range    Hemoglobin A1C 5.5 4.0 - 6.0 %   TSH   Result Value Ref Range    TSH 1.970 0.270 - 4.200 uIU/mL       No follow-ups on file.     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 Negative for chills, diaphoresis and fever. HENT:  Negative for congestion, hearing loss, postnasal drip, sore throat, tinnitus and trouble swallowing. Eyes:  Negative for visual disturbance. Respiratory:  Negative for cough, shortness of breath, wheezing and stridor. Cardiovascular:  Negative for chest pain, palpitations and leg swelling. Gastrointestinal:  Positive for nausea (and vomitting). Negative for abdominal pain, blood in stool, constipation and diarrhea. Heartburn   Endocrine: Negative for cold intolerance. Genitourinary:  Negative for dysuria, enuresis and frequency. Erectile dysfunction   Musculoskeletal:  Positive for back pain. Negative for arthralgias and joint swelling. Skin:  Negative for color change and wound. Allergic/Immunologic: Negative for environmental allergies. Neurological:  Positive for numbness. Negative for dizziness, weakness, light-headedness and headaches. Hematological:  Negative for adenopathy. Does not bruise/bleed easily. Psychiatric/Behavioral:  Positive for sleep disturbance. Negative for decreased concentration and dysphoric mood. The patient is nervous/anxious. Physical Exam  Vitals and nursing note reviewed. Constitutional:       General: He is not in acute distress. Appearance: He is well-developed. HENT:      Head: Normocephalic and atraumatic. Right Ear: External ear normal.      Left Ear: External ear normal.      Nose: Nose normal.   Eyes:      General: No scleral icterus.      Conjunctiva/sclera: Conjunctivae normal.      Pupils: Pupils are equal, round, and reactive to light. Neck:      Thyroid: No thyromegaly. Cardiovascular:      Rate and Rhythm: Normal rate and regular rhythm. Heart sounds: Normal heart sounds. No murmur heard. Pulmonary:      Effort: Pulmonary effort is normal.      Breath sounds: Normal breath sounds. No wheezing or rales. Abdominal:      General: Bowel sounds are normal. There is no distension. Palpations: Abdomen is soft. There is no mass. Tenderness: There is no abdominal tenderness. There is no rebound. Musculoskeletal:         General: No tenderness. Normal range of motion. Cervical back: Normal range of motion and neck supple. Lymphadenopathy:      Cervical: No cervical adenopathy. Skin:     General: Skin is warm and dry. Findings: No rash. Neurological:      Mental Status: He is alert and oriented to person, place, and time. Cranial Nerves: No cranial nerve deficit. Deep Tendon Reflexes: Reflexes normal.   Psychiatric:         Behavior: Behavior normal.         Thought Content:  Thought content normal.         Judgment: Judgment normal.         (Time Documentation Optional [id number])    An electronic signaturewaas used to authenticate this note  -Koki Hernández MD on 2/21/2023 at 12:49 PM

## 2023-02-21 ENCOUNTER — OFFICE VISIT (OUTPATIENT)
Dept: PRIMARY CARE CLINIC | Age: 53
End: 2023-02-21
Payer: MEDICAID

## 2023-02-21 VITALS
OXYGEN SATURATION: 99 % | SYSTOLIC BLOOD PRESSURE: 114 MMHG | HEART RATE: 65 BPM | WEIGHT: 197 LBS | DIASTOLIC BLOOD PRESSURE: 80 MMHG | RESPIRATION RATE: 20 BRPM | BODY MASS INDEX: 30.92 KG/M2 | HEIGHT: 67 IN | TEMPERATURE: 98.4 F

## 2023-02-21 DIAGNOSIS — E78.2 MIXED HYPERLIPIDEMIA: ICD-10-CM

## 2023-02-21 DIAGNOSIS — F17.200 TOBACCO USE DISORDER: ICD-10-CM

## 2023-02-21 DIAGNOSIS — F41.9 ANXIETY: ICD-10-CM

## 2023-02-21 DIAGNOSIS — N52.9 ERECTILE DYSFUNCTION, UNSPECIFIED ERECTILE DYSFUNCTION TYPE: ICD-10-CM

## 2023-02-21 DIAGNOSIS — E03.9 ACQUIRED HYPOTHYROIDISM: ICD-10-CM

## 2023-02-21 DIAGNOSIS — G47.33 OBSTRUCTIVE SLEEP APNEA: ICD-10-CM

## 2023-02-21 DIAGNOSIS — R73.03 PREDIABETES: ICD-10-CM

## 2023-02-21 DIAGNOSIS — E78.2 COMBINED HYPERLIPIDEMIA: ICD-10-CM

## 2023-02-21 DIAGNOSIS — K21.9 GASTROESOPHAGEAL REFLUX DISEASE WITHOUT ESOPHAGITIS: ICD-10-CM

## 2023-02-21 DIAGNOSIS — Z71.6 ENCOUNTER FOR SMOKING CESSATION COUNSELING: Primary | ICD-10-CM

## 2023-02-21 LAB
CHOLESTEROL, TOTAL: 167 MG/DL (ref 160–199)
HBA1C MFR BLD: 5.5 % (ref 4–6)
HDLC SERPL-MCNC: 37 MG/DL (ref 55–121)
LDL CHOLESTEROL CALCULATED: 114 MG/DL
TRIGL SERPL-MCNC: 82 MG/DL (ref 0–149)
TSH SERPL DL<=0.05 MIU/L-ACNC: 1.97 UIU/ML (ref 0.27–4.2)

## 2023-02-21 PROCEDURE — G8484 FLU IMMUNIZE NO ADMIN: HCPCS | Performed by: INTERNAL MEDICINE

## 2023-02-21 PROCEDURE — 3017F COLORECTAL CA SCREEN DOC REV: CPT | Performed by: INTERNAL MEDICINE

## 2023-02-21 PROCEDURE — G8427 DOCREV CUR MEDS BY ELIG CLIN: HCPCS | Performed by: INTERNAL MEDICINE

## 2023-02-21 PROCEDURE — G8417 CALC BMI ABV UP PARAM F/U: HCPCS | Performed by: INTERNAL MEDICINE

## 2023-02-21 PROCEDURE — 99214 OFFICE O/P EST MOD 30 MIN: CPT | Performed by: INTERNAL MEDICINE

## 2023-02-21 PROCEDURE — 4004F PT TOBACCO SCREEN RCVD TLK: CPT | Performed by: INTERNAL MEDICINE

## 2023-02-21 RX ORDER — OMEPRAZOLE 40 MG/1
40 CAPSULE, DELAYED RELEASE ORAL
Qty: 90 CAPSULE | Refills: 1 | Status: SHIPPED | OUTPATIENT
Start: 2023-02-21

## 2023-02-21 RX ORDER — VARENICLINE TARTRATE 0.5 MG/1
.5-1 TABLET, FILM COATED ORAL SEE ADMIN INSTRUCTIONS
Qty: 57 TABLET | Refills: 0 | Status: SHIPPED | OUTPATIENT
Start: 2023-02-21

## 2023-02-21 RX ORDER — PAROXETINE HYDROCHLORIDE 40 MG/1
TABLET, FILM COATED ORAL
Qty: 90 TABLET | Refills: 1 | Status: SHIPPED | OUTPATIENT
Start: 2023-02-21

## 2023-02-21 RX ORDER — ATORVASTATIN CALCIUM 20 MG/1
20 TABLET, FILM COATED ORAL DAILY
Qty: 90 TABLET | Refills: 1 | Status: SHIPPED | OUTPATIENT
Start: 2023-02-21 | End: 2023-05-22

## 2023-02-21 RX ORDER — SILDENAFIL CITRATE 20 MG/1
20 TABLET ORAL DAILY PRN
Qty: 60 TABLET | Refills: 5 | Status: SHIPPED | OUTPATIENT
Start: 2023-02-21

## 2023-02-21 SDOH — ECONOMIC STABILITY: INCOME INSECURITY: HOW HARD IS IT FOR YOU TO PAY FOR THE VERY BASICS LIKE FOOD, HOUSING, MEDICAL CARE, AND HEATING?: NOT VERY HARD

## 2023-02-21 SDOH — ECONOMIC STABILITY: FOOD INSECURITY: WITHIN THE PAST 12 MONTHS, THE FOOD YOU BOUGHT JUST DIDN'T LAST AND YOU DIDN'T HAVE MONEY TO GET MORE.: NEVER TRUE

## 2023-02-21 SDOH — ECONOMIC STABILITY: HOUSING INSECURITY
IN THE LAST 12 MONTHS, WAS THERE A TIME WHEN YOU DID NOT HAVE A STEADY PLACE TO SLEEP OR SLEPT IN A SHELTER (INCLUDING NOW)?: NO

## 2023-02-21 SDOH — ECONOMIC STABILITY: FOOD INSECURITY: WITHIN THE PAST 12 MONTHS, YOU WORRIED THAT YOUR FOOD WOULD RUN OUT BEFORE YOU GOT MONEY TO BUY MORE.: NEVER TRUE

## 2023-02-21 ASSESSMENT — PATIENT HEALTH QUESTIONNAIRE - PHQ9
SUM OF ALL RESPONSES TO PHQ QUESTIONS 1-9: 0
SUM OF ALL RESPONSES TO PHQ QUESTIONS 1-9: 0
SUM OF ALL RESPONSES TO PHQ9 QUESTIONS 1 & 2: 0
SUM OF ALL RESPONSES TO PHQ QUESTIONS 1-9: 0
2. FEELING DOWN, DEPRESSED OR HOPELESS: 0
1. LITTLE INTEREST OR PLEASURE IN DOING THINGS: 0
SUM OF ALL RESPONSES TO PHQ QUESTIONS 1-9: 0

## 2023-02-21 NOTE — ASSESSMENT & PLAN NOTE
Uncontrolled, changes made today: Patient will try Chantix again if he gets nauseous he will use it so for more than

## 2023-02-21 NOTE — ASSESSMENT & PLAN NOTE
Well-controlled, continue current medications continues to benefit from the use of CPAP mask and equipment

## 2023-02-24 ENCOUNTER — TELEPHONE (OUTPATIENT)
Dept: PRIMARY CARE CLINIC | Age: 53
End: 2023-02-24

## 2023-02-24 NOTE — TELEPHONE ENCOUNTER
----- Message from Taylor Fabian MD sent at 2/22/2023  4:03 PM CST -----  Patient's cholesterol numbers have improved tremendously great job, thyroid function is now normal, hemoglobin A1c a test for diabetes is 5.5 normal, pending thyroid function test

## 2023-03-27 DIAGNOSIS — E03.9 ACQUIRED HYPOTHYROIDISM: ICD-10-CM

## 2023-03-27 DIAGNOSIS — Z71.6 ENCOUNTER FOR SMOKING CESSATION COUNSELING: ICD-10-CM

## 2023-03-27 RX ORDER — LEVOTHYROXINE SODIUM 0.15 MG/1
TABLET ORAL
Qty: 90 TABLET | Refills: 4 | Status: SHIPPED | OUTPATIENT
Start: 2023-03-27

## 2023-03-27 RX ORDER — VARENICLINE TARTRATE 1 MG/1
1 TABLET, FILM COATED ORAL 2 TIMES DAILY
Qty: 60 TABLET | Refills: 5 | Status: SHIPPED | OUTPATIENT
Start: 2023-03-27

## 2023-03-27 NOTE — TELEPHONE ENCOUNTER
Noreen Vogel called to request a refill on his medication.       Last office visit : 2/21/2023   Next office visit : 8/21/2023     Requested Prescriptions     Pending Prescriptions Disp Refills    levothyroxine (SYNTHROID) 150 MCG tablet [Pharmacy Med Name: LEVOTHYROXINE 150 MCG TABLET] 90 tablet 4     Sig: TAKE ONE TABLET BY MOUTH EVERY DAY    varenicline (CHANTIX) 1 MG tablet [Pharmacy Med Name: VARENICLINE STARTING MONTH BOX] 60 tablet 5     Sig: Take 1 tablet by mouth 2 times daily            Almaz Daniels LPN

## 2023-04-07 ENCOUNTER — OFFICE VISIT (OUTPATIENT)
Dept: PRIMARY CARE CLINIC | Age: 53
End: 2023-04-07

## 2023-04-07 ENCOUNTER — HOSPITAL ENCOUNTER (OUTPATIENT)
Dept: INFUSION THERAPY | Age: 53
Discharge: HOME | End: 2023-04-07
Payer: MEDICAID

## 2023-04-07 VITALS
OXYGEN SATURATION: 99 % | HEART RATE: 54 BPM | SYSTOLIC BLOOD PRESSURE: 136 MMHG | WEIGHT: 198 LBS | DIASTOLIC BLOOD PRESSURE: 72 MMHG | TEMPERATURE: 98.9 F | RESPIRATION RATE: 20 BRPM | BODY MASS INDEX: 31.08 KG/M2 | HEIGHT: 67 IN

## 2023-04-07 DIAGNOSIS — M15.3 POST-TRAUMATIC OSTEOARTHRITIS OF MULTIPLE JOINTS: ICD-10-CM

## 2023-04-07 DIAGNOSIS — C85.10 LOW GRADE B-CELL LYMPHOMA (HCC): ICD-10-CM

## 2023-04-07 LAB
ALBUMIN SERPL-MCNC: 4.5 G/DL (ref 3.5–5.2)
ALP SERPL-CCNC: 78 U/L (ref 40–130)
ALT SERPL-CCNC: 20 U/L (ref 21–72)
ANION GAP SERPL CALCULATED.3IONS-SCNC: 10 MMOL/L (ref 7–19)
AST SERPL-CCNC: 22 U/L (ref 17–59)
BILIRUB SERPL-MCNC: 0.4 MG/DL (ref 0.2–1.3)
BUN SERPL-MCNC: 16 MG/DL (ref 9–20)
CALCIUM SERPL-MCNC: 9.7 MG/DL (ref 8.4–10.2)
CHLORIDE SERPL-SCNC: 106 MMOL/L (ref 98–111)
CO2 SERPL-SCNC: 27 MMOL/L (ref 22–29)
CREAT SERPL-MCNC: 1 MG/DL (ref 0.6–1.2)
ERYTHROCYTE [DISTWIDTH] IN BLOOD BY AUTOMATED COUNT: 13.2 % (ref 11.6–14.4)
GLOBULIN: 2.8 G/DL
GLUCOSE SERPL-MCNC: 116 MG/DL (ref 74–106)
HCT VFR BLD AUTO: 43.1 % (ref 40.1–51)
HGB BLD-MCNC: 14.7 G/DL (ref 13.7–17.5)
LDH SERPL-CCNC: 222 U/L (ref 120–246)
LYMPHOCYTES # BLD: 0.84 K/UL (ref 1.18–3.74)
LYMPHOCYTES NFR BLD: 13.5 % (ref 19.3–53.1)
MCH RBC QN AUTO: 30.6 PG (ref 25.7–32.2)
MCHC RBC AUTO-ENTMCNC: 34.1 G/DL (ref 32.3–36.5)
MCV RBC AUTO: 89.8 FL (ref 79–92.2)
MONOCYTES # BLD: 0.36 K/UL (ref 0.24–0.82)
MONOCYTES NFR BLD: 5.8 % (ref 4.7–12.5)
NEUTROPHILS # BLD: 4.68 K/UL (ref 1.56–6.13)
NEUTS SEG NFR BLD: 75.6 % (ref 34–71.1)
PLATELET # BLD AUTO: 238 K/UL (ref 163–337)
PMV BLD AUTO: 8.7 FL (ref 7.4–10.4)
POTASSIUM SERPL-SCNC: 3.8 MMOL/L (ref 3.5–5.1)
PROT SERPL-MCNC: 7.3 G/DL (ref 6.3–8.2)
RBC # BLD AUTO: 4.8 M/UL (ref 4.63–6.08)
SODIUM SERPL-SCNC: 143 MMOL/L (ref 137–145)
WBC # BLD AUTO: 6.2 K/UL (ref 4.23–9.07)

## 2023-04-07 PROCEDURE — 80053 COMPREHEN METABOLIC PANEL: CPT

## 2023-04-07 PROCEDURE — 36415 COLL VENOUS BLD VENIPUNCTURE: CPT

## 2023-04-07 PROCEDURE — 83615 LACTATE (LD) (LDH) ENZYME: CPT

## 2023-04-07 PROCEDURE — 85025 COMPLETE CBC W/AUTO DIFF WBC: CPT

## 2023-04-07 RX ORDER — METHYLPREDNISOLONE 4 MG/1
TABLET ORAL
Qty: 1 KIT | Refills: 0 | Status: SHIPPED | OUTPATIENT
Start: 2023-04-07 | End: 2023-04-13

## 2023-04-07 RX ORDER — METHYLPREDNISOLONE SODIUM SUCCINATE 40 MG/ML
40 INJECTION, POWDER, LYOPHILIZED, FOR SOLUTION INTRAMUSCULAR; INTRAVENOUS ONCE
Status: COMPLETED | OUTPATIENT
Start: 2023-04-07 | End: 2023-04-07

## 2023-04-07 RX ADMIN — METHYLPREDNISOLONE SODIUM SUCCINATE 40 MG: 40 INJECTION, POWDER, LYOPHILIZED, FOR SOLUTION INTRAMUSCULAR; INTRAVENOUS at 11:06

## 2023-04-07 NOTE — ASSESSMENT & PLAN NOTE
Uncontrolled, continue current medications and will provide a steroid injection, and steroid taper  He did not want any NSAIDS due to dastric problems

## 2023-04-07 NOTE — PROGRESS NOTES
ALANIS marie  Ronnie Beaulieu ( 1970) is a 50 y.o. male,  Established here for evaluation of the following chief complaint(s). Pain (Back elbow and shoulder)      Patient was encouraged and advised to be compliant with all  medications leads an active lifestyle and promote maintaining a healthy weight, encouraged not to use cigarettes, laboratory results discussed and reviewed with patient's during this visit   ASSESSMENT/PLAN:  Problem List          Musculoskeletal and Integument    Post-traumatic osteoarthritis of multiple joints    Relevant Medications    methylPREDNISolone (MEDROL DOSEPACK) 4 MG tablet    Other Relevant Orders    XR SHOULDER LEFT (MIN 2 VIEWS)    XR ELBOW RIGHT (2 VIEWS)         No flowsheet data found.     PHQ Scores 2023 2022 10/4/2021 2021 2021   PHQ2 Score 0 0 0 0 0   PHQ9 Score 0 0 0 0 0       Results for orders placed or performed during the hospital encounter of 23   CBC with Auto Differential   Result Value Ref Range    WBC 6.20 4.23 - 9.07 K/uL    RBC 4.80 4.63 - 6.08 M/uL    Hemoglobin 14.7 13.7 - 17.5 g/dL    Hematocrit 43.1 40.1 - 51.0 %    MCV 89.8 79.0 - 92.2 fL    MCH 30.6 25.7 - 32.2 pg    MCHC 34.1 32.3 - 36.5 g/dL    RDW 13.2 11.6 - 14.4 %    Platelets 069 618 - 947 K/uL    MPV 8.7 7.4 - 10.4 fL    Neutrophils % 75.6 (H) 34.0 - 71.1 %    Lymphocytes % 13.5 (L) 19.3 - 53.1 %    Monocytes % 5.8 4.7 - 12.5 %    Neutrophils Absolute 4.68 1.56 - 6.13 K/uL    Lymphocytes Absolute 0.84 (L) 1.18 - 3.74 K/uL    Monocytes Absolute 0.36 0.24 - 0.82 K/uL   Comprehensive Metabolic Panel   Result Value Ref Range    Sodium 143 137 - 145 mmol/L    Potassium 3.8 3.5 - 5.1 mmol/L    Chloride 106 98 - 111 mmol/L    CO2 27 22 - 29 mmol/L    Anion Gap 10 7 - 19 mmol/L    Glucose 116 (H) 74 - 106 mg/dL    BUN 16 9 - 20 mg/dL    Creatinine 1.0 0.6 - 1.2 mg/dL    Est, Glom Filt Rate >60 >60    Calcium 9.7 8.4 - 10.2 mg/dL    Total Protein 7.3 6.3 - 8.2 g/dL    Albumin

## 2023-04-10 ENCOUNTER — HOSPITAL ENCOUNTER (OUTPATIENT)
Dept: INFUSION THERAPY | Age: 53
Discharge: HOME | End: 2023-04-10
Payer: MEDICAID

## 2023-04-10 PROCEDURE — 99212 OFFICE O/P EST SF 10 MIN: CPT

## 2023-05-01 ENCOUNTER — OFFICE VISIT (OUTPATIENT)
Dept: PRIMARY CARE CLINIC | Age: 53
End: 2023-05-01
Payer: MEDICAID

## 2023-05-01 VITALS
BODY MASS INDEX: 31.39 KG/M2 | OXYGEN SATURATION: 99 % | DIASTOLIC BLOOD PRESSURE: 84 MMHG | HEIGHT: 67 IN | RESPIRATION RATE: 18 BRPM | SYSTOLIC BLOOD PRESSURE: 132 MMHG | WEIGHT: 200 LBS | HEART RATE: 70 BPM | TEMPERATURE: 98.9 F

## 2023-05-01 DIAGNOSIS — R11.0 NAUSEA: ICD-10-CM

## 2023-05-01 DIAGNOSIS — S49.91XA INJURY OF BOTH SHOULDERS, INITIAL ENCOUNTER: ICD-10-CM

## 2023-05-01 DIAGNOSIS — M79.10 MYALGIA: Primary | ICD-10-CM
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
## 2023-05-01 DIAGNOSIS — S00.86XS: ICD-10-CM

## 2023-05-01 DIAGNOSIS — S49.92XA INJURY OF BOTH SHOULDERS, INITIAL ENCOUNTER: ICD-10-CM

## 2023-05-01 LAB
CK SERPL-CCNC: 91 U/L (ref 39–308)
CRP SERPL HS-MCNC: <0.3 MG/DL (ref 0–0.5)
ERYTHROCYTE [SEDIMENTATION RATE] IN BLOOD BY WESTERGREN METHOD: 12 MM/HR (ref 0–15)

## 2023-05-01 PROCEDURE — 3017F COLORECTAL CA SCREEN DOC REV: CPT | Performed by: INTERNAL MEDICINE

## 2023-05-01 PROCEDURE — G8427 DOCREV CUR MEDS BY ELIG CLIN: HCPCS | Performed by: INTERNAL MEDICINE

## 2023-05-01 PROCEDURE — 4004F PT TOBACCO SCREEN RCVD TLK: CPT | Performed by: INTERNAL MEDICINE

## 2023-05-01 PROCEDURE — G8417 CALC BMI ABV UP PARAM F/U: HCPCS | Performed by: INTERNAL MEDICINE

## 2023-05-01 PROCEDURE — 99213 OFFICE O/P EST LOW 20 MIN: CPT | Performed by: INTERNAL MEDICINE

## 2023-05-01 RX ORDER — ONDANSETRON 4 MG/1
TABLET, FILM COATED ORAL
Qty: 30 TABLET | Refills: 1 | Status: SHIPPED | OUTPATIENT
Start: 2023-05-01

## 2023-05-01 RX ORDER — MELOXICAM 15 MG/1
15 TABLET ORAL DAILY PRN
Qty: 30 TABLET | Refills: 0 | Status: SHIPPED | OUTPATIENT
Start: 2023-05-01

## 2023-05-01 NOTE — PROGRESS NOTES
Alonso Vogel ( 1970) is a 50 y.o. male, Established , here for evaluation of the following chief complaint(s). Generalized Body Aches      Patient was encouraged and advised to be compliant with all  medications leads an active lifestyle and promote maintaining a healthy weight, encouraged not to use cigarettes, laboratory results discussed and reviewed with patient's during this visit   ASSESSMENT/PLAN:  Problem List    None        No flowsheet data found.     PHQ Scores 2023 2022 10/4/2021 2021 2021   PHQ2 Score 0 0 0 0 0   PHQ9 Score 0 0 0 0 0       Results for orders placed or performed during the hospital encounter of 23   CBC with Auto Differential   Result Value Ref Range    WBC 6.20 4.23 - 9.07 K/uL    RBC 4.80 4.63 - 6.08 M/uL    Hemoglobin 14.7 13.7 - 17.5 g/dL    Hematocrit 43.1 40.1 - 51.0 %    MCV 89.8 79.0 - 92.2 fL    MCH 30.6 25.7 - 32.2 pg    MCHC 34.1 32.3 - 36.5 g/dL    RDW 13.2 11.6 - 14.4 %    Platelets 947 039 - 922 K/uL    MPV 8.7 7.4 - 10.4 fL    Neutrophils % 75.6 (H) 34.0 - 71.1 %    Lymphocytes % 13.5 (L) 19.3 - 53.1 %    Monocytes % 5.8 4.7 - 12.5 %    Neutrophils Absolute 4.68 1.56 - 6.13 K/uL    Lymphocytes Absolute 0.84 (L) 1.18 - 3.74 K/uL    Monocytes Absolute 0.36 0.24 - 0.82 K/uL   Comprehensive Metabolic Panel   Result Value Ref Range    Sodium 143 137 - 145 mmol/L    Potassium 3.8 3.5 - 5.1 mmol/L    Chloride 106 98 - 111 mmol/L    CO2 27 22 - 29 mmol/L    Anion Gap 10 7 - 19 mmol/L    Glucose 116 (H) 74 - 106 mg/dL    BUN 16 9 - 20 mg/dL    Creatinine 1.0 0.6 - 1.2 mg/dL    Est, Glom Filt Rate >60 >60    Calcium 9.7 8.4 - 10.2 mg/dL    Total Protein 7.3 6.3 - 8.2 g/dL    Albumin 4.5 3.5 - 5.2 g/dL    Total Bilirubin 0.4 0.2 - 1.3 mg/dL    Alkaline Phosphatase 78 40 - 130 U/L    ALT 20 (L) 21 - 72 U/L    AST 22 17 - 59 U/L    Globulin 2.8 g/dL   Lactate Dehydrogenase   Result Value Ref Range     120 - 246 U/L       No follow-ups on

## 2023-05-01 NOTE — ASSESSMENT & PLAN NOTE
patient has been working on his property, and states he has been lifting heavy logs, possibly overuse symptoms   Rest, take meloxicam

## 2023-05-04 LAB
ANAPLASMA PHAGNOCYTOPHILUM BY PCR: NOT DETECTED
E CHAFFEENSIS DNA BLD QL NAA+PROBE: NOT DETECTED
E EWINGII DNA SPEC QL NAA+PROBE: NOT DETECTED
EHRLICHIA DNA SPEC QL NAA+PROBE: NOT DETECTED
R RICKETTSI IGG TITR SER IF: NORMAL {TITER}
R RICKETTSI IGM TITR SER IF: NORMAL {TITER}

## 2023-05-05 LAB — B BURGDOR AB SER IA-ACNC: 0.3 LIV (ref 0–1.2)

## 2023-07-10 DIAGNOSIS — R11.0 NAUSEA: ICD-10-CM

## 2023-07-10 NOTE — TELEPHONE ENCOUNTER
Rosenda Vogel called to request a refill on his medication.       Last office visit : 5/1/2023   Next office visit : 8/21/2023     Requested Prescriptions     Pending Prescriptions Disp Refills    ondansetron (ZOFRAN) 4 MG tablet [Pharmacy Med Name: ONDANSETRON HCL 4 MG TABLET] 30 tablet 0     Sig: TAKE ONE TABLET BY MOUTH EVERY 6 HOURS AS NEEDED FOR NAUSEA            Yesy Erwin LPN

## 2023-07-11 RX ORDER — ONDANSETRON 4 MG/1
TABLET, FILM COATED ORAL
Qty: 30 TABLET | Refills: 0 | Status: SHIPPED | OUTPATIENT
Start: 2023-07-11

## 2023-07-31 ENCOUNTER — HOSPITAL ENCOUNTER (OUTPATIENT)
Dept: CT IMAGING | Age: 53
Discharge: HOME | End: 2023-07-31
Payer: MEDICAID

## 2023-07-31 DIAGNOSIS — C85.10 LOW GRADE B-CELL LYMPHOMA (HCC): ICD-10-CM

## 2023-07-31 PROCEDURE — 71260 CT THORAX DX C+: CPT

## 2023-07-31 PROCEDURE — 74177 CT ABD & PELVIS W/CONTRAST: CPT

## 2023-07-31 PROCEDURE — 6360000004 HC RX CONTRAST MEDICATION: Performed by: INTERNAL MEDICINE

## 2023-07-31 RX ADMIN — IOPAMIDOL 75 ML: 755 INJECTION, SOLUTION INTRAVENOUS at 11:21

## 2023-08-02 DIAGNOSIS — C85.10 LOW GRADE B-CELL LYMPHOMA (HCC): ICD-10-CM

## 2023-08-02 DIAGNOSIS — R59.9 LYMPH NODE ENLARGEMENT: Primary | ICD-10-CM

## 2023-08-02 NOTE — PROGRESS NOTES
Phoned Davin Chaparro to discuss recent imaging demonstrating enlarged supraclavicular lymph nodes , concerning for recurrent lymphoma. Order placed for US guided biopsy to confirm. Spoke with Gable Alpers who verbalized understanding and expects call from scheduling with appointment information. Will schedule f/u with dr Piero Segovia 1 week post biopsy to discuss results.

## 2023-08-04 ENCOUNTER — HOSPITAL ENCOUNTER (OUTPATIENT)
Dept: ULTRASOUND IMAGING | Age: 53
Discharge: HOME | End: 2023-08-04
Payer: MEDICAID

## 2023-08-04 DIAGNOSIS — R59.9 LYMPH NODE ENLARGEMENT: ICD-10-CM

## 2023-08-04 DIAGNOSIS — C85.10 LOW GRADE B-CELL LYMPHOMA (HCC): ICD-10-CM

## 2023-08-04 PROCEDURE — 60100 BIOPSY OF THYROID: CPT

## 2023-08-09 DIAGNOSIS — R11.0 NAUSEA: ICD-10-CM

## 2023-08-09 DIAGNOSIS — C85.10 LOW GRADE B-CELL LYMPHOMA (HCC): Primary | ICD-10-CM

## 2023-08-09 DIAGNOSIS — R59.0 ENLARGED LYMPH NODE IN NECK: ICD-10-CM

## 2023-08-09 DIAGNOSIS — R59.9 ENLARGED LYMPH NODE: ICD-10-CM

## 2023-08-09 RX ORDER — ONDANSETRON 4 MG/1
TABLET, FILM COATED ORAL
Qty: 30 TABLET | Refills: 11 | Status: SHIPPED | OUTPATIENT
Start: 2023-08-09

## 2023-08-09 NOTE — PROGRESS NOTES
Spoke with Leanne Ling regarding pathology results of left supraclavicular lymph node biopsy on 8/4/2023 consistent with CLL/SLL, and Dr Dale Moe recommendation for PET scan for further evaluation. This will be ordered and scheduled prior to return to see Dr Ghazal Cordero in clinic.

## 2023-08-09 NOTE — TELEPHONE ENCOUNTER
Walker Vogel called to request a refill on his medication.       Last office visit : 5/1/2023   Next office visit : 8/15/2023     Requested Prescriptions     Pending Prescriptions Disp Refills    ondansetron (ZOFRAN) 4 MG tablet [Pharmacy Med Name: ONDANSETRON HCL 4 MG TABLET] 30 tablet 11     Sig: TAKE ONE TABLET BY MOUTH EVERY 6 HOURS AS NEEDED FOR NAUSEA            Felice Dunn LPN

## 2023-08-11 ENCOUNTER — HOSPITAL ENCOUNTER (OUTPATIENT)
Dept: NUCLEAR MEDICINE | Age: 53
Discharge: HOME | End: 2023-08-13
Attending: INTERNAL MEDICINE
Payer: MEDICAID

## 2023-08-11 DIAGNOSIS — R59.9 ENLARGED LYMPH NODE: ICD-10-CM

## 2023-08-11 DIAGNOSIS — C85.10 LOW GRADE B-CELL LYMPHOMA (HCC): ICD-10-CM

## 2023-08-11 LAB
GLUCOSE BLD-MCNC: 106 MG/DL (ref 70–99)
PERFORMED ON: ABNORMAL

## 2023-08-11 PROCEDURE — 82962 GLUCOSE BLOOD TEST: CPT

## 2023-08-11 PROCEDURE — A9552 F18 FDG: HCPCS | Performed by: INTERNAL MEDICINE

## 2023-08-11 PROCEDURE — 3430000000 HC RX DIAGNOSTIC RADIOPHARMACEUTICAL: Performed by: INTERNAL MEDICINE

## 2023-08-11 PROCEDURE — 78815 PET IMAGE W/CT SKULL-THIGH: CPT

## 2023-08-11 RX ORDER — FLUDEOXYGLUCOSE F 18 200 MCI/ML
10 INJECTION, SOLUTION INTRAVENOUS
Status: COMPLETED | OUTPATIENT
Start: 2023-08-11 | End: 2023-08-11

## 2023-08-11 RX ADMIN — FLUDEOXYGLUCOSE F 18 10 MILLICURIE: 200 INJECTION, SOLUTION INTRAVENOUS at 15:15

## 2023-08-11 NOTE — PROGRESS NOTES
Patient:  David Christopher  YOB: 1970  Date of Service: 8/14/2023  MRN: [id number]    Primary Care Physician: Ramone Deleon MD    Chief Complaint   Patient presents with    Cancer     CLL/SLL    Results     CT Chest, Abd & Pelvis at [facility] 7/31/23  Left Supraclavicular LN biopsy 8/4/23  PET scan at [facility] 8/11/23    Follow-up           Patient Seen, Chart, Consults notes, Labs, Radiology studies reviewed. Subjective:    Mr Cherelle Anne is a 50year old gentleman managed with primary and secondary diagnoses as outlined:  Stage I low-grade B-cell lymphoma (SLL) (small lymphocytic lymphoma) manifesting with 4 x 2.1 x 4.9 cm and 3.6 x 1.9 x 4.1 cm retroperitoneal para-aortic region lymph node conglomerates, 6/2/2021   Minimal cerebellar tonsillar ectopia, not meeting criteria for Chiari I malformation on MRI of the brain 11/19/2020 followed by Dr. Geraldine Del Real with neurology    Aparna Jones completed XRT, and received 3000 cGy that was completed on 9/01/2021  He is asymptomatic, without B symptoms. He has never received systemic therapy for SLL. He is being monitored conservatively for symptoms or radiographic progression. He appears healthy, with the only complaint being nauseated in the mornings reportedly secondary to a minimal cerebellar tonsillar ectopia, not meeting criteria for Chiari I malformation on MRI of the brain 11/19/2020. Routine annual screening imaging on 7/31/2023 identified a 1.7 cm left supraclavicular lymph node biopsy. Completion imaging work-up in the left supraclavicular lymph node biopsy has been performed. He is here accompanied by his significant other to review results and for further recommendations. He iswithout B symptoms.     TARGET SLL/CLL SITES:  4 x 2.1 x 4.9 cm and 3.6 x 1.9 x 4.1 cm enhancing oval lobulated solid masses in the right upper abdomen, retroperitoneal para-aortic region located between the aorta and inferior vena cava    TUMOR HISTORY: Stage I

## 2023-08-14 ENCOUNTER — OFFICE VISIT (OUTPATIENT)
Dept: HEMATOLOGY | Age: 53
End: 2023-08-14
Payer: MEDICAID

## 2023-08-14 ENCOUNTER — HOSPITAL ENCOUNTER (OUTPATIENT)
Dept: INFUSION THERAPY | Age: 53
Discharge: HOME | End: 2023-08-14
Payer: MEDICAID

## 2023-08-14 VITALS
OXYGEN SATURATION: 97 % | BODY MASS INDEX: 30.09 KG/M2 | WEIGHT: 191.7 LBS | SYSTOLIC BLOOD PRESSURE: 120 MMHG | HEART RATE: 82 BPM | HEIGHT: 67 IN | DIASTOLIC BLOOD PRESSURE: 68 MMHG

## 2023-08-14 DIAGNOSIS — C85.10 LOW GRADE B-CELL LYMPHOMA (HCC): Primary | ICD-10-CM

## 2023-08-14 DIAGNOSIS — R59.9 ENLARGEMENT OF LYMPH NODE: ICD-10-CM

## 2023-08-14 DIAGNOSIS — C85.10 LOW GRADE B-CELL LYMPHOMA (HCC): ICD-10-CM

## 2023-08-14 DIAGNOSIS — Z71.2 ENCOUNTER TO DISCUSS TEST RESULTS: ICD-10-CM

## 2023-08-14 DIAGNOSIS — Z98.890 S/P LYMPH NODE BIOPSY: ICD-10-CM

## 2023-08-14 LAB
ERYTHROCYTE [DISTWIDTH] IN BLOOD BY AUTOMATED COUNT: 13.2 % (ref 11.6–14.4)
HCT VFR BLD AUTO: 40.6 % (ref 40.1–51)
HGB BLD-MCNC: 14.3 G/DL (ref 13.7–17.5)
LYMPHOCYTES # BLD: 1.05 K/UL (ref 1.18–3.74)
LYMPHOCYTES NFR BLD: 15.7 % (ref 19.3–53.1)
MCH RBC QN AUTO: 30.5 PG (ref 25.7–32.2)
MCHC RBC AUTO-ENTMCNC: 35.2 G/DL (ref 32.3–36.5)
MCV RBC AUTO: 86.6 FL (ref 79–92.2)
MONOCYTES # BLD: 0.37 K/UL (ref 0.24–0.82)
MONOCYTES NFR BLD: 5.5 % (ref 4.7–12.5)
NEUTROPHILS # BLD: 5.03 K/UL (ref 1.56–6.13)
NEUTS SEG NFR BLD: 75.6 % (ref 34–71.1)
PLATELET # BLD AUTO: 216 K/UL (ref 163–337)
PMV BLD AUTO: 9.3 FL (ref 7.4–10.4)
RBC # BLD AUTO: 4.69 M/UL (ref 4.63–6.08)
WBC # BLD AUTO: 6.67 K/UL (ref 4.23–9.07)

## 2023-08-14 PROCEDURE — 4004F PT TOBACCO SCREEN RCVD TLK: CPT | Performed by: INTERNAL MEDICINE

## 2023-08-14 PROCEDURE — 36415 COLL VENOUS BLD VENIPUNCTURE: CPT

## 2023-08-14 PROCEDURE — 99214 OFFICE O/P EST MOD 30 MIN: CPT | Performed by: INTERNAL MEDICINE

## 2023-08-14 PROCEDURE — 85025 COMPLETE CBC W/AUTO DIFF WBC: CPT

## 2023-08-14 PROCEDURE — 3017F COLORECTAL CA SCREEN DOC REV: CPT | Performed by: INTERNAL MEDICINE

## 2023-08-14 PROCEDURE — G8427 DOCREV CUR MEDS BY ELIG CLIN: HCPCS | Performed by: INTERNAL MEDICINE

## 2023-08-14 PROCEDURE — G8417 CALC BMI ABV UP PARAM F/U: HCPCS | Performed by: INTERNAL MEDICINE

## 2023-08-14 PROCEDURE — 99212 OFFICE O/P EST SF 10 MIN: CPT

## 2023-08-14 ASSESSMENT — ENCOUNTER SYMPTOMS
STRIDOR: 0
NAUSEA: 1
COUGH: 0
SORE THROAT: 0
WHEEZING: 0
BACK PAIN: 1
COLOR CHANGE: 0
TROUBLE SWALLOWING: 0
CONSTIPATION: 0
DIARRHEA: 0
ABDOMINAL PAIN: 0
BLOOD IN STOOL: 0
SHORTNESS OF BREATH: 0

## 2023-08-14 NOTE — PROGRESS NOTES
Maury Vogel ( 1970) is a 50 y.o. male,  Established, here for evaluation of the following chief complaint(s).   6 Month Follow-Up        ASSESSMENT/PLAN:  Problem List          Endocrine    Acquired hypothyroidism - Primary      Well-controlled, continue current medications           Relevant Medications    levothyroxine (SYNTHROID) 150 MCG tablet    Other Relevant Orders    TSH with Reflex       Musculoskeletal and Integument    Post-traumatic osteoarthritis of multiple joints      Well-controlled, continue current medications           Relevant Medications    meloxicam (MOBIC) 15 MG tablet       Other    Tobacco use disorder      Uncontrolled, continue current medications will be restarting Chantix again           Prediabetes      Well-controlled, continue current medications           Relevant Orders    Comprehensive Metabolic Panel    Hemoglobin A1C    Low grade B-cell lymphoma (HCC)      Monitored by specialist- no acute findings meriting change in the plan   Patient was found to have left supraclavicular matted lymph nodes on PET scan and biopsy shows small lymphoma/CLL patient underwent bone marrow biopsy if bone marrow biopsy is positive then he will be stage III otherwise will be stage I and treatment will be decided           Relevant Medications    meloxicam (MOBIC) 15 MG tablet    Combined hyperlipidemia    Relevant Medications    sildenafil (REVATIO) 20 MG tablet    atorvastatin (LIPITOR) 40 MG tablet    Other Relevant Orders    Lipid Panel     Results for orders placed or performed during the hospital encounter of 08/15/23   CBC with Auto Differential   Result Value Ref Range    WBC 8.56 4.23 - 9.07 K/uL    RBC 4.24 (L) 4.63 - 6.08 M/uL    Hemoglobin 13.0 (L) 13.7 - 17.5 g/dL    Hematocrit 37.3 (L) 40.1 - 51.0 %    MCV 88.0 79.0 - 92.2 fL    MCH 30.7 25.7 - 32.2 pg    MCHC 34.9 32.3 - 36.5 g/dL    RDW 13.2 11.6 - 14.4 %    Platelets 601 291 - 679 K/uL    MPV 9.0 7.4 - 10.4 fL    Neutrophils

## 2023-08-15 ENCOUNTER — OFFICE VISIT (OUTPATIENT)
Dept: PRIMARY CARE CLINIC | Age: 53
End: 2023-08-15
Payer: MEDICAID

## 2023-08-15 ENCOUNTER — HOSPITAL ENCOUNTER (OUTPATIENT)
Dept: INFUSION THERAPY | Age: 53
Discharge: HOME | End: 2023-08-15
Payer: MEDICAID

## 2023-08-15 ENCOUNTER — OFFICE VISIT (OUTPATIENT)
Dept: HEMATOLOGY | Age: 53
End: 2023-08-15
Payer: MEDICAID

## 2023-08-15 VITALS
OXYGEN SATURATION: 98 % | DIASTOLIC BLOOD PRESSURE: 90 MMHG | WEIGHT: 191.3 LBS | HEART RATE: 90 BPM | BODY MASS INDEX: 29.96 KG/M2 | SYSTOLIC BLOOD PRESSURE: 132 MMHG

## 2023-08-15 VITALS
OXYGEN SATURATION: 97 % | BODY MASS INDEX: 29.82 KG/M2 | SYSTOLIC BLOOD PRESSURE: 116 MMHG | WEIGHT: 190 LBS | DIASTOLIC BLOOD PRESSURE: 78 MMHG | HEIGHT: 67 IN | HEART RATE: 69 BPM | TEMPERATURE: 98.7 F | RESPIRATION RATE: 16 BRPM

## 2023-08-15 DIAGNOSIS — R73.03 PREDIABETES: ICD-10-CM

## 2023-08-15 DIAGNOSIS — M15.3 POST-TRAUMATIC OSTEOARTHRITIS OF MULTIPLE JOINTS: ICD-10-CM

## 2023-08-15 DIAGNOSIS — C85.10 LOW GRADE B-CELL LYMPHOMA (HCC): Primary | ICD-10-CM

## 2023-08-15 DIAGNOSIS — E78.2 MIXED HYPERLIPIDEMIA: ICD-10-CM

## 2023-08-15 DIAGNOSIS — Z71.6 ENCOUNTER FOR SMOKING CESSATION COUNSELING: ICD-10-CM

## 2023-08-15 DIAGNOSIS — F17.200 TOBACCO USE DISORDER: ICD-10-CM

## 2023-08-15 DIAGNOSIS — C85.10 LOW GRADE B-CELL LYMPHOMA (HCC): ICD-10-CM

## 2023-08-15 DIAGNOSIS — E03.9 ACQUIRED HYPOTHYROIDISM: Primary | ICD-10-CM

## 2023-08-15 DIAGNOSIS — E78.2 COMBINED HYPERLIPIDEMIA: ICD-10-CM

## 2023-08-15 LAB
ERYTHROCYTE [DISTWIDTH] IN BLOOD BY AUTOMATED COUNT: 13.2 % (ref 11.6–14.4)
HCT VFR BLD AUTO: 37.3 % (ref 40.1–51)
HGB BLD-MCNC: 13 G/DL (ref 13.7–17.5)
LYMPHOCYTES # BLD: 1.16 K/UL (ref 1.18–3.74)
LYMPHOCYTES NFR BLD: 13.6 % (ref 19.3–53.1)
MCH RBC QN AUTO: 30.7 PG (ref 25.7–32.2)
MCHC RBC AUTO-ENTMCNC: 34.9 G/DL (ref 32.3–36.5)
MCV RBC AUTO: 88 FL (ref 79–92.2)
MONOCYTES # BLD: 0.48 K/UL (ref 0.24–0.82)
MONOCYTES NFR BLD: 5.6 % (ref 4.7–12.5)
NEUTROPHILS # BLD: 6.64 K/UL (ref 1.56–6.13)
NEUTS SEG NFR BLD: 77.5 % (ref 34–71.1)
PLATELET # BLD AUTO: 269 K/UL (ref 163–337)
PMV BLD AUTO: 9 FL (ref 7.4–10.4)
RBC # BLD AUTO: 4.24 M/UL (ref 4.63–6.08)
WBC # BLD AUTO: 8.56 K/UL (ref 4.23–9.07)

## 2023-08-15 PROCEDURE — G8427 DOCREV CUR MEDS BY ELIG CLIN: HCPCS | Performed by: INTERNAL MEDICINE

## 2023-08-15 PROCEDURE — G8417 CALC BMI ABV UP PARAM F/U: HCPCS | Performed by: INTERNAL MEDICINE

## 2023-08-15 PROCEDURE — 3017F COLORECTAL CA SCREEN DOC REV: CPT | Performed by: INTERNAL MEDICINE

## 2023-08-15 PROCEDURE — 99214 OFFICE O/P EST MOD 30 MIN: CPT | Performed by: INTERNAL MEDICINE

## 2023-08-15 PROCEDURE — 36415 COLL VENOUS BLD VENIPUNCTURE: CPT

## 2023-08-15 PROCEDURE — 4004F PT TOBACCO SCREEN RCVD TLK: CPT | Performed by: INTERNAL MEDICINE

## 2023-08-15 PROCEDURE — 38222 DX BONE MARROW BX & ASPIR: CPT | Performed by: PHYSICIAN ASSISTANT

## 2023-08-15 PROCEDURE — 99999 PR OFFICE/OUTPT VISIT,PROCEDURE ONLY: CPT | Performed by: PHYSICIAN ASSISTANT

## 2023-08-15 PROCEDURE — 85025 COMPLETE CBC W/AUTO DIFF WBC: CPT

## 2023-08-15 RX ORDER — ATORVASTATIN CALCIUM 40 MG/1
40 TABLET, FILM COATED ORAL DAILY
Qty: 90 TABLET | Refills: 1 | Status: SHIPPED | OUTPATIENT
Start: 2023-08-15 | End: 2024-02-11

## 2023-08-15 NOTE — ASSESSMENT & PLAN NOTE
Monitored by specialist- no acute findings meriting change in the plan   Patient was found to have left supraclavicular matted lymph nodes on PET scan and biopsy shows small lymphoma/CLL patient underwent bone marrow biopsy if bone marrow biopsy is positive then he will be stage III otherwise will be stage I and treatment will be decided

## 2023-08-15 NOTE — PROGRESS NOTES
Patient name: Leoncio Walters  Patient : 1970      Procedure Note: Bone Marrow Aspirate and Biopsy    Indication:    1. Low grade B-cell lymphoma ([address])          Informed consent was signed by Leoncio Walters.  he  was placed in the left lateral decubitus position. The patient was prepped and draped in sterile fashion. Lidocaine 1% was used for local anesthetic of the skin and periosteum. A bone marrow aspirate and biopsy was obtained from the [facility] and sent for surgical pathology review,  flow cytometry, chromosome analysis and CLL FISH panel. The total blood loss was less than 3 mL. The skin was cleaned and a sterile bandage was placed on the entrance site. Gable Alpers J Langham tolerated the procedure without complication.      Nicolas Calderon PA-C    08/15/23  7:16 AM

## 2023-08-16 DIAGNOSIS — E34.9 HYPOTESTOSTERONISM: ICD-10-CM

## 2023-08-16 NOTE — TELEPHONE ENCOUNTER
Aldo Michael MADDIE Vogel called to request a refill on his medication. Last office visit : 8/15/2023   Next office visit : 12/19/2023     Last UDS:   Opiate Scrn, Ur   Date Value Ref Range Status   03/26/2015 Negative Icvahq=774 ng/mL Final     Comment:     Opiate test includes Codeine, Morphine, Hydromorphone, Hydrocodone. Last Kaylah Cleverly: 08/15/2023  Medication Contract: 02/21/2023   Last Fill: 07/10/2023    Requested Prescriptions     Pending Prescriptions Disp Refills    Testosterone POWD, Versabase GEL, Ethyl Alcohol 95 % SOLN 1 each 4         Please approve or refuse this medication.    Marcial Tamez LPN

## 2023-08-17 RX ORDER — TESTOSTERONE MICRONIZED 100 %
POWDER (GRAM) MISCELLANEOUS
Qty: 1 EACH | Refills: 4 | Status: SHIPPED | OUTPATIENT
Start: 2023-08-17 | End: 2024-03-07

## 2023-08-24 ENCOUNTER — OFFICE VISIT (OUTPATIENT)
Dept: PRIMARY CARE CLINIC | Age: 53
End: 2023-08-24
Payer: MEDICAID

## 2023-08-24 VITALS
HEIGHT: 67 IN | WEIGHT: 186 LBS | DIASTOLIC BLOOD PRESSURE: 88 MMHG | BODY MASS INDEX: 29.19 KG/M2 | OXYGEN SATURATION: 98 % | HEART RATE: 70 BPM | SYSTOLIC BLOOD PRESSURE: 124 MMHG | RESPIRATION RATE: 18 BRPM | TEMPERATURE: 98.6 F

## 2023-08-24 DIAGNOSIS — F17.200 TOBACCO USE DISORDER: ICD-10-CM

## 2023-08-24 DIAGNOSIS — C85.10 LOW GRADE B-CELL LYMPHOMA (HCC): Primary | ICD-10-CM

## 2023-08-24 PROCEDURE — G8417 CALC BMI ABV UP PARAM F/U: HCPCS | Performed by: INTERNAL MEDICINE

## 2023-08-24 PROCEDURE — 99213 OFFICE O/P EST LOW 20 MIN: CPT | Performed by: INTERNAL MEDICINE

## 2023-08-24 PROCEDURE — 4004F PT TOBACCO SCREEN RCVD TLK: CPT | Performed by: INTERNAL MEDICINE

## 2023-08-24 PROCEDURE — G8427 DOCREV CUR MEDS BY ELIG CLIN: HCPCS | Performed by: INTERNAL MEDICINE

## 2023-08-24 PROCEDURE — 3017F COLORECTAL CA SCREEN DOC REV: CPT | Performed by: INTERNAL MEDICINE

## 2023-08-25 NOTE — ASSESSMENT & PLAN NOTE
Monitored by specialist- no acute findings meriting change in the plan, pending bone marrow biopsy, he is asking for a referral for [facility], a referral was given at patients request

## 2023-08-25 NOTE — PROGRESS NOTES
Dalton Vogel ( 1970) is a 48 y.o. male, Established , here for evaluation of the following chief complaint(s). Referral - General (Wants referral to [facility]. Has info. Has been approved by [facility]. )      Patient was encouraged and advised to be compliant with all  medications leads an active lifestyle and promote maintaining a healthy weight, encouraged not to use cigarettes, laboratory results discussed and reviewed with patient's during this visit   ASSESSMENT/PLAN:  Problem List          Other    Tobacco use disorder      Uncontrolled, continue current medications   Has not started Chantix as of yet, will make an effort           Low grade B-cell lymphoma ([address]) - Primary      Monitored by specialist- no acute findings meriting change in the plan, pending bone marrow biopsy, he is asking for a referral for [facility], a referral was given at patients request           Relevant Medications    meloxicam (MOBIC) 15 MG tablet    Other Relevant Orders    External Referral To Oncology         No flowsheet data found. PHQ Scores 2023 2022 10/4/2021 2021 2021   PHQ2 Score 0 0 0 0 0   PHQ9 Score 0 0 0 0 0       Results for orders placed or performed during the hospital encounter of 08/15/23   CBC with Auto Differential   Result Value Ref Range    WBC 8.56 4.23 - 9.07 K/uL    RBC 4.24 (L) 4.63 - 6.08 M/uL    Hemoglobin 13.0 (L) 13.7 - 17.5 g/dL    Hematocrit 37.3 (L) 40.1 - 51.0 %    MCV 88.0 79.0 - 92.2 fL    MCH 30.7 25.7 - 32.2 pg    MCHC 34.9 32.3 - 36.5 g/dL    RDW 13.2 11.6 - 14.4 %    Platelets 087 667 - 950 K/uL    MPV 9.0 7.4 - 10.4 fL    Neutrophils % 77.5 (H) 34.0 - 71.1 %    Lymphocytes % 13.6 (L) 19.3 - 53.1 %    Monocytes % 5.6 4.7 - 12.5 %    Neutrophils Absolute 6.64 (H) 1.56 - 6.13 K/uL    Lymphocytes Absolute 1.16 (L) 1.18 - 3.74 K/uL    Monocytes Absolute 0.48 0.24 - 0.82 K/uL       No follow-ups on file.     HPI  51-year-old male unfortunate has

## 2023-08-28 DIAGNOSIS — E34.9 HYPOTESTOSTERONISM: ICD-10-CM

## 2023-08-28 RX ORDER — TESTOSTERONE MICRONIZED 100 %
POWDER (GRAM) MISCELLANEOUS
Qty: 1 EACH | Refills: 5 | Status: SHIPPED | OUTPATIENT
Start: 2023-08-28 | End: 2023-08-28

## 2023-08-28 RX ORDER — TESTOSTERONE MICRONIZED 100 %
POWDER (GRAM) MISCELLANEOUS
Qty: 1 EACH | Refills: 5 | Status: SHIPPED | OUTPATIENT
Start: 2023-08-28 | End: 2024-03-18

## 2023-08-28 RX ORDER — TESTOSTERONE MICRONIZED 100 %
POWDER (GRAM) MISCELLANEOUS
Qty: 1 EACH | Refills: 5 | Status: SHIPPED | OUTPATIENT
Start: 2023-08-28 | End: 2023-08-28 | Stop reason: SDUPTHER

## 2023-08-31 NOTE — PROGRESS NOTES
by flow cytometry  [facility] report the neoplastic cells are positive for PAX-5 and LEF1. CD3 is positive and T cells. Cells are negative for cyclin D1 and SOX11. The Ki-67 proliferative index is variable and ranges from 5% to focally up to 20%    Flow cytometry immunophenotypic B cells  POSITIVE FOR CD5, lambda, CD45, CD20, CD22, CD19, CD23, and CD38  NEGATIVE FOR, CD10, CD79B and CD14    COMMENT:   Findings support involvement by a B cell lymphoma. Correlation with the forthcoming morphology (94-[id number]-) is recommended. Physical examination at presentation did not reveal evidence of palpable pathological peripheral lymphadenopathy in the cervical, supraclavicular, infraclavicular, axillary or inguinal lymph node areas. Lungs are clear heart is regular normal S1-S2, no S3  Abdominal exam documents an enlarged liver, with fullness in the RUQ, somewhat tender. I am unable to palpate a spleen. Extremities no cyanosis clubbing or edema  Neurological exam is nonfocal intact with an intact mental status and normal cranial nerves II through XII. Serology on 6/30/21 revealed:  CMP - unremarkable  Iron - 149  TIBC - 460  Saturation - 32%  Ferritin -128  LDH - 441  TSH - 0.267  Uric Acid - 4.5  Folate - 4.2  Retic - 1.0  Haptoglobin-180  MMA - 109    B2M-1.9  Kappa light chains- 18.9  Lambda light chains-24.4  K/L ratio-0.77  IgG-590 ,IgA-176 , IgM-54  Total Protein - 6.2  M-spike-not observed    Hepatitis Panel - negative    Bone marrow biopsy and aspirate on 7/8/2021 is preliminary but has been discussed with Dr. Trena Gomes indicating the following:  Morphologically the marrow is hypercellular (90%) with increased erythroid shift. There is no obvious evidence of panmyelosis  Flow cytometry shows no increase in number of blasts and no immunophenotypic evidence of involvement by non-Hodgkin's lymphoproliferative disorder.   [id number] [facility] study for myelodysplasia profile is completely negative  [employer] documents a

## 2023-09-01 ENCOUNTER — TELEPHONE (OUTPATIENT)
Dept: HEMATOLOGY | Age: 53
End: 2023-09-01

## 2023-09-05 ENCOUNTER — OFFICE VISIT (OUTPATIENT)
Dept: HEMATOLOGY | Age: 53
End: 2023-09-05
Payer: MEDICAID

## 2023-09-05 ENCOUNTER — HOSPITAL ENCOUNTER (OUTPATIENT)
Dept: INFUSION THERAPY | Age: 53
Discharge: HOME | End: 2023-09-05
Payer: MEDICAID

## 2023-09-05 VITALS
BODY MASS INDEX: 29.62 KG/M2 | SYSTOLIC BLOOD PRESSURE: 122 MMHG | DIASTOLIC BLOOD PRESSURE: 62 MMHG | HEIGHT: 67 IN | WEIGHT: 188.7 LBS | OXYGEN SATURATION: 94 % | TEMPERATURE: 97.9 F | HEART RATE: 82 BPM

## 2023-09-05 DIAGNOSIS — R73.03 PREDIABETES: ICD-10-CM

## 2023-09-05 DIAGNOSIS — E03.9 ACQUIRED HYPOTHYROIDISM: ICD-10-CM

## 2023-09-05 DIAGNOSIS — C85.10 LOW GRADE B-CELL LYMPHOMA (HCC): Primary | ICD-10-CM

## 2023-09-05 DIAGNOSIS — Z71.2 ENCOUNTER TO DISCUSS TEST RESULTS: ICD-10-CM

## 2023-09-05 DIAGNOSIS — E78.2 COMBINED HYPERLIPIDEMIA: ICD-10-CM

## 2023-09-05 LAB
ALBUMIN SERPL-MCNC: 4.2 G/DL (ref 3.5–5.2)
ALP SERPL-CCNC: 71 U/L (ref 40–130)
ALT SERPL-CCNC: 10 U/L (ref 5–41)
ANION GAP SERPL CALCULATED.3IONS-SCNC: 9 MMOL/L (ref 7–19)
AST SERPL-CCNC: 11 U/L (ref 5–40)
BILIRUB SERPL-MCNC: 0.3 MG/DL (ref 0.2–1.2)
BUN SERPL-MCNC: 16 MG/DL (ref 6–20)
CALCIUM SERPL-MCNC: 9.3 MG/DL (ref 8.6–10)
CHLORIDE SERPL-SCNC: 107 MMOL/L (ref 98–111)
CHOLEST SERPL-MCNC: 168 MG/DL (ref 160–199)
CO2 SERPL-SCNC: 28 MMOL/L (ref 22–29)
CREAT SERPL-MCNC: 1.2 MG/DL (ref 0.5–1.2)
GLUCOSE SERPL-MCNC: 110 MG/DL (ref 74–109)
HBA1C MFR BLD: 5.5 % (ref 4–6)
HDLC SERPL-MCNC: 36 MG/DL (ref 55–121)
LDLC SERPL CALC-MCNC: 107 MG/DL
POTASSIUM SERPL-SCNC: 4.1 MMOL/L (ref 3.5–5)
PROT SERPL-MCNC: 6.8 G/DL (ref 6.6–8.7)
SODIUM SERPL-SCNC: 144 MMOL/L (ref 136–145)
TRIGL SERPL-MCNC: 127 MG/DL (ref 0–149)

## 2023-09-05 PROCEDURE — 99214 OFFICE O/P EST MOD 30 MIN: CPT | Performed by: INTERNAL MEDICINE

## 2023-09-05 PROCEDURE — 4004F PT TOBACCO SCREEN RCVD TLK: CPT | Performed by: INTERNAL MEDICINE

## 2023-09-05 PROCEDURE — 3017F COLORECTAL CA SCREEN DOC REV: CPT | Performed by: INTERNAL MEDICINE

## 2023-09-05 PROCEDURE — 99212 OFFICE O/P EST SF 10 MIN: CPT

## 2023-09-05 PROCEDURE — G8427 DOCREV CUR MEDS BY ELIG CLIN: HCPCS | Performed by: INTERNAL MEDICINE

## 2023-09-05 PROCEDURE — G8417 CALC BMI ABV UP PARAM F/U: HCPCS | Performed by: INTERNAL MEDICINE

## 2023-09-15 DIAGNOSIS — N52.9 ERECTILE DYSFUNCTION, UNSPECIFIED ERECTILE DYSFUNCTION TYPE: ICD-10-CM

## 2023-09-15 RX ORDER — SILDENAFIL CITRATE 20 MG/1
TABLET ORAL
Qty: 60 TABLET | Refills: 4 | OUTPATIENT
Start: 2023-09-15

## 2023-09-15 NOTE — TELEPHONE ENCOUNTER
Aparna Vogel called to request a refill on his medication.       Last office visit : 8/24/2023   Next office visit : 12/19/2023     Requested Prescriptions     Pending Prescriptions Disp Refills    sildenafil (REVATIO) 20 MG tablet [Pharmacy Med Name: SILDENAFIL 20 MG TABLET] 60 tablet 4     Sig: TAKE ONE TABLET BY MOUTH EVERY DAY AS NEEDED            Viola Lantigua LPN

## 2023-09-16 DIAGNOSIS — N52.9 ERECTILE DYSFUNCTION, UNSPECIFIED ERECTILE DYSFUNCTION TYPE: ICD-10-CM

## 2023-09-18 RX ORDER — SILDENAFIL CITRATE 20 MG/1
20 TABLET ORAL DAILY PRN
Qty: 60 TABLET | Refills: 5 | Status: SHIPPED | OUTPATIENT
Start: 2023-09-18

## 2023-09-18 NOTE — TELEPHONE ENCOUNTER
Kenneth Vogel called to request a refill on his medication.       Last office visit : 8/24/2023   Next office visit : 12/19/2023     Requested Prescriptions     Pending Prescriptions Disp Refills    sildenafil (REVATIO) 20 MG tablet 60 tablet 5     Sig: Take 1 tablet by mouth daily as needed (erectile dysfunction)            Wendie Patel LPN

## 2023-09-22 ENCOUNTER — TELEPHONE (OUTPATIENT)
Dept: UROLOGY | Age: 53
End: 2023-09-22

## 2023-09-22 DIAGNOSIS — E29.1 HYPOGONADISM IN MALE: Primary | ICD-10-CM

## 2023-09-25 ENCOUNTER — OFFICE VISIT (OUTPATIENT)
Dept: UROLOGY | Age: 53
End: 2023-09-25
Payer: MEDICAID

## 2023-09-25 VITALS — WEIGHT: 193.2 LBS | HEIGHT: 67 IN | BODY MASS INDEX: 30.32 KG/M2 | TEMPERATURE: 98.8 F

## 2023-09-25 DIAGNOSIS — E29.1 HYPOGONADISM IN MALE: ICD-10-CM

## 2023-09-25 DIAGNOSIS — E29.1 HYPOGONADISM MALE: Primary | ICD-10-CM

## 2023-09-25 DIAGNOSIS — N52.9 ERECTILE DYSFUNCTION, UNSPECIFIED ERECTILE DYSFUNCTION TYPE: ICD-10-CM

## 2023-09-25 LAB
APPEARANCE FLUID: CLEAR
BILIRUBIN, POC: NORMAL
BLOOD URINE, POC: NORMAL
CLARITY, POC: CLEAR
COLOR, POC: YELLOW
ERYTHROCYTE [DISTWIDTH] IN BLOOD BY AUTOMATED COUNT: 13.6 % (ref 11.5–14.5)
GLUCOSE URINE, POC: NORMAL
HCT VFR BLD AUTO: 38.8 % (ref 42–52)
HGB BLD-MCNC: 13.1 G/DL (ref 14–18)
KETONES, POC: NORMAL
LEUKOCYTE EST, POC: NORMAL
MCH RBC QN AUTO: 31.1 PG (ref 27–31)
MCHC RBC AUTO-ENTMCNC: 33.8 G/DL (ref 33–37)
MCV RBC AUTO: 92.2 FL (ref 80–94)
NITRITE, POC: NORMAL
PH, POC: 5.5
PLATELET # BLD AUTO: 229 K/UL (ref 130–400)
PMV BLD AUTO: 8.8 FL (ref 9.4–12.4)
PROTEIN, POC: NORMAL
PSA SERPL-MCNC: 1.26 NG/ML (ref 0–4)
RBC # BLD AUTO: 4.21 M/UL (ref 4.7–6.1)
SPECIFIC GRAVITY, POC: 1.03
TESTOST SERPL-MCNC: 222.7 NG/DL (ref 193–740)
UROBILINOGEN, POC: 0.2
WBC # BLD AUTO: 7.6 K/UL (ref 4.8–10.8)

## 2023-09-25 PROCEDURE — G8417 CALC BMI ABV UP PARAM F/U: HCPCS | Performed by: NURSE PRACTITIONER

## 2023-09-25 PROCEDURE — 81002 URINALYSIS NONAUTO W/O SCOPE: CPT | Performed by: NURSE PRACTITIONER

## 2023-09-25 PROCEDURE — 99204 OFFICE O/P NEW MOD 45 MIN: CPT | Performed by: NURSE PRACTITIONER

## 2023-09-25 PROCEDURE — 3017F COLORECTAL CA SCREEN DOC REV: CPT | Performed by: NURSE PRACTITIONER

## 2023-09-25 PROCEDURE — G8427 DOCREV CUR MEDS BY ELIG CLIN: HCPCS | Performed by: NURSE PRACTITIONER

## 2023-09-25 PROCEDURE — 4004F PT TOBACCO SCREEN RCVD TLK: CPT | Performed by: NURSE PRACTITIONER

## 2023-09-25 RX ORDER — SILDENAFIL 50 MG/1
50 TABLET, FILM COATED ORAL DAILY PRN
Qty: 30 TABLET | Refills: 1 | Status: SHIPPED | OUTPATIENT
Start: 2023-09-25

## 2023-09-25 ASSESSMENT — ENCOUNTER SYMPTOMS
ABDOMINAL PAIN: 0
BACK PAIN: 0
ABDOMINAL DISTENTION: 0
VOMITING: 0
NAUSEA: 0

## 2023-09-27 ENCOUNTER — OFFICE VISIT (OUTPATIENT)
Dept: NEUROLOGY | Age: 53
End: 2023-09-27
Payer: MEDICAID

## 2023-09-27 ENCOUNTER — TELEPHONE (OUTPATIENT)
Dept: UROLOGY | Age: 53
End: 2023-09-27

## 2023-09-27 VITALS
WEIGHT: 193 LBS | HEART RATE: 65 BPM | HEIGHT: 67 IN | DIASTOLIC BLOOD PRESSURE: 78 MMHG | SYSTOLIC BLOOD PRESSURE: 119 MMHG | BODY MASS INDEX: 30.29 KG/M2

## 2023-09-27 DIAGNOSIS — G43.011 INTRACTABLE MIGRAINE WITHOUT AURA AND WITH STATUS MIGRAINOSUS: ICD-10-CM

## 2023-09-27 DIAGNOSIS — Z99.89 CPAP (CONTINUOUS POSITIVE AIRWAY PRESSURE) DEPENDENCE: ICD-10-CM

## 2023-09-27 DIAGNOSIS — G47.33 OBSTRUCTIVE SLEEP APNEA: Primary | ICD-10-CM

## 2023-09-27 DIAGNOSIS — Q04.8 CEREBELLAR TONSILLAR ECTOPIA (HCC): ICD-10-CM

## 2023-09-27 PROCEDURE — 3017F COLORECTAL CA SCREEN DOC REV: CPT | Performed by: PHYSICIAN ASSISTANT

## 2023-09-27 PROCEDURE — G8417 CALC BMI ABV UP PARAM F/U: HCPCS | Performed by: PHYSICIAN ASSISTANT

## 2023-09-27 PROCEDURE — G8427 DOCREV CUR MEDS BY ELIG CLIN: HCPCS | Performed by: PHYSICIAN ASSISTANT

## 2023-09-27 PROCEDURE — 99213 OFFICE O/P EST LOW 20 MIN: CPT | Performed by: PHYSICIAN ASSISTANT

## 2023-09-27 PROCEDURE — 4004F PT TOBACCO SCREEN RCVD TLK: CPT | Performed by: PHYSICIAN ASSISTANT

## 2023-09-27 RX ORDER — RIMEGEPANT SULFATE 75 MG/75MG
TABLET, ORALLY DISINTEGRATING ORAL
Qty: 16 TABLET | Refills: 11 | Status: SHIPPED | OUTPATIENT
Start: 2023-09-27

## 2023-09-27 NOTE — PROGRESS NOTES

## 2023-09-27 NOTE — TELEPHONE ENCOUNTER
Patient called stating the ANDRODERM patches have been discontinued and will not be able to get them. He spoke with [facility] pharmacy over this.  Please advise

## 2023-09-27 NOTE — PROGRESS NOTES
[facility]  [address] [facility], [facility] [address] [facility], [address] [place] [address]  Phone [phone]  Fax [phone]       [facility] Sleep Follow Up Encounter      Information:   Patient Name: Jere Schirmer  :   1970  Age:   48 y.o. MRN:   [id number]  Account #:  [de-identified]  Today:                23    Provider:  J Carlos Bhatt PA-C    Chief Complaint   Patient presents with    Sleep Apnea    Other     Migraines and medication management        Subjective:   Jere Schirmer is a 48 y.o. male who has  has a past medical history of Anemia, Anxiety, CPAP (continuous positive airway pressure) dependence, Depression, Herniated lumbar intervertebral disc, Hyperglycemia, Hypertriglyceridemia, Kidney cysts, Liver cyst, Low testosterone, Lymphoma (HCC), Migraine, Neck pain, Obstructive sleep apnea, OCD (obsessive compulsive disorder), Poison ivy, Somnolence, daytime, Thyroid disease, and Witnessed apneic spells. Jere Schirmer comes in for a sleep clinic follow up. He is followed for JANET, CPAP, and migraines. Today he reports that he is doing well with the use of PAP. He reports that he sleeps well and his sleep is restorative. He denies snoring over therapy. He denies excessive daytime somnolence. The ESS score is 0. The compliance report indicates that he is averaging 9.5 hours of PAP use per day. He reports that consistent PAP use has alleviated the previous JANET symptoms. He desires to continue present therapy. The migraines have improved with Nurtec QOD. He has had a few headaches but not really a migraine. He denies side effects and desires to continue Nurtec. The MRI brain, 2020 showed minimal chronic paranasal sinus disease. It showed minimal cerebellar tonsillar ectopia. Amitriptyline is contraindicated as he takes Paxil. Beta blockers are contraindicated due to side effects of fatigue and insomnia, as he has a sleep disorder. It is also contraindicated with history of depression.

## 2023-09-27 NOTE — PROGRESS NOTES
Closed  PA Detail   Other Case ID: [id number]      Payer:  [employer] Patient's Payer    [phone]    Member has an active PA on file which is expiring on 2024 and has 5 no. of fills remaining. View History    Medication Being Authorized     Rimegepant Sulfate (NURTEC) 75 MG TBDP    1 QOD for migraine prevention    Dispense: 16 tablet Refills: 11     Start: 2023      Class: Normal      This order has been released to its destination.    To be filled at: [place] [id number] [address] [facility] [phone]        Prior Authorization History for Rimegepant Sulfate (NURTEC) 75 MG TBDP    11 months ago Approved      Pharmacy Benefits     [address] (MEDIACT)    Covered:  Retail    Not covered:  Mail Order    Unknown:  Specialty, Long-Term Care   Member ID:  [id number]   Group ID:  [id number]   Group name:      [place] Pel[place]o:  [id number]   PCN:  Ayaan Cui    :  1970   Legal sex:  M   Address:  [address] [place] [address] [phone]

## 2023-09-27 NOTE — PATIENT INSTRUCTIONS
Patient education: Sleep apnea in adults       INTRODUCTION -- Normally during sleep, air moves through the throat and in and out of the lungs at a regular rhythm. In a person with sleep apnea, air movement is periodically diminished or stopped. There are two types of sleep apnea: obstructive sleep apnea and central sleep apnea. In obstructive sleep apnea, breathing is abnormal because of narrowing or closure of the throat. In central sleep apnea, breathing is abnormal because of a change in the breathing control and rhythm. Sleep apnea is a serious condition that can affect a person's ability to safely perform normal daily activities and can affect long term health. Approximately 25 percent of adults are at risk for sleep apnea of some degree. Men are more commonly affected than women. Other risk factors include middle and older age, being overweight or obese, and having a small mouth and throat. This topic review focuses on the most common type of sleep apnea in adults, obstructive sleep apnea (JANET). HOW SLEEP APNEA OCCURS -- The throat is surrounded by muscles that control the airway for speaking, swallowing, and breathing. During sleep, these muscles are less active, and this causes the throat to narrow. In most people, this narrowing does not affect breathing. In others, it can cause snoring, sometimes with reduced or completely blocked airflow. A completely blocked airway without airflow is called an obstructive apnea. Partial obstruction with diminished airflow is called a hypopnea. A person may have apnea and hypopnea during sleep. Insufficient breathing due to apnea or hypopnea causes oxygen levels to fall and carbon dioxide to rise. Because the airway is blocked, breathing faster or harder does not help to improve oxygen levels until the airway is reopened. Typically, the obstruction requires the person to awaken to activate the upper airway muscles.  Once the airway is opened, the person then

## 2023-09-28 DIAGNOSIS — E29.1 HYPOGONADISM MALE: Primary | ICD-10-CM

## 2023-09-28 RX ORDER — TESTOSTERONE GEL, 1% 10 MG/G
50 GEL TRANSDERMAL DAILY
Qty: 30 EACH | Refills: 0 | Status: SHIPPED | OUTPATIENT
Start: 2023-09-28 | End: 2023-10-28

## 2023-10-17 DIAGNOSIS — F41.9 ANXIETY: ICD-10-CM

## 2023-10-17 RX ORDER — PAROXETINE HYDROCHLORIDE 40 MG/1
TABLET, FILM COATED ORAL
Qty: 90 TABLET | Refills: 0 | Status: SHIPPED | OUTPATIENT
Start: 2023-10-17

## 2023-10-17 NOTE — TELEPHONE ENCOUNTER
Kenneth Vogel called to request a refill on his medication.       Last office visit : 8/24/2023   Next office visit : 12/19/2023     Requested Prescriptions     Pending Prescriptions Disp Refills    PARoxetine (PAXIL) 40 MG tablet [Pharmacy Med Name: PAROXETINE HCL 40 MG TABLET] 90 tablet 0     Sig: TAKE ONE TABLET IN THE MORNING            Wendie Patel LPN

## 2023-10-31 ENCOUNTER — HOSPITAL ENCOUNTER (OUTPATIENT)
Dept: CT IMAGING | Age: 53
Discharge: HOME | End: 2023-10-31
Payer: MEDICAID

## 2023-10-31 DIAGNOSIS — E29.1 HYPOGONADISM MALE: ICD-10-CM

## 2023-10-31 DIAGNOSIS — C85.10 LOW GRADE B-CELL LYMPHOMA (HCC): ICD-10-CM

## 2023-10-31 PROCEDURE — 71260 CT THORAX DX C+: CPT

## 2023-10-31 PROCEDURE — 74177 CT ABD & PELVIS W/CONTRAST: CPT

## 2023-10-31 PROCEDURE — 6360000004 HC RX CONTRAST MEDICATION: Performed by: INTERNAL MEDICINE

## 2023-10-31 RX ORDER — TESTOSTERONE GEL, 1% 10 MG/G
50 GEL TRANSDERMAL DAILY
Qty: 30 EACH | Refills: 0 | OUTPATIENT
Start: 2023-10-31 | End: 2023-11-30

## 2023-10-31 RX ORDER — TESTOSTERONE GEL, 1% 10 MG/G
50 GEL TRANSDERMAL DAILY
Qty: 150 G | Refills: 0 | Status: SHIPPED | OUTPATIENT
Start: 2023-10-31 | End: 2023-11-30

## 2023-10-31 RX ADMIN — IOPAMIDOL 75 ML: 755 INJECTION, SOLUTION INTRAVENOUS at 11:09

## 2023-11-07 ENCOUNTER — CLINICAL DOCUMENTATION (OUTPATIENT)
Dept: HEMATOLOGY | Age: 53
End: 2023-11-07

## 2023-11-07 NOTE — PROGRESS NOTES
Mr Jaquelin Hussein sent a message via [employer] stating \"I will no longer be a patient of [facility]\". I phoned patient today, 11/7/23 at 12:20 PM, following a message  he sent via [employer] to confirm his intentions. Paul Arriaga stated that he has chosen to cancel all future appointments with Dr Vidal Levin and does not ever desire to reschedule or be seen in this office. Mr Jaquelin Hussein stated that he was evaluated at  [facility] and moving forward will be managed there. [facility] records documented that Mr Jaquelin Hussein was evaluated for second opinion at [facility] with Dr Apolinar Choudhury on 9/14/23 and he has a follow up scheduled in 3 months, 12/21/20223.   All appointments have been cancelled at patient's request.

## 2023-12-04 DIAGNOSIS — E29.1 HYPOGONADISM MALE: ICD-10-CM

## 2023-12-04 LAB
ERYTHROCYTE [DISTWIDTH] IN BLOOD BY AUTOMATED COUNT: 12.8 % (ref 11.5–14.5)
ESTRADIOL SERPL-SCNC: 8.7 PG/ML
HCT VFR BLD AUTO: 39.3 % (ref 42–52)
HGB BLD-MCNC: 13.3 G/DL (ref 14–18)
MCH RBC QN AUTO: 30.9 PG (ref 27–31)
MCHC RBC AUTO-ENTMCNC: 33.8 G/DL (ref 33–37)
MCV RBC AUTO: 91.2 FL (ref 80–94)
PLATELET # BLD AUTO: 251 K/UL (ref 130–400)
PMV BLD AUTO: 8.9 FL (ref 9.4–12.4)
RBC # BLD AUTO: 4.31 M/UL (ref 4.7–6.1)
TESTOST SERPL-MCNC: 316.5 NG/DL (ref 193–740)
WBC # BLD AUTO: 5.5 K/UL (ref 4.8–10.8)

## 2023-12-05 ENCOUNTER — OFFICE VISIT (OUTPATIENT)
Dept: UROLOGY | Age: 53
End: 2023-12-05
Payer: MEDICAID

## 2023-12-05 VITALS — BODY MASS INDEX: 29.54 KG/M2 | WEIGHT: 188.2 LBS | TEMPERATURE: 99.3 F | HEIGHT: 67 IN

## 2023-12-05 DIAGNOSIS — N52.9 ERECTILE DYSFUNCTION, UNSPECIFIED ERECTILE DYSFUNCTION TYPE: ICD-10-CM

## 2023-12-05 DIAGNOSIS — R35.1 BENIGN PROSTATIC HYPERPLASIA WITH NOCTURIA: Primary | ICD-10-CM

## 2023-12-05 DIAGNOSIS — N40.1 BENIGN PROSTATIC HYPERPLASIA WITH NOCTURIA: Primary | ICD-10-CM

## 2023-12-05 DIAGNOSIS — E29.1 HYPOGONADISM MALE: ICD-10-CM

## 2023-12-05 LAB
APPEARANCE FLUID: CLEAR
BILIRUBIN, POC: NORMAL
BLOOD URINE, POC: NORMAL
CLARITY, POC: CLEAR
COLOR, POC: YELLOW
GLUCOSE URINE, POC: NORMAL
KETONES, POC: NORMAL
LEUKOCYTE EST, POC: NORMAL
NITRITE, POC: NORMAL
PH, POC: 7
PROTEIN, POC: NORMAL
SPECIFIC GRAVITY, POC: 1.02
UROBILINOGEN, POC: 0.2

## 2023-12-05 PROCEDURE — 4004F PT TOBACCO SCREEN RCVD TLK: CPT | Performed by: NURSE PRACTITIONER

## 2023-12-05 PROCEDURE — 3017F COLORECTAL CA SCREEN DOC REV: CPT | Performed by: NURSE PRACTITIONER

## 2023-12-05 PROCEDURE — G8427 DOCREV CUR MEDS BY ELIG CLIN: HCPCS | Performed by: NURSE PRACTITIONER

## 2023-12-05 PROCEDURE — G8417 CALC BMI ABV UP PARAM F/U: HCPCS | Performed by: NURSE PRACTITIONER

## 2023-12-05 PROCEDURE — 51798 US URINE CAPACITY MEASURE: CPT | Performed by: NURSE PRACTITIONER

## 2023-12-05 PROCEDURE — G8484 FLU IMMUNIZE NO ADMIN: HCPCS | Performed by: NURSE PRACTITIONER

## 2023-12-05 PROCEDURE — 81002 URINALYSIS NONAUTO W/O SCOPE: CPT | Performed by: NURSE PRACTITIONER

## 2023-12-05 PROCEDURE — 99214 OFFICE O/P EST MOD 30 MIN: CPT | Performed by: NURSE PRACTITIONER

## 2023-12-05 RX ORDER — TESTOSTERONE GEL, 1% 10 MG/G
50 GEL TRANSDERMAL DAILY
Qty: 30 EACH | Refills: 0 | Status: SHIPPED | OUTPATIENT
Start: 2023-12-05 | End: 2024-01-04

## 2023-12-05 RX ORDER — TAMSULOSIN HYDROCHLORIDE 0.4 MG/1
0.4 CAPSULE ORAL DAILY
Qty: 30 CAPSULE | Refills: 11 | Status: SHIPPED | OUTPATIENT
Start: 2023-12-05

## 2023-12-05 ASSESSMENT — ENCOUNTER SYMPTOMS
BACK PAIN: 0
NAUSEA: 0
ABDOMINAL PAIN: 0
ABDOMINAL DISTENTION: 0
VOMITING: 0

## 2023-12-11 DIAGNOSIS — K21.9 GASTROESOPHAGEAL REFLUX DISEASE WITHOUT ESOPHAGITIS: ICD-10-CM

## 2023-12-11 RX ORDER — OMEPRAZOLE 40 MG/1
40 CAPSULE, DELAYED RELEASE ORAL
Qty: 90 CAPSULE | Refills: 1 | Status: SHIPPED | OUTPATIENT
Start: 2023-12-11

## 2023-12-11 NOTE — TELEPHONE ENCOUNTER
Virginie Vogel called to request a refill on his medication.       Last office visit : 8/24/2023   Next office visit : 12/19/2023     Requested Prescriptions     Pending Prescriptions Disp Refills    omeprazole (PRILOSEC) 40 MG delayed release capsule [Pharmacy Med Name: OMEPRAZOLE DR 40 MG CAPSULE] 90 capsule 11     Sig: Take 1 capsule by mouth every morning (before breakfast)            Ani Carbajal LPN

## 2023-12-27 ENCOUNTER — TELEPHONE (OUTPATIENT)
Dept: UROLOGY | Age: 53
End: 2023-12-27

## 2023-12-28 DIAGNOSIS — N40.1 BENIGN PROSTATIC HYPERPLASIA WITH NOCTURIA: ICD-10-CM

## 2023-12-28 DIAGNOSIS — E29.1 HYPOGONADISM MALE: ICD-10-CM

## 2023-12-28 DIAGNOSIS — R35.1 BENIGN PROSTATIC HYPERPLASIA WITH NOCTURIA: ICD-10-CM

## 2023-12-28 LAB
ERYTHROCYTE [DISTWIDTH] IN BLOOD BY AUTOMATED COUNT: 13.4 % (ref 11.5–14.5)
HCT VFR BLD AUTO: 38.4 % (ref 42–52)
HGB BLD-MCNC: 13 G/DL (ref 14–18)
MCH RBC QN AUTO: 31.6 PG (ref 27–31)
MCHC RBC AUTO-ENTMCNC: 33.9 G/DL (ref 33–37)
MCV RBC AUTO: 93.2 FL (ref 80–94)
PLATELET # BLD AUTO: 235 K/UL (ref 130–400)
PMV BLD AUTO: 8.7 FL (ref 9.4–12.4)
PSA SERPL-MCNC: 2.78 NG/ML (ref 0–4)
RBC # BLD AUTO: 4.12 M/UL (ref 4.7–6.1)
TESTOST SERPL-MCNC: 429.4 NG/DL (ref 193–740)
WBC # BLD AUTO: 5.4 K/UL (ref 4.8–10.8)

## 2024-01-02 ENCOUNTER — OFFICE VISIT (OUTPATIENT)
Dept: UROLOGY | Age: 54
End: 2024-01-02
Payer: MEDICAID

## 2024-01-02 VITALS — HEIGHT: 67 IN | WEIGHT: 189 LBS | BODY MASS INDEX: 29.66 KG/M2 | TEMPERATURE: 98 F

## 2024-01-02 DIAGNOSIS — N40.1 BENIGN PROSTATIC HYPERPLASIA WITH NOCTURIA: ICD-10-CM

## 2024-01-02 DIAGNOSIS — E29.1 HYPOGONADISM MALE: ICD-10-CM

## 2024-01-02 DIAGNOSIS — R35.1 BENIGN PROSTATIC HYPERPLASIA WITH NOCTURIA: ICD-10-CM

## 2024-01-02 DIAGNOSIS — N52.9 ERECTILE DYSFUNCTION, UNSPECIFIED ERECTILE DYSFUNCTION TYPE: Primary | ICD-10-CM

## 2024-01-02 LAB
APPEARANCE FLUID: CLEAR
BILIRUBIN, POC: NORMAL
BLOOD URINE, POC: NORMAL
CLARITY, POC: CLEAR
COLOR, POC: YELLOW
GLUCOSE URINE, POC: NORMAL
KETONES, POC: NORMAL
LEUKOCYTE EST, POC: NORMAL
NITRITE, POC: NORMAL
PH, POC: 6.5
POST VOID RESIDUAL (PVR): 0 ML
PROTEIN, POC: NORMAL
SPECIFIC GRAVITY, POC: 1.03
UROBILINOGEN, POC: 0.2

## 2024-01-02 PROCEDURE — 3017F COLORECTAL CA SCREEN DOC REV: CPT | Performed by: NURSE PRACTITIONER

## 2024-01-02 PROCEDURE — 81002 URINALYSIS NONAUTO W/O SCOPE: CPT | Performed by: NURSE PRACTITIONER

## 2024-01-02 PROCEDURE — G8427 DOCREV CUR MEDS BY ELIG CLIN: HCPCS | Performed by: NURSE PRACTITIONER

## 2024-01-02 PROCEDURE — 4004F PT TOBACCO SCREEN RCVD TLK: CPT | Performed by: NURSE PRACTITIONER

## 2024-01-02 PROCEDURE — G8484 FLU IMMUNIZE NO ADMIN: HCPCS | Performed by: NURSE PRACTITIONER

## 2024-01-02 PROCEDURE — 51798 US URINE CAPACITY MEASURE: CPT | Performed by: NURSE PRACTITIONER

## 2024-01-02 PROCEDURE — G8417 CALC BMI ABV UP PARAM F/U: HCPCS | Performed by: NURSE PRACTITIONER

## 2024-01-02 PROCEDURE — 99214 OFFICE O/P EST MOD 30 MIN: CPT | Performed by: NURSE PRACTITIONER

## 2024-01-02 RX ORDER — CHOLECALCIFEROL (VITAMIN D3) 1250 MCG
CAPSULE ORAL
COMMUNITY
Start: 2023-12-05

## 2024-01-02 NOTE — PROGRESS NOTES
Caio Vogel is a 53 y.o. male who presents today   Chief Complaint   Patient presents with    Follow-up     I am here today for my 1 month BPH/testosterone follow up       Hypogonadism:  Patient is here today for symptoms of low testosterone which started 8 year(s) ago.  The patient's last testosterone level was:  Lab Results   Component Value Date    TESTOSTERONE 429.4 12/28/2023     Recently his hypogonadism symptoms: are worsening  Current medical Rx for hypogonadism: AndroGel 50 mg / 5 g 1% topically daily  His erections are present.  On Viagra  Difficulting maintaining erection? Yes.  On Viagra 50 mg working well  Sex drive/libido: normal  Energy level: At baseline  At last visit patient was only using topical testosterone once a week he felt this was may be making his lower urinary tract symptoms worse.  He has now increased this to daily use testosterone has improved 429.4.    BPH  At last visit patient was complaining of lower urinary tract symptoms frequency, urgency, nocturia 3 times per night, intermittency split stream, weak stream.  He did have a moderately enlarged prostate bladder scan was 44 mL PVR.  I placed him on Flomax he does report significant improvement in his lower urinary tract symptoms now with AUA score of 1/35 nocturia x 1.  He is pleased with his voiding symptoms.    Erectile dysfunction  Patient with history of erectile dysfunction.  At last visit I placed him on Viagra 50 mg.  I did tell him he could titrate this up to 100 mg as needed.  He has told me 50 mg has helped at times although there have been other times he is needed on 100 mg.  Denies any side effects this is working well for him.     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  Is now seen oncology in [place]       Past Medical History:   Diagnosis Date    Anemia     Anxiety     CPAP (continuous positive airway pressure) dependence     Depression     Herniated lumbar

## 2024-01-04 ASSESSMENT — ENCOUNTER SYMPTOMS
NAUSEA: 0
BACK PAIN: 0
ABDOMINAL PAIN: 0
ABDOMINAL DISTENTION: 0
VOMITING: 0

## 2024-01-09 DIAGNOSIS — F41.9 ANXIETY: ICD-10-CM

## 2024-01-09 RX ORDER — PAROXETINE HYDROCHLORIDE 40 MG/1
TABLET, FILM COATED ORAL
Qty: 30 TABLET | Refills: 0 | Status: SHIPPED | OUTPATIENT
Start: 2024-01-09

## 2024-01-09 NOTE — TELEPHONE ENCOUNTER
Caio Vogel called to request a refill on his medication.      Last office visit : 8/24/2023   Next office visit : 2/6/2024     Requested Prescriptions     Pending Prescriptions Disp Refills    PARoxetine (PAXIL) 40 MG tablet [Pharmacy Med Name: PAROXETINE HCL 40 MG TABLET] 90 tablet 0     Sig: TAKE ONE TABLET IN THE MORNING            Cici Beasley LPN

## 2024-01-10 ENCOUNTER — HOSPITAL ENCOUNTER (OUTPATIENT)
Dept: PHYSICAL THERAPY | Age: 54
Setting detail: THERAPIES SERIES
Discharge: HOME | End: 2024-01-10
Payer: MEDICAID

## 2024-01-10 PROCEDURE — 97162 PT EVAL MOD COMPLEX 30 MIN: CPT

## 2024-01-10 ASSESSMENT — PAIN DESCRIPTION - ORIENTATION: ORIENTATION: LEFT

## 2024-01-10 ASSESSMENT — PAIN SCALES - GENERAL: PAINLEVEL_OUTOF10: 3

## 2024-01-10 ASSESSMENT — PAIN DESCRIPTION - LOCATION: LOCATION: BACK

## 2024-01-10 ASSESSMENT — PAIN DESCRIPTION - DESCRIPTORS: DESCRIPTORS: DISCOMFORT;SORE

## 2024-01-10 NOTE — PROGRESS NOTES
Physical Therapy: Initial Evaluation    Patient: Caio Vogel (53 y.o. male)   Examination Date: 01/10/2024  Plan of Care Certification Period:1/10/2024 to 24      :  1970 ;    Confirmed: Yes MRN: [id number]  CSN: [id number]   Insurance: Payor: [employer] / Plan: [facility] / Product Type: *No Product type* /   Insurance ID: [id number] - (Medicaid Managed) Secondary Insurance (if applicable):    Referring Physician: Moris Quinones MD Narendra Singh, MD   PCP: Tianna Vázquez MD Visits to Date/Visits Approved:  (12 visits prescribed and anticipated for this diagnosis)    No Show/Cancelled Appts:   /       Medical Diagnosis: Other intervertebral disc degeneration, lumbar region [M51.36] lumbar DDD (M51.36), lumbar radiculopathy (M54.16)  Treatment Diagnosis:       PERTINENT MEDICAL HISTORY   Patient Assessed for Rehabilitation Services: Yes       Medical History: Chart Reviewed: Yes    Past Medical History:   Diagnosis Date    Anemia     Anxiety     CPAP (continuous positive airway pressure) dependence     Depression     Herniated lumbar intervertebral disc     Hyperglycemia 2021    Hypertriglyceridemia 10/16/2020    Kidney cysts     Liver cyst     Low testosterone     Lymphoma (HCC) 2021    Dr Garcia [facility]     Migraine     Neck pain     Obstructive sleep apnea     OCD (obsessive compulsive disorder)     Poison ivy 2021    Somnolence, daytime 2020    Thyroid disease     Witnessed apneic spells 2020     Surgical History:   Past Surgical History:   Procedure Laterality Date    APPENDECTOMY      COLONOSCOPY  approx 2017    Dr Hanley @ [facility] hospt.\" polyps\" per pt recall    COLONOSCOPY N/A 2020    Dr Lisa-Diverticulosis, internal hemorrhoids-Grade 1, 5 yr recall    HERNIA REPAIR      bih    THYROIDECTOMY      TOTAL    UPPER GASTROINTESTINAL ENDOSCOPY N/A 2021    Dr Lisa-Chemical gastritis (avoid

## 2024-01-15 ENCOUNTER — APPOINTMENT (OUTPATIENT)
Dept: PHYSICAL THERAPY | Age: 54
End: 2024-01-15
Payer: MEDICAID

## 2024-01-16 DIAGNOSIS — E29.1 HYPOGONADISM MALE: ICD-10-CM

## 2024-01-16 RX ORDER — TESTOSTERONE GEL, 1% 10 MG/G
50 GEL TRANSDERMAL DAILY
Qty: 150 G | Refills: 0 | Status: SHIPPED | OUTPATIENT
Start: 2024-01-16 | End: 2024-02-15

## 2024-01-17 ENCOUNTER — HOSPITAL ENCOUNTER (OUTPATIENT)
Dept: PHYSICAL THERAPY | Age: 54
Setting detail: THERAPIES SERIES
Discharge: HOME | End: 2024-01-17
Payer: MEDICAID

## 2024-01-17 PROCEDURE — 97110 THERAPEUTIC EXERCISES: CPT

## 2024-01-17 ASSESSMENT — PAIN SCALES - GENERAL: PAINLEVEL_OUTOF10: 2

## 2024-01-17 ASSESSMENT — PAIN DESCRIPTION - PAIN TYPE: TYPE: CHRONIC PAIN

## 2024-01-17 ASSESSMENT — PAIN DESCRIPTION - LOCATION: LOCATION: BACK

## 2024-01-17 ASSESSMENT — PAIN DESCRIPTION - DESCRIPTORS: DESCRIPTORS: ACHING;BURNING;SORE

## 2024-01-17 ASSESSMENT — PAIN DESCRIPTION - ORIENTATION: ORIENTATION: LEFT;LOWER

## 2024-01-17 NOTE — PROGRESS NOTES
Physical Therapy: Daily Note   Patient: Caio Vogel (53 y.o. male)   Examination Date: 2024  Plan of Care/Certification Expiration Date: 24    No data recorded   :  1970 # of Visits since SOC:   2   MRN: [id number]  CSN: [id number] Start of Care Date:   1/10/2024   Insurance: Payor: [employer] / Plan: [facility] / Product Type: *No Product type* /   Insurance ID: [id number] - (Medicaid Managed) Secondary Insurance (if applicable):    Referring Physician: Moris Quinones MD Narendra Singh, MD   PCP: Tianna Vázquez MD Visits to Date/Visits Approved:     No Show/Cancelled Appts:   /       Medical Diagnosis: Other intervertebral disc degeneration, lumbar region [M51.36]    No data recorded      SUBJECTIVE EXAMINATION   Pain Level: Pain Screening  Patient Currently in Pain: Yes  Pain Assessment: 0-10  Pain Level: 2  Pain Type: Chronic pain  Pain Location: Back  Pain Orientation: Left, Lower  Pain Descriptors: Aching, Burning, Sore    Patient Comments: Subjective: No changes since initial evaluation.  He does state that he feels the SI belt relieves his pain, but he has a slight increase in pain upon removing it.    OBJECTIVE EXAMINATION   Restrictions:  No data recorded No data recorded No data recorded      TREATMENT     Exercises:      Treatment Reasoning    Exercise 1: ++check leg length before and after SI mobs (left leg longer)  Exercise 2: supine lower trunk rotation to right--4 reps, 15 sec hold  Exercise 3: supine quadratus stretch to right--4 reps, 15 sec hold  Exercise 4: isometric resisted left hip extension from DKTC position--8 reps, 3-5 sec hold (towel)  Exercise 5: isometric resisted right hip flexion at 90/90 position--8 reps, 3-5 sec hold  Exercise 6: bilateral axial traction to legs--4 reps, 10 sec hold (check leg length)  Exercise 7: pelvic tilts with folded towel under sacrum--10 reps, 3 sec hold  Exercise 8: abdominal series holding pelvic tilt (arms x

## 2024-01-22 ENCOUNTER — HOSPITAL ENCOUNTER (OUTPATIENT)
Dept: PHYSICAL THERAPY | Age: 54
Setting detail: THERAPIES SERIES
Discharge: HOME | End: 2024-01-22
Payer: MEDICAID

## 2024-01-22 PROCEDURE — 97110 THERAPEUTIC EXERCISES: CPT

## 2024-01-22 ASSESSMENT — PAIN DESCRIPTION - DESCRIPTORS: DESCRIPTORS: ACHING;BURNING;SORE

## 2024-01-22 ASSESSMENT — PAIN DESCRIPTION - LOCATION: LOCATION: BACK

## 2024-01-22 ASSESSMENT — PAIN SCALES - GENERAL: PAINLEVEL_OUTOF10: 4

## 2024-01-22 ASSESSMENT — PAIN DESCRIPTION - PAIN TYPE: TYPE: CHRONIC PAIN

## 2024-01-22 ASSESSMENT — PAIN DESCRIPTION - ORIENTATION: ORIENTATION: LEFT;LOWER

## 2024-01-22 NOTE — PROGRESS NOTES
Physical Therapy: Daily Note   Patient: Caio Vogel (53 y.o. male)   Examination Date: 2024  Plan of Care/Certification Expiration Date: 24    No data recorded   :  1970 # of Visits since SOC:   3   MRN: [id number]  CSN: [id number] Start of Care Date:   1/10/2024   Insurance: Payor: [employer]ICAID / Plan: [facility] / Product Type: *No Product type* /   Insurance ID: [id number] - (Medicaid Managed) Secondary Insurance (if applicable):    Referring Physician: Moris Quinones MD Narendra Singh, MD   PCP: Tianna Vázquez MD Visits to Date/Visits Approved: 3 / 9    No Show/Cancelled Appts:   /       Medical Diagnosis: Other intervertebral disc degeneration, lumbar region [M51.36]    No data recorded      SUBJECTIVE EXAMINATION   Pain Level: Pain Screening  Patient Currently in Pain: Yes  Pain Assessment: 0-10  Pain Level: 4  Pain Type: Chronic pain  Pain Location: Back  Pain Orientation: Left, Lower  Pain Descriptors: Aching, Burning, Sore    Patient Comments: Subjective: Reports muscle soreness after previous visit which has lasted to a point up until today.  This is not severe in nature and he states it feels more like using muscles that haven't been used in awhile.    TREATMENT     Exercises:      Treatment Reasoning    Exercise 1: ++check leg length before and after SI mobs (left leg longer)  Exercise 2: supine lower trunk rotation to right--4 reps, 15 sec hold  Exercise 3: supine quadratus stretch to right--4 reps, 15 sec hold  Exercise 4: isometric resisted left hip extension from DKTC position--8 reps, 3-5 sec hold (towel)  Exercise 5: isometric resisted right hip flexion at 90/90 position--8 reps, 3-5 sec hold  Exercise 6: bilateral axial traction to legs--4 reps, 10 sec hold (check leg length)  Exercise 7: pelvic tilts with folded towel under sacrum--10 reps, 3 sec hold  Exercise 8: abdominal series holding pelvic tilt (arms x 5, legs x 5, arms/legs x 5)-- max cuing for

## 2024-01-24 ENCOUNTER — APPOINTMENT (OUTPATIENT)
Dept: PHYSICAL THERAPY | Age: 54
End: 2024-01-24
Payer: MEDICAID

## 2024-01-31 ENCOUNTER — APPOINTMENT (OUTPATIENT)
Dept: PHYSICAL THERAPY | Age: 54
End: 2024-01-31
Payer: MEDICAID

## 2024-02-14 ENCOUNTER — PATIENT MESSAGE (OUTPATIENT)
Dept: NEUROLOGY | Age: 54
End: 2024-02-14

## 2024-02-15 NOTE — TELEPHONE ENCOUNTER
Susan eKarney we may have found a work around to get your medication covered. I will keep you up dated on this.

## 2024-02-20 DIAGNOSIS — E29.1 HYPOGONADISM MALE: ICD-10-CM

## 2024-02-20 RX ORDER — TESTOSTERONE GEL, 1% 10 MG/G
50 GEL TRANSDERMAL DAILY
Qty: 150 G | Refills: 0 | Status: SHIPPED | OUTPATIENT
Start: 2024-02-20 | End: 2024-03-21

## 2024-03-15 DIAGNOSIS — N52.9 ERECTILE DYSFUNCTION, UNSPECIFIED ERECTILE DYSFUNCTION TYPE: ICD-10-CM

## 2024-03-15 RX ORDER — SILDENAFIL 50 MG/1
50 TABLET, FILM COATED ORAL DAILY PRN
Qty: 30 TABLET | Refills: 1 | Status: SHIPPED | OUTPATIENT
Start: 2024-03-15

## 2024-03-28 DIAGNOSIS — E29.1 HYPOGONADISM MALE: ICD-10-CM

## 2024-03-28 RX ORDER — TESTOSTERONE GEL, 1% 10 MG/G
50 GEL TRANSDERMAL DAILY
Qty: 150 G | Refills: 0 | Status: SHIPPED | OUTPATIENT
Start: 2024-03-28 | End: 2024-04-27

## 2024-04-03 ENCOUNTER — OFFICE VISIT (OUTPATIENT)
Dept: NEUROLOGY | Age: 54
End: 2024-04-03
Payer: MEDICAID

## 2024-04-03 VITALS
BODY MASS INDEX: 30.29 KG/M2 | SYSTOLIC BLOOD PRESSURE: 135 MMHG | WEIGHT: 193 LBS | DIASTOLIC BLOOD PRESSURE: 81 MMHG | HEIGHT: 67 IN | HEART RATE: 73 BPM | OXYGEN SATURATION: 95 %

## 2024-04-03 DIAGNOSIS — G43.011 INTRACTABLE MIGRAINE WITHOUT AURA AND WITH STATUS MIGRAINOSUS: ICD-10-CM

## 2024-04-03 DIAGNOSIS — Z99.89 CPAP (CONTINUOUS POSITIVE AIRWAY PRESSURE) DEPENDENCE: ICD-10-CM

## 2024-04-03 DIAGNOSIS — R40.0 SOMNOLENCE, DAYTIME: ICD-10-CM

## 2024-04-03 DIAGNOSIS — G47.33 OBSTRUCTIVE SLEEP APNEA: Primary | ICD-10-CM

## 2024-04-03 PROCEDURE — 4004F PT TOBACCO SCREEN RCVD TLK: CPT | Performed by: PHYSICIAN ASSISTANT

## 2024-04-03 PROCEDURE — 3017F COLORECTAL CA SCREEN DOC REV: CPT | Performed by: PHYSICIAN ASSISTANT

## 2024-04-03 PROCEDURE — G8417 CALC BMI ABV UP PARAM F/U: HCPCS | Performed by: PHYSICIAN ASSISTANT

## 2024-04-03 PROCEDURE — 99213 OFFICE O/P EST LOW 20 MIN: CPT | Performed by: PHYSICIAN ASSISTANT

## 2024-04-03 PROCEDURE — G8427 DOCREV CUR MEDS BY ELIG CLIN: HCPCS | Performed by: PHYSICIAN ASSISTANT

## 2024-04-03 NOTE — PROGRESS NOTES
REVIEW OF SYSTEMS    Constitutional: []Fever []Sweats []Chills [] Recent Injury [x] Denies all unless marked  HEENT:[]Headache  [] Head Injury [] Hearing Loss  [] Sore Throat  [] Ear Ache [x] Denies all unless marked  Spine:  [] Neck pain  [] Back pain  [] Sciaticia  [x] Denies all unless marked  Cardiovascular:[]Heart Disease []Palpitations [] Chest Pain   [x] Denies all unless marked  Pulmonary: []Shortness of Breath []Cough   [x] Denies all unless marked  Psychiatric/Behavioral:[] Depression [] Anxiety [x] Denies all unless marked  Gastrointestinal: []Nausea  []Vomiting  []Abdominal Pain  []Constipation  []Diarrhea  [x] Denies all unless marked  Genitourinary:   [] Frequency  [] Urgency  [] Dysuria [] Incontinence  [x] Denies all unless marked  Extremities: []Pain  []Swelling  [x] Denies all unless marked  Musculoskeletal: [] Myalgias  [] Joint Pain  [] Arthritis [] Muscle Cramps [] Muscle Twitches  [x] Denies all unless marked  Sleep: []Insomnia[x]Snoring []Restless Legs  [x]Sleep Apnea  [x]Daytime Sleepiness  [x] Denies all unless marked  Skin:[] Rash [] Color Change [x] Denies all unless marked   Neurological:[]Visual Disturbance [] Memory Loss []Loss of Balance []Slurred Speech []Weakness []Seizures  [] Dizziness [x] Denies all unless marked    .  
controlled     []  :  Worsening     []  :  Additional workup planned        Caio Vogel is a 53 y.o. year old male pt who presents with obstructive sleep apnea treated with PAP. Caio Vogel reports that he is doing well with no new signs or symptoms of obstructive sleep apnea. He has daytime somnolence that he relates to his chronic medical conditions.The ESS score is 14. He is able to sleep well with PAP. He notes compliance with therapy and the compliance report indicates compliance. He averages 9 hours of sleep and  PAP usage per night. He should continue PAP use q hs.       The migraines have stabilized with Nurtec 75 mg QOD. Continue present care.       Plan:     No orders of the defined types were placed in this encounter.      1.   Previously advised of the etiology,  pathophysiology, signs, symptoms, diagnosis, treatment options, and risks of untreated JANET. Risks may include, but are not limited to  hypertension, coronary artery disease, atrial fibrillation, CHF, diabetes, stroke, weight gain, impaired cognition, daytime somnolence, and motor vehicle accidents. Advised to abstain from driving or operating heavy machinery when drowsy and the use of respiratory suppressants. Reviewed treatment plan. Counseled on multimodal approach to treatment of sleep apnea to include but not limited to diet, exercise, sleep hygiene, and compliance with pap therapy, if indicated.  2.  Will continue to evaluate for PAP clinical benefit and compliance during a 30 day period within the preceding 90 days PRN.  3.  The following educational material has been included in this visit after visit summary for your review: JANET/PAP guidelines-Discussed with the patient and all questions fully answered.  4.  Continue PAP therapy. The patient voices understanding and recognizes the need for adherence to the prescribed therapy  5.  Continue Nurtec 75 mg  6.  Order-supplies-[facility]  7.  Follow up in 6 months to reassess

## 2024-04-06 PROBLEM — R40.0 SOMNOLENCE, DAYTIME: Status: ACTIVE | Noted: 2024-04-06

## 2024-05-09 DIAGNOSIS — E29.1 HYPOGONADISM MALE: ICD-10-CM

## 2024-05-09 RX ORDER — TESTOSTERONE GEL, 1% 10 MG/G
50 GEL TRANSDERMAL DAILY
Qty: 150 G | Refills: 0 | Status: SHIPPED | OUTPATIENT
Start: 2024-05-09 | End: 2024-06-08

## 2024-05-19 DIAGNOSIS — N52.9 ERECTILE DYSFUNCTION, UNSPECIFIED ERECTILE DYSFUNCTION TYPE: ICD-10-CM

## 2024-05-20 RX ORDER — SILDENAFIL 50 MG/1
50 TABLET, FILM COATED ORAL DAILY PRN
Qty: 30 TABLET | Refills: 1 | Status: SHIPPED | OUTPATIENT
Start: 2024-05-20

## 2024-06-05 ENCOUNTER — CLINICAL DOCUMENTATION (OUTPATIENT)
Dept: NEUROLOGY | Age: 54
End: 2024-06-05

## 2024-06-05 NOTE — PROGRESS NOTES
Caio Vogel (Key: [id number])  PA Case ID #: [id number]  Need Help? Call us at [phone]  Outcome  Approved today  The request has been approved. The authorization is effective from 06/05/2024 to 06/05/2025, as long as the member is enrolled in their current health plan. A written notification letter will follow with additional details.  Authorization Expiration Date: 6/4/2025  Drug  Nurtec 75MG dispersible tablets    Form  [employer] [place] Medicaid ePA Form 2017 NCPDP

## 2024-06-24 DIAGNOSIS — E29.1 HYPOGONADISM MALE: ICD-10-CM

## 2024-06-24 RX ORDER — TESTOSTERONE GEL, 1% 10 MG/G
GEL TRANSDERMAL
Qty: 150 G | Refills: 0 | Status: SHIPPED | OUTPATIENT
Start: 2024-06-24 | End: 2024-07-24

## 2024-06-27 DIAGNOSIS — E29.1 HYPOGONADISM MALE: ICD-10-CM

## 2024-06-27 LAB
HCT VFR BLD AUTO: 39.3 % (ref 42–52)
HGB BLD-MCNC: 13.6 G/DL (ref 14–18)
TESTOST SERPL-MCNC: 428.5 NG/DL (ref 193–740)

## 2024-07-10 ENCOUNTER — OFFICE VISIT (OUTPATIENT)
Dept: UROLOGY | Age: 54
End: 2024-07-10
Payer: MEDICAID

## 2024-07-10 VITALS — HEIGHT: 67 IN | BODY MASS INDEX: 30.42 KG/M2 | WEIGHT: 193.8 LBS | TEMPERATURE: 98.4 F

## 2024-07-10 DIAGNOSIS — N40.1 BENIGN PROSTATIC HYPERPLASIA WITH NOCTURIA: ICD-10-CM

## 2024-07-10 DIAGNOSIS — R35.1 BENIGN PROSTATIC HYPERPLASIA WITH NOCTURIA: ICD-10-CM

## 2024-07-10 DIAGNOSIS — E29.1 HYPOGONADISM MALE: Primary | ICD-10-CM

## 2024-07-10 DIAGNOSIS — N52.9 ERECTILE DYSFUNCTION, UNSPECIFIED ERECTILE DYSFUNCTION TYPE: ICD-10-CM

## 2024-07-10 LAB
APPEARANCE FLUID: CLEAR
BILIRUBIN, POC: NORMAL
BLOOD URINE, POC: NORMAL
CLARITY, POC: CLEAR
COLOR, POC: YELLOW
GLUCOSE URINE, POC: NORMAL
KETONES, POC: NORMAL
LEUKOCYTE EST, POC: NORMAL
NITRITE, POC: NORMAL
PH, POC: 7
POST VOID RESIDUAL (PVR): 0 ML
PROTEIN, POC: NORMAL
SPECIFIC GRAVITY, POC: 1.02
UROBILINOGEN, POC: 0.2

## 2024-07-10 PROCEDURE — G8427 DOCREV CUR MEDS BY ELIG CLIN: HCPCS | Performed by: NURSE PRACTITIONER

## 2024-07-10 PROCEDURE — 3017F COLORECTAL CA SCREEN DOC REV: CPT | Performed by: NURSE PRACTITIONER

## 2024-07-10 PROCEDURE — 81002 URINALYSIS NONAUTO W/O SCOPE: CPT | Performed by: NURSE PRACTITIONER

## 2024-07-10 PROCEDURE — 99214 OFFICE O/P EST MOD 30 MIN: CPT | Performed by: NURSE PRACTITIONER

## 2024-07-10 PROCEDURE — 4004F PT TOBACCO SCREEN RCVD TLK: CPT | Performed by: NURSE PRACTITIONER

## 2024-07-10 PROCEDURE — G8417 CALC BMI ABV UP PARAM F/U: HCPCS | Performed by: NURSE PRACTITIONER

## 2024-07-10 PROCEDURE — 51798 US URINE CAPACITY MEASURE: CPT | Performed by: NURSE PRACTITIONER

## 2024-07-10 RX ORDER — TESTOSTERONE GEL, 1% 10 MG/G
GEL TRANSDERMAL
Qty: 150 G | Refills: 0 | Status: SHIPPED | OUTPATIENT
Start: 2024-07-10 | End: 2024-08-12

## 2024-07-10 RX ORDER — TAMSULOSIN HYDROCHLORIDE 0.4 MG/1
0.4 CAPSULE ORAL DAILY
Qty: 90 CAPSULE | Refills: 3 | Status: SHIPPED | OUTPATIENT
Start: 2024-07-10

## 2024-07-10 ASSESSMENT — ENCOUNTER SYMPTOMS
ABDOMINAL PAIN: 0
BACK PAIN: 0
VOMITING: 0
NAUSEA: 0
ABDOMINAL DISTENTION: 0

## 2024-07-10 NOTE — PROGRESS NOTES
Appearance: Normal appearance. He is not ill-appearing.   Pulmonary:      Effort: Pulmonary effort is normal. No respiratory distress.   Abdominal:      General: There is no distension.      Tenderness: There is no abdominal tenderness. There is no right CVA tenderness or left CVA tenderness.   Neurological:      Mental Status: He is alert and oriented to person, place, and time. Mental status is at baseline.   Psychiatric:         Mood and Affect: Mood normal.         Behavior: Behavior normal.       DATA:    Results for orders placed or performed in visit on 07/10/24   POCT Urinalysis no Micro   Result Value Ref Range    Color, UA YELLOW     Clarity, UA CLEAR     Glucose, UA POC NEG     Bilirubin, UA NEG     Ketones, UA NEG     Spec Grav, UA 1.020     Blood, UA POC NEG     pH, UA 7     Protein, UA POC NEG     Urobilinogen, UA 0.2     Leukocytes, UA NEG     Nitrite, UA NEG     Appearance, Fluid Clear Clear, Slightly Cloudy   VA Measure, post-void residual, US, non-imaging   Result Value Ref Range    post void residual 0 ml     Lab Results   Component Value Date    PSA 2.78 12/28/2023    PSA 1.26 09/25/2023    PSA 1.71 08/26/2022       1. Hypogonadism male  Patient doing well on TRT.  He does have some mild fatigue although underlying health conditions worsening this fatigue.  Overall improved libido.  Continue current regimen with testosterone gel.  Follow-up in 6 months with testosterone and PSA labs.    - VA Measure, post-void residual, US, non-imaging  - Testosterone; Future  - PSA, Diagnostic; Future  - POCT Urinalysis no Micro  - testosterone (ANDROGEL; TESTIM) 50 MG/5GM (1%) GEL 1% gel; APPLY TRANSDERMALLY 50MG (1 TUBE) DAILY  Dispense: 150 g; Refill: 0    2. Benign prostatic hyperplasia with nocturia  Voiding well with Flomax we will continue his current regimen and send in refills today.  Bladder scan is 0.    - VA Measure, post-void residual, US, non-imaging  - tamsulosin (FLOMAX) 0.4 MG capsule; Take 1

## 2024-08-02 DIAGNOSIS — R11.0 NAUSEA: ICD-10-CM

## 2024-08-02 RX ORDER — ONDANSETRON 4 MG/1
TABLET, FILM COATED ORAL
Qty: 30 TABLET | Refills: 11 | OUTPATIENT
Start: 2024-08-02

## 2024-08-06 DIAGNOSIS — E29.1 HYPOGONADISM MALE: ICD-10-CM

## 2024-08-06 RX ORDER — TESTOSTERONE GEL, 1% 10 MG/G
GEL TRANSDERMAL
Qty: 150 G | Refills: 0 | Status: SHIPPED | OUTPATIENT
Start: 2024-08-06 | End: 2024-09-06

## 2024-08-11 DIAGNOSIS — N52.9 ERECTILE DYSFUNCTION, UNSPECIFIED ERECTILE DYSFUNCTION TYPE: ICD-10-CM

## 2024-08-12 RX ORDER — SILDENAFIL 50 MG/1
50 TABLET, FILM COATED ORAL DAILY PRN
Qty: 30 TABLET | Refills: 1 | Status: SHIPPED | OUTPATIENT
Start: 2024-08-12

## 2024-09-09 DIAGNOSIS — E29.1 HYPOGONADISM MALE: ICD-10-CM

## 2024-09-09 RX ORDER — TESTOSTERONE GEL, 1% 10 MG/G
GEL TRANSDERMAL
Qty: 150 G | Refills: 0 | Status: SHIPPED | OUTPATIENT
Start: 2024-09-09 | End: 2024-10-10

## 2024-09-30 DIAGNOSIS — N52.9 ERECTILE DYSFUNCTION, UNSPECIFIED ERECTILE DYSFUNCTION TYPE: ICD-10-CM

## 2024-09-30 RX ORDER — SILDENAFIL 50 MG/1
50 TABLET, FILM COATED ORAL DAILY PRN
Qty: 30 TABLET | Refills: 1 | Status: SHIPPED | OUTPATIENT
Start: 2024-09-30 | End: 2024-10-04 | Stop reason: SDUPTHER

## 2024-10-03 ENCOUNTER — TELEPHONE (OUTPATIENT)
Dept: UROLOGY | Age: 54
End: 2024-10-03

## 2024-10-03 NOTE — TELEPHONE ENCOUNTER
Pt called to have prescription sent to [facility] in [place], [place]. Phone: [phone]. Please advise.

## 2024-10-04 DIAGNOSIS — N52.9 ERECTILE DYSFUNCTION, UNSPECIFIED ERECTILE DYSFUNCTION TYPE: ICD-10-CM

## 2024-10-04 RX ORDER — SILDENAFIL 50 MG/1
50 TABLET, FILM COATED ORAL DAILY PRN
Qty: 30 TABLET | Refills: 1 | Status: SHIPPED | OUTPATIENT
Start: 2024-10-04

## 2024-10-05 DIAGNOSIS — N52.9 ERECTILE DYSFUNCTION, UNSPECIFIED ERECTILE DYSFUNCTION TYPE: ICD-10-CM

## 2024-10-06 RX ORDER — SILDENAFIL 50 MG/1
TABLET, FILM COATED ORAL
Qty: 30 TABLET | Refills: 1 | OUTPATIENT
Start: 2024-10-06

## 2024-10-07 DIAGNOSIS — G43.011 INTRACTABLE MIGRAINE WITHOUT AURA AND WITH STATUS MIGRAINOSUS: Primary | ICD-10-CM

## 2024-10-07 DIAGNOSIS — E29.1 HYPOGONADISM MALE: ICD-10-CM

## 2024-10-07 RX ORDER — TESTOSTERONE GEL, 1% 10 MG/G
GEL TRANSDERMAL
Qty: 150 G | Refills: 0 | Status: SHIPPED | OUTPATIENT
Start: 2024-10-07 | End: 2024-11-07

## 2024-10-07 RX ORDER — RIMEGEPANT SULFATE 75 MG/75MG
TABLET, ORALLY DISINTEGRATING ORAL
Qty: 16 TABLET | Refills: 10 | Status: SHIPPED | OUTPATIENT
Start: 2024-10-07

## 2024-10-07 NOTE — TELEPHONE ENCOUNTER
Requested Prescriptions     Pending Prescriptions Disp Refills    rimegepant sulfate (NURTEC) 75 MG TBDP [Pharmacy Med Name: NURTEC 75 MG TBDP 75 Tablet] 16 tablet 10     Sig: TAKE ONE TABLET BY MOUTH EVERY OTHER DAY FOR MIGRAINE PREVENTION       Last Office Visit: 4/3/2024  Next Office Visit: Visit date not found  Last Medication Refill: 9/27/2023 with 11 RF

## 2024-10-25 RX ORDER — ONDANSETRON 4 MG/1
TABLET, ORALLY DISINTEGRATING ORAL
Qty: 30 TABLET | Refills: 0 | OUTPATIENT
Start: 2024-10-25

## 2024-11-04 DIAGNOSIS — E29.1 HYPOGONADISM MALE: ICD-10-CM

## 2024-11-04 RX ORDER — TESTOSTERONE GEL, 1% 10 MG/G
GEL TRANSDERMAL
Qty: 150 G | Refills: 0 | Status: SHIPPED | OUTPATIENT
Start: 2024-11-04 | End: 2024-12-04

## 2024-11-05 DIAGNOSIS — N52.9 ERECTILE DYSFUNCTION, UNSPECIFIED ERECTILE DYSFUNCTION TYPE: ICD-10-CM

## 2024-11-05 RX ORDER — SILDENAFIL 50 MG/1
50 TABLET, FILM COATED ORAL DAILY PRN
Qty: 30 TABLET | Refills: 1 | Status: SHIPPED | OUTPATIENT
Start: 2024-11-05

## 2024-11-26 DIAGNOSIS — N52.9 ERECTILE DYSFUNCTION, UNSPECIFIED ERECTILE DYSFUNCTION TYPE: ICD-10-CM

## 2024-11-26 DIAGNOSIS — E29.1 HYPOGONADISM MALE: ICD-10-CM

## 2024-11-26 RX ORDER — TESTOSTERONE GEL, 1% 10 MG/G
GEL TRANSDERMAL
Qty: 150 G | Refills: 0 | Status: SHIPPED | OUTPATIENT
Start: 2024-11-26 | End: 2024-12-26

## 2024-11-27 RX ORDER — SILDENAFIL 50 MG/1
50 TABLET, FILM COATED ORAL DAILY PRN
Qty: 30 TABLET | Refills: 5 | Status: SHIPPED | OUTPATIENT
Start: 2024-11-27

## 2024-12-11 ENCOUNTER — TELEPHONE (OUTPATIENT)
Dept: UROLOGY | Age: 54
End: 2024-12-11

## 2024-12-11 DIAGNOSIS — N52.9 ERECTILE DYSFUNCTION, UNSPECIFIED ERECTILE DYSFUNCTION TYPE: ICD-10-CM

## 2024-12-11 DIAGNOSIS — R35.1 BENIGN PROSTATIC HYPERPLASIA WITH NOCTURIA: ICD-10-CM

## 2024-12-11 DIAGNOSIS — N40.1 BENIGN PROSTATIC HYPERPLASIA WITH NOCTURIA: ICD-10-CM

## 2024-12-11 RX ORDER — SILDENAFIL 50 MG/1
50 TABLET, FILM COATED ORAL DAILY PRN
Qty: 30 TABLET | Refills: 5 | Status: SHIPPED | OUTPATIENT
Start: 2024-12-11

## 2024-12-11 RX ORDER — TAMSULOSIN HYDROCHLORIDE 0.4 MG/1
0.4 CAPSULE ORAL DAILY
Qty: 90 CAPSULE | Refills: 3 | Status: SHIPPED | OUTPATIENT
Start: 2024-12-11

## 2024-12-11 NOTE — TELEPHONE ENCOUNTER
Caio is requesting a refill of their   Requested Prescriptions      No prescriptions requested or ordered in this encounter   . Please advise.    sildenafil (VIAGRA) 50 MG tablet       tamsulosin (FLOMAX) 0.4 MG capsule      Last Appt:  7/10/2024  Next Appt:   1/13/2025  Preferred pharmacy: Medicare pharmacy   [address] [place], [place] [address]     # [phone]

## 2024-12-31 DIAGNOSIS — N52.9 ERECTILE DYSFUNCTION, UNSPECIFIED ERECTILE DYSFUNCTION TYPE: ICD-10-CM

## 2024-12-31 RX ORDER — SILDENAFIL 50 MG/1
50 TABLET, FILM COATED ORAL DAILY PRN
Qty: 30 TABLET | Refills: 1 | OUTPATIENT
Start: 2024-12-31

## 2025-01-03 DIAGNOSIS — N52.9 ERECTILE DYSFUNCTION, UNSPECIFIED ERECTILE DYSFUNCTION TYPE: ICD-10-CM

## 2025-01-03 RX ORDER — SILDENAFIL 50 MG/1
50 TABLET, FILM COATED ORAL DAILY PRN
Qty: 30 TABLET | Refills: 5 | OUTPATIENT
Start: 2025-01-03

## 2025-01-04 DIAGNOSIS — R35.1 BENIGN PROSTATIC HYPERPLASIA WITH NOCTURIA: ICD-10-CM

## 2025-01-04 DIAGNOSIS — N40.1 BENIGN PROSTATIC HYPERPLASIA WITH NOCTURIA: ICD-10-CM

## 2025-01-07 DIAGNOSIS — R35.1 BENIGN PROSTATIC HYPERPLASIA WITH NOCTURIA: ICD-10-CM

## 2025-01-07 DIAGNOSIS — N40.1 BENIGN PROSTATIC HYPERPLASIA WITH NOCTURIA: ICD-10-CM

## 2025-01-07 RX ORDER — TAMSULOSIN HYDROCHLORIDE 0.4 MG/1
0.4 CAPSULE ORAL DAILY
Qty: 90 CAPSULE | Refills: 3 | Status: SHIPPED | OUTPATIENT
Start: 2025-01-07 | End: 2025-01-07 | Stop reason: SDUPTHER

## 2025-01-07 RX ORDER — TAMSULOSIN HYDROCHLORIDE 0.4 MG/1
0.4 CAPSULE ORAL DAILY
Qty: 90 CAPSULE | Refills: 3 | Status: SHIPPED | OUTPATIENT
Start: 2025-01-07

## 2025-01-09 ENCOUNTER — TELEPHONE (OUTPATIENT)
Dept: UROLOGY | Age: 55
End: 2025-01-09

## 2025-01-13 DIAGNOSIS — E29.1 HYPOGONADISM MALE: ICD-10-CM

## 2025-01-13 LAB
PSA SERPL-MCNC: 1.16 NG/ML (ref 0–4)
TESTOST SERPL-MCNC: 351.3 NG/DL (ref 193–740)

## 2025-01-16 ENCOUNTER — OFFICE VISIT (OUTPATIENT)
Dept: UROLOGY | Age: 55
End: 2025-01-16
Payer: MEDICAID

## 2025-01-16 VITALS — HEIGHT: 67 IN | BODY MASS INDEX: 30.45 KG/M2 | WEIGHT: 194 LBS | TEMPERATURE: 98 F

## 2025-01-16 DIAGNOSIS — E29.1 HYPOGONADISM MALE: ICD-10-CM

## 2025-01-16 DIAGNOSIS — R35.1 BENIGN PROSTATIC HYPERPLASIA WITH NOCTURIA: Primary | ICD-10-CM

## 2025-01-16 DIAGNOSIS — N40.1 BENIGN PROSTATIC HYPERPLASIA WITH NOCTURIA: Primary | ICD-10-CM

## 2025-01-16 DIAGNOSIS — K42.9 UMBILICAL HERNIA WITHOUT OBSTRUCTION AND WITHOUT GANGRENE: ICD-10-CM

## 2025-01-16 DIAGNOSIS — N52.9 ERECTILE DYSFUNCTION, UNSPECIFIED ERECTILE DYSFUNCTION TYPE: ICD-10-CM

## 2025-01-16 LAB
APPEARANCE FLUID: CLEAR
BILIRUBIN, POC: NORMAL
BLOOD URINE, POC: NORMAL
CLARITY, POC: CLEAR
COLOR, POC: YELLOW
GLUCOSE URINE, POC: NORMAL MG/DL
KETONES, POC: NORMAL MG/DL
LEUKOCYTE EST, POC: NORMAL
NITRITE, POC: NORMAL
PH, POC: 6
PROTEIN, POC: NORMAL MG/DL
SPECIFIC GRAVITY, POC: 1.02
UROBILINOGEN, POC: 0.2 MG/DL

## 2025-01-16 PROCEDURE — 99214 OFFICE O/P EST MOD 30 MIN: CPT | Performed by: NURSE PRACTITIONER

## 2025-01-16 PROCEDURE — 4004F PT TOBACCO SCREEN RCVD TLK: CPT | Performed by: NURSE PRACTITIONER

## 2025-01-16 PROCEDURE — 3017F COLORECTAL CA SCREEN DOC REV: CPT | Performed by: NURSE PRACTITIONER

## 2025-01-16 PROCEDURE — G8417 CALC BMI ABV UP PARAM F/U: HCPCS | Performed by: NURSE PRACTITIONER

## 2025-01-16 PROCEDURE — 81002 URINALYSIS NONAUTO W/O SCOPE: CPT | Performed by: NURSE PRACTITIONER

## 2025-01-16 PROCEDURE — G8427 DOCREV CUR MEDS BY ELIG CLIN: HCPCS | Performed by: NURSE PRACTITIONER

## 2025-01-16 RX ORDER — ATORVASTATIN CALCIUM 10 MG/1
TABLET, FILM COATED ORAL
COMMUNITY
Start: 2025-01-09

## 2025-01-16 RX ORDER — TADALAFIL 5 MG/1
5 TABLET ORAL DAILY
Qty: 30 TABLET | Refills: 11 | Status: SHIPPED | OUTPATIENT
Start: 2025-01-16

## 2025-01-16 NOTE — PROGRESS NOTES
Caio Vogel is a 54 y.o. male who presents today   Chief Complaint   Patient presents with    Follow-up     I am here today for my 6 month follow up. My labs are done.        Hypogonadism:  Patient is here today for symptoms of low testosterone which started 8 year(s) ago.  The patient's last testosterone level was:  Lab Results   Component Value Date    TESTOSTERONE 351.3 01/13/2025     Lab Results   Component Value Date    PSA 1.16 01/13/2025     Recently his hypogonadism symptoms: are improving   Current medical Rx for hypogonadism: AndroGel 50 mg / 5 g 1% topically daily  His erections are present.  On Viagra  Difficulting maintaining erection? Yes.  On Viagra 50 mg he is taking up to 100 mg does not work as well as he would like it to.  He is currently maintained on Paxil.  Sex drive/libido: normal  Energy level: At baseline     BPH  Patient with history of BPH currently maintained on Flomax 0.4 mg daily and overall doing well.  AUA score today 9/35 Main complaints of frequency, intermittency, urgency, weak stream, nocturia x 1.  Denies any incomplete emptying or straining.  Bother score of 1, pleased with his overall quality life.       Erectile dysfunction  Patient with a history of ED currently maintained on sildenafil 50 mg he has increase this up to 100 mg with really no change in his erection.       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  Is now seen oncology in [place]     Umbilical hernia  Patient is complaining of an abdominal hernia around his umbilicus.  He states when he has a bowel movement occasionally he will have pain at the umbilicus and his hernia seems to protrude more.  Tonny had a previous CT done at [facility] impression reading a fat-containing periumbilical hernia.  He does have a history of bilateral inguinal hernia repair.  He is wanting to get his umbilical hernia repaired.      Past Medical History:   Diagnosis Date    Anemia     Anxiety

## 2025-01-17 ASSESSMENT — ENCOUNTER SYMPTOMS
NAUSEA: 0
ABDOMINAL PAIN: 0
BACK PAIN: 0
VOMITING: 0
ABDOMINAL DISTENTION: 0

## 2025-02-04 DIAGNOSIS — E29.1 HYPOGONADISM MALE: ICD-10-CM

## 2025-02-04 RX ORDER — TESTOSTERONE GEL, 1% 10 MG/G
GEL TRANSDERMAL
Qty: 150 G | Refills: 0 | Status: SHIPPED | OUTPATIENT
Start: 2025-02-04 | End: 2025-03-04

## 2025-03-03 DIAGNOSIS — E29.1 HYPOGONADISM MALE: ICD-10-CM

## 2025-03-03 DIAGNOSIS — N52.9 ERECTILE DYSFUNCTION, UNSPECIFIED ERECTILE DYSFUNCTION TYPE: ICD-10-CM

## 2025-03-03 RX ORDER — TESTOSTERONE GEL, 1% 10 MG/G
GEL TRANSDERMAL
Qty: 150 G | Refills: 0 | Status: SHIPPED | OUTPATIENT
Start: 2025-03-03 | End: 2025-04-03

## 2025-03-03 RX ORDER — SILDENAFIL 50 MG/1
50 TABLET, FILM COATED ORAL DAILY PRN
Qty: 30 TABLET | Refills: 3 | Status: SHIPPED | OUTPATIENT
Start: 2025-03-03

## 2025-03-17 DIAGNOSIS — N52.9 ERECTILE DYSFUNCTION, UNSPECIFIED ERECTILE DYSFUNCTION TYPE: ICD-10-CM

## 2025-03-17 RX ORDER — SILDENAFIL 50 MG/1
50 TABLET, FILM COATED ORAL DAILY PRN
Qty: 30 TABLET | Refills: 3 | OUTPATIENT
Start: 2025-03-17

## 2025-04-08 DIAGNOSIS — E29.1 HYPOGONADISM MALE: ICD-10-CM

## 2025-04-09 RX ORDER — TESTOSTERONE GEL, 1% 10 MG/G
GEL TRANSDERMAL
Qty: 150 G | Refills: 0 | Status: SHIPPED | OUTPATIENT
Start: 2025-04-09 | End: 2025-05-09

## 2025-05-23 DIAGNOSIS — E29.1 HYPOGONADISM MALE: ICD-10-CM

## 2025-05-23 RX ORDER — TESTOSTERONE GEL, 1% 10 MG/G
GEL TRANSDERMAL
Qty: 150 G | Refills: 0 | Status: SHIPPED | OUTPATIENT
Start: 2025-05-23 | End: 2025-06-23

## 2025-06-02 DIAGNOSIS — N52.9 ERECTILE DYSFUNCTION, UNSPECIFIED ERECTILE DYSFUNCTION TYPE: ICD-10-CM

## 2025-06-02 RX ORDER — SILDENAFIL 50 MG/1
TABLET, FILM COATED ORAL
Qty: 30 TABLET | Refills: 1 | Status: SHIPPED | OUTPATIENT
Start: 2025-06-02

## 2025-06-10 ENCOUNTER — OFFICE VISIT (OUTPATIENT)
Dept: NEUROLOGY | Age: 55
End: 2025-06-10
Payer: MEDICAID

## 2025-06-10 VITALS
OXYGEN SATURATION: 99 % | SYSTOLIC BLOOD PRESSURE: 114 MMHG | WEIGHT: 190 LBS | DIASTOLIC BLOOD PRESSURE: 77 MMHG | HEIGHT: 67 IN | HEART RATE: 74 BPM | BODY MASS INDEX: 29.82 KG/M2

## 2025-06-10 DIAGNOSIS — Z99.89 CPAP (CONTINUOUS POSITIVE AIRWAY PRESSURE) DEPENDENCE: ICD-10-CM

## 2025-06-10 DIAGNOSIS — Z79.899 MEDICATION MANAGEMENT: ICD-10-CM

## 2025-06-10 DIAGNOSIS — G43.011 INTRACTABLE MIGRAINE WITHOUT AURA AND WITH STATUS MIGRAINOSUS: ICD-10-CM

## 2025-06-10 DIAGNOSIS — G47.33 OBSTRUCTIVE SLEEP APNEA: Primary | ICD-10-CM

## 2025-06-10 PROCEDURE — 3017F COLORECTAL CA SCREEN DOC REV: CPT | Performed by: PHYSICIAN ASSISTANT

## 2025-06-10 PROCEDURE — 99213 OFFICE O/P EST LOW 20 MIN: CPT | Performed by: PHYSICIAN ASSISTANT

## 2025-06-10 PROCEDURE — G8427 DOCREV CUR MEDS BY ELIG CLIN: HCPCS | Performed by: PHYSICIAN ASSISTANT

## 2025-06-10 PROCEDURE — G8417 CALC BMI ABV UP PARAM F/U: HCPCS | Performed by: PHYSICIAN ASSISTANT

## 2025-06-10 PROCEDURE — 4004F PT TOBACCO SCREEN RCVD TLK: CPT | Performed by: PHYSICIAN ASSISTANT

## 2025-06-10 RX ORDER — RIMEGEPANT SULFATE 75 MG/75MG
TABLET, ORALLY DISINTEGRATING ORAL
Qty: 16 TABLET | Refills: 10 | Status: SHIPPED | OUTPATIENT
Start: 2025-06-10

## 2025-06-10 NOTE — PATIENT INSTRUCTIONS
reports, additional testing, and face-to-face  clinical evaluation subsequent to any treatment, changes in treatment, and continued treatment.     Caution:  Please abstain from driving or engaging in other activities which may be hazardous in the presence of diminished alertness or daytime drowsiness. And avoid the use of sedatives or alcohol, which can worsen sleep apnea and daytime drowsiness.       Mask suggestions:  -     Resmed Airfit N20 (Nasal) or F20 (Full face mask).  They conform to your face, thus decreasing the potential for mask leakage. You might like the AirTouch F20(full face mask).  It has a \"memory foam\" like cushion. The AirFit F30 is a smaller style full face mask designed to sit low on and cover less of your face for fewer facial marks. AirFit N30i has a top of the head tube with a nasal mask. AirFit P10 reported to be the most comfortable nasal pillow mask. Resmed Mirage FX reported to be the most comfortable nasal mask. Resmed Mirage Benson reported to be the most comfortable hybrid mask. AirTouch N20-memory foam nasal mask.    Respironics: You might also like to try a nasal mask called a Dreamwear nasal mask or the Dreamwear nasal pillow. Another suggestion is the Sepideh View, it is a minimal contact full face mask.  The Sepideh View's incredible under the nose design makes it the only full face mask that won't cause red marks on the bridge of your nose when compared to other full face masks. The Dreamwear full face mask has a  soft feel, unique in-frame air-flow, and innovative air tube connection at the top of the head for the ultimate in sleep comfort. Comfort Gel Blue. Dreamwear gel pillows.    [employer]: Brevida nasal pillow mask and Simplus FFM    The use of a memory foam CPAP pillow supports the head and neck throughout the night.
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
## 2025-06-11 ENCOUNTER — TELEPHONE (OUTPATIENT)
Dept: NEUROLOGY | Age: 55
End: 2025-06-11

## 2025-06-11 NOTE — TELEPHONE ENCOUNTER
LILY TOLBERT (Ortiz: [id number])  PA Case ID #: [id number]  Rx #: [id number]  Need Help? Call us at [phone]  Outcome  Approved today by [place] [employer] 2017  The request has been approved. The authorization is effective from 06/11/2025 to 06/11/2026, as long as the member is enrolled in their current health plan. A written notification letter will follow with additional details.  Effective Date: 6/11/2025  Authorization Expiration Date: 6/11/2026  Drug  Nurtec 75MG dispersible tablets    Form  Med[employer] [place] Medicaid ePA Form 2017 NCPDP  Original Claim Info  75

## 2025-07-03 DIAGNOSIS — E29.1 HYPOGONADISM MALE: ICD-10-CM

## 2025-07-03 RX ORDER — TESTOSTERONE GEL, 1% 10 MG/G
GEL TRANSDERMAL
Qty: 150 EACH | Refills: 0 | Status: SHIPPED | OUTPATIENT
Start: 2025-07-03 | End: 2025-08-03

## 2025-07-14 DIAGNOSIS — E29.1 HYPOGONADISM MALE: ICD-10-CM

## 2025-07-14 LAB — TESTOST SERPL-MCNC: 213 NG/DL (ref 193–740)

## 2025-07-16 ENCOUNTER — OFFICE VISIT (OUTPATIENT)
Dept: UROLOGY | Age: 55
End: 2025-07-16
Payer: MEDICAID

## 2025-07-16 VITALS — HEIGHT: 67 IN | WEIGHT: 182 LBS | TEMPERATURE: 99.9 F | BODY MASS INDEX: 28.56 KG/M2

## 2025-07-16 DIAGNOSIS — N40.1 BENIGN PROSTATIC HYPERPLASIA WITH NOCTURIA: ICD-10-CM

## 2025-07-16 DIAGNOSIS — E29.1 HYPOGONADISM MALE: ICD-10-CM

## 2025-07-16 DIAGNOSIS — R35.1 BENIGN PROSTATIC HYPERPLASIA WITH NOCTURIA: ICD-10-CM

## 2025-07-16 DIAGNOSIS — N52.9 ERECTILE DYSFUNCTION, UNSPECIFIED ERECTILE DYSFUNCTION TYPE: Primary | ICD-10-CM

## 2025-07-16 LAB
APPEARANCE FLUID: CLEAR
BILIRUBIN, POC: NORMAL
BLOOD URINE, POC: NORMAL
CLARITY, POC: CLEAR
COLOR, POC: YELLOW
GLUCOSE URINE, POC: NORMAL MG/DL
KETONES, POC: NORMAL MG/DL
LEUKOCYTE EST, POC: NORMAL
NITRITE, POC: NORMAL
PH, POC: 5.5
PROTEIN, POC: NORMAL MG/DL
SPECIFIC GRAVITY, POC: 1.02
UROBILINOGEN, POC: 0.2 MG/DL

## 2025-07-16 PROCEDURE — 3017F COLORECTAL CA SCREEN DOC REV: CPT | Performed by: NURSE PRACTITIONER

## 2025-07-16 PROCEDURE — 4004F PT TOBACCO SCREEN RCVD TLK: CPT | Performed by: NURSE PRACTITIONER

## 2025-07-16 PROCEDURE — 51798 US URINE CAPACITY MEASURE: CPT | Performed by: NURSE PRACTITIONER

## 2025-07-16 PROCEDURE — 99214 OFFICE O/P EST MOD 30 MIN: CPT | Performed by: NURSE PRACTITIONER

## 2025-07-16 PROCEDURE — 81002 URINALYSIS NONAUTO W/O SCOPE: CPT | Performed by: NURSE PRACTITIONER

## 2025-07-16 PROCEDURE — G8427 DOCREV CUR MEDS BY ELIG CLIN: HCPCS | Performed by: NURSE PRACTITIONER

## 2025-07-16 PROCEDURE — G8417 CALC BMI ABV UP PARAM F/U: HCPCS | Performed by: NURSE PRACTITIONER

## 2025-07-16 RX ORDER — TESTOSTERONE ENANTHATE 75 MG/.5ML
75 INJECTION SUBCUTANEOUS WEEKLY
Qty: 4 ADJUSTABLE DOSE PRE-FILLED PEN SYRINGE | Refills: 0 | Status: SHIPPED | OUTPATIENT
Start: 2025-07-16

## 2025-07-16 NOTE — PROGRESS NOTES
Caio Vogel is a 54 y.o. male who presents today   Chief Complaint   Patient presents with    Follow-up     I am here for a 6 month follow up  I had my labs done        Hypogonadism:  Patient is here today for symptoms of low testosterone which started 8 year(s) ago.  The patient's last testosterone level was:  Lab Results   Component Value Date    TESTOSTERONE 213.0 07/14/2025     Component  Ref Range & Units 3/20/25 1349   White Blood Cells  4.50 - 11.00 10*3/uL 5.5   Comment: WBC not corrected for nRBCs if present.   Red Blood Cells  4.50 - 5.90 10*6/uL 4.23 Low    Hemoglobin  13.5 - 17.5 g/dL 12.9 Low    Hematocrit  41.0 - 53.0 % 37.2 Low               Lab Results   Component Value Date     PSA 1.16 01/13/2025      Recently his hypogonadism symptoms: are worsening  Current medical Rx for hypogonadism: AndroGel 50 mg / 5 g 1% topically daily  His erections are present.  On Viagra  Difficulting maintaining erection?  Viagra 100 mg as needed  Sex drive/libido: Decreased  Energy level: Decreased    Patient continues to have worsening fatigue and libido.  He has been very persistent with his topical testosterone however he puts this on in the morning and sweats that off pretty quickly throughout the day.  He is unable to put this on at night as he may rub this off on his wife.  Therefore testosterone continues to be subtherapeutic.  He is attempted IM injections in the past and has been unable to give these to himself with a history of needle phobia.    BPH  Patient with history of BPH currently maintained on Flomax 0.4 mg daily and overall doing well.  AUA score today 9/35 Main complaints of frequency, intermittency, urgency, weak stream, nocturia x 1.  Denies any incomplete emptying or straining.  Bother score of 1, pleased with his overall quality life.       Patient has a history of small cell lymphoma, leukemia follows with outlying oncologist    Past Medical History:   Diagnosis Date    Anemia     Anxiety

## 2025-07-17 ASSESSMENT — ENCOUNTER SYMPTOMS
ABDOMINAL PAIN: 0
BACK PAIN: 0
NAUSEA: 0
ABDOMINAL DISTENTION: 0
VOMITING: 0

## 2025-08-04 DIAGNOSIS — E29.1 HYPOGONADISM MALE: ICD-10-CM

## 2025-08-05 DIAGNOSIS — N52.9 ERECTILE DYSFUNCTION, UNSPECIFIED ERECTILE DYSFUNCTION TYPE: ICD-10-CM

## 2025-08-05 RX ORDER — TESTOSTERONE GEL, 1% 10 MG/G
GEL TRANSDERMAL
Qty: 150 EACH | Refills: 0 | Status: SHIPPED | OUTPATIENT
Start: 2025-08-05 | End: 2025-09-05

## 2025-08-05 RX ORDER — SILDENAFIL 50 MG/1
TABLET, FILM COATED ORAL
Qty: 30 TABLET | Refills: 1 | Status: SHIPPED | OUTPATIENT
Start: 2025-08-05

## 2025-08-19 ENCOUNTER — OFFICE VISIT (OUTPATIENT)
Dept: UROLOGY | Age: 55
End: 2025-08-19
Payer: MEDICAID

## 2025-08-19 VITALS — BODY MASS INDEX: 28.79 KG/M2 | WEIGHT: 183.4 LBS | HEIGHT: 67 IN | TEMPERATURE: 99 F

## 2025-08-19 DIAGNOSIS — E29.1 HYPOGONADISM IN MALE: Primary | ICD-10-CM

## 2025-08-19 PROCEDURE — 3017F COLORECTAL CA SCREEN DOC REV: CPT | Performed by: NURSE PRACTITIONER

## 2025-08-19 PROCEDURE — 99214 OFFICE O/P EST MOD 30 MIN: CPT | Performed by: NURSE PRACTITIONER

## 2025-08-19 PROCEDURE — G8427 DOCREV CUR MEDS BY ELIG CLIN: HCPCS | Performed by: NURSE PRACTITIONER

## 2025-08-19 PROCEDURE — 4004F PT TOBACCO SCREEN RCVD TLK: CPT | Performed by: NURSE PRACTITIONER

## 2025-08-19 PROCEDURE — G8417 CALC BMI ABV UP PARAM F/U: HCPCS | Performed by: NURSE PRACTITIONER

## 2025-08-19 PROCEDURE — 81002 URINALYSIS NONAUTO W/O SCOPE: CPT | Performed by: NURSE PRACTITIONER

## 2025-08-19 RX ORDER — NEEDLES, DISPOSABLE 25GX5/8"
1 NEEDLE, DISPOSABLE MISCELLANEOUS WEEKLY
Qty: 10 EACH | Refills: 3 | Status: SHIPPED | OUTPATIENT
Start: 2025-08-19 | End: 2025-09-18

## 2025-08-19 RX ORDER — TESTOSTERONE CYPIONATE 200 MG/ML
200 INJECTION, SOLUTION INTRAMUSCULAR ONCE
Status: COMPLETED | OUTPATIENT
Start: 2025-08-19 | End: 2025-08-19

## 2025-08-19 RX ORDER — TESTOSTERONE CYPIONATE 200 MG/ML
1 VIAL (ML) INTRAMUSCULAR
Qty: 2 ML | Refills: 0 | Status: SHIPPED | OUTPATIENT
Start: 2025-08-19 | End: 2025-09-18

## 2025-08-19 RX ADMIN — TESTOSTERONE CYPIONATE 200 MG: 200 INJECTION, SOLUTION INTRAMUSCULAR at 15:21

## 2025-08-20 ASSESSMENT — ENCOUNTER SYMPTOMS
NAUSEA: 0
ABDOMINAL PAIN: 0
VOMITING: 0
BACK PAIN: 0
ABDOMINAL DISTENTION: 0

## (undated) DEVICE — FORCEPS BX L240CM JAW DIA2.4MM ORNG L CAP W/ NDL DISP RAD

## (undated) DEVICE — ENDO KIT,LOURDES HOSPITAL: Brand: MEDLINE INDUSTRIES, INC.